# Patient Record
Sex: FEMALE | Race: WHITE | NOT HISPANIC OR LATINO
[De-identification: names, ages, dates, MRNs, and addresses within clinical notes are randomized per-mention and may not be internally consistent; named-entity substitution may affect disease eponyms.]

---

## 2017-01-03 ENCOUNTER — RESULT CHARGE (OUTPATIENT)
Age: 82
End: 2017-01-03

## 2017-01-03 ENCOUNTER — APPOINTMENT (OUTPATIENT)
Dept: CARDIOLOGY | Facility: CLINIC | Age: 82
End: 2017-01-03

## 2017-01-03 VITALS — OXYGEN SATURATION: 91 % | HEART RATE: 59 BPM

## 2017-01-03 LAB
INR PPP: 1.9 RATIO
POCT-PROTHROMBIN TIME: 23 SECS
QUALITY CONTROL: YES

## 2017-01-08 ENCOUNTER — RX RENEWAL (OUTPATIENT)
Age: 82
End: 2017-01-08

## 2017-01-10 ENCOUNTER — APPOINTMENT (OUTPATIENT)
Dept: MAMMOGRAPHY | Facility: HOSPITAL | Age: 82
End: 2017-01-10

## 2017-01-10 ENCOUNTER — OUTPATIENT (OUTPATIENT)
Dept: OUTPATIENT SERVICES | Facility: HOSPITAL | Age: 82
LOS: 1 days | End: 2017-01-10
Payer: COMMERCIAL

## 2017-01-10 DIAGNOSIS — Z12.31 ENCOUNTER FOR SCREENING MAMMOGRAM FOR MALIGNANT NEOPLASM OF BREAST: ICD-10-CM

## 2017-01-10 PROCEDURE — G0202: CPT | Mod: 26,52,LT

## 2017-01-10 PROCEDURE — 77067 SCR MAMMO BI INCL CAD: CPT

## 2017-01-10 PROCEDURE — 77063 BREAST TOMOSYNTHESIS BI: CPT

## 2017-01-10 PROCEDURE — 77063 BREAST TOMOSYNTHESIS BI: CPT | Mod: 26,52

## 2017-01-19 ENCOUNTER — APPOINTMENT (OUTPATIENT)
Dept: CARDIOLOGY | Facility: CLINIC | Age: 82
End: 2017-01-19

## 2017-01-19 VITALS — DIASTOLIC BLOOD PRESSURE: 90 MMHG | OXYGEN SATURATION: 93 % | HEART RATE: 68 BPM | SYSTOLIC BLOOD PRESSURE: 124 MMHG

## 2017-01-20 LAB
INR PPP: 1.9 RATIO
POCT-PROTHROMBIN TIME: 22.7 SECS
QUALITY CONTROL: YES

## 2017-01-30 ENCOUNTER — APPOINTMENT (OUTPATIENT)
Dept: CARDIOLOGY | Facility: CLINIC | Age: 82
End: 2017-01-30

## 2017-02-07 ENCOUNTER — APPOINTMENT (OUTPATIENT)
Dept: CARDIOLOGY | Facility: CLINIC | Age: 82
End: 2017-02-07

## 2017-02-07 VITALS — DIASTOLIC BLOOD PRESSURE: 56 MMHG | HEART RATE: 76 BPM | SYSTOLIC BLOOD PRESSURE: 124 MMHG | OXYGEN SATURATION: 90 %

## 2017-02-07 LAB
INR PPP: 2.4 RATIO
POCT-PROTHROMBIN TIME: 28.9 SECS
QUALITY CONTROL: YES

## 2017-02-16 ENCOUNTER — APPOINTMENT (OUTPATIENT)
Dept: CARDIOLOGY | Facility: CLINIC | Age: 82
End: 2017-02-16

## 2017-02-16 ENCOUNTER — NON-APPOINTMENT (OUTPATIENT)
Age: 82
End: 2017-02-16

## 2017-02-16 VITALS — SYSTOLIC BLOOD PRESSURE: 134 MMHG | HEART RATE: 80 BPM | DIASTOLIC BLOOD PRESSURE: 80 MMHG

## 2017-02-16 VITALS
WEIGHT: 223 LBS | HEART RATE: 75 BPM | OXYGEN SATURATION: 97 % | HEIGHT: 59 IN | SYSTOLIC BLOOD PRESSURE: 164 MMHG | BODY MASS INDEX: 44.96 KG/M2 | DIASTOLIC BLOOD PRESSURE: 107 MMHG | RESPIRATION RATE: 15 BRPM

## 2017-02-17 LAB
INR PPP: 2.8 RATIO
POCT-PROTHROMBIN TIME: 33.7 SECS
QUALITY CONTROL: YES

## 2017-02-21 ENCOUNTER — RX RENEWAL (OUTPATIENT)
Age: 82
End: 2017-02-21

## 2017-02-23 ENCOUNTER — APPOINTMENT (OUTPATIENT)
Dept: INTERNAL MEDICINE | Facility: CLINIC | Age: 82
End: 2017-02-23
Payer: COMMERCIAL

## 2017-02-23 VITALS
TEMPERATURE: 98.4 F | HEIGHT: 59 IN | RESPIRATION RATE: 14 BRPM | DIASTOLIC BLOOD PRESSURE: 78 MMHG | BODY MASS INDEX: 44.55 KG/M2 | HEART RATE: 76 BPM | WEIGHT: 221 LBS | SYSTOLIC BLOOD PRESSURE: 128 MMHG

## 2017-02-23 PROCEDURE — 99215 OFFICE O/P EST HI 40 MIN: CPT

## 2017-02-28 ENCOUNTER — APPOINTMENT (OUTPATIENT)
Dept: CARDIOLOGY | Facility: CLINIC | Age: 82
End: 2017-02-28

## 2017-03-09 ENCOUNTER — APPOINTMENT (OUTPATIENT)
Dept: CARDIOLOGY | Facility: CLINIC | Age: 82
End: 2017-03-09

## 2017-03-09 LAB
INR PPP: 2.1 RATIO
POCT-PROTHROMBIN TIME: 24.9 SECS
QUALITY CONTROL: YES

## 2017-03-30 ENCOUNTER — RX RENEWAL (OUTPATIENT)
Age: 82
End: 2017-03-30

## 2017-03-30 ENCOUNTER — APPOINTMENT (OUTPATIENT)
Dept: CARDIOLOGY | Facility: CLINIC | Age: 82
End: 2017-03-30

## 2017-03-30 VITALS — DIASTOLIC BLOOD PRESSURE: 74 MMHG | HEART RATE: 65 BPM | SYSTOLIC BLOOD PRESSURE: 140 MMHG

## 2017-03-30 LAB
INR PPP: 3 RATIO
POCT-PROTHROMBIN TIME: 36 SECS
QUALITY CONTROL: YES

## 2017-04-08 ENCOUNTER — RX RENEWAL (OUTPATIENT)
Age: 82
End: 2017-04-08

## 2017-04-14 ENCOUNTER — RX RENEWAL (OUTPATIENT)
Age: 82
End: 2017-04-14

## 2017-04-24 ENCOUNTER — RX RENEWAL (OUTPATIENT)
Age: 82
End: 2017-04-24

## 2017-04-27 ENCOUNTER — APPOINTMENT (OUTPATIENT)
Dept: CARDIOLOGY | Facility: CLINIC | Age: 82
End: 2017-04-27

## 2017-04-27 VITALS — OXYGEN SATURATION: 94 % | HEART RATE: 66 BPM

## 2017-04-27 LAB
INR PPP: 2.4 RATIO
POCT-PROTHROMBIN TIME: 29.3 SECS
QUALITY CONTROL: YES

## 2017-05-01 ENCOUNTER — RX RENEWAL (OUTPATIENT)
Age: 82
End: 2017-05-01

## 2017-05-10 ENCOUNTER — RX RENEWAL (OUTPATIENT)
Age: 82
End: 2017-05-10

## 2017-05-18 ENCOUNTER — APPOINTMENT (OUTPATIENT)
Dept: CARDIOLOGY | Facility: CLINIC | Age: 82
End: 2017-05-18

## 2017-05-18 VITALS — SYSTOLIC BLOOD PRESSURE: 137 MMHG | DIASTOLIC BLOOD PRESSURE: 77 MMHG | HEART RATE: 59 BPM | OXYGEN SATURATION: 91 %

## 2017-05-18 LAB
INR PPP: 1.5 RATIO
POCT-PROTHROMBIN TIME: 18.5 SECS
QUALITY CONTROL: YES

## 2017-05-24 ENCOUNTER — APPOINTMENT (OUTPATIENT)
Dept: CARDIOLOGY | Facility: CLINIC | Age: 82
End: 2017-05-24

## 2017-05-24 VITALS — OXYGEN SATURATION: 94 % | HEART RATE: 61 BPM

## 2017-05-24 LAB
INR PPP: 2.8 RATIO
POCT-PROTHROMBIN TIME: 33.2 SECS
QUALITY CONTROL: YES

## 2017-05-25 ENCOUNTER — LABORATORY RESULT (OUTPATIENT)
Age: 82
End: 2017-05-25

## 2017-05-25 ENCOUNTER — APPOINTMENT (OUTPATIENT)
Dept: INTERNAL MEDICINE | Facility: CLINIC | Age: 82
End: 2017-05-25

## 2017-05-25 VITALS
HEART RATE: 64 BPM | BODY MASS INDEX: 44.76 KG/M2 | HEIGHT: 59 IN | DIASTOLIC BLOOD PRESSURE: 74 MMHG | SYSTOLIC BLOOD PRESSURE: 132 MMHG | RESPIRATION RATE: 14 BRPM | WEIGHT: 222 LBS

## 2017-05-25 RX ORDER — MECLIZINE HYDROCHLORIDE 12.5 MG/1
12.5 TABLET ORAL
Qty: 30 | Refills: 0 | Status: DISCONTINUED | COMMUNITY
Start: 2017-01-08 | End: 2017-05-25

## 2017-06-13 ENCOUNTER — RESULT CHARGE (OUTPATIENT)
Age: 82
End: 2017-06-13

## 2017-06-14 ENCOUNTER — NON-APPOINTMENT (OUTPATIENT)
Age: 82
End: 2017-06-14

## 2017-06-14 ENCOUNTER — APPOINTMENT (OUTPATIENT)
Dept: CARDIOLOGY | Facility: CLINIC | Age: 82
End: 2017-06-14

## 2017-06-14 VITALS
OXYGEN SATURATION: 96 % | SYSTOLIC BLOOD PRESSURE: 156 MMHG | HEIGHT: 59 IN | HEART RATE: 75 BPM | BODY MASS INDEX: 43.34 KG/M2 | WEIGHT: 215 LBS | DIASTOLIC BLOOD PRESSURE: 104 MMHG | RESPIRATION RATE: 15 BRPM

## 2017-06-14 VITALS — DIASTOLIC BLOOD PRESSURE: 80 MMHG | SYSTOLIC BLOOD PRESSURE: 120 MMHG | HEART RATE: 76 BPM

## 2017-06-14 LAB
INR PPP: 3.5 RATIO
POCT-PROTHROMBIN TIME: 41.8 SECS
QUALITY CONTROL: YES

## 2017-06-27 ENCOUNTER — APPOINTMENT (OUTPATIENT)
Dept: CARDIOLOGY | Facility: CLINIC | Age: 82
End: 2017-06-27

## 2017-06-29 ENCOUNTER — APPOINTMENT (OUTPATIENT)
Dept: CARDIOLOGY | Facility: CLINIC | Age: 82
End: 2017-06-29
Payer: COMMERCIAL

## 2017-06-29 VITALS — OXYGEN SATURATION: 92 % | HEART RATE: 69 BPM

## 2017-06-29 LAB
INR PPP: 2.4 RATIO
POCT-PROTHROMBIN TIME: 28.8 SECS
QUALITY CONTROL: YES

## 2017-06-29 PROCEDURE — 85610 PROTHROMBIN TIME: CPT | Mod: QW

## 2017-06-29 PROCEDURE — 99211 OFF/OP EST MAY X REQ PHY/QHP: CPT

## 2017-07-01 ENCOUNTER — RX RENEWAL (OUTPATIENT)
Age: 82
End: 2017-07-01

## 2017-07-06 ENCOUNTER — MEDICATION RENEWAL (OUTPATIENT)
Age: 82
End: 2017-07-06

## 2017-07-18 ENCOUNTER — APPOINTMENT (OUTPATIENT)
Dept: CARDIOLOGY | Facility: CLINIC | Age: 82
End: 2017-07-18
Payer: COMMERCIAL

## 2017-07-18 LAB
INR PPP: 2.5 RATIO
POCT-PROTHROMBIN TIME: 30.5 SECS
QUALITY CONTROL: YES

## 2017-07-18 PROCEDURE — 99211 OFF/OP EST MAY X REQ PHY/QHP: CPT

## 2017-07-18 PROCEDURE — 85610 PROTHROMBIN TIME: CPT | Mod: QW

## 2017-08-08 ENCOUNTER — APPOINTMENT (OUTPATIENT)
Dept: CARDIOLOGY | Facility: CLINIC | Age: 82
End: 2017-08-08
Payer: COMMERCIAL

## 2017-08-08 VITALS — DIASTOLIC BLOOD PRESSURE: 85 MMHG | OXYGEN SATURATION: 96 % | SYSTOLIC BLOOD PRESSURE: 130 MMHG

## 2017-08-08 LAB
INR PPP: 2.8 RATIO
POCT-PROTHROMBIN TIME: 33.6 SECS
QUALITY CONTROL: YES

## 2017-08-08 PROCEDURE — 85610 PROTHROMBIN TIME: CPT | Mod: QW

## 2017-08-08 PROCEDURE — 99211 OFF/OP EST MAY X REQ PHY/QHP: CPT

## 2017-08-24 ENCOUNTER — APPOINTMENT (OUTPATIENT)
Dept: INTERNAL MEDICINE | Facility: CLINIC | Age: 82
End: 2017-08-24
Payer: COMMERCIAL

## 2017-08-24 VITALS
HEIGHT: 59 IN | SYSTOLIC BLOOD PRESSURE: 132 MMHG | WEIGHT: 216 LBS | DIASTOLIC BLOOD PRESSURE: 80 MMHG | BODY MASS INDEX: 43.55 KG/M2 | RESPIRATION RATE: 14 BRPM | HEART RATE: 70 BPM

## 2017-08-24 PROCEDURE — 99215 OFFICE O/P EST HI 40 MIN: CPT

## 2017-08-24 RX ORDER — METOPROLOL TARTRATE 25 MG/1
25 TABLET, FILM COATED ORAL
Refills: 0 | Status: DISCONTINUED | COMMUNITY
Start: 2017-08-24 | End: 2017-08-24

## 2017-08-29 ENCOUNTER — APPOINTMENT (OUTPATIENT)
Dept: CARDIOLOGY | Facility: CLINIC | Age: 82
End: 2017-08-29
Payer: COMMERCIAL

## 2017-08-29 LAB
INR PPP: 2.4 RATIO
POCT-PROTHROMBIN TIME: 29.1 SECS
QUALITY CONTROL: YES

## 2017-08-29 PROCEDURE — 85610 PROTHROMBIN TIME: CPT | Mod: QW

## 2017-08-29 PROCEDURE — 99211 OFF/OP EST MAY X REQ PHY/QHP: CPT

## 2017-09-26 ENCOUNTER — APPOINTMENT (OUTPATIENT)
Dept: CARDIOLOGY | Facility: CLINIC | Age: 82
End: 2017-09-26
Payer: COMMERCIAL

## 2017-09-26 VITALS — DIASTOLIC BLOOD PRESSURE: 67 MMHG | OXYGEN SATURATION: 96 % | SYSTOLIC BLOOD PRESSURE: 130 MMHG | HEART RATE: 59 BPM

## 2017-09-26 LAB
INR PPP: 2.6 RATIO
POCT-PROTHROMBIN TIME: 30.6 SECS
QUALITY CONTROL: YES

## 2017-09-26 PROCEDURE — 99211 OFF/OP EST MAY X REQ PHY/QHP: CPT

## 2017-09-26 PROCEDURE — 85610 PROTHROMBIN TIME: CPT | Mod: QW

## 2017-10-02 ENCOUNTER — RX RENEWAL (OUTPATIENT)
Age: 82
End: 2017-10-02

## 2017-10-03 ENCOUNTER — RX RENEWAL (OUTPATIENT)
Age: 82
End: 2017-10-03

## 2017-10-17 ENCOUNTER — APPOINTMENT (OUTPATIENT)
Dept: CARDIOLOGY | Facility: CLINIC | Age: 82
End: 2017-10-17
Payer: COMMERCIAL

## 2017-10-17 ENCOUNTER — NON-APPOINTMENT (OUTPATIENT)
Age: 82
End: 2017-10-17

## 2017-10-17 VITALS
WEIGHT: 219 LBS | HEIGHT: 59 IN | SYSTOLIC BLOOD PRESSURE: 124 MMHG | RESPIRATION RATE: 16 BRPM | BODY MASS INDEX: 44.15 KG/M2 | DIASTOLIC BLOOD PRESSURE: 86 MMHG | OXYGEN SATURATION: 94 % | HEART RATE: 64 BPM

## 2017-10-17 VITALS — DIASTOLIC BLOOD PRESSURE: 80 MMHG | SYSTOLIC BLOOD PRESSURE: 130 MMHG

## 2017-10-17 PROCEDURE — 99214 OFFICE O/P EST MOD 30 MIN: CPT

## 2017-10-17 PROCEDURE — 93000 ELECTROCARDIOGRAM COMPLETE: CPT

## 2017-10-17 RX ORDER — METOPROLOL TARTRATE 50 MG/1
50 TABLET, FILM COATED ORAL EVERY MORNING
Refills: 0 | Status: DISCONTINUED | COMMUNITY
Start: 2017-08-24 | End: 2017-10-17

## 2017-10-24 ENCOUNTER — APPOINTMENT (OUTPATIENT)
Dept: CARDIOLOGY | Facility: CLINIC | Age: 82
End: 2017-10-24
Payer: COMMERCIAL

## 2017-10-24 VITALS — OXYGEN SATURATION: 93 % | HEART RATE: 67 BPM | SYSTOLIC BLOOD PRESSURE: 145 MMHG | DIASTOLIC BLOOD PRESSURE: 84 MMHG

## 2017-10-24 LAB
INR PPP: 2.8 RATIO
POCT-PROTHROMBIN TIME: 34.1 SECS
QUALITY CONTROL: YES

## 2017-10-24 PROCEDURE — 85610 PROTHROMBIN TIME: CPT | Mod: QW

## 2017-10-24 PROCEDURE — 99211 OFF/OP EST MAY X REQ PHY/QHP: CPT

## 2017-11-03 ENCOUNTER — RX RENEWAL (OUTPATIENT)
Age: 82
End: 2017-11-03

## 2017-11-14 ENCOUNTER — NON-APPOINTMENT (OUTPATIENT)
Age: 82
End: 2017-11-14

## 2017-11-14 ENCOUNTER — APPOINTMENT (OUTPATIENT)
Dept: CARDIOLOGY | Facility: CLINIC | Age: 82
End: 2017-11-14
Payer: COMMERCIAL

## 2017-11-14 VITALS — DIASTOLIC BLOOD PRESSURE: 86 MMHG | OXYGEN SATURATION: 93 % | SYSTOLIC BLOOD PRESSURE: 137 MMHG | HEART RATE: 57 BPM

## 2017-11-14 VITALS
RESPIRATION RATE: 16 BRPM | DIASTOLIC BLOOD PRESSURE: 87 MMHG | SYSTOLIC BLOOD PRESSURE: 138 MMHG | HEIGHT: 59 IN | BODY MASS INDEX: 43.95 KG/M2 | HEART RATE: 68 BPM | WEIGHT: 218 LBS | OXYGEN SATURATION: 93 %

## 2017-11-14 VITALS — SYSTOLIC BLOOD PRESSURE: 108 MMHG | HEART RATE: 56 BPM | DIASTOLIC BLOOD PRESSURE: 80 MMHG

## 2017-11-14 LAB
INR PPP: 2.7 RATIO
POCT-PROTHROMBIN TIME: 32 SECS
QUALITY CONTROL: YES

## 2017-11-14 PROCEDURE — 93000 ELECTROCARDIOGRAM COMPLETE: CPT

## 2017-11-14 PROCEDURE — 99214 OFFICE O/P EST MOD 30 MIN: CPT

## 2017-11-14 PROCEDURE — 85610 PROTHROMBIN TIME: CPT | Mod: QW

## 2017-11-17 ENCOUNTER — RX RENEWAL (OUTPATIENT)
Age: 82
End: 2017-11-17

## 2017-11-21 ENCOUNTER — APPOINTMENT (OUTPATIENT)
Dept: INTERNAL MEDICINE | Facility: CLINIC | Age: 82
End: 2017-11-21
Payer: COMMERCIAL

## 2017-11-21 VITALS
TEMPERATURE: 98.4 F | WEIGHT: 210 LBS | BODY MASS INDEX: 42.33 KG/M2 | RESPIRATION RATE: 15 BRPM | DIASTOLIC BLOOD PRESSURE: 62 MMHG | SYSTOLIC BLOOD PRESSURE: 112 MMHG | HEIGHT: 59 IN | HEART RATE: 60 BPM

## 2017-11-21 DIAGNOSIS — Z23 ENCOUNTER FOR IMMUNIZATION: ICD-10-CM

## 2017-11-21 PROCEDURE — 90686 IIV4 VACC NO PRSV 0.5 ML IM: CPT

## 2017-11-21 PROCEDURE — G0008: CPT

## 2017-11-21 PROCEDURE — 99215 OFFICE O/P EST HI 40 MIN: CPT | Mod: 25

## 2017-12-12 ENCOUNTER — APPOINTMENT (OUTPATIENT)
Dept: CARDIOLOGY | Facility: CLINIC | Age: 82
End: 2017-12-12
Payer: COMMERCIAL

## 2017-12-12 VITALS — DIASTOLIC BLOOD PRESSURE: 84 MMHG | HEART RATE: 72 BPM | OXYGEN SATURATION: 91 % | SYSTOLIC BLOOD PRESSURE: 132 MMHG

## 2017-12-12 LAB
INR PPP: 2.4 RATIO
POCT-PROTHROMBIN TIME: 29.1 SECS
QUALITY CONTROL: YES

## 2017-12-12 PROCEDURE — 99211 OFF/OP EST MAY X REQ PHY/QHP: CPT

## 2017-12-12 PROCEDURE — 85610 PROTHROMBIN TIME: CPT | Mod: QW

## 2017-12-15 ENCOUNTER — MEDICATION RENEWAL (OUTPATIENT)
Age: 82
End: 2017-12-15

## 2017-12-26 ENCOUNTER — RX RENEWAL (OUTPATIENT)
Age: 82
End: 2017-12-26

## 2018-01-09 ENCOUNTER — APPOINTMENT (OUTPATIENT)
Dept: CARDIOLOGY | Facility: CLINIC | Age: 83
End: 2018-01-09
Payer: COMMERCIAL

## 2018-01-09 VITALS — OXYGEN SATURATION: 91 % | SYSTOLIC BLOOD PRESSURE: 143 MMHG | HEART RATE: 62 BPM | DIASTOLIC BLOOD PRESSURE: 80 MMHG

## 2018-01-09 LAB
INR PPP: 2.3 RATIO
POCT-PROTHROMBIN TIME: 27.2 SECS
QUALITY CONTROL: YES

## 2018-01-09 PROCEDURE — 99211 OFF/OP EST MAY X REQ PHY/QHP: CPT

## 2018-01-09 PROCEDURE — 85610 PROTHROMBIN TIME: CPT | Mod: QW

## 2018-01-18 ENCOUNTER — RX RENEWAL (OUTPATIENT)
Age: 83
End: 2018-01-18

## 2018-02-04 ENCOUNTER — RX RENEWAL (OUTPATIENT)
Age: 83
End: 2018-02-04

## 2018-02-05 ENCOUNTER — MEDICATION RENEWAL (OUTPATIENT)
Age: 83
End: 2018-02-05

## 2018-02-06 ENCOUNTER — APPOINTMENT (OUTPATIENT)
Dept: CARDIOLOGY | Facility: CLINIC | Age: 83
End: 2018-02-06
Payer: COMMERCIAL

## 2018-02-06 VITALS — OXYGEN SATURATION: 93 % | HEART RATE: 53 BPM

## 2018-02-06 LAB
INR PPP: 2.3 RATIO
POCT-PROTHROMBIN TIME: 27.2 SECS
QUALITY CONTROL: YES

## 2018-02-06 PROCEDURE — 85610 PROTHROMBIN TIME: CPT | Mod: QW

## 2018-02-06 PROCEDURE — 99211 OFF/OP EST MAY X REQ PHY/QHP: CPT

## 2018-02-13 ENCOUNTER — NON-APPOINTMENT (OUTPATIENT)
Age: 83
End: 2018-02-13

## 2018-02-13 ENCOUNTER — APPOINTMENT (OUTPATIENT)
Dept: CARDIOLOGY | Facility: CLINIC | Age: 83
End: 2018-02-13
Payer: COMMERCIAL

## 2018-02-13 VITALS — SYSTOLIC BLOOD PRESSURE: 130 MMHG | HEART RATE: 70 BPM | DIASTOLIC BLOOD PRESSURE: 70 MMHG

## 2018-02-13 VITALS
BODY MASS INDEX: 44.15 KG/M2 | RESPIRATION RATE: 16 BRPM | SYSTOLIC BLOOD PRESSURE: 137 MMHG | DIASTOLIC BLOOD PRESSURE: 86 MMHG | OXYGEN SATURATION: 94 % | WEIGHT: 219 LBS | HEART RATE: 70 BPM | HEIGHT: 59 IN

## 2018-02-13 PROCEDURE — 93000 ELECTROCARDIOGRAM COMPLETE: CPT

## 2018-02-13 PROCEDURE — 99215 OFFICE O/P EST HI 40 MIN: CPT

## 2018-02-20 ENCOUNTER — APPOINTMENT (OUTPATIENT)
Dept: INTERNAL MEDICINE | Facility: CLINIC | Age: 83
End: 2018-02-20
Payer: COMMERCIAL

## 2018-02-20 VITALS
WEIGHT: 210 LBS | SYSTOLIC BLOOD PRESSURE: 130 MMHG | HEIGHT: 59 IN | DIASTOLIC BLOOD PRESSURE: 72 MMHG | BODY MASS INDEX: 42.33 KG/M2 | RESPIRATION RATE: 15 BRPM | HEART RATE: 68 BPM

## 2018-02-20 PROCEDURE — 99215 OFFICE O/P EST HI 40 MIN: CPT

## 2018-02-22 ENCOUNTER — RX RENEWAL (OUTPATIENT)
Age: 83
End: 2018-02-22

## 2018-02-24 ENCOUNTER — RX RENEWAL (OUTPATIENT)
Age: 83
End: 2018-02-24

## 2018-03-06 ENCOUNTER — APPOINTMENT (OUTPATIENT)
Dept: CARDIOLOGY | Facility: CLINIC | Age: 83
End: 2018-03-06
Payer: COMMERCIAL

## 2018-03-06 VITALS — OXYGEN SATURATION: 92 % | HEART RATE: 54 BPM

## 2018-03-06 LAB
INR PPP: 3.3 RATIO
POCT-PROTHROMBIN TIME: 39.7 SECS
QUALITY CONTROL: YES

## 2018-03-06 PROCEDURE — 85610 PROTHROMBIN TIME: CPT | Mod: QW

## 2018-03-06 PROCEDURE — 99211 OFF/OP EST MAY X REQ PHY/QHP: CPT

## 2018-03-20 ENCOUNTER — APPOINTMENT (OUTPATIENT)
Dept: CARDIOLOGY | Facility: CLINIC | Age: 83
End: 2018-03-20
Payer: COMMERCIAL

## 2018-03-20 VITALS — RESPIRATION RATE: 14 BRPM | HEART RATE: 71 BPM

## 2018-03-20 LAB
INR PPP: 1.6 RATIO
POCT-PROTHROMBIN TIME: 19.5 SECS
QUALITY CONTROL: YES

## 2018-03-20 PROCEDURE — 99211 OFF/OP EST MAY X REQ PHY/QHP: CPT

## 2018-03-20 PROCEDURE — 85610 PROTHROMBIN TIME: CPT | Mod: QW

## 2018-04-03 ENCOUNTER — APPOINTMENT (OUTPATIENT)
Dept: CARDIOLOGY | Facility: CLINIC | Age: 83
End: 2018-04-03
Payer: COMMERCIAL

## 2018-04-03 VITALS — DIASTOLIC BLOOD PRESSURE: 72 MMHG | HEART RATE: 46 BPM | OXYGEN SATURATION: 89 % | SYSTOLIC BLOOD PRESSURE: 134 MMHG

## 2018-04-03 LAB
INR PPP: 2.1 RATIO
POCT-PROTHROMBIN TIME: 25.6 SECS
QUALITY CONTROL: YES

## 2018-04-03 PROCEDURE — 99211 OFF/OP EST MAY X REQ PHY/QHP: CPT

## 2018-04-03 PROCEDURE — 85610 PROTHROMBIN TIME: CPT | Mod: QW

## 2018-04-16 ENCOUNTER — MEDICATION RENEWAL (OUTPATIENT)
Age: 83
End: 2018-04-16

## 2018-04-18 ENCOUNTER — RX RENEWAL (OUTPATIENT)
Age: 83
End: 2018-04-18

## 2018-05-01 ENCOUNTER — APPOINTMENT (OUTPATIENT)
Dept: CARDIOLOGY | Facility: CLINIC | Age: 83
End: 2018-05-01
Payer: MEDICARE

## 2018-05-01 VITALS — OXYGEN SATURATION: 92 % | HEART RATE: 51 BPM

## 2018-05-01 LAB
INR PPP: 2.2 RATIO
POCT-PROTHROMBIN TIME: 26.9 SECS
QUALITY CONTROL: YES

## 2018-05-01 PROCEDURE — 99211 OFF/OP EST MAY X REQ PHY/QHP: CPT

## 2018-05-01 PROCEDURE — 85610 PROTHROMBIN TIME: CPT | Mod: QW

## 2018-05-08 ENCOUNTER — RX RENEWAL (OUTPATIENT)
Age: 83
End: 2018-05-08

## 2018-05-09 ENCOUNTER — NON-APPOINTMENT (OUTPATIENT)
Age: 83
End: 2018-05-09

## 2018-05-09 ENCOUNTER — APPOINTMENT (OUTPATIENT)
Dept: CARDIOLOGY | Facility: CLINIC | Age: 83
End: 2018-05-09
Payer: MEDICARE

## 2018-05-09 VITALS
SYSTOLIC BLOOD PRESSURE: 176 MMHG | OXYGEN SATURATION: 98 % | HEART RATE: 70 BPM | HEIGHT: 59 IN | WEIGHT: 215 LBS | RESPIRATION RATE: 16 BRPM | DIASTOLIC BLOOD PRESSURE: 75 MMHG | BODY MASS INDEX: 43.34 KG/M2

## 2018-05-09 VITALS — SYSTOLIC BLOOD PRESSURE: 130 MMHG | HEART RATE: 69 BPM | DIASTOLIC BLOOD PRESSURE: 80 MMHG

## 2018-05-09 PROCEDURE — 93306 TTE W/DOPPLER COMPLETE: CPT

## 2018-05-09 PROCEDURE — 99214 OFFICE O/P EST MOD 30 MIN: CPT

## 2018-05-09 PROCEDURE — 93000 ELECTROCARDIOGRAM COMPLETE: CPT

## 2018-05-15 ENCOUNTER — APPOINTMENT (OUTPATIENT)
Dept: INTERNAL MEDICINE | Facility: CLINIC | Age: 83
End: 2018-05-15

## 2018-05-21 LAB
25(OH)D3 SERPL-MCNC: 24.8 NG/ML
ALBUMIN SERPL ELPH-MCNC: 4.1 G/DL
ALP BLD-CCNC: 148 U/L
ALT SERPL-CCNC: 11 U/L
ANION GAP SERPL CALC-SCNC: 15 MMOL/L
APPEARANCE: CLEAR
AST SERPL-CCNC: 18 U/L
BASOPHILS # BLD AUTO: 0.01 K/UL
BASOPHILS NFR BLD AUTO: 0.2 %
BILIRUB SERPL-MCNC: 0.8 MG/DL
BILIRUBIN URINE: NEGATIVE
BLOOD URINE: NEGATIVE
BUN SERPL-MCNC: 21 MG/DL
CALCIUM SERPL-MCNC: 9.9 MG/DL
CHLORIDE SERPL-SCNC: 102 MMOL/L
CHOLEST SERPL-MCNC: 140 MG/DL
CHOLEST/HDLC SERPL: 2.7 RATIO
CO2 SERPL-SCNC: 26 MMOL/L
COLOR: YELLOW
CREAT SERPL-MCNC: 1.19 MG/DL
CRP SERPL HS-MCNC: 3.9 MG/L
EOSINOPHIL # BLD AUTO: 0.13 K/UL
EOSINOPHIL NFR BLD AUTO: 2 %
GLUCOSE BS SERPL-MCNC: 96 MG/DL
GLUCOSE QUALITATIVE U: NEGATIVE MG/DL
GLUCOSE SERPL-MCNC: 96 MG/DL
HBA1C MFR BLD HPLC: 5.8 %
HCT VFR BLD CALC: 40 %
HDLC SERPL-MCNC: 52 MG/DL
HGB BLD-MCNC: 12.6 G/DL
IMM GRANULOCYTES NFR BLD AUTO: 0.3 %
KETONES URINE: NEGATIVE
LDLC SERPL CALC-MCNC: 65 MG/DL
LEUKOCYTE ESTERASE URINE: NEGATIVE
LYMPHOCYTES # BLD AUTO: 0.92 K/UL
LYMPHOCYTES NFR BLD AUTO: 14.4 %
MAN DIFF?: NORMAL
MCHC RBC-ENTMCNC: 29.8 PG
MCHC RBC-ENTMCNC: 31.5 GM/DL
MCV RBC AUTO: 94.6 FL
MONOCYTES # BLD AUTO: 0.37 K/UL
MONOCYTES NFR BLD AUTO: 5.8 %
NEUTROPHILS # BLD AUTO: 4.92 K/UL
NEUTROPHILS NFR BLD AUTO: 77.3 %
NITRITE URINE: NEGATIVE
PH URINE: 5.5
PLATELET # BLD AUTO: 201 K/UL
POTASSIUM SERPL-SCNC: 3.9 MMOL/L
PROT SERPL-MCNC: 8.4 G/DL
PROTEIN URINE: NEGATIVE MG/DL
RBC # BLD: 4.23 M/UL
RBC # FLD: 15.4 %
SODIUM SERPL-SCNC: 143 MMOL/L
SPECIFIC GRAVITY URINE: 1.01
T4 FREE SERPL-MCNC: 1.1 NG/DL
TRIGL SERPL-MCNC: 114 MG/DL
TSH SERPL-ACNC: 3.32 UIU/ML
UROBILINOGEN URINE: NEGATIVE MG/DL
VIT B12 SERPL-MCNC: 351 PG/ML
WBC # FLD AUTO: 6.37 K/UL

## 2018-05-29 ENCOUNTER — APPOINTMENT (OUTPATIENT)
Dept: CARDIOLOGY | Facility: CLINIC | Age: 83
End: 2018-05-29
Payer: MEDICARE

## 2018-05-29 VITALS — OXYGEN SATURATION: 90 % | HEART RATE: 62 BPM | SYSTOLIC BLOOD PRESSURE: 138 MMHG | DIASTOLIC BLOOD PRESSURE: 79 MMHG

## 2018-05-29 LAB
INR PPP: 1.8 RATIO
POCT-PROTHROMBIN TIME: 22.1 SECS
QUALITY CONTROL: YES

## 2018-05-29 PROCEDURE — 85610 PROTHROMBIN TIME: CPT | Mod: QW

## 2018-05-29 PROCEDURE — 99211 OFF/OP EST MAY X REQ PHY/QHP: CPT

## 2018-05-30 ENCOUNTER — APPOINTMENT (OUTPATIENT)
Dept: MAMMOGRAPHY | Facility: HOSPITAL | Age: 83
End: 2018-05-30
Payer: MEDICARE

## 2018-05-30 ENCOUNTER — OUTPATIENT (OUTPATIENT)
Dept: OUTPATIENT SERVICES | Facility: HOSPITAL | Age: 83
LOS: 1 days | End: 2018-05-30
Payer: MEDICARE

## 2018-05-30 DIAGNOSIS — Z00.8 ENCOUNTER FOR OTHER GENERAL EXAMINATION: ICD-10-CM

## 2018-05-30 PROCEDURE — 77067 SCR MAMMO BI INCL CAD: CPT

## 2018-05-30 PROCEDURE — 77063 BREAST TOMOSYNTHESIS BI: CPT | Mod: 26,52

## 2018-05-30 PROCEDURE — 77063 BREAST TOMOSYNTHESIS BI: CPT

## 2018-05-30 PROCEDURE — 77067 SCR MAMMO BI INCL CAD: CPT | Mod: 26,52,LT

## 2018-06-05 ENCOUNTER — RX RENEWAL (OUTPATIENT)
Age: 83
End: 2018-06-05

## 2018-06-12 ENCOUNTER — APPOINTMENT (OUTPATIENT)
Dept: CARDIOLOGY | Facility: CLINIC | Age: 83
End: 2018-06-12
Payer: MEDICARE

## 2018-06-12 LAB
INR PPP: 2.6 RATIO
POCT-PROTHROMBIN TIME: 31.7 SECS
QUALITY CONTROL: YES

## 2018-06-12 PROCEDURE — 99211 OFF/OP EST MAY X REQ PHY/QHP: CPT

## 2018-06-12 PROCEDURE — 85610 PROTHROMBIN TIME: CPT | Mod: QW

## 2018-06-13 ENCOUNTER — APPOINTMENT (OUTPATIENT)
Dept: INTERNAL MEDICINE | Facility: CLINIC | Age: 83
End: 2018-06-13
Payer: MEDICARE

## 2018-06-13 VITALS — DIASTOLIC BLOOD PRESSURE: 82 MMHG | SYSTOLIC BLOOD PRESSURE: 128 MMHG | HEART RATE: 66 BPM | RESPIRATION RATE: 15 BRPM

## 2018-06-13 VITALS — BODY MASS INDEX: 42.94 KG/M2 | WEIGHT: 213 LBS | HEIGHT: 59 IN

## 2018-06-13 PROCEDURE — 99215 OFFICE O/P EST HI 40 MIN: CPT

## 2018-06-13 NOTE — HEALTH RISK ASSESSMENT
[No falls in past year] : Patient reported no falls in the past year [0] : 2) Feeling down, depressed, or hopeless: Not at all (0) [[id number]] : 0

## 2018-06-13 NOTE — PHYSICAL EXAM
[No Acute Distress] : no acute distress [Well Nourished] : well nourished [Well Developed] : well developed [Well-Appearing] : well-appearing [Normal Voice/Communication] : normal voice/communication [Normal Sclera/Conjunctiva] : normal sclera/conjunctiva [PERRL] : pupils equal round and reactive to light [EOMI] : extraocular movements intact [Normal Outer Ear/Nose] : the outer ears and nose were normal in appearance [Normal Oropharynx] : the oropharynx was normal [Normal TMs] : both tympanic membranes were normal [Normal Nasal Mucosa] : the nasal mucosa was normal [No JVD] : no jugular venous distention [Supple] : supple [No Lymphadenopathy] : no lymphadenopathy [Thyroid Normal, No Nodules] : the thyroid was normal and there were no nodules present [No Respiratory Distress] : no respiratory distress  [Clear to Auscultation] : lungs were clear to auscultation bilaterally [No Accessory Muscle Use] : no accessory muscle use [Normal Percussion] : the chest was normal to percussion [Normal Rate] : normal rate  [Regular Rhythm] : with a regular rhythm [Normal S1, S2] : normal S1 and S2 [No Murmur] : no murmur heard [No Carotid Bruits] : no carotid bruits [No Abdominal Bruit] : a ~M bruit was not heard ~T in the abdomen [No Varicosities] : no varicosities [Pedal Pulses Present] : the pedal pulses are present [No Edema] : there was no peripheral edema [No Extremity Clubbing/Cyanosis] : no extremity clubbing/cyanosis [No Palpable Aorta] : no palpable aorta [Declined Breast Exam] : declined breast exam  [Soft] : abdomen soft [Non Tender] : non-tender [Non-distended] : non-distended [No Masses] : no abdominal mass palpated [No HSM] : no HSM [Normal Bowel Sounds] : normal bowel sounds [No Hernias] : no hernias [Declined Rectal Exam] : declined rectal exam [Normal Supraclavicular Nodes] : no supraclavicular lymphadenopathy [Normal Axillary Nodes] : no axillary lymphadenopathy [Normal Posterior Cervical Nodes] : no posterior cervical lymphadenopathy [Normal Femoral Nodes] : no femoral lymphadenopathy [Normal Anterior Cervical Nodes] : no anterior cervical lymphadenopathy [Normal Inguinal Nodes] : no inguinal lymphadenopathy [No CVA Tenderness] : no CVA  tenderness [No Spinal Tenderness] : no spinal tenderness [No Joint Swelling] : no joint swelling [Grossly Normal Strength/Tone] : grossly normal strength/tone [No Rash] : no rash [No Skin Lesions] : no skin lesions [Normal Gait] : normal gait [Coordination Grossly Intact] : coordination grossly intact [No Focal Deficits] : no focal deficits [Deep Tendon Reflexes (DTR)] : deep tendon reflexes were 2+ and symmetric [Speech Grossly Normal] : speech grossly normal [Memory Grossly Normal] : memory grossly normal [Normal Affect] : the affect was normal [Normal Mood] : the mood was normal [Normal Insight/Judgement] : insight and judgment were intact [Kyphosis] : no kyphosis [Scoliosis] : no scoliosis [Acne] : no acne

## 2018-06-13 NOTE — REVIEW OF SYSTEMS
[Fever] : no fever [Chills] : no chills [Fatigue] : no fatigue [Hot Flashes] : no hot flashes [Night Sweats] : no night sweats [Recent Change In Weight] : ~T no recent weight change [Discharge] : no discharge [Pain] : no pain [Redness] : no redness [Dryness] : no dryness  [Vision Problems] : no vision problems [Itching] : no itching [Earache] : no earache [Hearing Loss] : no hearing loss [Nosebleed] : no nosebleeds [Hoarseness] : no hoarseness [Nasal Discharge] : no nasal discharge [Sore Throat] : no sore throat [Postnasal Drip] : no postnasal drip [Chest Pain] : no chest pain [Palpitations] : no palpitations [Leg Claudication] : no leg claudication [Orthopnea] : no orthopnea [Paroysmal Nocturnal Dyspnea] : no paroysmal nocturnal dyspnea [Shortness Of Breath] : no shortness of breath [Wheezing] : no wheezing [Cough] : no cough [Dyspnea on Exertion] : no dyspnea on exertion [Abdominal Pain] : no abdominal pain [Nausea] : no nausea [Constipation] : no constipation [Diarrhea] : diarrhea [Vomiting] : no vomiting [Heartburn] : no heartburn [Melena] : no melena [Dysuria] : no dysuria [Incontinence] : no incontinence [Nocturia] : nocturia [Poor Libido] : libido not poor [Hematuria] : no hematuria [Frequency] : frequency [Vaginal Discharge] : no vaginal discharge [Dysmenorrhea] : no dysmenorrhea [Joint Pain] : no joint pain [Joint Stiffness] : joint stiffness [Joint Swelling] : no joint swelling [Muscle Weakness] : no muscle weakness [Muscle Pain] : no muscle pain [Back Pain] : back pain [Mole Changes] : no mole changes [Nail Changes] : no nail changes [Hair Changes] : no hair changes [Skin Rash] : no skin rash [Headache] : no headache [Dizziness] : no dizziness [Fainting] : no fainting [Confusion] : no confusion [Memory Loss] : no memory loss [Unsteady Walking] : no ataxia [Suicidal] : not suicidal [Insomnia] : no insomnia [Anxiety] : no anxiety [Depression] : no depression [Easy Bleeding] : no easy bleeding [Easy Bruising] : no easy bruising [Swollen Glands] : no swollen glands [Negative] : Heme/Lymph

## 2018-06-13 NOTE — HISTORY OF PRESENT ILLNESS
[FreeTextEntry1] : Comes to the office for followup to review medications discuss her overall health...Her INRs are being followed by Dr. Wesbter.Her last complete blood tests were in May of 2018 [de-identified] : -83year-old woman comes to the office for followup with a history of atrial fibrillation chronic allergies hypertension gout hyperlipidemia hypothyroidism chronic anticoagulation left bundle branch block obesity. She has been feeling fatigued denies temperature chills sweats or myalgias he has had no chest pain shortness of breath exertional dyspnea palpitations lightheadedness vertigo dizziness or syncope she denies abdominal pain nausea vomiting diarrhea constipation rectal bleeding she has seen no black stools she has chronic pains her back occasionally knees and hips but nothing severe her appetite has been good and her weight has been high and stable

## 2018-06-13 NOTE — ASSESSMENT
[FreeTextEntry1] : Physical exam shows a well-developed female in no acute distress her blood pressure was 128/82 her weight was 213 pounds 4 foot 11 inches her BMI was 43.0... her heart rate was 66 and her respiratory rate was 15. HEENT was unremarkable chest was clear cardiovascular exam was regular abdomen was soft and nontender extremities showed no clubbing cyanosis or edema neurologic exam was nonfocal...... blood tests from May were reviewed again a slip was given for shingles vaccine is up-to-date on all other vaccines she is up-to-date on her ophthalmologist and dermatologist and I have reinforced her need to go to a gynecologist for an exam of her uterus and ovaries on discussion with her about her weight and if her quality of life she is improved tremendously but losing some weight I gave her organization names and ideas as to how to achieve that she'll ever since says that she doesn't know if she has the strength or energy to do it.

## 2018-06-14 DIAGNOSIS — Z12.31 ENCOUNTER FOR SCREENING MAMMOGRAM FOR MALIGNANT NEOPLASM OF BREAST: ICD-10-CM

## 2018-07-03 ENCOUNTER — APPOINTMENT (OUTPATIENT)
Dept: CARDIOLOGY | Facility: CLINIC | Age: 83
End: 2018-07-03
Payer: MEDICARE

## 2018-07-03 VITALS — SYSTOLIC BLOOD PRESSURE: 143 MMHG | DIASTOLIC BLOOD PRESSURE: 78 MMHG | OXYGEN SATURATION: 94 % | HEART RATE: 58 BPM

## 2018-07-03 LAB
INR PPP: 2.4 RATIO
POCT-PROTHROMBIN TIME: 29.3 SECS
QUALITY CONTROL: YES

## 2018-07-03 PROCEDURE — 93793 ANTICOAG MGMT PT WARFARIN: CPT

## 2018-07-03 PROCEDURE — 85610 PROTHROMBIN TIME: CPT | Mod: QW

## 2018-07-05 ENCOUNTER — RX RENEWAL (OUTPATIENT)
Age: 83
End: 2018-07-05

## 2018-07-27 ENCOUNTER — MEDICATION RENEWAL (OUTPATIENT)
Age: 83
End: 2018-07-27

## 2018-07-31 ENCOUNTER — APPOINTMENT (OUTPATIENT)
Dept: CARDIOLOGY | Facility: CLINIC | Age: 83
End: 2018-07-31
Payer: MEDICARE

## 2018-07-31 VITALS — HEART RATE: 57 BPM | OXYGEN SATURATION: 96 %

## 2018-07-31 LAB
INR PPP: 2.5 RATIO
POCT-PROTHROMBIN TIME: 30.1 SECS
QUALITY CONTROL: YES

## 2018-07-31 PROCEDURE — 93793 ANTICOAG MGMT PT WARFARIN: CPT

## 2018-07-31 PROCEDURE — 85610 PROTHROMBIN TIME: CPT | Mod: QW

## 2018-08-02 ENCOUNTER — RX RENEWAL (OUTPATIENT)
Age: 83
End: 2018-08-02

## 2018-08-09 ENCOUNTER — RX RENEWAL (OUTPATIENT)
Age: 83
End: 2018-08-09

## 2018-08-14 ENCOUNTER — APPOINTMENT (OUTPATIENT)
Dept: CARDIOLOGY | Facility: CLINIC | Age: 83
End: 2018-08-14
Payer: MEDICARE

## 2018-08-14 VITALS — OXYGEN SATURATION: 94 % | HEART RATE: 56 BPM

## 2018-08-14 LAB
INR PPP: 2.9 RATIO
POCT-PROTHROMBIN TIME: 35 SECS
QUALITY CONTROL: YES

## 2018-08-14 PROCEDURE — 93793 ANTICOAG MGMT PT WARFARIN: CPT

## 2018-08-14 PROCEDURE — 85610 PROTHROMBIN TIME: CPT | Mod: QW

## 2018-09-12 ENCOUNTER — NON-APPOINTMENT (OUTPATIENT)
Age: 83
End: 2018-09-12

## 2018-09-12 ENCOUNTER — APPOINTMENT (OUTPATIENT)
Dept: CARDIOLOGY | Facility: CLINIC | Age: 83
End: 2018-09-12
Payer: MEDICARE

## 2018-09-12 VITALS
DIASTOLIC BLOOD PRESSURE: 106 MMHG | HEIGHT: 55 IN | BODY MASS INDEX: 49.3 KG/M2 | SYSTOLIC BLOOD PRESSURE: 178 MMHG | WEIGHT: 213 LBS | OXYGEN SATURATION: 96 % | HEART RATE: 70 BPM

## 2018-09-12 VITALS — DIASTOLIC BLOOD PRESSURE: 80 MMHG | SYSTOLIC BLOOD PRESSURE: 140 MMHG

## 2018-09-12 VITALS — HEART RATE: 60 BPM | DIASTOLIC BLOOD PRESSURE: 80 MMHG | SYSTOLIC BLOOD PRESSURE: 150 MMHG

## 2018-09-12 LAB
INR PPP: 2.7 RATIO
POCT-PROTHROMBIN TIME: 32.5 SECS
QUALITY CONTROL: YES

## 2018-09-12 PROCEDURE — 99214 OFFICE O/P EST MOD 30 MIN: CPT

## 2018-09-12 PROCEDURE — 93000 ELECTROCARDIOGRAM COMPLETE: CPT

## 2018-09-12 PROCEDURE — 85610 PROTHROMBIN TIME: CPT | Mod: QW

## 2018-10-11 ENCOUNTER — APPOINTMENT (OUTPATIENT)
Dept: CARDIOLOGY | Facility: CLINIC | Age: 83
End: 2018-10-11
Payer: MEDICARE

## 2018-10-11 VITALS — HEART RATE: 58 BPM | DIASTOLIC BLOOD PRESSURE: 72 MMHG | SYSTOLIC BLOOD PRESSURE: 132 MMHG

## 2018-10-11 LAB
INR PPP: 2.6 RATIO
POCT-PROTHROMBIN TIME: 31.1 SECS
QUALITY CONTROL: YES

## 2018-10-11 PROCEDURE — 85610 PROTHROMBIN TIME: CPT | Mod: QW

## 2018-10-11 PROCEDURE — 93793 ANTICOAG MGMT PT WARFARIN: CPT

## 2018-10-17 ENCOUNTER — APPOINTMENT (OUTPATIENT)
Dept: INTERNAL MEDICINE | Facility: CLINIC | Age: 83
End: 2018-10-17
Payer: MEDICARE

## 2018-10-17 VITALS
HEIGHT: 59 IN | OXYGEN SATURATION: 97 % | DIASTOLIC BLOOD PRESSURE: 62 MMHG | TEMPERATURE: 97.7 F | HEART RATE: 72 BPM | BODY MASS INDEX: 42.33 KG/M2 | SYSTOLIC BLOOD PRESSURE: 155 MMHG | RESPIRATION RATE: 16 BRPM | WEIGHT: 210 LBS

## 2018-10-17 DIAGNOSIS — M10.9 GOUT, UNSPECIFIED: ICD-10-CM

## 2018-10-17 PROCEDURE — G0008: CPT

## 2018-10-17 PROCEDURE — 99215 OFFICE O/P EST HI 40 MIN: CPT | Mod: 25

## 2018-10-17 PROCEDURE — 90662 IIV NO PRSV INCREASED AG IM: CPT

## 2018-10-17 NOTE — PHYSICAL EXAM
[No Acute Distress] : no acute distress [Well Nourished] : well nourished [Well Developed] : well developed [Well-Appearing] : well-appearing [Normal Voice/Communication] : normal voice/communication [Normal Sclera/Conjunctiva] : normal sclera/conjunctiva [PERRL] : pupils equal round and reactive to light [EOMI] : extraocular movements intact [Normal Outer Ear/Nose] : the outer ears and nose were normal in appearance [Normal Oropharynx] : the oropharynx was normal [Normal TMs] : both tympanic membranes were normal [Normal Nasal Mucosa] : the nasal mucosa was normal [No JVD] : no jugular venous distention [Supple] : supple [No Lymphadenopathy] : no lymphadenopathy [Thyroid Normal, No Nodules] : the thyroid was normal and there were no nodules present [No Respiratory Distress] : no respiratory distress  [Clear to Auscultation] : lungs were clear to auscultation bilaterally [No Accessory Muscle Use] : no accessory muscle use [Normal Percussion] : the chest was normal to percussion [Normal Rate] : normal rate  [Regular Rhythm] : with a regular rhythm [Normal S1, S2] : normal S1 and S2 [No Murmur] : no murmur heard [No Carotid Bruits] : no carotid bruits [No Abdominal Bruit] : a ~M bruit was not heard ~T in the abdomen [No Varicosities] : no varicosities [Pedal Pulses Present] : the pedal pulses are present [No Edema] : there was no peripheral edema [No Extremity Clubbing/Cyanosis] : no extremity clubbing/cyanosis [No Palpable Aorta] : no palpable aorta [Declined Breast Exam] : declined breast exam  [Soft] : abdomen soft [Non Tender] : non-tender [Non-distended] : non-distended [No Masses] : no abdominal mass palpated [No HSM] : no HSM [Normal Bowel Sounds] : normal bowel sounds [No Hernias] : no hernias [Declined Rectal Exam] : declined rectal exam [Normal Supraclavicular Nodes] : no supraclavicular lymphadenopathy [Normal Axillary Nodes] : no axillary lymphadenopathy [Normal Posterior Cervical Nodes] : no posterior cervical lymphadenopathy [Normal Femoral Nodes] : no femoral lymphadenopathy [Normal Anterior Cervical Nodes] : no anterior cervical lymphadenopathy [Normal Inguinal Nodes] : no inguinal lymphadenopathy [No CVA Tenderness] : no CVA  tenderness [No Spinal Tenderness] : no spinal tenderness [Kyphosis] : no kyphosis [Scoliosis] : no scoliosis [No Joint Swelling] : no joint swelling [Grossly Normal Strength/Tone] : grossly normal strength/tone [No Rash] : no rash [Normal Gait] : normal gait [Coordination Grossly Intact] : coordination grossly intact [No Focal Deficits] : no focal deficits [Deep Tendon Reflexes (DTR)] : deep tendon reflexes were 2+ and symmetric [Speech Grossly Normal] : speech grossly normal [Memory Grossly Normal] : memory grossly normal [Normal Affect] : the affect was normal [Alert and Oriented x3] : oriented to person, place, and time [Normal Mood] : the mood was normal [Normal Insight/Judgement] : insight and judgment were intact

## 2018-10-17 NOTE — HISTORY OF PRESENT ILLNESS
[Family Member] : family member [FreeTextEntry1] : Comes to the office for followup company by her daughter to review her medications discuss her overall health recent problems with lower back pain and memory [de-identified] : 83-year-old woman comes to the office accompanied by her daughter with a history of hypertension atrial fibrillation hyperlipidemia hypothyroidism gout and chronic back pain 4 followup and to receive the influenza vaccine they bring up an issue with her memory that they have noticed the past months. She has been overloaded emotionally recently because of a hospitalization of her  she also has been housebound because of her bad back and has not been having a lot of social interaction she denies temperature chills sweats or myalgias she said no headache sinus congestion sore throat cough wheezing chest pain pleurisy shortness of breath exertional dyspnea lightheadedness palpitations dizziness vertigo or syncope she denies abdominal pain nausea vomiting diarrhea constipation bright red blood per rectum or rectal bleeding . Her eating habits have been sporadic and her weight however is stable

## 2018-10-17 NOTE — ASSESSMENT
[FreeTextEntry1] : Physical exam shows a well-developed female in no acute distress blood pressure was 155/62 repeated 138/68 height is 4 foot 11 weight 210 pounds heart rate is 72 respiratory rate of 16 HEENT was unremarkable chest was clear cardiovascular exam normal S1 and S2 with no extra heart sounds or murmurs abdomen was soft extremities showed chronic left greater than right lower extremity edema neurologic exam was nonfocal high-dose influenza vaccine was administered long discussion with the patient concerning her memory was referred to Dr. Armstrong neurologist for followup valuation I have suggested that she do memory puzzles and games every morning and to get involves socializing with people whether this is [facility] etc. he has been under a lot of stress recently because of the health of her  full blood tests have recently been done they will be forwarded to Dr. Armstrong she is up-to-date with the ophthalmologist and dermatologist

## 2018-10-17 NOTE — HEALTH RISK ASSESSMENT
[No falls in past year] : Patient reported no falls in the past year [0] : 2) Feeling down, depressed, or hopeless: Not at all (0) [[id number]] : 0

## 2018-10-17 NOTE — REVIEW OF SYSTEMS
[Negative] : Heme/Lymph [Fever] : no fever [Chills] : no chills [Fatigue] : no fatigue [Hot Flashes] : no hot flashes [Night Sweats] : no night sweats [Recent Change In Weight] : ~T no recent weight change [Discharge] : no discharge [Pain] : no pain [Redness] : no redness [Dryness] : no dryness  [Vision Problems] : no vision problems [Itching] : no itching [Earache] : no earache [Hearing Loss] : no hearing loss [Nosebleed] : no nosebleeds [Hoarseness] : no hoarseness [Nasal Discharge] : no nasal discharge [Sore Throat] : no sore throat [Postnasal Drip] : postnasal drip [Chest Pain] : no chest pain [Palpitations] : no palpitations [Leg Claudication] : no leg claudication [Lower Ext Edema] : no lower extremity edema [Orthopnea] : no orthopnea [Paroysmal Nocturnal Dyspnea] : no paroysmal nocturnal dyspnea [Shortness Of Breath] : no shortness of breath [Wheezing] : no wheezing [Cough] : no cough [Dyspnea on Exertion] : no dyspnea on exertion [Abdominal Pain] : no abdominal pain [Nausea] : no nausea [Constipation] : no constipation [Diarrhea] : diarrhea [Vomiting] : no vomiting [Heartburn] : no heartburn [Melena] : no melena [Dysuria] : no dysuria [Incontinence] : no incontinence [Nocturia] : no nocturia [Poor Libido] : libido not poor [Hematuria] : no hematuria [Frequency] : no frequency [Vaginal Discharge] : no vaginal discharge [Dysmenorrhea] : no dysmenorrhea [Joint Pain] : no joint pain [Joint Stiffness] : no joint stiffness [Joint Swelling] : no joint swelling [Muscle Weakness] : no muscle weakness [Muscle Pain] : no muscle pain [Back Pain] : no back pain [Itching] : no itching [Mole Changes] : no mole changes [Nail Changes] : no nail changes [Hair Changes] : no hair changes [Skin Rash] : no skin rash [Headache] : no headache [Dizziness] : no dizziness [Fainting] : no fainting [Confusion] : confusion [Memory Loss] : memory loss [Unsteady Walking] : no ataxia [Suicidal] : not suicidal [Insomnia] : no insomnia [Anxiety] : anxiety [Depression] : no depression [Easy Bleeding] : no easy bleeding [Easy Bruising] : no easy bruising [Swollen Glands] : no swollen glands

## 2018-10-21 ENCOUNTER — RX RENEWAL (OUTPATIENT)
Age: 83
End: 2018-10-21

## 2018-10-22 ENCOUNTER — MEDICATION RENEWAL (OUTPATIENT)
Age: 83
End: 2018-10-22

## 2018-10-25 ENCOUNTER — RX RENEWAL (OUTPATIENT)
Age: 83
End: 2018-10-25

## 2018-11-08 ENCOUNTER — APPOINTMENT (OUTPATIENT)
Dept: CARDIOLOGY | Facility: CLINIC | Age: 83
End: 2018-11-08
Payer: MEDICARE

## 2018-11-08 VITALS — OXYGEN SATURATION: 95 % | HEART RATE: 57 BPM

## 2018-11-08 LAB
INR PPP: 2.5 RATIO
POCT-PROTHROMBIN TIME: 30.5 SECS
QUALITY CONTROL: YES

## 2018-11-08 PROCEDURE — 93793 ANTICOAG MGMT PT WARFARIN: CPT

## 2018-11-08 PROCEDURE — 85610 PROTHROMBIN TIME: CPT | Mod: QW

## 2018-11-23 ENCOUNTER — RX RENEWAL (OUTPATIENT)
Age: 83
End: 2018-11-23

## 2018-12-06 ENCOUNTER — APPOINTMENT (OUTPATIENT)
Dept: CARDIOLOGY | Facility: CLINIC | Age: 83
End: 2018-12-06
Payer: MEDICARE

## 2018-12-06 VITALS — SYSTOLIC BLOOD PRESSURE: 129 MMHG | HEART RATE: 56 BPM | OXYGEN SATURATION: 96 % | DIASTOLIC BLOOD PRESSURE: 83 MMHG

## 2018-12-06 LAB
INR PPP: 2.3 RATIO
POCT-PROTHROMBIN TIME: 27.6 SECS
QUALITY CONTROL: YES

## 2018-12-06 PROCEDURE — G0250: CPT

## 2018-12-06 PROCEDURE — 85610 PROTHROMBIN TIME: CPT | Mod: QW

## 2018-12-19 ENCOUNTER — APPOINTMENT (OUTPATIENT)
Dept: MRI IMAGING | Facility: HOSPITAL | Age: 83
End: 2018-12-19
Payer: MEDICARE

## 2018-12-19 ENCOUNTER — OUTPATIENT (OUTPATIENT)
Dept: OUTPATIENT SERVICES | Facility: HOSPITAL | Age: 83
LOS: 1 days | End: 2018-12-19
Payer: MEDICARE

## 2018-12-19 DIAGNOSIS — Z00.8 ENCOUNTER FOR OTHER GENERAL EXAMINATION: ICD-10-CM

## 2018-12-19 PROCEDURE — 70551 MRI BRAIN STEM W/O DYE: CPT | Mod: 26

## 2018-12-19 PROCEDURE — 70544 MR ANGIOGRAPHY HEAD W/O DYE: CPT

## 2018-12-19 PROCEDURE — 70547 MR ANGIOGRAPHY NECK W/O DYE: CPT | Mod: 26

## 2018-12-19 PROCEDURE — 70547 MR ANGIOGRAPHY NECK W/O DYE: CPT

## 2018-12-19 PROCEDURE — 70544 MR ANGIOGRAPHY HEAD W/O DYE: CPT | Mod: 26,59

## 2018-12-19 PROCEDURE — 70551 MRI BRAIN STEM W/O DYE: CPT

## 2019-01-03 ENCOUNTER — APPOINTMENT (OUTPATIENT)
Dept: CARDIOLOGY | Facility: CLINIC | Age: 84
End: 2019-01-03
Payer: MEDICARE

## 2019-01-03 VITALS — SYSTOLIC BLOOD PRESSURE: 119 MMHG | HEART RATE: 54 BPM | DIASTOLIC BLOOD PRESSURE: 72 MMHG | OXYGEN SATURATION: 93 %

## 2019-01-03 LAB
INR PPP: 3 RATIO
POCT-PROTHROMBIN TIME: 31.9 SECS
QUALITY CONTROL: YES

## 2019-01-03 PROCEDURE — 93793 ANTICOAG MGMT PT WARFARIN: CPT

## 2019-01-03 PROCEDURE — 85610 PROTHROMBIN TIME: CPT | Mod: QW

## 2019-01-14 ENCOUNTER — MEDICATION RENEWAL (OUTPATIENT)
Age: 84
End: 2019-01-14

## 2019-01-15 ENCOUNTER — RX RENEWAL (OUTPATIENT)
Age: 84
End: 2019-01-15

## 2019-01-16 ENCOUNTER — RX RENEWAL (OUTPATIENT)
Age: 84
End: 2019-01-16

## 2019-01-24 ENCOUNTER — APPOINTMENT (OUTPATIENT)
Dept: CARDIOLOGY | Facility: CLINIC | Age: 84
End: 2019-01-24

## 2019-01-29 ENCOUNTER — RX RENEWAL (OUTPATIENT)
Age: 84
End: 2019-01-29

## 2019-02-03 ENCOUNTER — RX RENEWAL (OUTPATIENT)
Age: 84
End: 2019-02-03

## 2019-02-07 ENCOUNTER — APPOINTMENT (OUTPATIENT)
Dept: CARDIOLOGY | Facility: CLINIC | Age: 84
End: 2019-02-07
Payer: MEDICARE

## 2019-02-07 VITALS — HEART RATE: 67 BPM | OXYGEN SATURATION: 93 % | DIASTOLIC BLOOD PRESSURE: 79 MMHG | SYSTOLIC BLOOD PRESSURE: 144 MMHG

## 2019-02-07 LAB
INR PPP: 3 RATIO
POCT-PROTHROMBIN TIME: 39 SECS
QUALITY CONTROL: YES

## 2019-02-07 PROCEDURE — 99211 OFF/OP EST MAY X REQ PHY/QHP: CPT | Mod: 25

## 2019-02-07 PROCEDURE — 85610 PROTHROMBIN TIME: CPT | Mod: QW

## 2019-03-07 ENCOUNTER — APPOINTMENT (OUTPATIENT)
Dept: CARDIOLOGY | Facility: CLINIC | Age: 84
End: 2019-03-07
Payer: MEDICARE

## 2019-03-07 VITALS — OXYGEN SATURATION: 96 % | HEART RATE: 67 BPM

## 2019-03-07 LAB
INR PPP: 2.2 RATIO
POCT-PROTHROMBIN TIME: 25.9 SECS
QUALITY CONTROL: YES

## 2019-03-07 PROCEDURE — 85610 PROTHROMBIN TIME: CPT | Mod: QW

## 2019-03-07 PROCEDURE — 99211 OFF/OP EST MAY X REQ PHY/QHP: CPT | Mod: 25

## 2019-04-02 ENCOUNTER — APPOINTMENT (OUTPATIENT)
Dept: CARDIOLOGY | Facility: CLINIC | Age: 84
End: 2019-04-02
Payer: MEDICARE

## 2019-04-02 LAB
INR PPP: 4.1 RATIO
POCT-PROTHROMBIN TIME: 48.8 SECS
QUALITY CONTROL: YES

## 2019-04-02 PROCEDURE — 85610 PROTHROMBIN TIME: CPT | Mod: QW

## 2019-04-02 PROCEDURE — 99211 OFF/OP EST MAY X REQ PHY/QHP: CPT | Mod: 25

## 2019-04-16 ENCOUNTER — APPOINTMENT (OUTPATIENT)
Dept: CARDIOLOGY | Facility: CLINIC | Age: 84
End: 2019-04-16

## 2019-04-25 RX ORDER — CANDESARTAN CILEXETIL 8 MG/1
8 TABLET ORAL DAILY
Qty: 90 | Refills: 1 | Status: DISCONTINUED | COMMUNITY
Start: 2019-04-24 | End: 2019-04-25

## 2019-04-25 RX ORDER — IRBESARTAN 150 MG/1
150 TABLET ORAL DAILY
Qty: 90 | Refills: 3 | Status: DISCONTINUED | COMMUNITY
Start: 2018-07-25 | End: 2019-04-25

## 2019-05-01 ENCOUNTER — RX RENEWAL (OUTPATIENT)
Age: 84
End: 2019-05-01

## 2019-05-06 ENCOUNTER — RX RENEWAL (OUTPATIENT)
Age: 84
End: 2019-05-06

## 2019-05-08 ENCOUNTER — LABORATORY RESULT (OUTPATIENT)
Age: 84
End: 2019-05-08

## 2019-07-16 ENCOUNTER — RX RENEWAL (OUTPATIENT)
Age: 84
End: 2019-07-16

## 2019-07-19 ENCOUNTER — LABORATORY RESULT (OUTPATIENT)
Age: 84
End: 2019-07-19

## 2019-08-06 ENCOUNTER — RX RENEWAL (OUTPATIENT)
Age: 84
End: 2019-08-06

## 2019-08-08 ENCOUNTER — APPOINTMENT (OUTPATIENT)
Dept: CARDIOLOGY | Facility: CLINIC | Age: 84
End: 2019-08-08
Payer: MEDICARE

## 2019-08-08 ENCOUNTER — NON-APPOINTMENT (OUTPATIENT)
Age: 84
End: 2019-08-08

## 2019-08-08 ENCOUNTER — APPOINTMENT (OUTPATIENT)
Dept: INTERNAL MEDICINE | Facility: CLINIC | Age: 84
End: 2019-08-08
Payer: MEDICARE

## 2019-08-08 VITALS
RESPIRATION RATE: 16 BRPM | HEIGHT: 59 IN | BODY MASS INDEX: 39.72 KG/M2 | HEART RATE: 65 BPM | OXYGEN SATURATION: 93 % | SYSTOLIC BLOOD PRESSURE: 152 MMHG | WEIGHT: 197 LBS | DIASTOLIC BLOOD PRESSURE: 89 MMHG

## 2019-08-08 VITALS
HEART RATE: 72 BPM | HEIGHT: 59 IN | TEMPERATURE: 98 F | OXYGEN SATURATION: 96 % | SYSTOLIC BLOOD PRESSURE: 140 MMHG | DIASTOLIC BLOOD PRESSURE: 80 MMHG | RESPIRATION RATE: 17 BRPM | WEIGHT: 196 LBS | BODY MASS INDEX: 39.51 KG/M2

## 2019-08-08 VITALS — SYSTOLIC BLOOD PRESSURE: 132 MMHG | DIASTOLIC BLOOD PRESSURE: 80 MMHG | HEART RATE: 72 BPM

## 2019-08-08 DIAGNOSIS — J30.9 ALLERGIC RHINITIS, UNSPECIFIED: ICD-10-CM

## 2019-08-08 DIAGNOSIS — Z00.00 ENCOUNTER FOR GENERAL ADULT MEDICAL EXAMINATION W/OUT ABNORMAL FINDINGS: ICD-10-CM

## 2019-08-08 PROCEDURE — 93000 ELECTROCARDIOGRAM COMPLETE: CPT

## 2019-08-08 PROCEDURE — 99215 OFFICE O/P EST HI 40 MIN: CPT

## 2019-08-08 PROCEDURE — 99214 OFFICE O/P EST MOD 30 MIN: CPT

## 2019-08-08 NOTE — ASSESSMENT
[FreeTextEntry1] : Physical exam shows an elderly woman in no acute distress blood pressure 140/80 height 4 feet 11 weight 196 pounds BMI 39.59 pulse is 72 respirations 17 HEENT was unremarkable chest was clear cardiovascular exam was irregularly irregular abdomen was soft extremities showed trace bilateral edema neurologic exam was nonfocal family encouraging use of memory stabilizing medications in view of her A. fib and heart disease Aricept is not a good choice and started her on Namenda 5 mg once a day at bedtime they are aware that this medicine does not improve memory stabilizes and slows its deterioration a slip was given for full blood tests including CBC comprehensive metabolic profile CRP hemoglobin A1c lipid profile thyroid profile urinalysis vitamins D3 and B12 and measles mumps and rubella antibodies she is up-to-date with her ophthalmologist a dermatologist and wishes to defer mammograms and colorectal cancer screening at this age

## 2019-08-08 NOTE — REASON FOR VISIT
[Atrial Fibrillation] : atrial fibrillation [Follow-Up - Clinic] : a clinic follow-up of [FreeTextEntry1] : Patient returns for followup. Feeling well. Offers no complaints of chest discomfort shortness of breath palpitations dizziness or syncope.\par  [Hypertension] : hypertension

## 2019-08-08 NOTE — HEALTH RISK ASSESSMENT
[No falls in past year] : Patient reported no falls in the past year [0] : 2) Feeling down, depressed, or hopeless: Not at all (0) [] : No [[id number]] : 0

## 2019-08-08 NOTE — ASSESSMENT
[FreeTextEntry1] : depression:\par 1. Chronic atrial fibrillation with moderate ventricular response \par 2. History of mitral and tricuspid insufficiency by echocardiography\par 3. hypertension well-controlled \par \par  Plan:\par 1.continue current medical regimen

## 2019-08-08 NOTE — PHYSICAL EXAM
[General Appearance - Well Developed] : well developed [Normal Appearance] : normal appearance [Well Groomed] : well groomed [General Appearance - Well Nourished] : well nourished [General Appearance - In No Acute Distress] : no acute distress [No Deformities] : no deformities [No Oral Pallor] : no oral pallor [Normal Oral Mucosa] : normal oral mucosa [Normal Jugular Venous A Waves Present] : normal jugular venous A waves present [No Oral Cyanosis] : no oral cyanosis [Normal Jugular Venous V Waves Present] : normal jugular venous V waves present [No Jugular Venous Gamez A Waves] : no jugular venous gamez A waves [Respiration, Rhythm And Depth] : normal respiratory rhythm and effort [Exaggerated Use Of Accessory Muscles For Inspiration] : no accessory muscle use [Auscultation Breath Sounds / Voice Sounds] : lungs were clear to auscultation bilaterally [Abdomen Soft] : soft [Abdomen Mass (___ Cm)] : no abdominal mass palpated [Abdomen Tenderness] : non-tender [Nail Clubbing] : no clubbing of the fingernails [Cyanosis, Localized] : no localized cyanosis [Petechial Hemorrhages (___cm)] : no petechial hemorrhages [Skin Color & Pigmentation] : normal skin color and pigmentation [FreeTextEntry1] : negative Homans sign [No Venous Stasis] : no venous stasis [] : no rash [No Xanthoma] : no  xanthoma was observed [No Skin Ulcers] : no skin ulcer [Skin Lesions] : no skin lesions [Oriented To Time, Place, And Person] : oriented to person, place, and time [Affect] : the affect was normal [Mood] : the mood was normal [No Anxiety] : not feeling anxious [5th Left ICS - MCL] : palpated at the 5th LICS in the midclavicular line [Normal] : normal [Normal Rate] : normal [No Murmur] : no murmurs heard [Irregularly Irregular] : irregularly irregular [Left Carotid Bruit] : no bruit heard over the left carotid [Right Femoral Bruit] : no bruit heard over the right femoral artery [Right Carotid Bruit] : no bruit heard over the right carotid [Left Femoral Bruit] : no bruit heard over the left femoral artery [No Abnormalities] : the abdominal aorta was not enlarged and no bruit was heard [Nonpitting Edema] : nonpitting edema present

## 2019-08-08 NOTE — HISTORY OF PRESENT ILLNESS
[Family Member] : family member [de-identified] : 84-year-old woman comes to the office accompanied by her daughter with a history of chronic atrial fibrillation on Coumadin hypertension hyperlipidemia hypothyroidism allergic rhinitis and mild cognitive impairment which was recently evaluated by a neurologist he denies temperature chills sweats or myalgias he's had no headache sinus congestion sore throat cough wheezing pleurisy chest pain shortness of breath exertional dyspnea lightheadedness palpitations dizziness vertigo or syncope she has had no abdominal pain nausea vomiting diarrhea or constipation bright red blood per rectum or black stools her appetite has been good her weight is stable she denies significant musculoskeletal complaints he does get up at night to urinate but denies dysuria or gross hematuria she has had no recent skin rashes [FreeTextEntry1] : Comes to the office for followup to review her medications and discuss her overall health recent issues with mild cognitive impairment

## 2019-08-08 NOTE — REVIEW OF SYSTEMS
[Postnasal Drip] : postnasal drip [Joint Stiffness] : joint stiffness [Confusion] : confusion [Memory Loss] : memory loss [Anxiety] : anxiety [Negative] : Heme/Lymph [Fever] : no fever [Chills] : no chills [Fatigue] : no fatigue [Night Sweats] : no night sweats [Hot Flashes] : no hot flashes [Recent Change In Weight] : ~T no recent weight change [Redness] : no redness [Pain] : no pain [Discharge] : no discharge [Vision Problems] : no vision problems [Dryness] : no dryness  [Itching] : no itching [Hearing Loss] : no hearing loss [Earache] : no earache [Nosebleed] : no nosebleeds [Hoarseness] : no hoarseness [Sore Throat] : no sore throat [Nasal Discharge] : no nasal discharge [Palpitations] : no palpitations [Chest Pain] : no chest pain [Lower Ext Edema] : no lower extremity edema [Leg Claudication] : no leg claudication [Orthopnea] : no orthopnea [Shortness Of Breath] : no shortness of breath [Paroysmal Nocturnal Dyspnea] : no paroysmal nocturnal dyspnea [Wheezing] : no wheezing [Cough] : no cough [Abdominal Pain] : no abdominal pain [Dyspnea on Exertion] : no dyspnea on exertion [Constipation] : no constipation [Nausea] : no nausea [Vomiting] : no vomiting [Diarrhea] : diarrhea [Melena] : no melena [Heartburn] : no heartburn [Incontinence] : no incontinence [Dysuria] : no dysuria [Nocturia] : no nocturia [Poor Libido] : libido not poor [Hematuria] : no hematuria [Frequency] : no frequency [Dysmenorrhea] : no dysmenorrhea [Vaginal Discharge] : no vaginal discharge [Joint Swelling] : no joint swelling [Joint Pain] : no joint pain [Back Pain] : no back pain [Muscle Pain] : no muscle pain [Muscle Weakness] : no muscle weakness [Nail Changes] : no nail changes [Mole Changes] : no mole changes [Hair Changes] : no hair changes [Skin Rash] : no skin rash [Headache] : no headache [Dizziness] : no dizziness [Fainting] : no fainting [Unsteady Walking] : no ataxia [Insomnia] : no insomnia [Suicidal] : not suicidal [Depression] : no depression [Easy Bleeding] : no easy bleeding [Easy Bruising] : no easy bruising [Swollen Glands] : no swollen glands

## 2019-08-08 NOTE — PHYSICAL EXAM
[No Acute Distress] : no acute distress [Well Nourished] : well nourished [Well Developed] : well developed [Well-Appearing] : well-appearing [Normal Sclera/Conjunctiva] : normal sclera/conjunctiva [PERRL] : pupils equal round and reactive to light [EOMI] : extraocular movements intact [Normal Outer Ear/Nose] : the outer ears and nose were normal in appearance [Normal Oropharynx] : the oropharynx was normal [No JVD] : no jugular venous distention [No Lymphadenopathy] : no lymphadenopathy [Thyroid Normal, No Nodules] : the thyroid was normal and there were no nodules present [Supple] : supple [No Respiratory Distress] : no respiratory distress  [No Accessory Muscle Use] : no accessory muscle use [Clear to Auscultation] : lungs were clear to auscultation bilaterally [Normal Rate] : normal rate  [Normal S1, S2] : normal S1 and S2 [Regular Rhythm] : with a regular rhythm [No Murmur] : no murmur heard [No Carotid Bruits] : no carotid bruits [No Varicosities] : no varicosities [No Abdominal Bruit] : a ~M bruit was not heard ~T in the abdomen [Pedal Pulses Present] : the pedal pulses are present [No Edema] : there was no peripheral edema [No Palpable Aorta] : no palpable aorta [No Extremity Clubbing/Cyanosis] : no extremity clubbing/cyanosis [Soft] : abdomen soft [Non Tender] : non-tender [Non-distended] : non-distended [No Masses] : no abdominal mass palpated [No HSM] : no HSM [Normal Bowel Sounds] : normal bowel sounds [Normal Posterior Cervical Nodes] : no posterior cervical lymphadenopathy [Normal Anterior Cervical Nodes] : no anterior cervical lymphadenopathy [No CVA Tenderness] : no CVA  tenderness [No Spinal Tenderness] : no spinal tenderness [No Joint Swelling] : no joint swelling [Grossly Normal Strength/Tone] : grossly normal strength/tone [No Rash] : no rash [Coordination Grossly Intact] : coordination grossly intact [No Focal Deficits] : no focal deficits [Normal Gait] : normal gait [Deep Tendon Reflexes (DTR)] : deep tendon reflexes were 2+ and symmetric [Normal Affect] : the affect was normal [Normal Insight/Judgement] : insight and judgment were intact

## 2019-08-12 ENCOUNTER — LABORATORY RESULT (OUTPATIENT)
Age: 84
End: 2019-08-12

## 2019-08-15 ENCOUNTER — LABORATORY RESULT (OUTPATIENT)
Age: 84
End: 2019-08-15

## 2019-08-21 LAB
25(OH)D3 SERPL-MCNC: 10.9 NG/ML
APPEARANCE: CLEAR
BASOPHILS # BLD AUTO: 0.01 K/UL
BASOPHILS NFR BLD AUTO: 0.1 %
BILIRUBIN URINE: NEGATIVE
BLOOD URINE: NEGATIVE
CHOLEST SERPL-MCNC: 121 MG/DL
CHOLEST/HDLC SERPL: 2.6 RATIO
COLOR: YELLOW
CRP SERPL HS-MCNC: 2.48 MG/L
EOSINOPHIL # BLD AUTO: 0.11 K/UL
EOSINOPHIL NFR BLD AUTO: 1.5 %
ESTIMATED AVERAGE GLUCOSE: 111 MG/DL
GLUCOSE BS SERPL-MCNC: 85 MG/DL
GLUCOSE QUALITATIVE U: NEGATIVE
HBA1C MFR BLD HPLC: 5.5 %
HCT VFR BLD CALC: 36 %
HDLC SERPL-MCNC: 47 MG/DL
HGB BLD-MCNC: 11.1 G/DL
IMM GRANULOCYTES NFR BLD AUTO: 0.3 %
KETONES URINE: NEGATIVE
LDLC SERPL CALC-MCNC: 47 MG/DL
LEUKOCYTE ESTERASE URINE: NEGATIVE
LYMPHOCYTES # BLD AUTO: 1.17 K/UL
LYMPHOCYTES NFR BLD AUTO: 16 %
MAN DIFF?: NORMAL
MCHC RBC-ENTMCNC: 29.1 PG
MCHC RBC-ENTMCNC: 30.8 GM/DL
MCV RBC AUTO: 94.5 FL
MEV IGG FLD QL IA: >300 AU/ML
MEV IGG+IGM SER-IMP: POSITIVE
MONOCYTES # BLD AUTO: 0.36 K/UL
MONOCYTES NFR BLD AUTO: 4.9 %
MUV AB SER-ACNC: POSITIVE
MUV IGG SER QL IA: >300 AU/ML
NEUTROPHILS # BLD AUTO: 5.65 K/UL
NEUTROPHILS NFR BLD AUTO: 77.2 %
NITRITE URINE: NEGATIVE
PH URINE: 5.5
PLATELET # BLD AUTO: 225 K/UL
PROTEIN URINE: NORMAL
RBC # BLD: 3.81 M/UL
RBC # FLD: 14.9 %
RUBV IGG FLD-ACNC: 25 INDEX
RUBV IGG SER-IMP: POSITIVE
SPECIFIC GRAVITY URINE: 1.01
T4 FREE SERPL-MCNC: 1.3 NG/DL
TRIGL SERPL-MCNC: 137 MG/DL
TSH SERPL-ACNC: 2.06 UIU/ML
UROBILINOGEN URINE: NORMAL
VIT B12 SERPL-MCNC: 293 PG/ML
WBC # FLD AUTO: 7.32 K/UL

## 2019-09-05 ENCOUNTER — LABORATORY RESULT (OUTPATIENT)
Age: 84
End: 2019-09-05

## 2019-09-26 ENCOUNTER — LABORATORY RESULT (OUTPATIENT)
Age: 84
End: 2019-09-26

## 2019-10-05 ENCOUNTER — EMERGENCY (EMERGENCY)
Facility: HOSPITAL | Age: 84
LOS: 1 days | Discharge: HOME | End: 2019-10-05
Attending: INTERNAL MEDICINE | Admitting: INTERNAL MEDICINE
Payer: MEDICARE

## 2019-10-05 VITALS
OXYGEN SATURATION: 98 % | TEMPERATURE: 98 F | DIASTOLIC BLOOD PRESSURE: 110 MMHG | SYSTOLIC BLOOD PRESSURE: 164 MMHG | HEIGHT: 62 IN | HEART RATE: 90 BPM | RESPIRATION RATE: 17 BRPM | WEIGHT: 179.9 LBS

## 2019-10-05 VITALS
HEART RATE: 68 BPM | DIASTOLIC BLOOD PRESSURE: 70 MMHG | OXYGEN SATURATION: 96 % | RESPIRATION RATE: 17 BRPM | TEMPERATURE: 98 F | SYSTOLIC BLOOD PRESSURE: 144 MMHG

## 2019-10-05 PROCEDURE — 70450 CT HEAD/BRAIN W/O DYE: CPT | Mod: 26

## 2019-10-05 PROCEDURE — 99284 EMERGENCY DEPT VISIT MOD MDM: CPT

## 2019-10-05 PROCEDURE — 70450 CT HEAD/BRAIN W/O DYE: CPT

## 2019-10-05 PROCEDURE — 99284 EMERGENCY DEPT VISIT MOD MDM: CPT | Mod: 25

## 2019-10-05 RX ORDER — ACETAMINOPHEN 500 MG
650 TABLET ORAL ONCE
Refills: 0 | Status: COMPLETED | OUTPATIENT
Start: 2019-10-05 | End: 2019-10-05

## 2019-10-05 RX ADMIN — Medication 650 MILLIGRAM(S): at 19:56

## 2019-10-05 NOTE — ED PROVIDER NOTE - PATIENT PORTAL LINK FT
You can access the FollowMyHealth Patient Portal offered by [facility] by registering at the following website: [email or web address]. By joining [employer]’s FollowMyHealth portal, you will also be able to view your health information using other applications (apps) compatible with our system.

## 2019-10-05 NOTE — ED PROVIDER NOTE - CHPI ED SYMPTOMS NEG
no vomiting/no confusion/no syncope/no weakness/no dizziness/no loss of consciousness/no seizure/no change in level of consciousness/no blurred vision/no nausea

## 2019-10-05 NOTE — ED PROVIDER NOTE - NSFOLLOWUPINSTRUCTIONS_ED_ALL_ED_FT
Concussion, Adult  ImageA concussion is a brain injury from a direct hit (blow) to the head or body. This injury causes the brain to shake quickly back and forth inside the skull. It is caused by:  A hit to the head.A quick and sudden movement (jolt) of the head or neck.How fast you will get better from a concussion depends on many things. Recovery can take time. It is important to wait to return to activity until a doctor says it is safe and your symptoms are all gone.  Follow these instructions at home:  Activity     Limit activities that need a lot of thought or concentration. You may need to talk with your  or teachers about this. Limit activities such as:  Homework or work for your job.Watching TV.Computer work.Playing memory games and puzzles.Rest. Rest helps the brain to heal. Make sure you:  Get plenty of sleep at night. Do not stay up late.Rest during the day. Take naps or rest breaks when you feel tired.Do not do activities that could cause a second concussion, such as riding a bike or playing sports. It can be dangerous if you get another concussion before the first one has healed.Ask your doctor when you can return to your normal activities, like driving, riding a bike, or using machinery. Your ability to react may be slower. Do not do these activities if you are dizzy. Your doctor will likely give you a plan for slowly going back to activities.General instructions     Take over-the-counter and prescription medicines only as told by your doctor.Do not drink alcohol until your doctor says you can.Watch your symptoms and tell other people to do the same. Other problems (complications) can happen after a concussion. Older adults with a brain injury may have a higher risk of serious problems, such as a blood clot in the brain.Tell your , teachers, school nurse, school counselor, , or  about your injury and symptoms. Tell them about what you can or cannot do. They should watch you for:  More problems with attention or concentration.More trouble remembering or learning new information.More time needed to do tasks or assignments.Being more annoyed (irritable) or having a harder time dealing with stress.Any other symptoms that get worse.Keep all follow-up visits as told by your doctor. This is important.Prevention     It is very important that you donot get another brain injury, especially before you have healed. In rare cases, another injury can cause permanent brain damage, brain swelling, or death. You have the most risk if you get another head injury in the first 7–10 days after you were hurt before. To avoid injuries:  Avoid activities that could make you get a second concussion, like contact sports.When you have returned to sports or activities:  Avoid plays or moves that can cause you to crash into another person. This is how most concussions happen.Follow the rules and be respectful of other players.Get regular exercise that includes strength and balance training.Wear a helmet when you do activities like:  Biking.Skiing.Skateboarding.Skating.Helmets can help protect you from serious skull and brain injuries, but they do not protect your from a concussion. Even when wearing a helmet, you should avoid being hit in the head.Contact a doctor if:  Your symptoms get worse or they do not get better.You have new symptoms.You have another injury.Get help right away if:  You have bad headaches or your headaches get worse.You have weakness in any part of your body.You are confused.Your coordination gets worse.You keep throwing up (vomiting).You feel more sleepy than normal.You twitch or shake violently (convulse) or have a seizure.Your speech is not clear (is slurred).You have strange behavior changes.You have changes in how you see (vision).You pass out (lose consciousness).Summary  A concussion is a brain injury from a direct hit (blow) to the head or body.This condition is treated with rest and careful watching of symptoms.If you keep having symptoms, call your doctor.This information is not intended to replace advice given to you by your health care provider. Make sure you discuss any questions you have with your health care provider.    Document Released: 12/06/2010 Document Revised: 01/29/2019 Document Reviewed: 01/29/2019  [employer] Interactive Patient Education © 2019 [employer]  Rest, drink plenty of fluids  Advance activity as tolerated  Continue all previously prescribed medications as directed  Follow up with your PMD 2-3 days- bring copies of your results  Return to the ER for worsening

## 2019-10-05 NOTE — ED ADULT NURSE NOTE - OBJECTIVE STATEMENT
84 yr old female A&Ox3 from home s/p fall today. Pt c/o head injury and pain to buttocks. Pt denies LOC. Pt states she was on the kitchen, tripped and hit her head on the cabinet. Pt denies nausea, vomiting, weakness, numbness. Pt on Coumadin. PMHX: Afib, Hard of hearing. Family at bedside.

## 2019-10-05 NOTE — ED ADULT NURSE NOTE - NSIMPLEMENTINTERV_GEN_ALL_ED
Implemented All Fall Risk Interventions:  [place] to call system. Call bell, personal items and telephone within reach. Instruct patient to call for assistance. Room bathroom lighting operational. Non-slip footwear when patient is off stretcher. Physically safe environment: no spills, clutter or unnecessary equipment. Stretcher in lowest position, wheels locked, appropriate side rails in place. Provide visual cue, wrist band, yellow gown, etc. Monitor gait and stability. Monitor for mental status changes and reorient to person, place, and time. Review medications for side effects contributing to fall risk. Reinforce activity limits and safety measures with patient and family.

## 2019-10-07 ENCOUNTER — RX RENEWAL (OUTPATIENT)
Age: 84
End: 2019-10-07

## 2019-10-07 PROBLEM — I48.91 UNSPECIFIED ATRIAL FIBRILLATION: Chronic | Status: ACTIVE | Noted: 2019-10-05

## 2019-10-07 PROBLEM — I10 ESSENTIAL (PRIMARY) HYPERTENSION: Chronic | Status: ACTIVE | Noted: 2019-10-05

## 2019-10-08 ENCOUNTER — APPOINTMENT (OUTPATIENT)
Dept: INTERNAL MEDICINE | Facility: CLINIC | Age: 84
End: 2019-10-08
Payer: MEDICARE

## 2019-10-08 VITALS
BODY MASS INDEX: 39.92 KG/M2 | DIASTOLIC BLOOD PRESSURE: 76 MMHG | HEIGHT: 59 IN | SYSTOLIC BLOOD PRESSURE: 128 MMHG | RESPIRATION RATE: 15 BRPM | HEART RATE: 76 BPM | WEIGHT: 198 LBS

## 2019-10-08 PROCEDURE — 99215 OFFICE O/P EST HI 40 MIN: CPT

## 2019-10-08 NOTE — PHYSICAL EXAM
[No Acute Distress] : no acute distress [Well Nourished] : well nourished [Well-Appearing] : well-appearing [Well Developed] : well developed [PERRL] : pupils equal round and reactive to light [Normal Sclera/Conjunctiva] : normal sclera/conjunctiva [Normal Voice/Communication] : normal voice/communication [EOMI] : extraocular movements intact [Normal Outer Ear/Nose] : the outer ears and nose were normal in appearance [No JVD] : no jugular venous distention [Normal Oropharynx] : the oropharynx was normal [No Lymphadenopathy] : no lymphadenopathy [Thyroid Normal, No Nodules] : the thyroid was normal and there were no nodules present [Supple] : supple [No Accessory Muscle Use] : no accessory muscle use [No Respiratory Distress] : no respiratory distress  [Normal Rate] : normal rate  [Regular Rhythm] : with a regular rhythm [Clear to Auscultation] : lungs were clear to auscultation bilaterally [Normal S1, S2] : normal S1 and S2 [No Murmur] : no murmur heard [No Carotid Bruits] : no carotid bruits [No Abdominal Bruit] : a ~M bruit was not heard ~T in the abdomen [Pedal Pulses Present] : the pedal pulses are present [No Varicosities] : no varicosities [No Edema] : there was no peripheral edema [No Palpable Aorta] : no palpable aorta [Soft] : abdomen soft [No Extremity Clubbing/Cyanosis] : no extremity clubbing/cyanosis [Declined Breast Exam] : declined breast exam  [Non Tender] : non-tender [No Masses] : no abdominal mass palpated [Non-distended] : non-distended [Normal Bowel Sounds] : normal bowel sounds [No HSM] : no HSM [Declined Rectal Exam] : declined rectal exam [Normal Supraclavicular Nodes] : no supraclavicular lymphadenopathy [No Hernias] : no hernias [Normal Axillary Nodes] : no axillary lymphadenopathy [Normal Anterior Cervical Nodes] : no anterior cervical lymphadenopathy [Normal Posterior Cervical Nodes] : no posterior cervical lymphadenopathy [Normal Inguinal Nodes] : no inguinal lymphadenopathy [No CVA Tenderness] : no CVA  tenderness [Normal Femoral Nodes] : no femoral lymphadenopathy [No Spinal Tenderness] : no spinal tenderness [No Joint Swelling] : no joint swelling [Grossly Normal Strength/Tone] : grossly normal strength/tone [No Rash] : no rash [No Skin Lesions] : no skin lesions [No Focal Deficits] : no focal deficits [Coordination Grossly Intact] : coordination grossly intact [Normal Gait] : normal gait [Deep Tendon Reflexes (DTR)] : deep tendon reflexes were 2+ and symmetric [Speech Grossly Normal] : speech grossly normal [Memory Grossly Normal] : memory grossly normal [Normal Affect] : the affect was normal [Normal Mood] : the mood was normal [Alert and Oriented x3] : oriented to person, place, and time [Normal Insight/Judgement] : insight and judgment were intact [Kyphosis] : no kyphosis [Scoliosis] : no scoliosis [Acne] : no acne

## 2019-10-08 NOTE — ASSESSMENT
[FreeTextEntry1] : Physical exam shows an elderly woman in no acute distress looking his usual with her cane blood pressure 128/76 height 4 foot 11 weight 198 pounds BMI of 40 heart rate is 76 respiratory rate of 15 HEENT was unremarkable there was minimal discomfort with flexion and extension of her neck on the right chest was clear cardiovascular exam was irregularly irregular a moderate ventricular response abdomen was soft and nontender extremities showed no clubbing cyanosis or edema neurologic exam was nonfocal impression questionable syncope patient was advised to use a walker instead of a cane for now previous potassium had been 3.3 will repeat as potassium has been ordered patient will call her cardiologist tomorrow to make an immediate appointment for followup monitoring such as Holter monitor or a loop recorder may be necessary she is up-to-date with her ophthalmologist she is aware that if any further symptoms occur of near syncope or lightheadedness that she should go immediately to the emergency room

## 2019-10-08 NOTE — REVIEW OF SYSTEMS
[Joint Stiffness] : joint stiffness [Back Pain] : back pain [Confusion] : confusion [Unsteady Walking] : ataxia [Memory Loss] : memory loss [Anxiety] : anxiety [Negative] : Psychiatric [Fever] : no fever [Chills] : no chills [Hot Flashes] : no hot flashes [Fatigue] : no fatigue [Recent Change In Weight] : ~T no recent weight change [Night Sweats] : no night sweats [Redness] : no redness [Discharge] : no discharge [Pain] : no pain [Dryness] : no dryness  [Itching] : no itching [Vision Problems] : no vision problems [Earache] : no earache [Hoarseness] : no hoarseness [Nosebleed] : no nosebleeds [Hearing Loss] : no hearing loss [Nasal Discharge] : no nasal discharge [Sore Throat] : no sore throat [Chest Pain] : no chest pain [Postnasal Drip] : no postnasal drip [Leg Claudication] : no leg claudication [Palpitations] : no palpitations [Lower Ext Edema] : no lower extremity edema [Orthopnea] : no orthopnea [Shortness Of Breath] : no shortness of breath [Wheezing] : no wheezing [Cough] : no cough [Nausea] : no nausea [Dyspnea on Exertion] : no dyspnea on exertion [Abdominal Pain] : no abdominal pain [Constipation] : no constipation [Diarrhea] : diarrhea [Heartburn] : no heartburn [Vomiting] : no vomiting [Melena] : no melena [Incontinence] : no incontinence [Dysuria] : no dysuria [Hematuria] : no hematuria [Nocturia] : no nocturia [Poor Libido] : libido not poor [Vaginal Discharge] : no vaginal discharge [Dysmenorrhea] : no dysmenorrhea [Frequency] : no frequency [Muscle Weakness] : no muscle weakness [Joint Swelling] : no joint swelling [Joint Pain] : no joint pain [Muscle Pain] : no muscle pain [Itching] : no itching [Mole Changes] : no mole changes [Hair Changes] : no hair changes [Nail Changes] : no nail changes [Dizziness] : no dizziness [Headache] : no headache [Skin Rash] : no skin rash [Fainting] : no fainting [Insomnia] : no insomnia [Depression] : no depression [Suicidal] : not suicidal [Easy Bleeding] : no easy bleeding [FreeTextEntry9] : neck and left elbow [Swollen Glands] : no swollen glands [Easy Bruising] : no easy bruising [de-identified] : syncope

## 2019-10-08 NOTE — HEALTH RISK ASSESSMENT
[Any fall with injury in past year] : Patient reported fall with injury in the past year [] : No [Yes] : Yes [de-identified] : neck sprain [[id number]] : 0 [0] : 2) Feeling down, depressed, or hopeless: Not at all (0)

## 2019-10-08 NOTE — HISTORY OF PRESENT ILLNESS
[Spouse] : spouse [de-identified] : 84 year-old lungs and comes to the office with a history of atrial fibrillation chronic anticoagulation hypertension hyperlipidemia hypothyroidism left bundle branch block mild cognitive impairment and obesity for followup patient recently 3 days ago while in the kitchen had a questionable syncopal attack hitting her head and her left elbow she was taken to the emergency room where a CT scan of the head was read as no evidence of acute process there were no x-rays taken of her elbow or blood test to check potassium and electrolytes or INR she denies temperature chills sweats or myalgias she said no headaches she does complain of some neck discomfort on the right with motion he has had no sinus congestion sore throat cough pleurisy chest pain shortness of breath exertional dyspnea lightheadedness palpitations dizziness vertigo or syncope he has had no abdominal pain nausea vomiting diarrhea constipation bright red blood rectum or black stools her appetite has been good her weight has been stable she has had no urinary symptoms leg edema or recent skin rashes [FreeTextEntry1] : Comes to the office for followup to review her medications and discuss her overall health recently in the emergency room 3 days ago after a fall/questionable syncope in her kitchen

## 2019-10-10 ENCOUNTER — NON-APPOINTMENT (OUTPATIENT)
Age: 84
End: 2019-10-10

## 2019-10-10 ENCOUNTER — APPOINTMENT (OUTPATIENT)
Dept: CARDIOLOGY | Facility: CLINIC | Age: 84
End: 2019-10-10
Payer: MEDICARE

## 2019-10-10 VITALS
BODY MASS INDEX: 37.9 KG/M2 | SYSTOLIC BLOOD PRESSURE: 128 MMHG | WEIGHT: 188 LBS | OXYGEN SATURATION: 91 % | RESPIRATION RATE: 16 BRPM | DIASTOLIC BLOOD PRESSURE: 71 MMHG | HEART RATE: 59 BPM | HEIGHT: 59 IN

## 2019-10-10 PROCEDURE — 93000 ELECTROCARDIOGRAM COMPLETE: CPT

## 2019-10-10 PROCEDURE — 99215 OFFICE O/P EST HI 40 MIN: CPT

## 2019-10-10 NOTE — REASON FOR VISIT
[Follow-Up - From Hospitalization] : follow-up of a recent hospitalization for [Syncope] : syncope [FreeTextEntry1] : She presents for re evaluation. She was standing by the sink with her  several days ago and fell to the floor. It does not appear that it was a mechanical fall. Is uncertain whether or not she actually lost consciousness, but she is unclear about remembering when knowing that she was going to fall. Her  states that one moment she was standing in the next moment she was on the floor. She was evaluated in the emergency room with a CAT scan and discharged. She has a bump on her head and bruised left arm. He is uncertain of what dose of metoprolol she is taking but will go home and check. The record shows 25 mg twice per day which has been reduced over time. Last Holter monitor several years ago showed A. fib with fairly well-controlled ventricular response but there was a pause of his long as 3.6 seconds.At that time her metoprolol was cut in half.

## 2019-10-10 NOTE — PHYSICAL EXAM
[General Appearance - Well Developed] : well developed [Normal Appearance] : normal appearance [Well Groomed] : well groomed [General Appearance - Well Nourished] : well nourished [No Deformities] : no deformities [General Appearance - In No Acute Distress] : no acute distress [Normal Oral Mucosa] : normal oral mucosa [No Oral Pallor] : no oral pallor [No Oral Cyanosis] : no oral cyanosis [Normal Jugular Venous A Waves Present] : normal jugular venous A waves present [Normal Jugular Venous V Waves Present] : normal jugular venous V waves present [No Jugular Venous Gamez A Waves] : no jugular venous gamez A waves [Respiration, Rhythm And Depth] : normal respiratory rhythm and effort [Exaggerated Use Of Accessory Muscles For Inspiration] : no accessory muscle use [Auscultation Breath Sounds / Voice Sounds] : lungs were clear to auscultation bilaterally [Abdomen Soft] : soft [Abdomen Tenderness] : non-tender [Abdomen Mass (___ Cm)] : no abdominal mass palpated [Nail Clubbing] : no clubbing of the fingernails [Cyanosis, Localized] : no localized cyanosis [Petechial Hemorrhages (___cm)] : no petechial hemorrhages [Skin Color & Pigmentation] : normal skin color and pigmentation [] : no rash [No Venous Stasis] : no venous stasis [Skin Lesions] : no skin lesions [No Skin Ulcers] : no skin ulcer [No Xanthoma] : no  xanthoma was observed [Oriented To Time, Place, And Person] : oriented to person, place, and time [Affect] : the affect was normal [Mood] : the mood was normal [No Anxiety] : not feeling anxious [5th Left ICS - MCL] : palpated at the 5th LICS in the midclavicular line [Normal] : normal [Normal Rate] : normal [Irregularly Irregular] : irregularly irregular [No Murmur] : no murmurs heard [No Abnormalities] : the abdominal aorta was not enlarged and no bruit was heard [Nonpitting Edema] : nonpitting edema present [FreeTextEntry1] : negative Homans sign [Right Carotid Bruit] : no bruit heard over the right carotid [Left Carotid Bruit] : no bruit heard over the left carotid [Right Femoral Bruit] : no bruit heard over the right femoral artery [Left Femoral Bruit] : no bruit heard over the left femoral artery

## 2019-10-11 ENCOUNTER — LABORATORY RESULT (OUTPATIENT)
Age: 84
End: 2019-10-11

## 2019-10-17 ENCOUNTER — LABORATORY RESULT (OUTPATIENT)
Age: 84
End: 2019-10-17

## 2019-10-19 DIAGNOSIS — S09.90XA UNSPECIFIED INJURY OF HEAD, INITIAL ENCOUNTER: ICD-10-CM

## 2019-11-05 ENCOUNTER — RX RENEWAL (OUTPATIENT)
Age: 84
End: 2019-11-05

## 2019-11-08 ENCOUNTER — LABORATORY RESULT (OUTPATIENT)
Age: 84
End: 2019-11-08

## 2019-11-14 ENCOUNTER — APPOINTMENT (OUTPATIENT)
Dept: INTERNAL MEDICINE | Facility: CLINIC | Age: 84
End: 2019-11-14
Payer: MEDICARE

## 2019-11-14 ENCOUNTER — EMERGENCY (EMERGENCY)
Facility: HOSPITAL | Age: 84
LOS: 1 days | Discharge: HOME | End: 2019-11-14
Attending: EMERGENCY MEDICINE | Admitting: EMERGENCY MEDICINE
Payer: MEDICARE

## 2019-11-14 VITALS
OXYGEN SATURATION: 96 % | HEART RATE: 67 BPM | RESPIRATION RATE: 16 BRPM | SYSTOLIC BLOOD PRESSURE: 186 MMHG | DIASTOLIC BLOOD PRESSURE: 100 MMHG | HEIGHT: 62 IN | WEIGHT: 179.9 LBS | TEMPERATURE: 98 F

## 2019-11-14 VITALS
TEMPERATURE: 97.5 F | HEART RATE: 66 BPM | WEIGHT: 180 LBS | SYSTOLIC BLOOD PRESSURE: 130 MMHG | HEIGHT: 59 IN | RESPIRATION RATE: 15 BRPM | DIASTOLIC BLOOD PRESSURE: 80 MMHG | OXYGEN SATURATION: 95 % | BODY MASS INDEX: 36.29 KG/M2

## 2019-11-14 VITALS
OXYGEN SATURATION: 95 % | HEART RATE: 62 BPM | TEMPERATURE: 97 F | RESPIRATION RATE: 18 BRPM | DIASTOLIC BLOOD PRESSURE: 89 MMHG | SYSTOLIC BLOOD PRESSURE: 152 MMHG
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
## 2019-11-14 DIAGNOSIS — R55 SYNCOPE AND COLLAPSE: ICD-10-CM
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
## 2019-11-14 LAB
APTT BLD: 35.8 SEC — SIGNIFICANT CHANGE UP (ref 27.5–36.3)
INR BLD: 1.54 RATIO — HIGH (ref 0.88–1.16)
PROTHROM AB SERPL-ACNC: 17.5 SEC — HIGH (ref 10–12.9)

## 2019-11-14 PROCEDURE — 85610 PROTHROMBIN TIME: CPT

## 2019-11-14 PROCEDURE — 73521 X-RAY EXAM HIPS BI 2 VIEWS: CPT

## 2019-11-14 PROCEDURE — 71110 X-RAY EXAM RIBS BIL 3 VIEWS: CPT | Mod: 26

## 2019-11-14 PROCEDURE — 36415 COLL VENOUS BLD VENIPUNCTURE: CPT

## 2019-11-14 PROCEDURE — 72110 X-RAY EXAM L-2 SPINE 4/>VWS: CPT | Mod: 26

## 2019-11-14 PROCEDURE — 99215 OFFICE O/P EST HI 40 MIN: CPT | Mod: 25,PD

## 2019-11-14 PROCEDURE — 73521 X-RAY EXAM HIPS BI 2 VIEWS: CPT | Mod: 26

## 2019-11-14 PROCEDURE — 99284 EMERGENCY DEPT VISIT MOD MDM: CPT

## 2019-11-14 PROCEDURE — 85730 THROMBOPLASTIN TIME PARTIAL: CPT

## 2019-11-14 PROCEDURE — 71110 X-RAY EXAM RIBS BIL 3 VIEWS: CPT

## 2019-11-14 PROCEDURE — G0444 DEPRESSION SCREEN ANNUAL: CPT | Mod: 59,PD

## 2019-11-14 PROCEDURE — 72110 X-RAY EXAM L-2 SPINE 4/>VWS: CPT

## 2019-11-14 NOTE — ED PROVIDER NOTE - PATIENT PORTAL LINK FT
You can access the FollowMyHealth Patient Portal offered by [facility] by registering at the following website: [email or web address]. By joining [employer]’s FollowMyHealth portal, you will also be able to view your health information using other applications (apps) compatible with our system.

## 2019-11-14 NOTE — ED ADULT NURSE NOTE - NSIMPLEMENTINTERV_GEN_ALL_ED
Implemented All Fall Risk Interventions:  [place] to call system. Call bell, personal items and telephone within reach. Instruct patient to call for assistance. Room bathroom lighting operational. Non-slip footwear when patient is off stretcher. Physically safe environment: no spills, clutter or unnecessary equipment. Stretcher in lowest position, wheels locked, appropriate side rails in place. Provide visual cue, wrist band, yellow gown, etc. Monitor gait and stability. Monitor for mental status changes and reorient to person, place, and time. Review medications for side effects contributing to fall risk. Reinforce activity limits and safety measures with patient and family.

## 2019-11-14 NOTE — ED ADULT NURSE NOTE - OBJECTIVE STATEMENT
Pt arrived to the ER s/p fall. Pt reports syncope yesterday. Pt denies hitting her head. Pt denies any chest pain, SOB, or dizziness prior to syncope. Pt c/o b/l hip and pelvic region pain. Pt states PMD Dr. Bryant sent for xrays. Pt able to ambulate with cane.

## 2019-11-14 NOTE — ED ADULT NURSE REASSESSMENT NOTE - NS ED NURSE REASSESS COMMENT FT1
Pt received in bed. Handoff given by outgoing RN. Pt A&Ox4. MAEX4. Breathing spontaneously on RA. VSS. xray completed. awaiting for result at this time. All needs are met and safety maintained. Will continue to monitor.

## 2019-11-14 NOTE — ED PROVIDER NOTE - OBJECTIVE STATEMENT
Pt is 85 y/o female with PMhx of spinal stenosis, a-fib (on Coumadin), HTN here for eval s/p mechanical fall yesterday. Pt fell at home, lost her balance and fell backwards and landed on her buttocks, no head injury no LOC, now with b/l hip pain, able to ambulate (uses cane secondary to spinal stenosis), seen by pcp today Dr Bryant and sent to ER for xrays. Pt denies any cp, sob, palpitations or dizziness before or after the fall, denies abd pain, n/v/d. also back pain at baseline, pt denies ext weakness or numbness, denies saddle anesthesia, urinary retention, denies fever, chills, urinary sx, chronic steroids use, IVDU, denies hx of malignancy, denies hx of spinal surgeries or epidural injections;

## 2019-11-14 NOTE — ED PROVIDER NOTE - ATTENDING CONTRIBUTION TO CARE
I personally evaluated the patient. I reviewed the Resident’s or Physician Assistant’s note (as assigned above), and agree with the findings and plan except as documented in my note.    Fall with back and rib pain     PE: tender lateral lower ribs     xray negative     follow up Dr Bryant

## 2019-11-14 NOTE — ED PROVIDER NOTE - CLINICAL SUMMARY MEDICAL DECISION MAKING FREE TEXT BOX
Pt is 83 y/o female with PMhx of spinal stenosis, a-fib (on Coumadin), HTN here for eval s/p mechanical fall yesterday. Pt fell at home, lost her balance and fell backwards and landed on her buttocks, no head injury no LOC, now with b/l hip pain, able to ambulate (uses cane secondary to spinal stenosis), seen by pcp today Dr Bryant and sent to ER for xrays. Pt denies any cp, sob, palpitations or dizziness before or after the fall, denies abd pain, n/v/d. also back pain at baseline, pt denies ext weakness or numbness, denies saddle anesthesia, urinary retention, denies fever, chills, urinary sx, chronic steroids use, IVDU, denies hx of malignancy, denies hx of spinal surgeries or epidural injections; will get plain films of lumbar spine, hips/pelvis and ribs, declined pain meds for now.

## 2019-11-14 NOTE — ED PROVIDER NOTE - NSFOLLOWUPINSTRUCTIONS_ED_ALL_ED_FT
PLEASE MAKE SURE THAT YOU FOLLOW UP WITH DR MATHEWS WITHIN 1 WEEK. TAKE TYLENOL 650MG EVERY 6HRS AS NEED FOR PAIN.    Contusion in Adults    WHAT YOU NEED TO KNOW:    A contusion is a bruise that appears on your skin after an injury. A bruise happens when small blood vessels tear but skin does not. Blood leaks into nearby tissue, such as soft tissue or muscle.    DISCHARGE INSTRUCTIONS:    Seek care immediately if:     You have new trouble moving the injured area.      You have tingling or numbness in or near the injured area.      Your hand or foot below the bruise gets cold or turns pale.    Call your doctor if:     You find a new lump in the injured area.      Your symptoms do not improve with treatment after 4 to 5 days.      You have questions or concerns about your condition or care.    Medicines: You may need any of the following:     NSAIDs, such as ibuprofen, help decrease swelling, pain, and fever. This medicine is available with or without a doctor's order. NSAIDs can cause stomach bleeding or kidney problems in certain people. If you take blood thinner medicine, always ask your healthcare provider if NSAIDs are safe for you. Always read the medicine label and follow directions.      Prescription pain medicine may be given. Ask your healthcare provider how to take this medicine safely. Some prescription pain medicines contain acetaminophen. Do not take other medicines that contain acetaminophen without talking to your healthcare provider. Too much acetaminophen may cause liver damage. Prescription pain medicine may cause constipation. Ask your healthcare provider how to prevent or treat constipation.       Take your medicine as directed. Contact your healthcare provider if you think your medicine is not helping or if you have side effects. Tell him or her if you are allergic to any medicine. Keep a list of the medicines, vitamins, and herbs you take. Include the amounts, and when and why you take them. Bring the list or the pill bottles to follow-up visits. Carry your medicine list with you in case of an emergency.    Help a contusion heal:     Rest the injured area or use it less than usual. If you bruised your leg or foot, you may need crutches or a cane to help you walk. This will help you keep weight off your injured body part.      Apply ice to decrease swelling and pain. Ice may also help prevent tissue damage. Use an ice pack, or put crushed ice in a plastic bag. Cover it with a towel and place it on your bruise for 15 to 20 minutes every hour or as directed.      Use compression to support the area and decrease swelling. Wrap an elastic bandage around the area over the bruised muscle. Make sure the bandage is not too tight. You should be able to fit 1 finger between the bandage and your skin.      Elevate (raise) your injured body part above the level of your heart to help decrease pain and swelling. Use pillows, blankets, or rolled towels to elevate the area as often as you can.      Do not drink alcohol as directed. Alcohol may slow healing.      Do not stretch injured muscles right after your injury. Ask your healthcare provider when and how you may safely stretch after your injury. Gentle stretches can help increase your flexibility.      Do not massage the area or put heating pads on the bruise right after your injury. Heat and massage may slow healing. Your healthcare provider may tell you to apply heat after several days. At that time, heat will start to help the injury heal.    Prevent another contusion:     Stretch and warm up before you play sports or exercise.      Wear protective gear when you play sports. Examples are shin guards and padding.       If you begin a new physical activity, start slowly to give your body a chance to adjust.

## 2019-11-14 NOTE — ED PROVIDER NOTE - PROGRESS NOTE DETAILS
[facility] - INR results subtherapeutic, called pt's daughter Lisa ([phone]) and advised that pt needs to f/u with pcp for coumadin adjustment;

## 2019-11-15 ENCOUNTER — INPATIENT (INPATIENT)
Facility: HOSPITAL | Age: 84
LOS: 3 days | Discharge: HOME | DRG: 229 | End: 2019-11-19
Attending: HOSPITALIST | Admitting: HOSPITALIST
Payer: MEDICARE

## 2019-11-15 VITALS
OXYGEN SATURATION: 95 % | HEIGHT: 62 IN | WEIGHT: 179.9 LBS | DIASTOLIC BLOOD PRESSURE: 89 MMHG | HEART RATE: 69 BPM | RESPIRATION RATE: 18 BRPM | TEMPERATURE: 98 F | SYSTOLIC BLOOD PRESSURE: 164 MMHG

## 2019-11-15 DIAGNOSIS — R55 SYNCOPE AND COLLAPSE: ICD-10-CM

## 2019-11-15 DIAGNOSIS — E03.9 HYPOTHYROIDISM, UNSPECIFIED: ICD-10-CM

## 2019-11-15 DIAGNOSIS — R60.0 LOCALIZED EDEMA: ICD-10-CM

## 2019-11-15 DIAGNOSIS — R09.89 OTHER SPECIFIED SYMPTOMS AND SIGNS INVOLVING THE CIRCULATORY AND RESPIRATORY SYSTEMS: ICD-10-CM

## 2019-11-15 DIAGNOSIS — Z96.659 PRESENCE OF UNSPECIFIED ARTIFICIAL KNEE JOINT: Chronic | ICD-10-CM

## 2019-11-15 DIAGNOSIS — Z29.9 ENCOUNTER FOR PROPHYLACTIC MEASURES, UNSPECIFIED: ICD-10-CM

## 2019-11-15 DIAGNOSIS — Z90.10 ACQUIRED ABSENCE OF UNSPECIFIED BREAST AND NIPPLE: Chronic | ICD-10-CM

## 2019-11-15 DIAGNOSIS — F03.90 UNSPECIFIED DEMENTIA WITHOUT BEHAVIORAL DISTURBANCE: ICD-10-CM

## 2019-11-15 DIAGNOSIS — N18.9 CHRONIC KIDNEY DISEASE, UNSPECIFIED: ICD-10-CM

## 2019-11-15 DIAGNOSIS — I48.91 UNSPECIFIED ATRIAL FIBRILLATION: ICD-10-CM

## 2019-11-15 DIAGNOSIS — I10 ESSENTIAL (PRIMARY) HYPERTENSION: ICD-10-CM

## 2019-11-15 DIAGNOSIS — Z02.9 ENCOUNTER FOR ADMINISTRATIVE EXAMINATIONS, UNSPECIFIED: ICD-10-CM

## 2019-11-15 LAB
ALBUMIN SERPL ELPH-MCNC: 4.4 G/DL — SIGNIFICANT CHANGE UP (ref 3.3–5)
ALP SERPL-CCNC: 189 U/L — HIGH (ref 40–120)
ALT FLD-CCNC: 7 U/L — LOW (ref 10–45)
ANION GAP SERPL CALC-SCNC: 10 MMOL/L — SIGNIFICANT CHANGE UP (ref 5–17)
AST SERPL-CCNC: 15 U/L — SIGNIFICANT CHANGE UP (ref 10–40)
BASOPHILS # BLD AUTO: 0.02 K/UL — SIGNIFICANT CHANGE UP (ref 0–0.2)
BASOPHILS NFR BLD AUTO: 0.2 % — SIGNIFICANT CHANGE UP (ref 0–2)
BILIRUB SERPL-MCNC: 1 MG/DL — SIGNIFICANT CHANGE UP (ref 0.2–1.2)
BUN SERPL-MCNC: 23 MG/DL — SIGNIFICANT CHANGE UP (ref 7–23)
CALCIUM SERPL-MCNC: 10.3 MG/DL — SIGNIFICANT CHANGE UP (ref 8.4–10.5)
CHLORIDE SERPL-SCNC: 103 MMOL/L — SIGNIFICANT CHANGE UP (ref 96–108)
CO2 SERPL-SCNC: 27 MMOL/L — SIGNIFICANT CHANGE UP (ref 22–31)
CREAT SERPL-MCNC: 1.37 MG/DL — HIGH (ref 0.5–1.3)
EOSINOPHIL # BLD AUTO: 0.06 K/UL — SIGNIFICANT CHANGE UP (ref 0–0.5)
EOSINOPHIL NFR BLD AUTO: 0.7 % — SIGNIFICANT CHANGE UP (ref 0–6)
GLUCOSE SERPL-MCNC: 108 MG/DL — HIGH (ref 70–99)
HCT VFR BLD CALC: 40.9 % — SIGNIFICANT CHANGE UP (ref 34.5–45)
HGB BLD-MCNC: 12.7 G/DL — SIGNIFICANT CHANGE UP (ref 11.5–15.5)
IMM GRANULOCYTES NFR BLD AUTO: 0.3 % — SIGNIFICANT CHANGE UP (ref 0–1.5)
INR BLD: 1.44 RATIO — HIGH (ref 0.88–1.16)
LYMPHOCYTES # BLD AUTO: 0.98 K/UL — LOW (ref 1–3.3)
LYMPHOCYTES # BLD AUTO: 10.9 % — LOW (ref 13–44)
MCHC RBC-ENTMCNC: 28 PG — SIGNIFICANT CHANGE UP (ref 27–34)
MCHC RBC-ENTMCNC: 31.1 GM/DL — LOW (ref 32–36)
MCV RBC AUTO: 90.3 FL — SIGNIFICANT CHANGE UP (ref 80–100)
MONOCYTES # BLD AUTO: 0.62 K/UL — SIGNIFICANT CHANGE UP (ref 0–0.9)
MONOCYTES NFR BLD AUTO: 6.9 % — SIGNIFICANT CHANGE UP (ref 2–14)
NEUTROPHILS # BLD AUTO: 7.24 K/UL — SIGNIFICANT CHANGE UP (ref 1.8–7.4)
NEUTROPHILS NFR BLD AUTO: 81 % — HIGH (ref 43–77)
NRBC # BLD: 0 /100 WBCS — SIGNIFICANT CHANGE UP (ref 0–0)
PLATELET # BLD AUTO: 240 K/UL — SIGNIFICANT CHANGE UP (ref 150–400)
POTASSIUM SERPL-MCNC: 4 MMOL/L — SIGNIFICANT CHANGE UP (ref 3.5–5.3)
POTASSIUM SERPL-SCNC: 4 MMOL/L — SIGNIFICANT CHANGE UP (ref 3.5–5.3)
PROT SERPL-MCNC: 8.6 G/DL — HIGH (ref 6–8.3)
PROTHROM AB SERPL-ACNC: 16.6 SEC — HIGH (ref 10–12.9)
RBC # BLD: 4.53 M/UL — SIGNIFICANT CHANGE UP (ref 3.8–5.2)
RBC # FLD: 16.1 % — HIGH (ref 10.3–14.5)
SODIUM SERPL-SCNC: 140 MMOL/L — SIGNIFICANT CHANGE UP (ref 135–145)
TROPONIN T, HIGH SENSITIVITY RESULT: 24 NG/L — SIGNIFICANT CHANGE UP (ref 0–51)
WBC # BLD: 8.95 K/UL — SIGNIFICANT CHANGE UP (ref 3.8–10.5)
WBC # FLD AUTO: 8.95 K/UL — SIGNIFICANT CHANGE UP (ref 3.8–10.5)

## 2019-11-15 PROCEDURE — 93010 ELECTROCARDIOGRAM REPORT: CPT

## 2019-11-15 PROCEDURE — 71045 X-RAY EXAM CHEST 1 VIEW: CPT | Mod: 26

## 2019-11-15 PROCEDURE — 99285 EMERGENCY DEPT VISIT HI MDM: CPT | Mod: GC

## 2019-11-15 PROCEDURE — 99223 1ST HOSP IP/OBS HIGH 75: CPT

## 2019-11-15 RX ORDER — LEVOTHYROXINE SODIUM 125 MCG
25 TABLET ORAL DAILY
Refills: 0 | Status: DISCONTINUED | OUTPATIENT
Start: 2019-11-15 | End: 2019-11-19

## 2019-11-15 RX ORDER — MEMANTINE HYDROCHLORIDE 10 MG/1
5 TABLET ORAL DAILY
Refills: 0 | Status: DISCONTINUED | OUTPATIENT
Start: 2019-11-15 | End: 2019-11-19

## 2019-11-15 RX ORDER — WARFARIN SODIUM 2.5 MG/1
5 TABLET ORAL ONCE
Refills: 0 | Status: COMPLETED | OUTPATIENT
Start: 2019-11-15 | End: 2019-11-15

## 2019-11-15 RX ORDER — FUROSEMIDE 40 MG
40 TABLET ORAL DAILY
Refills: 0 | Status: DISCONTINUED | OUTPATIENT
Start: 2019-11-15 | End: 2019-11-19

## 2019-11-15 RX ORDER — ATORVASTATIN CALCIUM 80 MG/1
10 TABLET, FILM COATED ORAL AT BEDTIME
Refills: 0 | Status: DISCONTINUED | OUTPATIENT
Start: 2019-11-15 | End: 2019-11-19

## 2019-11-15 RX ORDER — METOPROLOL TARTRATE 50 MG
25 TABLET ORAL
Refills: 0 | Status: DISCONTINUED | OUTPATIENT
Start: 2019-11-15 | End: 2019-11-17

## 2019-11-15 RX ORDER — WARFARIN SODIUM 2.5 MG/1
5 TABLET ORAL ONCE
Refills: 0 | Status: DISCONTINUED | OUTPATIENT
Start: 2019-11-15 | End: 2019-11-16

## 2019-11-15 RX ORDER — ALLOPURINOL 300 MG
100 TABLET ORAL DAILY
Refills: 0 | Status: DISCONTINUED | OUTPATIENT
Start: 2019-11-15 | End: 2019-11-19

## 2019-11-15 RX ADMIN — ATORVASTATIN CALCIUM 10 MILLIGRAM(S): 80 TABLET, FILM COATED ORAL at 23:43

## 2019-11-15 RX ADMIN — Medication 25 MILLIGRAM(S): at 23:43

## 2019-11-15 RX ADMIN — WARFARIN SODIUM 5 MILLIGRAM(S): 2.5 TABLET ORAL at 23:43

## 2019-11-15 NOTE — HEALTH RISK ASSESSMENT
[] : No [Yes] : Yes [Two or more falls in past year] : Patient reported two or more falls in the past year [0] : 2) Feeling down, depressed, or hopeless: Not at all (0) [[id number]] : 0

## 2019-11-15 NOTE — H&P ADULT - NSICDXFAMILYHX_GEN_ALL_CORE_FT
FAMILY HISTORY:  Family history of pacemaker, mother  FH: CVA (cerebrovascular accident), Mother  FH: myocardial infarction, father

## 2019-11-15 NOTE — H&P ADULT - HISTORY OF PRESENT ILLNESS
85 yo female with PMH of spinal stenosis, afib on coumadin, presents here with syncope.  Patient     84F with PMH of spinal stenosis and A fib on coumadin p/w concern for syncope. Pt states she fell 2 days ago. Was here in the ER yesterday for resultant hip pain 2/2 the fall at which time imaging was negative for pelvic fracture. Pt is unsure if she passed out or just tripped during the incident but in talking to her cardiologist today she was told to come to the ER to be admitted for syncope workup. Denies any prodromal or current palpitations, chest pain, SOB, N/V/D, numbness, weakness. 85 yo female with PMH of spinal stenosis, arthritis, Dementia, HTN, HLD, Hypothyroidism, afib on coumadin, presents here after a possible syncope.  Patient states about one month ago, she was standing in the kitchen and suddenly fell and had head trauma, not sure if she passed out.  At that time she was referred to cardiology was told everything was OK, but needed a follow up, which was never done. Two days ago, on Wednesday, patient was standing next to the TV and fell again on her side and her buttock.  She is unsure if she lost consciousness but she says she "woke up" and was on the floor.  She dragged herself to the couch. She was able to stand and walk on her feet though had some pelvic pain.  Yesterday, patient was seen by PCP, who sent patient to ED for XR.  Xray of hip and pelvis were negative and patient was discharged home.  Today she received a call from PCP stating that she should come to ED for further cardiac work up and EPS evaluation with Dr. Mijares. Patient otherwise denies palpitation, dizziness, blurry vision, chest pain, SOB, fever, cough, runny nose, sick contact, recent travel.  She denies any GI/ complaints.  She still has some left pelvic discomfort when walking, but much better than before.      Of note, family is concerned because patient has memory issues and tends to forget her medication.  Also not sure if she has been taking more than she is supposed to. 83 yo female with PMH of spinal stenosis, arthritis, Dementia, HTN, HLD, Hypothyroidism, afib on coumadin, presents here after a possible syncope.  Patient states about one month ago, she was standing in the kitchen and suddenly fell and had head trauma, not sure if she passed out.  At that time she was referred to cardiology and a discussion about EP study and pacemaker took place, but never had a follow up. Two days ago, on Wednesday, patient was standing next to the TV and fell again on her side and her buttock.  She is unsure if she lost consciousness but she says she "woke up" and was on the floor.  She dragged herself to the couch. She was able to stand and walk on her feet though had some pelvic pain.  Yesterday, patient was seen by PCP, who sent patient to ED for XR.  Xray of hip and pelvis were negative and patient was discharged home.  Today she received a call from PCP stating that she should come to ED for further cardiac work up and EPS evaluation with Dr. Mijares. Patient otherwise denies palpitation, dizziness, blurry vision, chest pain, SOB, fever, cough, runny nose, sick contact, recent travel.  She denies any GI/ complaints.  She still has some left pelvic discomfort when walking, but much better than before.      Of note, family is concerned because patient has memory issues and tends to forget her medication.  Also not sure if she has been taking more than she is supposed to.

## 2019-11-15 NOTE — H&P ADULT - NSHPPHYSICALEXAM_GEN_ALL_CORE
PHYSICAL EXAM:  Vital Signs Last 24 Hrs  T(C): 36.6 (11-15-19 @ 19:23)  T(F): 97.8 (11-15-19 @ 19:23), Max: 98.2 (11-15-19 @ 18:02)  HR: 65 (11-15-19 @ 19:23) (65 - 69)  BP: 148/71 (11-15-19 @ 19:23)  BP(mean): --  RR: 18 (11-15-19 @ 19:23) (18 - 18)  SpO2: 96% (11-15-19 @ 19:23) (95% - 97%)  Wt(kg): --    Constitutional: NAD, awake and alert  EYES: EOMI  ENT:  Normal Hearing, no tonsillar exudates   Neck: Soft and supple, No JVD  Lungs: Breath sounds are clear bilaterally, No wheezing, rales or rhonchi  Heart: S1 and S2, irregular, no Murmurs, gallops or rubs  Abdomen: Bowel Sounds present, soft, nontender, nondistended, no guarding, no rebound  Extremities: No cyanosis or clubbing; warm to touch  Vascular: 2+ peripheral pulses lower ex  Neurological: A/O x 3, no focal deficits  Musculoskeletal: 5/5 strength b/l upper and lower extremities; b/l LE swelling (chronic), L>R  Skin: No rashes  Psych: no depression or anhedonia  HEME: no bruises, no nose bleeds

## 2019-11-15 NOTE — HISTORY OF PRESENT ILLNESS
[Family Member] : family member [FreeTextEntry1] : Comes to the office for followup to review her medications and discuss her overall health recent episodes of syncope [de-identified] : 84-year-old woman comes to the office accompanied by her daughter with a history of atrial fibrillation hypertension previous syncope hyperlipidemia hypothyroidism left bundle branch block lumbar pain obesity on chronic anticoagulation several days ago the patient fell at home fainting requiring EMS to help her off the ground did not go to the emergency room and she comes in today for a regularly scheduled appointment patient saw her cardiology a month ago for a similar event was recommended to notify the cardiologist of how much metoprolol she was taking and to set up an EPS study neither of these things occurred she is complaining of some discomfort in her lower back hips and pelvic area she denies temperature chills sweats or myalgias denies headaches sinus congestion sore throat cough wheezing pleurisy chest pain shortness of breath lightheadedness palpitations exertional dyspnea no chronic dizziness or vertigo since her second syncopal attack in the last 2 months she has had no abdominal pain nausea vomiting diarrhea constipation bright red blood per rectum or black stools she denies urinary symptoms leg edema or recent skin rashes

## 2019-11-15 NOTE — PHYSICAL EXAM
[No Acute Distress] : no acute distress [Well Nourished] : well nourished [Well Developed] : well developed [Well-Appearing] : well-appearing [Normal Voice/Communication] : normal voice/communication [Normal Sclera/Conjunctiva] : normal sclera/conjunctiva [PERRL] : pupils equal round and reactive to light [EOMI] : extraocular movements intact [Normal Outer Ear/Nose] : the outer ears and nose were normal in appearance [Normal TMs] : both tympanic membranes were normal [Normal Oropharynx] : the oropharynx was normal [Normal Nasal Mucosa] : the nasal mucosa was normal [No JVD] : no jugular venous distention [Supple] : supple [No Lymphadenopathy] : no lymphadenopathy [Thyroid Normal, No Nodules] : the thyroid was normal and there were no nodules present [No Respiratory Distress] : no respiratory distress  [No Accessory Muscle Use] : no accessory muscle use [Clear to Auscultation] : lungs were clear to auscultation bilaterally [Normal Rate] : normal rate  [Normal S1, S2] : normal S1 and S2 [Regular Rhythm] : with a regular rhythm [No Murmur] : no murmur heard [No Carotid Bruits] : no carotid bruits [No Abdominal Bruit] : a ~M bruit was not heard ~T in the abdomen [No Varicosities] : no varicosities [Pedal Pulses Present] : the pedal pulses are present [No Edema] : there was no peripheral edema [No Extremity Clubbing/Cyanosis] : no extremity clubbing/cyanosis [No Palpable Aorta] : no palpable aorta [Declined Breast Exam] : declined breast exam  [Non Tender] : non-tender [Soft] : abdomen soft [Non-distended] : non-distended [No Masses] : no abdominal mass palpated [No HSM] : no HSM [Normal Bowel Sounds] : normal bowel sounds [No Hernias] : no hernias [Normal Supraclavicular Nodes] : no supraclavicular lymphadenopathy [Declined Rectal Exam] : declined rectal exam [Normal Axillary Nodes] : no axillary lymphadenopathy [Normal Posterior Cervical Nodes] : no posterior cervical lymphadenopathy [Normal Anterior Cervical Nodes] : no anterior cervical lymphadenopathy [Normal Inguinal Nodes] : no inguinal lymphadenopathy [No Spinal Tenderness] : no spinal tenderness [No CVA Tenderness] : no CVA  tenderness [Normal Femoral Nodes] : no femoral lymphadenopathy [Scoliosis] : no scoliosis [Kyphosis] : no kyphosis [No Joint Swelling] : no joint swelling [No Rash] : no rash [Grossly Normal Strength/Tone] : grossly normal strength/tone [No Skin Lesions] : no skin lesions [Coordination Grossly Intact] : coordination grossly intact [Acne] : no acne [No Focal Deficits] : no focal deficits [Normal Gait] : normal gait [Deep Tendon Reflexes (DTR)] : deep tendon reflexes were 2+ and symmetric [Memory Grossly Normal] : memory grossly normal [Speech Grossly Normal] : speech grossly normal [Alert and Oriented x3] : oriented to person, place, and time [Normal Affect] : the affect was normal [Normal Mood] : the mood was normal [Normal Insight/Judgement] : insight and judgment were intact

## 2019-11-15 NOTE — ED PROVIDER NOTE - PHYSICAL EXAMINATION
Gen: AAOx3, non-toxic  Head: NCAT  HEENT: EOMI, oral mucosa moist, normal conjunctiva  Lung: CTAB, no respiratory distress, no wheezes/rhonchi/rales B/L, speaking in full sentences  CV: irregular rhythm, normal rate, no murmurs, rubs or gallops  Abd: soft, NTND, no guarding, no CVA tenderness  MSK: no visible deformities  Neuro: No focal sensory or motor deficits, normal CN exam   Skin: Warm, well perfused, no rash  Psych: normal affect.     ~Julio Bill PGY2

## 2019-11-15 NOTE — ED PROVIDER NOTE - OBJECTIVE STATEMENT
84F with PMH of spinal stenosis and A fib on coumadin p/w concern for syncope. Pt states she fell 2 days ago. Was here in the ER yesterday for resultant hip pain 2/2 the fall at which time imaging was negative for pelvic fracture. Pt is unsure if she passed out or just tripped during the incident but in talking to her cardiologist today she was told to come to the ER to be admitted for syncope workup. Denies any prodromal or current palpitations, chest pain, SOB, N/V/D, numbness, weakness.

## 2019-11-15 NOTE — ED PROVIDER NOTE - CLINICAL SUMMARY MEDICAL DECISION MAKING FREE TEXT BOX
Possible syncope. Currently asymptomatic, well appearing, HD stable. Will assess labs, EKG, touch base with pt's cardiologist then likely admit for further syncope workup and monitoring. Possible syncope. Currently asymptomatic, well appearing, HD stable. Will assess labs, EKG, touch base with pt's cardiologist then likely admit for further syncope workup and monitoring.    JENNIFER Cuevas MD:  84F with PMH of spinal stenosis and A fib on coumadin p/w concern for syncope. Pt states she fell 2 days ago. Was here in the ER yesterday for resultant hip pain 2/2 the fall at which time imaging was negative for pelvic fracture. Pt is unsure if she passed out or just tripped during the incident but in talking to her cardiologist today she was told to come to the ER to be admitted for EP/syncope workup. Denies any prodromal or current palpitations, chest pain, SOB, N/V/D, numbness, weakness. Plan: basic labs, ekg, cxr, TBA

## 2019-11-15 NOTE — ASSESSMENT
[FreeTextEntry1] : Physical exam shows a obese elderly woman in no acute distress blood pressure 130/80 height 4 feet 11 inches weight 180 pounds BMI 36.36 heart rate 66 respirations of 15 HEENT was unremarkable chest was clear the rest that was irregular irregular heart rate between 60 and 70 abdomen was soft extremities showed trace bilateral edema neurologic exam was nonfocal patient sent to the emergency room for immediate x-rays is complaining of pelvis and hip discomfort discussed with her cardiologist once we find out her dose of her beta blocker we will either taper it or discontinue it Dr. Webster will be in contact with EPS in [facility] to arrange a study patient was informed of any further episodes go immediately to the emergency room. She was counseled about using a walker she is made aware as well as her daughter of the seriousness of these complaints and had a pacemaker may be the intervention required

## 2019-11-15 NOTE — COUNSELING
[Fall prevention counseling provided] : Fall prevention counseling provided [Adequate lighting] : Adequate lighting [No throw rugs] : No throw rugs [Use recommended devices] : Use recommended devices [Use proper foot wear] : Use proper foot wear

## 2019-11-15 NOTE — H&P ADULT - NSICDXPASTMEDICALHX_GEN_ALL_CORE_FT
PAST MEDICAL HISTORY:  Atrial fibrillation, unspecified type     Benign hypertension     Dementia     HLD (hyperlipidemia)     Hypothyroid

## 2019-11-15 NOTE — H&P ADULT - NSHPLABSRESULTS_GEN_ALL_CORE
Labs personally reviewed:                          12.7   8.95  )-----------( 240      ( 15 Nov 2019 19:23 )             40.9     11-15    140  |  103  |  23  ----------------------------<  108<H>  4.0   |  27  |  1.37<H>    Ca    10.3      15 Nov 2019 19:23    TPro  8.6<H>  /  Alb  4.4  /  TBili  1.0  /  DBili  x   /  AST  15  /  ALT  7<L>  /  AlkPhos  189<H>  11-15        LIVER FUNCTIONS - ( 15 Nov 2019 19:23 )  Alb: 4.4 g/dL / Pro: 8.6 g/dL / ALK PHOS: 189 U/L / ALT: 7 U/L / AST: 15 U/L / GGT: x           PT/INR - ( 14 Nov 2019 21:10 )   PT: 17.5 sec;   INR: 1.54 ratio         PTT - ( 14 Nov 2019 21:10 )  PTT:35.8 sec    CAPILLARY BLOOD GLUCOSE          Imaging:  CXR personally reviewed: no focal opacity    EKG personally reviewed: Afib, LBBB    XR hip/pelvis/lumbosacral 11/14: negative

## 2019-11-15 NOTE — H&P ADULT - ATTENDING COMMENTS
Patient assigned to me by night hospitalist in charge for management and care for patient for this evening only. Care to be resumed by day hospitalist in the morning and thereafter.     Patient's care rendered on 11/15/19 at 9PM.      I was physically present for the key portions of the evaluation and management (E/M) service provided.  I agree with the above history, physical, and plan which I have reviewed and edited where appropriate.     Plan discussed with Patient, RN,  Ced, Son, Balaji, Daughter, Lisa.

## 2019-11-15 NOTE — H&P ADULT - PROBLEM SELECTOR PLAN 4
creatinine likely at baseline  October creatinine was 1.39  monitor BMP  c/w home medication for now

## 2019-11-15 NOTE — ED ADULT NURSE NOTE - OBJECTIVE STATEMENT
pt brought in by family for possible opacemaker per daughter pt had syncope episode Tuesday and unwitnessed fall Wednesday seen at [facility] yesterday with xrays completed pt md called and told family to bring her to er today pt alert hx of afib on metoprolol on arrival pt denies any dizziness no sob pt obese with boilateral lower extremity edema

## 2019-11-15 NOTE — H&P ADULT - PROBLEM SELECTOR PLAN 9
1.  Name of PCP:  Dr. Bryant  2.  PCP Contacted on Admission: [ ] Y    [x ] N    3.  PCP contacted at Discharge: [ ] Y    [ ] N    [ ] N/A  4.  Post-Discharge Appointment Date and Location:  5.  Summary of Handoff given to PCP:

## 2019-11-15 NOTE — H&P ADULT - PROBLEM SELECTOR PLAN 1
Patient with likely syncopal episode, currently denies any complaints  check orthostatics  monitor on telemetry  check TSH  trend troponin  check echocardiogram  PCP requested EP evaluation with Dr. Mijares

## 2019-11-15 NOTE — REVIEW OF SYSTEMS
[Fever] : no fever [Fatigue] : no fatigue [Chills] : no chills [Hot Flashes] : no hot flashes [Recent Change In Weight] : ~T no recent weight change [Night Sweats] : no night sweats [Pain] : no pain [Discharge] : no discharge [Redness] : no redness [Dryness] : no dryness  [Itching] : no itching [Vision Problems] : no vision problems [Earache] : no earache [Hearing Loss] : no hearing loss [Nosebleed] : no nosebleeds [Hoarseness] : no hoarseness [Nasal Discharge] : no nasal discharge [Postnasal Drip] : no postnasal drip [Sore Throat] : no sore throat [Palpitations] : no palpitations [Chest Pain] : no chest pain [Lower Ext Edema] : no lower extremity edema [Leg Claudication] : no leg claudication [Shortness Of Breath] : no shortness of breath [Orthopnea] : no orthopnea [Wheezing] : no wheezing [Cough] : no cough [Dyspnea on Exertion] : no dyspnea on exertion [Abdominal Pain] : no abdominal pain [Constipation] : no constipation [Nausea] : no nausea [Diarrhea] : diarrhea [Vomiting] : no vomiting [Heartburn] : no heartburn [Melena] : no melena [Dysuria] : no dysuria [Incontinence] : no incontinence [Nocturia] : no nocturia [Poor Libido] : libido not poor [Hematuria] : no hematuria [Frequency] : no frequency [Vaginal Discharge] : no vaginal discharge [Dysmenorrhea] : no dysmenorrhea [Joint Pain] : joint pain [Joint Stiffness] : joint stiffness [Joint Swelling] : no joint swelling [Muscle Weakness] : no muscle weakness [Muscle Pain] : no muscle pain [Back Pain] : back pain [Itching] : no itching [Mole Changes] : no mole changes [Nail Changes] : no nail changes [Hair Changes] : no hair changes [Skin Rash] : no skin rash [Headache] : no headache [Fainting] : fainting [Dizziness] : no dizziness [Confusion] : no confusion [Unsteady Walking] : ataxia [Memory Loss] : memory loss [Insomnia] : no insomnia [Anxiety] : anxiety [Suicidal] : not suicidal [Depression] : no depression [Easy Bruising] : no easy bruising [Easy Bleeding] : no easy bleeding [Swollen Glands] : no swollen glands [Negative] : Heme/Lymph [de-identified] : syncope [FreeTextEntry9] : hips and back

## 2019-11-15 NOTE — ED PROVIDER NOTE - PROGRESS NOTE DETAILS
Estuardo PGY2- d/w Dr Richards who is partners with pt's cardiologist (Dr. Webster), admit to hospitalist Ivana Joshi (pcp, unattached Dr. CHRISTEL Bryant) to see EP Dr. Mijares for possible PM placement. Estuardo PGY2- sign out from Landy PGY2,  d/w Dr Richards who is partners with pt's cardiologist (Dr. Webster), admit to hospitalist Ivana Joshi (pcp, unattached Dr. CHRISTEL Bryant) to see EP Dr. Mijares for possible PM placement.

## 2019-11-15 NOTE — H&P ADULT - NSHPREVIEWOFSYSTEMS_GEN_ALL_CORE
CONSTITUTIONAL: No weakness, fevers or chills  EYES/ENT: No visual changes;  No dysphagia  NECK: No pain or stiffness  RESPIRATORY: No cough, wheezing, hemoptysis; No shortness of breath  CARDIOVASCULAR: no chest pain or palpitation  EXTREMITIES: no le edema, cyanosis, clubbing  MUSCULOSKELETAL: b/l knee pain  GASTROINTESTINAL: No abdominal or epigastric pain. No nausea, vomiting, or hematemesis; No diarrhea or constipation. No melena or hematochezia.  BACK: + back pain  GENITOURINARY: No dysuria, frequency or hematuria  NEUROLOGICAL: No numbness or weakness  SKIN: No itching, burning, rashes, or lesions   PSYCH: no agitation  All other review of systems is negative unless indicated above.

## 2019-11-15 NOTE — ED PROVIDER NOTE - NS ED ROS FT
ROS:  GENERAL: +fall/syncope. No fever, no chills  EYES: no change in vision  HEENT: no trouble swallowing, no trouble speaking  CARDIAC: no chest pain  PULMONARY: no cough, no shortness of breath  GI: no abdominal pain, no nausea, no vomiting, no diarrhea, no constipation  : No dysuria, no frequency, no change in appearance, or odor of urine  SKIN: no rashes  NEURO: no headache, no weakness  MSK: No joint pain    Julio Bill PGY2

## 2019-11-15 NOTE — H&P ADULT - ASSESSMENT
85 yo female with PMH of spinal stenosis, arthritis, Dementia, HTN, HLD, Hypothyroidism, afib on coumadin, presents here after a possible syncope.

## 2019-11-16 LAB
ALBUMIN SERPL ELPH-MCNC: 4 G/DL — SIGNIFICANT CHANGE UP (ref 3.3–5)
ALP SERPL-CCNC: 157 U/L — HIGH (ref 40–120)
ALT FLD-CCNC: 8 U/L — LOW (ref 10–45)
ANION GAP SERPL CALC-SCNC: 9 MMOL/L — SIGNIFICANT CHANGE UP (ref 5–17)
APTT BLD: 32.6 SEC — SIGNIFICANT CHANGE UP (ref 27.5–36.3)
AST SERPL-CCNC: 12 U/L — SIGNIFICANT CHANGE UP (ref 10–40)
BASOPHILS # BLD AUTO: 0.02 K/UL — SIGNIFICANT CHANGE UP (ref 0–0.2)
BASOPHILS NFR BLD AUTO: 0.3 % — SIGNIFICANT CHANGE UP (ref 0–2)
BILIRUB SERPL-MCNC: 1.1 MG/DL — SIGNIFICANT CHANGE UP (ref 0.2–1.2)
BUN SERPL-MCNC: 20 MG/DL — SIGNIFICANT CHANGE UP (ref 7–23)
CALCIUM SERPL-MCNC: 9.8 MG/DL — SIGNIFICANT CHANGE UP (ref 8.4–10.5)
CHLORIDE SERPL-SCNC: 104 MMOL/L — SIGNIFICANT CHANGE UP (ref 96–108)
CO2 SERPL-SCNC: 27 MMOL/L — SIGNIFICANT CHANGE UP (ref 22–31)
CREAT SERPL-MCNC: 1.24 MG/DL — SIGNIFICANT CHANGE UP (ref 0.5–1.3)
EOSINOPHIL # BLD AUTO: 0.07 K/UL — SIGNIFICANT CHANGE UP (ref 0–0.5)
EOSINOPHIL NFR BLD AUTO: 1 % — SIGNIFICANT CHANGE UP (ref 0–6)
FOLATE SERPL-MCNC: 19.7 NG/ML — SIGNIFICANT CHANGE UP
GLUCOSE SERPL-MCNC: 92 MG/DL — SIGNIFICANT CHANGE UP (ref 70–99)
HCT VFR BLD CALC: 37.7 % — SIGNIFICANT CHANGE UP (ref 34.5–45)
HGB BLD-MCNC: 11.7 G/DL — SIGNIFICANT CHANGE UP (ref 11.5–15.5)
IMM GRANULOCYTES NFR BLD AUTO: 0.3 % — SIGNIFICANT CHANGE UP (ref 0–1.5)
INR BLD: 1.34 RATIO — HIGH (ref 0.88–1.16)
LYMPHOCYTES # BLD AUTO: 0.79 K/UL — LOW (ref 1–3.3)
LYMPHOCYTES # BLD AUTO: 11.1 % — LOW (ref 13–44)
MAGNESIUM SERPL-MCNC: 2.2 MG/DL — SIGNIFICANT CHANGE UP (ref 1.6–2.6)
MCHC RBC-ENTMCNC: 28.1 PG — SIGNIFICANT CHANGE UP (ref 27–34)
MCHC RBC-ENTMCNC: 31 GM/DL — LOW (ref 32–36)
MCV RBC AUTO: 90.4 FL — SIGNIFICANT CHANGE UP (ref 80–100)
MONOCYTES # BLD AUTO: 0.49 K/UL — SIGNIFICANT CHANGE UP (ref 0–0.9)
MONOCYTES NFR BLD AUTO: 6.9 % — SIGNIFICANT CHANGE UP (ref 2–14)
NEUTROPHILS # BLD AUTO: 5.72 K/UL — SIGNIFICANT CHANGE UP (ref 1.8–7.4)
NEUTROPHILS NFR BLD AUTO: 80.4 % — HIGH (ref 43–77)
PHOSPHATE SERPL-MCNC: 2.9 MG/DL — SIGNIFICANT CHANGE UP (ref 2.5–4.5)
PLATELET # BLD AUTO: 218 K/UL — SIGNIFICANT CHANGE UP (ref 150–400)
POTASSIUM SERPL-MCNC: 3.5 MMOL/L — SIGNIFICANT CHANGE UP (ref 3.5–5.3)
POTASSIUM SERPL-SCNC: 3.5 MMOL/L — SIGNIFICANT CHANGE UP (ref 3.5–5.3)
PROT SERPL-MCNC: 7.6 G/DL — SIGNIFICANT CHANGE UP (ref 6–8.3)
PROTHROM AB SERPL-ACNC: 15.4 SEC — HIGH (ref 10–13.1)
RBC # BLD: 4.17 M/UL — SIGNIFICANT CHANGE UP (ref 3.8–5.2)
RBC # FLD: 16.3 % — HIGH (ref 10.3–14.5)
SODIUM SERPL-SCNC: 140 MMOL/L — SIGNIFICANT CHANGE UP (ref 135–145)
T PALLIDUM AB TITR SER: NEGATIVE — SIGNIFICANT CHANGE UP
TSH SERPL-MCNC: 3.97 UIU/ML — SIGNIFICANT CHANGE UP (ref 0.27–4.2)
VIT B12 SERPL-MCNC: 283 PG/ML — SIGNIFICANT CHANGE UP (ref 232–1245)
WBC # BLD: 7.11 K/UL — SIGNIFICANT CHANGE UP (ref 3.8–10.5)
WBC # FLD AUTO: 7.11 K/UL — SIGNIFICANT CHANGE UP (ref 3.8–10.5)

## 2019-11-16 PROCEDURE — 99232 SBSQ HOSP IP/OBS MODERATE 35: CPT

## 2019-11-16 PROCEDURE — 93010 ELECTROCARDIOGRAM REPORT: CPT

## 2019-11-16 RX ORDER — WARFARIN SODIUM 2.5 MG/1
5 TABLET ORAL ONCE
Refills: 0 | Status: COMPLETED | OUTPATIENT
Start: 2019-11-16 | End: 2019-11-16

## 2019-11-16 RX ADMIN — WARFARIN SODIUM 5 MILLIGRAM(S): 2.5 TABLET ORAL at 21:17

## 2019-11-16 RX ADMIN — Medication 25 MICROGRAM(S): at 05:14

## 2019-11-16 RX ADMIN — Medication 100 MILLIGRAM(S): at 12:37

## 2019-11-16 RX ADMIN — Medication 5 MILLIGRAM(S): at 05:14

## 2019-11-16 RX ADMIN — Medication 40 MILLIGRAM(S): at 05:14

## 2019-11-16 RX ADMIN — Medication 25 MILLIGRAM(S): at 18:15

## 2019-11-16 RX ADMIN — MEMANTINE HYDROCHLORIDE 5 MILLIGRAM(S): 10 TABLET ORAL at 12:37

## 2019-11-16 RX ADMIN — ATORVASTATIN CALCIUM 10 MILLIGRAM(S): 80 TABLET, FILM COATED ORAL at 21:17

## 2019-11-16 NOTE — PROGRESS NOTE ADULT - SUBJECTIVE AND OBJECTIVE BOX
Patient is a 84y old  Female who presents with a chief complaint of sent here for syncope (15 Nov 2019 21:13)      SUBJECTIVE / OVERNIGHT EVENTS:  No acute events.  Feels well.  Overnight, HR dropped to 37 (while patient was sleeping).  Now asymptomatic    MEDICATIONS  (STANDING):  allopurinol 100 milliGRAM(s) Oral daily  atorvastatin 10 milliGRAM(s) Oral at bedtime  enalapril 5 milliGRAM(s) Oral daily  furosemide    Tablet 40 milliGRAM(s) Oral daily  levothyroxine 25 MICROGram(s) Oral daily  memantine 5 milliGRAM(s) Oral daily  metoprolol tartrate 25 milliGRAM(s) Oral two times a day    MEDICATIONS  (PRN):      CAPILLARY BLOOD GLUCOSE        I&O's Summary    16 Nov 2019 07:01  -  16 Nov 2019 13:11  --------------------------------------------------------  IN: 420 mL / OUT: 400 mL / NET: 20 mL        PHYSICAL EXAM:  Vital Signs Last 24 Hrs  T(C): 36.3 (16 Nov 2019 12:38), Max: 36.8 (15 Nov 2019 18:02)  T(F): 97.4 (16 Nov 2019 12:38), Max: 98.2 (15 Nov 2019 18:02)  HR: 73 (16 Nov 2019 12:38) (55 - 73)  BP: 145/73 (16 Nov 2019 12:38) (142/77 - 165/81)  BP(mean): --  RR: 17 (16 Nov 2019 12:38) (17 - 22)  SpO2: 93% (16 Nov 2019 12:38) (93% - 98%)  GENERAL: NAD, well-developed  HEAD:  Atraumatic, Normocephalic  EYES: EOMI, PERRLA, conjunctiva and sclera clear  NECK: Supple, No JVD  CHEST/LUNG: Clear to auscultation bilaterally; No wheeze  HEART: Regular rate and rhythm; No murmurs, rubs, or gallops  ABDOMEN: Soft, Nontender, Nondistended; Bowel sounds present  EXTREMITIES:  2+ Peripheral Pulses, No clubbing, cyanosis, or edema  PSYCH: AAOx3  NEUROLOGY: non-focal  SKIN: No rashes or lesions    LABS:                        11.7   7.11  )-----------( 218      ( 16 Nov 2019 10:17 )             37.7     11-16    140  |  104  |  20  ----------------------------<  92  3.5   |  27  |  1.24    Ca    9.8      16 Nov 2019 07:25  Phos  2.9     11-16  Mg     2.2     11-16    TPro  7.6  /  Alb  4.0  /  TBili  1.1  /  DBili  x   /  AST  12  /  ALT  8<L>  /  AlkPhos  157<H>  11-16    PT/INR - ( 16 Nov 2019 09:40 )   PT: 15.4 sec;   INR: 1.34 ratio         PTT - ( 16 Nov 2019 09:40 )  PTT:32.6 sec          RADIOLOGY & ADDITIONAL TESTS:    Imaging Personally Reviewed:    Consultant(s) Notes Reviewed:      Care Discussed with Consultants/Other Providers:

## 2019-11-16 NOTE — PROGRESS NOTE ADULT - PROBLEM SELECTOR PLAN 1
- Patient with likely syncopal episode, currently denies any complaints  - check orthostatics  - monitor on telemetry  - check TSH  - trend troponin  - Echo pending.   - PCP requested EP evaluation with Dr. Mijares

## 2019-11-16 NOTE — CHART NOTE - NSCHARTNOTEFT_GEN_A_CORE
Chart/Event Note  [facility] [address]  ROBSON ESTRELLA, 84y, Female  [id number]    Reason for Notification:       Events/History of Present Illness:      T(C): 36.8 (11-16-19 @ 01:15), Max: 36.8 (11-15-19 @ 18:02)  HR: 55 (11-16-19 @ 01:15) (55 - 70)  BP: 142/77 (11-16-19 @ 01:15) (142/77 - 165/81)  RR: 18 (11-16-19 @ 01:15) (18 - 22)  SpO2: 94% (11-16-19 @ 01:15) (94% - 98%)    Medical Decision Making/Assessment/Plan:      - Diagnosis:     1)  2)  3)  4)   5) Will endorse the above diagnostics and findings to the day medicine team for review and follow up. Will additionally sign out to day medicine teams to follow with relevant specialties.     Akhil Madera NP  Department of Medicine   # Chart/Event Note  [facility] [address]  ROBSON ESTRELLA, 84y, Female  [id number]    Reason for Notification:   Notified by RN and Telemetry that the above patient AFib with slow ventricular response monitor down to a low of 37 beats while sleeping. This the patient's first episode of bradycardia throughout admission.     Events/History of Present Illness  Patient's chart reviewed. Went to bedside with nursing staff to evaluate patient. Patient at documented baseline status without acute change in condition or complaints. Vitals stable. Patient was sleeping, asymptomatic, and was not aware of the episode.     T(C): 36.8 (11-16-19 @ 01:15), Max: 36.8 (11-15-19 @ 18:02)  HR: 55 (11-16-19 @ 01:15) (55 - 70)  BP: 142/77 (11-16-19 @ 01:15) (142/77 - 165/81)  RR: 18 (11-16-19 @ 01:15) (18 - 22)  SpO2: 94% (11-16-19 @ 01:15) (94% - 98%)    Medical Decision Making/Assessment/Plan:  84-year-old Female with past medical history of dementia, Hypertension, and AFib presented with possible syncope, now with AFib with slow ventricular response, down to a low of 31.     - Diagnosis: AFib with Slow Ventricular Response  Patient clinically stable and asymptomatic. Vitals stable. Currently in rate controlled AFib 50s-60s. Episode occurred while patient was sleeping. Patient is on Lopressor 25 mg PO BID, received a dose this evening.     1) Continue cardiac monitoring.   2) EKG obtained and reviewed, AFib with slow ventricular response, no acute ST segment changes noted.   3) Will hold am doses of Lopressor.   4) Discussed above with Cardiology fellow Dr. Evans who agreed with above plan. Patient to be seen by Cardiology and EP in the am.   5) Will endorse the above diagnostics and findings to the day medicine team for review and follow up. Will additionally sign out to day medicine teams to follow with Cardiology and other relevant specialties.     Akhil Madera NP  Department of Medicine   #[id number] Chart/Event Note  [facility] [address]  ROBSON ESTRELLA, 84y, Female  [id number]    Reason for Notification:   Notified by RN and Telemetry that the above patient AFib with slow ventricular response monitor down to a low of 37 beats while sleeping. This the patient's first episode of bradycardia throughout admission.     Events/History of Present Illness  Patient's chart reviewed. Went to bedside with nursing staff to evaluate patient. Patient at documented baseline status without acute change in condition or complaints. Vitals stable. Patient was sleeping, asymptomatic, and was not aware of the episode.     T(C): 36.8 (11-16-19 @ 01:15), Max: 36.8 (11-15-19 @ 18:02)  HR: 55 (11-16-19 @ 01:15) (55 - 70)  BP: 142/77 (11-16-19 @ 01:15) (142/77 - 165/81)  RR: 18 (11-16-19 @ 01:15) (18 - 22)  SpO2: 94% (11-16-19 @ 01:15) (94% - 98%)    Medical Decision Making/Assessment/Plan:  84-year-old Female with past medical history of dementia, Hypertension, and AFib presented with possible syncope, now with AFib with slow ventricular response, down to a low of 37.     - Diagnosis: AFib with Slow Ventricular Response  Patient clinically stable and asymptomatic. Vitals stable. Currently in rate controlled AFib 50s-60s. Episode occurred while patient was sleeping. Patient is on Lopressor 25 mg PO BID, received a dose this evening.     1) Continue cardiac monitoring.   2) EKG obtained and reviewed, AFib with slow ventricular response, no acute ST segment changes noted.   3) Will hold am doses of Lopressor.   4) Discussed above with Cardiology fellow Dr. Evans who agreed with above plan. Patient to be seen by Cardiology and EP in the am.   5) Will endorse the above diagnostics and findings to the day medicine team for review and follow up. Will additionally sign out to day medicine teams to follow with Cardiology and other relevant specialties.     Akhil Madera NP  Department of Medicine   #[id number] Chart/Event Note  [facility] [address]  ROBSON ESTRELLA, 84y, Female  [id number]    Reason for Notification:   Notified by RN and Telemetry that the above patient AFib with slow ventricular response monitor down to a low of 37 beats while sleeping. This the patient's first episode of bradycardia throughout admission.     Events/History of Present Illness  Patient's chart reviewed. Went to bedside with nursing staff to evaluate patient. Patient at documented baseline status without acute change in condition or complaints. Vitals stable. Patient was sleeping, asymptomatic, and was not aware of the episode.     T(C): 36.8 (11-16-19 @ 01:15), Max: 36.8 (11-15-19 @ 18:02)  HR: 55 (11-16-19 @ 01:15) (55 - 70)  BP: 142/77 (11-16-19 @ 01:15) (142/77 - 165/81)  RR: 18 (11-16-19 @ 01:15) (18 - 22)  SpO2: 94% (11-16-19 @ 01:15) (94% - 98%)    Medical Decision Making/Assessment/Plan:  84-year-old Female with past medical history of dementia, Hypertension, and AFib presented with possible syncope, now with AFib with slow ventricular response, down to a low of 37.     - Diagnosis: AFib with Slow Ventricular Response  Patient clinically stable and asymptomatic. Vitals stable. Currently in rate controlled AFib 50s-60s. Episode occurred while patient was sleeping. Patient is on Lopressor 25 mg PO BID, received a dose this evening.     1) Continue cardiac monitoring. R2 pads applied.   2) EKG obtained and reviewed, AFib with slow ventricular response, no acute ST segment changes noted.   3) Will hold am doses of Lopressor.   4) Discussed above with Cardiology fellow Dr. Evans who agreed with above plan. Patient to be seen by Cardiology and EP in the am.   5) Will endorse the above diagnostics and findings to the day medicine team for review and follow up. Will additionally sign out to day medicine teams to follow with Cardiology and other relevant specialties.     Akhil Madera NP  Department of Medicine   #[id number]

## 2019-11-16 NOTE — CONSULT NOTE ADULT - SUBJECTIVE AND OBJECTIVE BOX
Patient seen and evaluated at bedside    Chief Complaint:    HPI:  83 yo female with PMH of spinal stenosis, arthritis, Dementia, HTN, HLD, Hypothyroidism, afib on coumadin, presents here after a possible syncope.  Patient states about one month ago, she was standing in the kitchen and suddenly fell and had head trauma, not sure if she passed out.  At that time she was referred to cardiology and a discussion about EP study and pacemaker took place, but never had a follow up. Two days ago, on Wednesday, patient was standing next to the TV and fell again on her side and her buttock.  She is unsure if she lost consciousness but she says she "woke up" and was on the floor.  She dragged herself to the couch. She was able to stand and walk on her feet though had some pelvic pain.  Yesterday, patient was seen by PCP, who sent patient to ED for XR.  Xray of hip and pelvis were negative and patient was discharged home.  Today she received a call from PCP stating that she should come to ED for further cardiac work up and EPS evaluation with Dr. Mijares. Patient otherwise denies palpitation, dizziness, blurry vision, chest pain, SOB, fever, cough, runny nose, sick contact, recent travel.  She denies any GI/ complaints.  She still has some left pelvic discomfort when walking, but much better than before.      Of note, family is concerned because patient has memory issues and tends to forget her medication.  Also not sure if she has been taking more than she is supposed to. (15 Nov 2019 21:13)      PMHx:   Dementia  HLD (hyperlipidemia)  Hypothyroid  Benign hypertension  Atrial fibrillation, unspecified type      PSHx:   H/O mastectomy  S/P knee replacement      Allergies:  No Known Allergies      Home Meds:    Current Medications:   allopurinol 100 milliGRAM(s) Oral daily  atorvastatin 10 milliGRAM(s) Oral at bedtime  enalapril 5 milliGRAM(s) Oral daily  furosemide    Tablet 40 milliGRAM(s) Oral daily  levothyroxine 25 MICROGram(s) Oral daily  memantine 5 milliGRAM(s) Oral daily  metoprolol tartrate 25 milliGRAM(s) Oral two times a day  warfarin 5 milliGRAM(s) Oral once      FAMILY HISTORY:  Family history of pacemaker: mother  FH: CVA (cerebrovascular accident): Mother  FH: myocardial infarction: father      Social History:  Smoking History:  Alcohol Use:  Drug Use:    REVIEW OF SYSTEMS:  CONSTITUTIONAL: No weakness, fevers or chills  EYES/ENT: No visual changes;  No dysphagia  NECK: No pain or stiffness  RESPIRATORY: No cough, wheezing, hemoptysis; No shortness of breath  CARDIOVASCULAR: No chest pain or palpitations; No lower extremity edema  GASTROINTESTINAL: No abdominal or epigastric pain. No nausea, vomiting, or hematemesis; No diarrhea or constipation. No melena or hematochezia.  BACK: No back pain  GENITOURINARY: No dysuria, frequency or hematuria  NEUROLOGICAL: No numbness or weakness  SKIN: No itching, burning, rashes, or lesions   All other review of systems is negative unless indicated above.    Physical Exam:  T(F): 97.4 (11-16), Max: 98.2 (11-16)  HR: 73 (11-16) (55 - 73)  BP: 145/73 (11-16) (142/77 - 163/90)  RR: 17 (11-16)  SpO2: 93% (11-16)  GENERAL: No acute distress, well-developed  HEAD:  Atraumatic, Normocephalic  ENT: EOMI, PERRLA, conjunctiva and sclera clear, Neck supple, No JVD, moist mucosa  CHEST/LUNG: Clear to auscultation bilaterally; No wheeze, equal breath sounds bilaterally   BACK: No spinal tenderness  HEART: Regular rate and rhythm; No murmurs, rubs, or gallops  ABDOMEN: Soft, Nontender, Nondistended; Bowel sounds present  EXTREMITIES:  No clubbing, cyanosis, or edema  PSYCH: Nl behavior, nl affect  NEUROLOGY: AAOx3, non-focal, cranial nerves intact  SKIN: Normal color, No rashes or lesions  LINES:    Cardiovascular Diagnostic Testing:    ECG: Personally reviewed:    Echo: Personally reviewed:    Stress Testing:    Cath:    Imaging:    CXR: Personally reviewed    Labs: Personally reviewed                        11.7   7.11  )-----------( 218      ( 16 Nov 2019 10:17 )             37.7     11-16    140  |  104  |  20  ----------------------------<  92  3.5   |  27  |  1.24    Ca    9.8      16 Nov 2019 07:25  Phos  2.9     11-16  Mg     2.2     11-16    TPro  7.6  /  Alb  4.0  /  TBili  1.1  /  DBili  x   /  AST  12  /  ALT  8<L>  /  AlkPhos  157<H>  11-16    PT/INR - ( 16 Nov 2019 09:40 )   PT: 15.4 sec;   INR: 1.34 ratio         PTT - ( 16 Nov 2019 09:40 )  PTT:32.6 sec    CARDIAC MARKERS ( 15 Nov 2019 22:40 )  24 ng/L / x     / x     / x     / x     / x      CARDIAC MARKERS ( 15 Nov 2019 19:23 )  24 ng/L / x     / x     / x     / x     / x                  Thyroid Stimulating Hormone, Serum: 3.97 uIU/mL (11-16 @ 10:30) Patient seen and evaluated at bedside    Chief Complaint: Syncope    HPI:  83 yo F with PMH of spinal stenosis, arthritis, mild dementia, HTN, HLD, hypothyroidism, afib on coumadin who presented after episode of syncope, which was second occurrence in last few months without prodrome. Noted with intermittent a fib with slow VR to 30s since admission, mostly during sleep. EP consulted for need for PPM vs additional evaluation.  Per patient, has had two separate episodes of syncope in past few months, both with sudden loss of consciousness without prior prodrome, denies chest pain, palpitations or lightheadedness beforehand. No postictal symptoms or seizure like activity afterwards. Of note, patient follows with general cardiologist Dr. Webster as outpatient, has worn a Holter monitor which showed a fib with well controlled rates, however significant for about 3 sec pause. Patient taking metoprolol 25mg BID. Discussion was held with primary cardiologist re: possible EPS, perhaps PPM, which patient was amenable to. Given second episode of syncope, recommended presenting to ED for workup and possible EPS evaluation with Dr. Pereira.  Patient currently without complaints, reports no episodes of presyncope or syncope since admitted.    PMHx:   Dementia  HLD (hyperlipidemia)  Hypothyroid  Benign hypertension  Atrial fibrillation, unspecified type      PSHx:   H/O mastectomy  S/P knee replacement      Allergies:  No Known Allergies      Home Meds:    Current Medications:   allopurinol 100 milliGRAM(s) Oral daily  atorvastatin 10 milliGRAM(s) Oral at bedtime  enalapril 5 milliGRAM(s) Oral daily  furosemide    Tablet 40 milliGRAM(s) Oral daily  levothyroxine 25 MICROGram(s) Oral daily  memantine 5 milliGRAM(s) Oral daily  metoprolol tartrate 25 milliGRAM(s) Oral two times a day  warfarin 5 milliGRAM(s) Oral once      FAMILY HISTORY:  Family history of pacemaker: mother  FH: CVA (cerebrovascular accident): Mother  FH: myocardial infarction: father    REVIEW OF SYSTEMS:  CONSTITUTIONAL: No weakness, fevers or chills  EYES/ENT: No visual changes;  No dysphagia  NECK: No pain or stiffness  RESPIRATORY: No cough, wheezing, hemoptysis; No shortness of breath  CARDIOVASCULAR: No chest pain or palpitations; No lower extremity edema  GASTROINTESTINAL: No abdominal or epigastric pain. No nausea, vomiting, or hematemesis; No diarrhea or constipation. No melena or hematochezia.  BACK: No back pain  GENITOURINARY: No dysuria, frequency or hematuria  NEUROLOGICAL: No numbness or weakness  SKIN: No itching, burning, rashes, or lesions   All other review of systems is negative unless indicated above.    Physical Exam:  T(F): 97.4 (11-16), Max: 98.2 (11-16)  HR: 73 (11-16) (55 - 73)  BP: 145/73 (11-16) (142/77 - 163/90)  RR: 17 (11-16)  SpO2: 93% (11-16)  GENERAL: No acute distress, well-developed  HEAD:  Atraumatic, Normocephalic  ENT: EOMI, conjunctiva and sclera clear, Neck supple, No JVD, moist mucosa  CHEST/LUNG: Clear to auscultation bilaterally  HEART: Irregularly irregular, normal S1, S2; No murmurs, rubs, or gallops  ABDOMEN: Soft  EXTREMITIES:  No clubbing, cyanosis, or edema  PSYCH: Nl behavior, nl affect  NEUROLOGY: AAOx3, non-focal, cranial nerves intact  SKIN: Normal color, No rashes or lesions    Cardiovascular Diagnostic Testing:  ECG: a fib with slow VR  Telemetry: a fib 50-70s, lowest 37 with evidence of slow VR    Labs: Personally reviewed                        11.7   7.11  )-----------( 218      ( 16 Nov 2019 10:17 )             37.7     11-16    140  |  104  |  20  ----------------------------<  92  3.5   |  27  |  1.24    Ca    9.8      16 Nov 2019 07:25  Phos  2.9     11-16  Mg     2.2     11-16    TPro  7.6  /  Alb  4.0  /  TBili  1.1  /  DBili  x   /  AST  12  /  ALT  8<L>  /  AlkPhos  157<H>  11-16    PT/INR - ( 16 Nov 2019 09:40 )   PT: 15.4 sec;   INR: 1.34 ratio         PTT - ( 16 Nov 2019 09:40 )  PTT:32.6 sec    CARDIAC MARKERS ( 15 Nov 2019 22:40 )  24 ng/L / x     / x     / x     / x     / x      CARDIAC MARKERS ( 15 Nov 2019 19:23 )  24 ng/L / x     / x     / x     / x     / x        Thyroid Stimulating Hormone, Serum: 3.97 uIU/mL (11-16 @ 10:30) Patient seen and evaluated at bedside    Chief Complaint: Syncope    HPI:  85 yo F with PMH of spinal stenosis, arthritis, mild dementia, HTN, HLD, hypothyroidism, afib on coumadin who presented after episode of syncope, which was second occurrence in last few months without prodrome. Noted with intermittent a fib with slow VR to 30s since admission, mostly during sleep. EP consulted for need for PPM vs additional evaluation.  Per patient, has had two separate episodes of syncope in past few months, both with sudden loss of consciousness without prior prodrome, denies chest pain, palpitations or lightheadedness beforehand. No postictal symptoms or seizure like activity afterwards. Of note, patient follows with general cardiologist Dr. Webster as outpatient, has worn a Holter monitor which showed a fib with well controlled rates, however significant for about 3 sec pause. Patient taking metoprolol 25mg BID. Discussion was held with primary cardiologist re: possible EPS, perhaps PPM, which patient was amenable to. Given second episode of syncope, recommended presenting to ED for workup and possible EPS evaluation with Dr. Pereira.  Patient currently without complaints, reports no episodes of presyncope or syncope since admitted.    PMHx:   Dementia  HLD (hyperlipidemia)  Hypothyroid  Benign hypertension  Atrial fibrillation, unspecified type      PSHx:   H/O mastectomy  S/P knee replacement      Allergies:  No Known Allergies      Home Meds:    Current Medications:   allopurinol 100 milliGRAM(s) Oral daily  atorvastatin 10 milliGRAM(s) Oral at bedtime  enalapril 5 milliGRAM(s) Oral daily  furosemide    Tablet 40 milliGRAM(s) Oral daily  levothyroxine 25 MICROGram(s) Oral daily  memantine 5 milliGRAM(s) Oral daily  metoprolol tartrate 25 milliGRAM(s) Oral two times a day  warfarin 5 milliGRAM(s) Oral once      FAMILY HISTORY:  Family history of pacemaker: mother  FH: CVA (cerebrovascular accident): Mother  FH: myocardial infarction: father    REVIEW OF SYSTEMS:  CONSTITUTIONAL: No weakness, fevers or chills  EYES/ENT: No visual changes;  No dysphagia  NECK: No pain or stiffness  RESPIRATORY: No cough, wheezing, hemoptysis; No shortness of breath  CARDIOVASCULAR: No chest pain or palpitations; No lower extremity edema  GASTROINTESTINAL: No abdominal or epigastric pain. No nausea, vomiting, or hematemesis; No diarrhea or constipation. No melena or hematochezia.  BACK: No back pain  GENITOURINARY: No dysuria, frequency or hematuria  NEUROLOGICAL: No numbness or weakness  SKIN: No itching, burning, rashes, or lesions   All other review of systems is negative unless indicated above.    Physical Exam:  T(F): 97.4 (11-16), Max: 98.2 (11-16)  HR: 73 (11-16) (55 - 73)  BP: 145/73 (11-16) (142/77 - 163/90)  RR: 17 (11-16)  SpO2: 93% (11-16)  GENERAL: No acute distress, well-developed  HEAD:  Atraumatic, Normocephalic  ENT: EOMI, conjunctiva and sclera clear, Neck supple, No JVD, moist mucosa  CHEST/LUNG: Clear to auscultation bilaterally  HEART: Irregularly irregular, normal S1, S2; No murmurs, rubs, or gallops  ABDOMEN: Soft  EXTREMITIES:  No clubbing, cyanosis, or edema  PSYCH: Nl behavior, nl affect  NEUROLOGY: AAOx3, non-focal, cranial nerves intact  SKIN: Normal color, No rashes or lesions    Cardiovascular Diagnostic Testing:  ECG: a fib with slow VR, IVCD  Telemetry: a fib 50-70s, lowest 37 with evidence of slow VR    Labs: Personally reviewed                        11.7   7.11  )-----------( 218      ( 16 Nov 2019 10:17 )             37.7     11-16    140  |  104  |  20  ----------------------------<  92  3.5   |  27  |  1.24    Ca    9.8      16 Nov 2019 07:25  Phos  2.9     11-16  Mg     2.2     11-16    TPro  7.6  /  Alb  4.0  /  TBili  1.1  /  DBili  x   /  AST  12  /  ALT  8<L>  /  AlkPhos  157<H>  11-16    PT/INR - ( 16 Nov 2019 09:40 )   PT: 15.4 sec;   INR: 1.34 ratio         PTT - ( 16 Nov 2019 09:40 )  PTT:32.6 sec    CARDIAC MARKERS ( 15 Nov 2019 22:40 )  24 ng/L / x     / x     / x     / x     / x      CARDIAC MARKERS ( 15 Nov 2019 19:23 )  24 ng/L / x     / x     / x     / x     / x        Thyroid Stimulating Hormone, Serum: 3.97 uIU/mL (11-16 @ 10:30)

## 2019-11-16 NOTE — CONSULT NOTE ADULT - ASSESSMENT
83 yo F with PMH of spinal stenosis, arthritis, mild dementia, HTN, HLD, hypothyroidism, afib on coumadin who presented after episode of syncope, second occurence in last few months without prodrome. EP consulted for need for PPM vs additional evaluation.    #a fib with occasional slow VR  - Tele reviewed, 50-70s with episode of a fib with slow VR to 37 in sleep, patient has been asymptomatic this admission  - Agree with holding home metoprolol, patient rate controlled this admission  - Unclear if a fib contributed to two episodes of syncope as described, patient with acute loss of consciousness, no lightheadedness, chest pain or dyspnea prior  - Continue to monitor on telemetry  - Please obtain TTE  - [id number] 3, on Coumadin, INR subtherapeutic currently  - Will discuss with attending in AM, options may consider continued monitoring, discharge with event monitor vs ILR, vs PPM pending on symptoms with bradyarrythmia    Will discuss with attending in AM    Stacey Muhammad MD  Cardiology Fellow  [phone]  All Cardiology service information can be found 24/7 on [email or web address], password: [email or web address] 83 yo F with PMH of spinal stenosis, arthritis, mild dementia, HTN, HLD, hypothyroidism, afib on coumadin who presented after episode of syncope, second occurence in last few months without prodrome. EP consulted for need for PPM vs additional evaluation.    #a fib with occasional slow VR  - Tele reviewed, 50-70s with episode of a fib with slow VR to 37 in sleep, patient has been asymptomatic this admission  - Agree with holding home metoprolol, patient rate controlled this admission  - Unclear if a fib contributed to two episodes of syncope as described, patient with acute loss of consciousness, no lightheadedness, chest pain or dyspnea prior  - Continue to monitor on telemetry  - Please obtain TTE  - [id number] 3, on Coumadin, INR subtherapeutic currently  - Will discuss with attending in AM, options may consider continued monitoring, discharge with event monitor vs ILR, EPS vs PPM pending on symptoms with bradyarrythmia    Will discuss with attending in AM    Stacey Muhammad MD  Cardiology Fellow  [phone]  All Cardiology service information can be found 24/7 on [email or web address], password: [email or web address] 83 yo F with PMH of spinal stenosis, arthritis, mild dementia, HTN, HLD, hypothyroidism, afib on coumadin who presented after episode of syncope, second occurence in last few months without prodrome. EP consulted for need for PPM vs additional evaluation.    #a fib with occasional slow VR  - Tele reviewed, 50-70s with episode of a fib with slow VR to 37 in sleep, patient has been asymptomatic this admission  - Agree with holding home metoprolol, patient rate controlled this admission  - Concern for bradyarrhythmia given patient with recurrent syncope with underlying conduction delay  - Continue to monitor on telemetry  - Please obtain TTE  - [id number] 3, on Coumadin, INR subtherapeutic currently  - Will discuss with attending in AM, will consider PPM based on symptoms with bradyarrhythmia    Will discuss with attending in AM    Stacey Muhammad MD  Cardiology Fellow  [phone]  All Cardiology service information can be found 24/7 on [email or web address], password: [email or web address]

## 2019-11-17 LAB
APPEARANCE UR: CLEAR — SIGNIFICANT CHANGE UP
APTT BLD: 94.7 SEC — HIGH (ref 27.5–36.3)
BACTERIA # UR AUTO: NEGATIVE — SIGNIFICANT CHANGE UP
BILIRUB UR-MCNC: NEGATIVE — SIGNIFICANT CHANGE UP
COLOR SPEC: SIGNIFICANT CHANGE UP
DIFF PNL FLD: NEGATIVE — SIGNIFICANT CHANGE UP
EPI CELLS # UR: 4 /HPF — SIGNIFICANT CHANGE UP
GLUCOSE UR QL: NEGATIVE — SIGNIFICANT CHANGE UP
HCT VFR BLD CALC: 39.2 % — SIGNIFICANT CHANGE UP (ref 34.5–45)
HGB BLD-MCNC: 12.2 G/DL — SIGNIFICANT CHANGE UP (ref 11.5–15.5)
HYALINE CASTS # UR AUTO: 2 /LPF — SIGNIFICANT CHANGE UP (ref 0–2)
INR BLD: 1.49 RATIO — HIGH (ref 0.88–1.16)
KETONES UR-MCNC: NEGATIVE — SIGNIFICANT CHANGE UP
LEUKOCYTE ESTERASE UR-ACNC: ABNORMAL
MCHC RBC-ENTMCNC: 27.9 PG — SIGNIFICANT CHANGE UP (ref 27–34)
MCHC RBC-ENTMCNC: 31.1 GM/DL — LOW (ref 32–36)
MCV RBC AUTO: 89.5 FL — SIGNIFICANT CHANGE UP (ref 80–100)
NITRITE UR-MCNC: NEGATIVE — SIGNIFICANT CHANGE UP
NRBC # BLD: 0 /100 WBCS — SIGNIFICANT CHANGE UP (ref 0–0)
PH UR: 5.5 — SIGNIFICANT CHANGE UP (ref 5–8)
PLATELET # BLD AUTO: 246 K/UL — SIGNIFICANT CHANGE UP (ref 150–400)
PROT UR-MCNC: ABNORMAL
PROTHROM AB SERPL-ACNC: 17.3 SEC — HIGH (ref 10–13.1)
RBC # BLD: 4.38 M/UL — SIGNIFICANT CHANGE UP (ref 3.8–5.2)
RBC # FLD: 16 % — HIGH (ref 10.3–14.5)
RBC CASTS # UR COMP ASSIST: 2 /HPF — SIGNIFICANT CHANGE UP (ref 0–4)
SP GR SPEC: 1.02 — SIGNIFICANT CHANGE UP (ref 1.01–1.02)
UROBILINOGEN FLD QL: NEGATIVE — SIGNIFICANT CHANGE UP
WBC # BLD: 8.48 K/UL — SIGNIFICANT CHANGE UP (ref 3.8–10.5)
WBC # FLD AUTO: 8.48 K/UL — SIGNIFICANT CHANGE UP (ref 3.8–10.5)
WBC UR QL: 4 /HPF — SIGNIFICANT CHANGE UP (ref 0–5)

## 2019-11-17 PROCEDURE — 93970 EXTREMITY STUDY: CPT | Mod: 26

## 2019-11-17 PROCEDURE — 99232 SBSQ HOSP IP/OBS MODERATE 35: CPT

## 2019-11-17 RX ORDER — HEPARIN SODIUM 5000 [USP'U]/ML
15 INJECTION INTRAVENOUS; SUBCUTANEOUS
Qty: 25000 | Refills: 0 | Status: DISCONTINUED | OUTPATIENT
Start: 2019-11-17 | End: 2019-11-17

## 2019-11-17 RX ORDER — WARFARIN SODIUM 2.5 MG/1
5 TABLET ORAL ONCE
Refills: 0 | Status: COMPLETED | OUTPATIENT
Start: 2019-11-17 | End: 2019-11-17

## 2019-11-17 RX ORDER — HEPARIN SODIUM 5000 [USP'U]/ML
1400 INJECTION INTRAVENOUS; SUBCUTANEOUS
Qty: 25000 | Refills: 0 | Status: DISCONTINUED | OUTPATIENT
Start: 2019-11-17 | End: 2019-11-18

## 2019-11-17 RX ORDER — HEPARIN SODIUM 5000 [USP'U]/ML
14 INJECTION INTRAVENOUS; SUBCUTANEOUS
Qty: 25000 | Refills: 0 | Status: DISCONTINUED | OUTPATIENT
Start: 2019-11-17 | End: 2019-11-17

## 2019-11-17 RX ORDER — HEPARIN SODIUM 5000 [USP'U]/ML
1500 INJECTION INTRAVENOUS; SUBCUTANEOUS
Qty: 25000 | Refills: 0 | Status: DISCONTINUED | OUTPATIENT
Start: 2019-11-17 | End: 2019-11-17

## 2019-11-17 RX ORDER — WARFARIN SODIUM 2.5 MG/1
5 TABLET ORAL ONCE
Refills: 0 | Status: DISCONTINUED | OUTPATIENT
Start: 2019-11-17 | End: 2019-11-17

## 2019-11-17 RX ADMIN — Medication 5 MILLIGRAM(S): at 05:28

## 2019-11-17 RX ADMIN — Medication 100 MILLIGRAM(S): at 12:05

## 2019-11-17 RX ADMIN — Medication 25 MICROGRAM(S): at 05:28

## 2019-11-17 RX ADMIN — ATORVASTATIN CALCIUM 10 MILLIGRAM(S): 80 TABLET, FILM COATED ORAL at 21:38

## 2019-11-17 RX ADMIN — WARFARIN SODIUM 5 MILLIGRAM(S): 2.5 TABLET ORAL at 21:38

## 2019-11-17 RX ADMIN — Medication 40 MILLIGRAM(S): at 05:28

## 2019-11-17 RX ADMIN — HEPARIN SODIUM 15 UNIT(S)/HR: 5000 INJECTION INTRAVENOUS; SUBCUTANEOUS at 13:58

## 2019-11-17 RX ADMIN — MEMANTINE HYDROCHLORIDE 5 MILLIGRAM(S): 10 TABLET ORAL at 12:05

## 2019-11-17 NOTE — PROGRESS NOTE ADULT - PROBLEM SELECTOR PLAN 1
- Patient with likely syncopal episode, currently denies any complaints  - Orthostatics pending.   - monitor on telemetry  - TSH WNL.  - CE's WNL.  - Echo pending.   - EP evaluation underway for possible PPM.

## 2019-11-17 NOTE — PROGRESS NOTE ADULT - PROBLEM SELECTOR PLAN 2
- rate controlled  - Metoprolol on hold.   - c/w coumadin; May need to hold if patient undergoes PPM.

## 2019-11-17 NOTE — PROGRESS NOTE ADULT - SUBJECTIVE AND OBJECTIVE BOX
Patient is a 84y old  Female who presents with a chief complaint of sent here for syncope (16 Nov 2019 19:35)      SUBJECTIVE / OVERNIGHT EVENTS:  Feels well.  No events overnight.  No further syncope.  Slight bradycardia on tele.     MEDICATIONS  (STANDING):  allopurinol 100 milliGRAM(s) Oral daily  atorvastatin 10 milliGRAM(s) Oral at bedtime  enalapril 5 milliGRAM(s) Oral daily  furosemide    Tablet 40 milliGRAM(s) Oral daily  levothyroxine 25 MICROGram(s) Oral daily  memantine 5 milliGRAM(s) Oral daily    MEDICATIONS  (PRN):      CAPILLARY BLOOD GLUCOSE        I&O's Summary    16 Nov 2019 07:01  -  17 Nov 2019 07:00  --------------------------------------------------------  IN: 1020 mL / OUT: 1225 mL / NET: -205 mL    17 Nov 2019 07:01  -  17 Nov 2019 10:34  --------------------------------------------------------  IN: 0 mL / OUT: 300 mL / NET: -300 mL        PHYSICAL EXAM:  Vital Signs Last 24 Hrs  T(C): 36.4 (17 Nov 2019 04:59), Max: 36.8 (16 Nov 2019 20:23)  T(F): 97.5 (17 Nov 2019 04:59), Max: 98.2 (16 Nov 2019 20:23)  HR: 71 (17 Nov 2019 04:59) (57 - 73)  BP: 133/74 (17 Nov 2019 04:59) (120/71 - 145/73)  BP(mean): --  RR: 18 (17 Nov 2019 04:59) (16 - 18)  SpO2: 94% (17 Nov 2019 04:59) (93% - 94%)  GENERAL: NAD, well-developed  HEAD:  Atraumatic, Normocephalic  EYES: EOMI, PERRLA, conjunctiva and sclera clear  NECK: Supple, No JVD  CHEST/LUNG: Clear to auscultation bilaterally; No wheeze  HEART: Regular rate and rhythm; No murmurs, rubs, or gallops  ABDOMEN: Soft, Nontender, Nondistended; Bowel sounds present  EXTREMITIES:  2+ Peripheral Pulses, No clubbing, cyanosis, or edema  PSYCH: AAOx3  NEUROLOGY: non-focal  SKIN: No rashes or lesions    LABS:                        11.7   7.11  )-----------( 218      ( 16 Nov 2019 10:17 )             37.7     11-16    140  |  104  |  20  ----------------------------<  92  3.5   |  27  |  1.24    Ca    9.8      16 Nov 2019 07:25  Phos  2.9     11-16  Mg     2.2     11-16    TPro  7.6  /  Alb  4.0  /  TBili  1.1  /  DBili  x   /  AST  12  /  ALT  8<L>  /  AlkPhos  157<H>  11-16    PT/INR - ( 17 Nov 2019 08:57 )   PT: 17.3 sec;   INR: 1.49 ratio         PTT - ( 16 Nov 2019 09:40 )  PTT:32.6 sec          RADIOLOGY & ADDITIONAL TESTS:    Imaging Personally Reviewed:    Consultant(s) Notes Reviewed:      Care Discussed with Consultants/Other Providers:

## 2019-11-17 NOTE — PROGRESS NOTE ADULT - SUBJECTIVE AND OBJECTIVE BOX
Patient seen and examined. Chart reviewed.    Sudden syncope x 2 with history of "slow" AF and wide LBBB. Will plan for MIcra implantation tomorrow for likely bradyarrhythmic syncope.  - Begin heparin gtt without bolus, goal ptt 50-70 sec  - Please give scheduled coumadin tonight  - NPO after MN

## 2019-11-18 LAB
ANION GAP SERPL CALC-SCNC: 14 MMOL/L — SIGNIFICANT CHANGE UP (ref 5–17)
APTT BLD: 92.8 SEC — HIGH (ref 27.5–36.3)
BUN SERPL-MCNC: 25 MG/DL — HIGH (ref 7–23)
CALCIUM SERPL-MCNC: 9.8 MG/DL — SIGNIFICANT CHANGE UP (ref 8.4–10.5)
CHLORIDE SERPL-SCNC: 100 MMOL/L — SIGNIFICANT CHANGE UP (ref 96–108)
CO2 SERPL-SCNC: 27 MMOL/L — SIGNIFICANT CHANGE UP (ref 22–31)
CREAT SERPL-MCNC: 1.37 MG/DL — HIGH (ref 0.5–1.3)
GLUCOSE SERPL-MCNC: 104 MG/DL — HIGH (ref 70–99)
HCT VFR BLD CALC: 43.6 % — SIGNIFICANT CHANGE UP (ref 34.5–45)
HGB BLD-MCNC: 13.5 G/DL — SIGNIFICANT CHANGE UP (ref 11.5–15.5)
INR BLD: 1.78 RATIO — HIGH (ref 0.88–1.16)
MAGNESIUM SERPL-MCNC: 2.1 MG/DL — SIGNIFICANT CHANGE UP (ref 1.6–2.6)
MCHC RBC-ENTMCNC: 28.3 PG — SIGNIFICANT CHANGE UP (ref 27–34)
MCHC RBC-ENTMCNC: 31 GM/DL — LOW (ref 32–36)
MCV RBC AUTO: 91.4 FL — SIGNIFICANT CHANGE UP (ref 80–100)
PLATELET # BLD AUTO: 215 K/UL — SIGNIFICANT CHANGE UP (ref 150–400)
POTASSIUM SERPL-MCNC: 3.2 MMOL/L — LOW (ref 3.5–5.3)
POTASSIUM SERPL-SCNC: 3.2 MMOL/L — LOW (ref 3.5–5.3)
PROTHROM AB SERPL-ACNC: 20.7 SEC — HIGH (ref 10–12.9)
RBC # BLD: 4.77 M/UL — SIGNIFICANT CHANGE UP (ref 3.8–5.2)
RBC # FLD: 16 % — HIGH (ref 10.3–14.5)
SODIUM SERPL-SCNC: 141 MMOL/L — SIGNIFICANT CHANGE UP (ref 135–145)
WBC # BLD: 6.67 K/UL — SIGNIFICANT CHANGE UP (ref 3.8–10.5)
WBC # FLD AUTO: 6.67 K/UL — SIGNIFICANT CHANGE UP (ref 3.8–10.5)

## 2019-11-18 PROCEDURE — 99233 SBSQ HOSP IP/OBS HIGH 50: CPT

## 2019-11-18 PROCEDURE — 93010 ELECTROCARDIOGRAM REPORT: CPT

## 2019-11-18 PROCEDURE — 33274 TCAT INSJ/RPL PERM LDLS PM: CPT | Mod: Q0

## 2019-11-18 RX ORDER — HEPARIN SODIUM 5000 [USP'U]/ML
1500 INJECTION INTRAVENOUS; SUBCUTANEOUS
Qty: 25000 | Refills: 0 | Status: DISCONTINUED | OUTPATIENT
Start: 2019-11-18 | End: 2019-11-19

## 2019-11-18 RX ORDER — POTASSIUM CHLORIDE 20 MEQ
40 PACKET (EA) ORAL EVERY 4 HOURS
Refills: 0 | Status: COMPLETED | OUTPATIENT
Start: 2019-11-18 | End: 2019-11-18

## 2019-11-18 RX ORDER — WARFARIN SODIUM 2.5 MG/1
5 TABLET ORAL ONCE
Refills: 0 | Status: COMPLETED | OUTPATIENT
Start: 2019-11-18 | End: 2019-11-18

## 2019-11-18 RX ADMIN — Medication 100 MILLIGRAM(S): at 12:30

## 2019-11-18 RX ADMIN — Medication 40 MILLIGRAM(S): at 05:36

## 2019-11-18 RX ADMIN — Medication 5 MILLIGRAM(S): at 05:36

## 2019-11-18 RX ADMIN — ATORVASTATIN CALCIUM 10 MILLIGRAM(S): 80 TABLET, FILM COATED ORAL at 21:24

## 2019-11-18 RX ADMIN — HEPARIN SODIUM 15 UNIT(S)/HR: 5000 INJECTION INTRAVENOUS; SUBCUTANEOUS at 18:57

## 2019-11-18 RX ADMIN — WARFARIN SODIUM 5 MILLIGRAM(S): 2.5 TABLET ORAL at 21:23

## 2019-11-18 RX ADMIN — Medication 40 MILLIEQUIVALENT(S): at 18:58

## 2019-11-18 RX ADMIN — Medication 25 MICROGRAM(S): at 05:36

## 2019-11-18 RX ADMIN — Medication 40 MILLIEQUIVALENT(S): at 21:24

## 2019-11-18 RX ADMIN — HEPARIN SODIUM 14 UNIT(S)/HR: 5000 INJECTION INTRAVENOUS; SUBCUTANEOUS at 00:01

## 2019-11-18 RX ADMIN — MEMANTINE HYDROCHLORIDE 5 MILLIGRAM(S): 10 TABLET ORAL at 12:30

## 2019-11-18 NOTE — PROGRESS NOTE ADULT - SUBJECTIVE AND OBJECTIVE BOX
24H hour events: Patient without complaints post MICRA implant    MEDICATIONS:  enalapril 5 milliGRAM(s) Oral daily  furosemide    Tablet 40 milliGRAM(s) Oral daily  heparin  Infusion 1500 Unit(s)/Hr IV Continuous <Continuous>  warfarin 5 milliGRAM(s) Oral once  memantine 5 milliGRAM(s) Oral daily  allopurinol 100 milliGRAM(s) Oral daily  atorvastatin 10 milliGRAM(s) Oral at bedtime  levothyroxine 25 MICROGram(s) Oral daily  potassium chloride    Tablet ER 40 milliEquivalent(s) Oral every 4 hours      REVIEW OF SYSTEMS:  See HPI, otherwise ROS negative.    PHYSICAL EXAM:  T(C): 36.7 (11-18-19 @ 13:03), Max: 37.1 (11-17-19 @ 20:41)  HR: 51 (11-18-19 @ 13:03) (51 - 67)  BP: 148/82 (11-18-19 @ 13:03) (128/73 - 158/82)  RR: 18 (11-18-19 @ 13:03) (17 - 18)  SpO2: 96% (11-18-19 @ 13:03) (92% - 96%)  Wt(kg): --  I&O's Summary    17 Nov 2019 07:01  -  18 Nov 2019 07:00  --------------------------------------------------------  IN: 689 mL / OUT: 1600 mL / NET: -911 mL    18 Nov 2019 07:01  -  18 Nov 2019 18:51  --------------------------------------------------------  IN: 0 mL / OUT: 525 mL / NET: -525 mL        Appearance: Alert. Obese.  NAD	  Cardiovascular: +S1S2 irreg, irreg  Respiratory: CTA B/L	  Psychiatry: A & O x 3, Mood & affect appropriate  Neurologic: Non-focal  Extremities: No edema BLE; No groin hematoma of right groin  Vascular: Peripheral pulses palpable 2+ bilaterally      LABS:	 	    CBC Full  -  ( 18 Nov 2019 08:53 )  WBC Count : 6.67 K/uL  Hemoglobin : 13.5 g/dL  Hematocrit : 43.6 %  Platelet Count - Automated : 215 K/uL  Mean Cell Volume : 91.4 fl  Mean Cell Hemoglobin : 28.3 pg  Mean Cell Hemoglobin Concentration : 31.0 gm/dL    11-18    141  |  100  |  25<H>  ----------------------------<  104<H>  3.2<L>   |  27  |  1.37<H>    Ca    9.8      18 Nov 2019 06:46  Mg     2.1     11-18    TELEMETRY: AF w/ VR 60-80's bpm    	  ASSESSMENT/PLAN: 	  83y/o female h/o spinal stenosis, mild dementia, HTN, HLD, hypothyroidism, AF on coumadin p/w recurrent syncope without prodrome & on telemetry with periods of AF w/ slow VR s/p Medtronic MICRA PPM 11/18/19  1. Recurrent syncope  2. AF w/ slow VR  3. LBBB  4. s/p Medtronic MICRA PPM 11/18    --Suture removed from right groin without complications  --PA & lateral CXR in am  --Heparin gtt can be resumed. Continue Coumadin      Aylin Carpio PA-C  [phone]

## 2019-11-18 NOTE — PROGRESS NOTE ADULT - PROBLEM SELECTOR PLAN 9
1.  Name of PCP:  Dr. Bryant  2.  PCP Contacted on Admission: [ ] Y    [x ] N    3.  PCP contacted at Discharge: [ ] Y    [ ] N    [ ] N/A  4.  Post-Discharge Appointment Date and Location:  5.  Summary of Handoff given to PCP: will update on d/c

## 2019-11-18 NOTE — PROGRESS NOTE ADULT - PROBLEM SELECTOR PLAN 2
rate controlled  - c/w holding home metoprolol as per EP  - c/w coumadin - ok per EP to restart - due to for 5 mg tonight  - I d/w pt and family NOAC for afib, however pt is hesistant thus far and would like to f/u w/ her cardiologist Dr Webster to decide as outpatient  - replete electrolytes - hypokalemia repleted today

## 2019-11-18 NOTE — CHART NOTE - NSCHARTNOTEFT_GEN_A_CORE
BRIEF PROCEDURE NOTE    ROBSON ESTRELLA  [id number]    Pre-op Diagnosis: LBBB/Syncope    Post-op Diagnosis: same    Procedure: Leadless Pacemaker implant    Electrophysiologist: Jigna Foster MD    Assistant: none    Anesthesia: IV sedation/Local    Access: RFV    Description:  6Fr sheath RFV using Seldinger technique  Superstiff amplatz to SVC  serial dilation with 12/18Fr dilators  27Fr Micra sheath placed to HRA  Micra delivered to RV septum  Micra tested and released  Sheath removed/hemosatsis with figure of 8 Stitch    Complications: none    EBL: 10cc    Disposition: to recovery/stable    Plan:  Bedrest 4 hours  remove stitch 5PM  Heparin @ 5PM with no bolus  continue coumadin  PA/Lat in CXR  ECG

## 2019-11-18 NOTE — PROGRESS NOTE ADULT - SUBJECTIVE AND OBJECTIVE BOX
Patient is a 84y old  Female who presents with a chief complaint of sent here for syncope (2019 11:10)        SUBJECTIVE / OVERNIGHT EVENTS:  overnight no acute events.  no n/v/f/chills, cp, sob.  seen after ppm placement with daughter at bedside. no complaints.    CAPILLARY BLOOD GLUCOSE        I&O's Summary    2019 07:  -  2019 07:00  --------------------------------------------------------  IN: 689 mL / OUT: 1600 mL / NET: -911 mL    2019 07:01  -  2019 14:37  --------------------------------------------------------  IN: 0 mL / OUT: 200 mL / NET: -200 mL        Vital Signs Last 24 Hrs  T(C): 36.7 (2019 13:03), Max: 37.1 (2019 20:41)  T(F): 98 (2019 13:03), Max: 98.7 (2019 20:41)  HR: 51 (2019 13:03) (51 - 67)  BP: 148/82 (2019 13:03) (128/73 - 158/82)  BP(mean): --  RR: 18 (2019 13:03) (17 - 18)  SpO2: 96% (2019 13:03) (92% - 96%)    PHYSICAL EXAM:  GENERAL:  Well appearing, obese f, appears younger than stated age, in NAD  HEAD:  NCAT  EYES: PERRLA, conjunctiva clear  NECK: Supple  CHEST/LUNG: CTA B/L. No w/r/r.  HEART: bradycardic. Normal S1, S2. No m/r/g.   ABDOMEN: SNTND.   EXTREMITIES:  2+ Peripheral Pulses, No clubbing, cyanosis, edema.  PSYCH: appropriate affect    LABS:                        13.5   6.67  )-----------( 215      ( 2019 08:53 )             43.6     11-18    141  |  100  |  25<H>  ----------------------------<  104<H>  3.2<L>   |  27  |  1.37<H>    Ca    9.8      2019 06:46  Mg     2.1     -18      PT/INR - ( 2019 06:47 )   PT: 20.7 sec;   INR: 1.78 ratio         PTT - ( 2019 06:47 )  PTT:92.8 sec      Urinalysis Basic - ( 2019 23:41 )    Color: Light Yellow / Appearance: Clear / S.018 / pH: x  Gluc: x / Ketone: Negative  / Bili: Negative / Urobili: Negative   Blood: x / Protein: Trace / Nitrite: Negative   Leuk Esterase: Small / RBC: 2 /hpf / WBC 4 /HPF   Sq Epi: x / Non Sq Epi: 4 /hpf / Bacteria: Negative      RADIOLOGY & ADDITIONAL TESTS:  Consultant(s) Notes Reviewed:  EP  Care Discussed with Consultants/Other Providers: Floor NP/PA    MEDICATIONS  (STANDING):  allopurinol 100 milliGRAM(s) Oral daily  atorvastatin 10 milliGRAM(s) Oral at bedtime  enalapril 5 milliGRAM(s) Oral daily  furosemide    Tablet 40 milliGRAM(s) Oral daily  levothyroxine 25 MICROGram(s) Oral daily  memantine 5 milliGRAM(s) Oral daily    MEDICATIONS  (PRN):    Home Medications:  allopurinol 100 mg oral tablet: 1 tab(s) orally once a day (15 Nov 2019 22:24)  atorvastatin 10 mg oral tablet: 1 tab(s) orally once a day (at bedtime) (15 Nov 2019 22:24)  Coumadin 2.5 mg oral tablet: 1 tab(s) orally once a day (at bedtime), Tu//Sat/Sun (15 Nov 2019 22:24)  Coumadin 5 mg oral tablet: 1 tab(s) orally once a day (at bedtime), MWF (15 Nov 2019 22:24)  enalapril 5 mg oral tablet: 1 tab(s) orally once a day (15 Nov 2019 22:24)  Lasix 40 mg oral tablet: 1 tab(s) orally once a day (15 Nov 2019 22:24)  levothyroxine 25 mcg (0.025 mg) oral tablet: 1 tab(s) orally once a day (15 Nov 2019 22:24)  memantine 5 mg oral tablet: 1 tab(s) orally once a day (15 Nov 2019 22:24)  Metoprolol Tartrate 25 mg oral tablet: 1 tab(s) orally 2 times a day (15 Nov 2019 22:24)

## 2019-11-18 NOTE — PROGRESS NOTE ADULT - ASSESSMENT
83 yo F PMH of spinal stenosis, OA, Dementia, HTN, HLD, Hypothyroidism, afib on coumadin, p/w syncope likely due to bradyarrhythmia now s/p micraPPM 1/18, awaiting PT eval.

## 2019-11-18 NOTE — PROGRESS NOTE ADULT - PROBLEM SELECTOR PLAN 1
recurrent syncope suspect bradyarrhythmia given slow AF and wide LBBB with no prodrome  now s/p micra PPM  - monitor on telemetry  - f/u TTE  - f/u EP outpatient, f/u post PPM CXR

## 2019-11-19 ENCOUNTER — TRANSCRIPTION ENCOUNTER (OUTPATIENT)
Age: 84
End: 2019-11-19

## 2019-11-19 VITALS
RESPIRATION RATE: 18 BRPM | SYSTOLIC BLOOD PRESSURE: 111 MMHG | DIASTOLIC BLOOD PRESSURE: 67 MMHG | OXYGEN SATURATION: 93 % | HEART RATE: 70 BPM

## 2019-11-19 PROBLEM — E03.9 HYPOTHYROIDISM, UNSPECIFIED: Chronic | Status: ACTIVE | Noted: 2019-11-14

## 2019-11-19 PROBLEM — E78.5 HYPERLIPIDEMIA, UNSPECIFIED: Chronic | Status: ACTIVE | Noted: 2019-11-15

## 2019-11-19 PROBLEM — F03.90 UNSPECIFIED DEMENTIA WITHOUT BEHAVIORAL DISTURBANCE: Chronic | Status: ACTIVE | Noted: 2019-11-15

## 2019-11-19 PROBLEM — F03.90 UNSPECIFIED DEMENTIA, UNSPECIFIED SEVERITY, WITHOUT BEHAVIORAL DISTURBANCE, PSYCHOTIC DISTURBANCE, MOOD DISTURBANCE, AND ANXIETY: Chronic | Status: ACTIVE | Noted: 2019-11-15

## 2019-11-19 LAB
ANION GAP SERPL CALC-SCNC: 13 MMOL/L — SIGNIFICANT CHANGE UP (ref 5–17)
APTT BLD: 102.9 SEC — HIGH (ref 27.5–36.3)
APTT BLD: 129.4 SEC — CRITICAL HIGH (ref 27.5–36.3)
BUN SERPL-MCNC: 25 MG/DL — HIGH (ref 7–23)
CALCIUM SERPL-MCNC: 9.6 MG/DL — SIGNIFICANT CHANGE UP (ref 8.4–10.5)
CHLORIDE SERPL-SCNC: 104 MMOL/L — SIGNIFICANT CHANGE UP (ref 96–108)
CO2 SERPL-SCNC: 26 MMOL/L — SIGNIFICANT CHANGE UP (ref 22–31)
CREAT SERPL-MCNC: 1.3 MG/DL — SIGNIFICANT CHANGE UP (ref 0.5–1.3)
CULTURE RESULTS: SIGNIFICANT CHANGE UP
GLUCOSE SERPL-MCNC: 91 MG/DL — SIGNIFICANT CHANGE UP (ref 70–99)
HCT VFR BLD CALC: 37.7 % — SIGNIFICANT CHANGE UP (ref 34.5–45)
HCT VFR BLD CALC: 39.4 % — SIGNIFICANT CHANGE UP (ref 34.5–45)
HGB BLD-MCNC: 11.9 G/DL — SIGNIFICANT CHANGE UP (ref 11.5–15.5)
HGB BLD-MCNC: 12.2 G/DL — SIGNIFICANT CHANGE UP (ref 11.5–15.5)
INR BLD: 2.44 RATIO — HIGH (ref 0.88–1.16)
MCHC RBC-ENTMCNC: 28.1 PG — SIGNIFICANT CHANGE UP (ref 27–34)
MCHC RBC-ENTMCNC: 28.6 PG — SIGNIFICANT CHANGE UP (ref 27–34)
MCHC RBC-ENTMCNC: 31 GM/DL — LOW (ref 32–36)
MCHC RBC-ENTMCNC: 31.6 GM/DL — LOW (ref 32–36)
MCV RBC AUTO: 88.9 FL — SIGNIFICANT CHANGE UP (ref 80–100)
MCV RBC AUTO: 92.3 FL — SIGNIFICANT CHANGE UP (ref 80–100)
NRBC # BLD: 0 /100 WBCS — SIGNIFICANT CHANGE UP (ref 0–0)
PLATELET # BLD AUTO: 211 K/UL — SIGNIFICANT CHANGE UP (ref 150–400)
PLATELET # BLD AUTO: 236 K/UL — SIGNIFICANT CHANGE UP (ref 150–400)
POTASSIUM SERPL-MCNC: 4.2 MMOL/L — SIGNIFICANT CHANGE UP (ref 3.5–5.3)
POTASSIUM SERPL-SCNC: 4.2 MMOL/L — SIGNIFICANT CHANGE UP (ref 3.5–5.3)
PROTHROM AB SERPL-ACNC: 28.4 SEC — HIGH (ref 10–13.1)
RBC # BLD: 4.24 M/UL — SIGNIFICANT CHANGE UP (ref 3.8–5.2)
RBC # BLD: 4.27 M/UL — SIGNIFICANT CHANGE UP (ref 3.8–5.2)
RBC # FLD: 15.9 % — HIGH (ref 10.3–14.5)
RBC # FLD: 16.1 % — HIGH (ref 10.3–14.5)
SODIUM SERPL-SCNC: 143 MMOL/L — SIGNIFICANT CHANGE UP (ref 135–145)
SPECIMEN SOURCE: SIGNIFICANT CHANGE UP
WBC # BLD: 6.8 K/UL — SIGNIFICANT CHANGE UP (ref 3.8–10.5)
WBC # BLD: 8.28 K/UL — SIGNIFICANT CHANGE UP (ref 3.8–10.5)
WBC # FLD AUTO: 6.8 K/UL — SIGNIFICANT CHANGE UP (ref 3.8–10.5)
WBC # FLD AUTO: 8.28 K/UL — SIGNIFICANT CHANGE UP (ref 3.8–10.5)

## 2019-11-19 PROCEDURE — 93005 ELECTROCARDIOGRAM TRACING: CPT

## 2019-11-19 PROCEDURE — 80053 COMPREHEN METABOLIC PANEL: CPT

## 2019-11-19 PROCEDURE — 33274 TCAT INSJ/RPL PERM LDLS PM: CPT | Mod: Q0

## 2019-11-19 PROCEDURE — 82607 VITAMIN B-12: CPT

## 2019-11-19 PROCEDURE — 85730 THROMBOPLASTIN TIME PARTIAL: CPT

## 2019-11-19 PROCEDURE — 87086 URINE CULTURE/COLONY COUNT: CPT

## 2019-11-19 PROCEDURE — 84100 ASSAY OF PHOSPHORUS: CPT

## 2019-11-19 PROCEDURE — 84484 ASSAY OF TROPONIN QUANT: CPT

## 2019-11-19 PROCEDURE — 86900 BLOOD TYPING SEROLOGIC ABO: CPT

## 2019-11-19 PROCEDURE — C1786: CPT

## 2019-11-19 PROCEDURE — 99239 HOSP IP/OBS DSCHRG MGMT >30: CPT

## 2019-11-19 PROCEDURE — 97161 PT EVAL LOW COMPLEX 20 MIN: CPT

## 2019-11-19 PROCEDURE — 81001 URINALYSIS AUTO W/SCOPE: CPT

## 2019-11-19 PROCEDURE — 99285 EMERGENCY DEPT VISIT HI MDM: CPT

## 2019-11-19 PROCEDURE — C1894: CPT

## 2019-11-19 PROCEDURE — 84443 ASSAY THYROID STIM HORMONE: CPT

## 2019-11-19 PROCEDURE — 86780 TREPONEMA PALLIDUM: CPT

## 2019-11-19 PROCEDURE — 93306 TTE W/DOPPLER COMPLETE: CPT | Mod: 26

## 2019-11-19 PROCEDURE — 93970 EXTREMITY STUDY: CPT

## 2019-11-19 PROCEDURE — 80048 BASIC METABOLIC PNL TOTAL CA: CPT

## 2019-11-19 PROCEDURE — 85027 COMPLETE CBC AUTOMATED: CPT

## 2019-11-19 PROCEDURE — 86850 RBC ANTIBODY SCREEN: CPT

## 2019-11-19 PROCEDURE — 71046 X-RAY EXAM CHEST 2 VIEWS: CPT | Mod: 26

## 2019-11-19 PROCEDURE — 71045 X-RAY EXAM CHEST 1 VIEW: CPT

## 2019-11-19 PROCEDURE — C1887: CPT

## 2019-11-19 PROCEDURE — 85610 PROTHROMBIN TIME: CPT

## 2019-11-19 PROCEDURE — 93306 TTE W/DOPPLER COMPLETE: CPT

## 2019-11-19 PROCEDURE — 82746 ASSAY OF FOLIC ACID SERUM: CPT

## 2019-11-19 PROCEDURE — 86901 BLOOD TYPING SEROLOGIC RH(D): CPT

## 2019-11-19 PROCEDURE — 83735 ASSAY OF MAGNESIUM: CPT

## 2019-11-19 PROCEDURE — 71046 X-RAY EXAM CHEST 2 VIEWS: CPT

## 2019-11-19 RX ORDER — METOPROLOL TARTRATE 50 MG
1 TABLET ORAL
Qty: 0 | Refills: 0 | DISCHARGE

## 2019-11-19 RX ORDER — HEPARIN SODIUM 5000 [USP'U]/ML
1150 INJECTION INTRAVENOUS; SUBCUTANEOUS
Qty: 25000 | Refills: 0 | Status: DISCONTINUED | OUTPATIENT
Start: 2019-11-19 | End: 2019-11-19

## 2019-11-19 RX ADMIN — HEPARIN SODIUM 11.5 UNIT(S)/HR: 5000 INJECTION INTRAVENOUS; SUBCUTANEOUS at 03:14

## 2019-11-19 RX ADMIN — Medication 40 MILLIGRAM(S): at 05:24

## 2019-11-19 RX ADMIN — Medication 25 MICROGRAM(S): at 05:24

## 2019-11-19 RX ADMIN — Medication 100 MILLIGRAM(S): at 11:32

## 2019-11-19 RX ADMIN — Medication 5 MILLIGRAM(S): at 05:24

## 2019-11-19 RX ADMIN — HEPARIN SODIUM 11.5 UNIT(S)/HR: 5000 INJECTION INTRAVENOUS; SUBCUTANEOUS at 11:30

## 2019-11-19 RX ADMIN — MEMANTINE HYDROCHLORIDE 5 MILLIGRAM(S): 10 TABLET ORAL at 11:31

## 2019-11-19 NOTE — DISCHARGE NOTE PROVIDER - HOSPITAL COURSE
85y/o female h/o spinal stenosis, mild dementia, HTN, HLD, hypothyroidism, AF on coumadin p/w recurrent syncope without prodrome & on telemetry with periods of AF w/ slow VR s/p [employer] MICRA PPM 11/18/19 83y/o female h/o spinal stenosis, mild dementia, HTN, HLD, hypothyroidism, AF on coumadin p/w recurrent syncope without prodrome & on telemetry with periods of AF w/ slow VR s/p [employer]onic MICRA PPM 11/18/19. Pt. stable for discharge per EP. chest x-ray done no pneumothorax noted. 85y/o female h/o spinal stenosis, mild dementia, HTN, HLD, hypothyroidism, AF on coumadin p/w recurrent syncope without prodrome & on telemetry with periods of AF w/ slow VR. Syncope likely due to bradyarrhythmia. s/p Medtronic MICRA PPM 11/18/19. Noted to have scalp hematoma 2/2 fall, stable and no issues. HYpokalemia repleted. Pt. stable for discharge per EP. Bridged hep to coumadin for afib, INR therapeutic. chest x-ray done no pneumothorax noted. PCP Dr Bryant updated.

## 2019-11-19 NOTE — DISCHARGE NOTE PROVIDER - CARE PROVIDER_API CALL
Jose Bryant)  Gastroenterology; Internal Medicine  [address] [place] [address], [address]  [place], [place] [id number]  Phone: [phone]  Fax: [phone]  Follow Up Time:

## 2019-11-19 NOTE — PHYSICAL THERAPY INITIAL EVALUATION ADULT - PERTINENT HX OF CURRENT PROBLEM, REHAB EVAL
84 y/oF admitted 11/15 for syncope workup. History of 2 falls, 1st one ~1 month pta, 2nd one 2 days pta, with possible LOC. Xrays of hip/pelvis neg for fx. PMH spinal stenosis, arthritis, Dementia, HTN, HLD, Hypothyroidism, afib on coumadin. Now s/p MICRA PPM implant on 11/18 2/2 bradyarrhythmia.

## 2019-11-19 NOTE — DISCHARGE NOTE NURSING/CASE MANAGEMENT/SOCIAL WORK - NSDCPEPTCOWAR_GEN_ALL_CORE
Warfarin/Coumadin - Compliance/Warfarin/Coumadin - Dietary Advice/Warfarin/Coumadin - Potential for adverse drug reactions and interactions/Warfarin/Coumadin - Follow up monitoring

## 2019-11-19 NOTE — PROGRESS NOTE ADULT - REASON FOR ADMISSION
sent here for syncope

## 2019-11-19 NOTE — DISCHARGE NOTE PROVIDER - NSDCFUSCHEDAPPT_GEN_ALL_CORE_FT
ROBSON ESTRELLA ; 11/29/2019 ; NPP Cardio [facility] [address] ROBSON James ; 12/17/2019 ; NPP Cardio [facility] ROBSON ESTRELLA ; 11/29/2019 ; NPP Cardio [facility] [address] ROBSON James ; 12/17/2019 ; NPP Cardio [facility] ROBSON ESTRELLA ; 11/29/2019 ; NPP Cardio [facility] [address] ROBSON James ; 12/17/2019 ; NPP Cardio [facility]

## 2019-11-19 NOTE — CHART NOTE - NSCHARTNOTEFT_GEN_A_CORE
seen at bedside, pt without complaints, sitting up in recliner chair, no pain, no n/v/f/chills, cp, sob, abd pain.  VSS, PE r groin c/d/i, no hematoma. Labs reviewed INR therapeutic.    83 yo F PMH of spinal stenosis, OA, Dementia, HTN, HLD, Hypothyroidism, afib on coumadin, p/w syncope likely due to bradyarrhythmia now s/p micraPPM 1/18, stable for dc home. d/c'ed hep gtt. seen by EP, appt set up.  I spoke w/ daughter Lisa over phone, all ques answered. can f/u TTE result outpatient.  dc time 48 min. see dc summary, seen at bedside, pt without complaints, sitting up in recliner chair, no pain, no n/v/f/chills, cp, sob, abd pain.  VSS, PE r groin c/d/i, no hematoma. Labs reviewed INR therapeutic. TTE reviewed - not optimal study but otherwise grossly unremarkable.    85 yo F PMH of spinal stenosis, OA, Dementia, HTN, HLD, Hypothyroidism, afib on coumadin, p/w syncope likely due to bradyarrhythmia now s/p micraPPM 1/18, stable for dc home. d/c'ed hep gtt. seen by EP, appt set up.  I spoke w/ daughter Lisa over phone, all ques answered.   dc time 48 min. see dc summary, seen at bedside, pt without complaints, sitting up in recliner chair, no pain, no n/v/f/chills, cp, sob, abd pain.  VSS, PE r groin c/d/i, no hematoma. Labs reviewed INR therapeutic. TTE reviewed - not optimal study but otherwise grossly unremarkable.    85 yo F PMH of spinal stenosis, OA, Dementia, HTN, HLD, Hypothyroidism, afib on coumadin, p/w syncope likely due to bradyarrhythmia now s/p micraPPM 1/18, stable for dc home. d/c'ed hep gtt. seen by EP, appt set up.  I spoke w/ daughter Lisa over phone, all ques answered.   dc time 48 min. see dc summary,      Name of PCP:  Dr. Bryant  2.  PCP Contacted on Admission: [ ] Y    [x ] N    3.  PCP contacted at Discharge: x ] Y    [ ] N    [ ] N/A  4.  Post-Discharge Appointment Date and Location: pt's daughter to make  5.  Summary of Handoff given to PCP: email sent to update on 11/19

## 2019-11-19 NOTE — PHYSICAL THERAPY INITIAL EVALUATION ADULT - ADDITIONAL COMMENTS
Pt lives with  in single level private house, 1 step to enter. Owns cane and rolling walker, ambulates with no device or with one of those devices depending on how she feels.

## 2019-11-19 NOTE — DISCHARGE NOTE NURSING/CASE MANAGEMENT/SOCIAL WORK - PATIENT PORTAL LINK FT
You can access the FollowMyHealth Patient Portal offered by [facility] by registering at the following website: [email or web address]. By joining [employer]’s FollowMyHealth portal, you will also be able to view your health information using other applications (apps) compatible with our system.

## 2019-11-19 NOTE — DISCHARGE NOTE PROVIDER - NSDCMRMEDTOKEN_GEN_ALL_CORE_FT
allopurinol 100 mg oral tablet: 1 tab(s) orally once a day  atorvastatin 10 mg oral tablet: 1 tab(s) orally once a day (at bedtime)  Coumadin 2.5 mg oral tablet: 1 tab(s) orally once a day (at bedtime), Tues/Thus/Sat/Sun  Coumadin 5 mg oral tablet: 1 tab(s) orally once a day (at bedtime), MWF  enalapril 5 mg oral tablet: 1 tab(s) orally once a day  Lasix 40 mg oral tablet: 1 tab(s) orally once a day  levothyroxine 25 mcg (0.025 mg) oral tablet: 1 tab(s) orally once a day  memantine 5 mg oral tablet: 1 tab(s) orally once a day  Metoprolol Tartrate 25 mg oral tablet: 1 tab(s) orally 2 times a day allopurinol 100 mg oral tablet: 1 tab(s) orally once a day  atorvastatin 10 mg oral tablet: 1 tab(s) orally once a day (at bedtime)  Coumadin 2.5 mg oral tablet: 1 tab(s) orally once a day (at bedtime), Tues/Thus/Sat/Sun  Coumadin 5 mg oral tablet: 1 tab(s) orally once a day (at bedtime), MWF  enalapril 5 mg oral tablet: 1 tab(s) orally once a day  Lasix 40 mg oral tablet: 1 tab(s) orally once a day  levothyroxine 25 mcg (0.025 mg) oral tablet: 1 tab(s) orally once a day  memantine 5 mg oral tablet: 1 tab(s) orally once a day

## 2019-11-19 NOTE — PROGRESS NOTE ADULT - ASSESSMENT
85y/o female h/o spinal stenosis, mild dementia, HTN, HLD, hypothyroidism, AF on coumadin p/w recurrent syncope without prodrome & on telemetry with periods of AF w/ slow VR s/p Medtronic MICRA PPM 11/18/19    1. Recurrent syncope  2. AF w/ slow VR  3. LBBB  4. s/p Medtronic MICRA PPM 11/18    - Post pacemaker instructions reviewed with patient  - ID and booklet given to patient  - Remote instructions d/w patient  - CXR result noted, micra PPM visible and no pneumothorax  - Follow up with EP CLinic on 11/29/19 @ 9:25am  - Patient clear for discharge from EP perspective, will sign off    #[phone]

## 2019-11-19 NOTE — CHART NOTE - NSCHARTNOTEFT_GEN_A_CORE
MEDICINE PA NOTE     EVENT SUMMARY   Notified by RN for APTT >129.4  Pt on patient specific heparin gtt for afib and currently is at 15cc/hr RN made aware to re weight the pt. Will hold Heparin drip for 1 hour and restart Heparin Drip at 11.5cc/hr and will repeat PT/APTT in 6 hours. No signs of active bleeding noted. Will continue to monitor.    Eden Weinberg PA-C  Department of Medicine  #[id number]

## 2019-11-19 NOTE — PROGRESS NOTE ADULT - SUBJECTIVE AND OBJECTIVE BOX
24H hour events:   Seen sitting up in bed; no events overnight; denies sob, dizziness, chest pain or palpitations, N/V, abdominal pain    MEDICATIONS:  enalapril 5 milliGRAM(s) Oral daily  furosemide    Tablet 40 milliGRAM(s) Oral daily  memantine 5 milliGRAM(s) Oral daily  allopurinol 100 milliGRAM(s) Oral daily  atorvastatin 10 milliGRAM(s) Oral at bedtime  levothyroxine 25 MICROGram(s) Oral daily      REVIEW OF SYSTEMS:  Complete 10point ROS negative except as noted above     PHYSICAL EXAM:  T(C): 36.5 (11-19-19 @ 04:42), Max: 36.7 (11-18-19 @ 13:03)  HR: 76 (11-19-19 @ 10:12) (51 - 76)  BP: 151/90 (11-19-19 @ 10:12) (122/78 - 151/90)  RR: 16 (11-19-19 @ 04:42) (16 - 18)  SpO2: 93% (11-19-19 @ 04:42) (93% - 96%)  Wt(kg): --  I&O's Summary    18 Nov 2019 07:01  -  19 Nov 2019 07:00  --------------------------------------------------------  IN: 507.5 mL / OUT: 525 mL / NET: -17.5 mL        Appearance: Normal, in NAD  HEENT: Normocephalic, atraumatic, normal oral mucosa  Cardiovascular:  S1 S2, No JVD, No murmurs, No edema  Respiratory: Lungs clear to auscultation	  Psychiatry: A & O x 3, Mood & affect appropriate  Gastrointestinal:  Soft, Non-tender, + BS	  Genitourinary: voids spontaneously  Skin: No rashes, No ecchymoses, No cyanosis	  Neurologic: Non-focal  Extremities: Normal range of motion, No clubbing, cyanosis or edema; right groin no hematoma, no echymosis      LABS:	 	    CBC Full  -  ( 19 Nov 2019 10:40 )  WBC Count : 6.80 K/uL  Hemoglobin : 12.2 g/dL  Hematocrit : 39.4 %  Platelet Count - Automated : 211 K/uL  Mean Cell Volume : 92.3 fl  Mean Cell Hemoglobin : 28.6 pg  Mean Cell Hemoglobin Concentration : 31.0 gm/dL  Auto Neutrophil # : x  Auto Lymphocyte # : x  Auto Monocyte # : x  Auto Eosinophil # : x  Auto Basophil # : x  Auto Neutrophil % : x  Auto Lymphocyte % : x  Auto Monocyte % : x  Auto Eosinophil % : x  Auto Basophil % : x    11-19    143  |  104  |  25<H>  ----------------------------<  91  4.2   |  26  |  1.30  11-18    141  |  100  |  25<H>  ----------------------------<  104<H>  3.2<L>   |  27  |  1.37<H>    Ca    9.6      19 Nov 2019 07:52  Ca    9.8      18 Nov 2019 06:46  Mg     2.1     11-18      TELEMETRY: Afib/Vpaced 60-100s	      CXR:  EXAM:  XR CHEST PA LAT 2V                            PROCEDURE DATE:  11/19/2019        INTERPRETATION:  XR CHEST PA AND LATERAL. AP view.    INDICATION: S/P Micra check placement    COMPARISON: 11/15/2019    FINDINGS/  IMPRESSION:    Clear lungs. No pleural effusion or pneumothorax.    The Micra pacer projects over the right ventricle.      NE BARNHART M.D., ATTENDING RADIOLOGIST  This document has been electronically signed. Nov 19 2019 12:42PM

## 2019-11-20 DIAGNOSIS — M25.552 PAIN IN LEFT HIP: ICD-10-CM

## 2019-11-21 ENCOUNTER — LABORATORY RESULT (OUTPATIENT)
Age: 84
End: 2019-11-21

## 2019-11-26 ENCOUNTER — APPOINTMENT (OUTPATIENT)
Dept: INTERNAL MEDICINE | Facility: CLINIC | Age: 84
End: 2019-11-26
Payer: MEDICARE

## 2019-11-26 VITALS
HEIGHT: 59 IN | HEART RATE: 68 BPM | BODY MASS INDEX: 36.08 KG/M2 | WEIGHT: 179 LBS | DIASTOLIC BLOOD PRESSURE: 75 MMHG | SYSTOLIC BLOOD PRESSURE: 125 MMHG | RESPIRATION RATE: 15 BRPM

## 2019-11-26 PROCEDURE — G0444 DEPRESSION SCREEN ANNUAL: CPT | Mod: 59

## 2019-11-26 PROCEDURE — 99496 TRANSJ CARE MGMT HIGH F2F 7D: CPT | Mod: 25

## 2019-11-26 RX ORDER — VALSARTAN 80 MG/1
80 TABLET, COATED ORAL
Qty: 90 | Refills: 1 | Status: DISCONTINUED | COMMUNITY
Start: 2019-07-16 | End: 2019-11-26

## 2019-11-26 RX ORDER — METOPROLOL TARTRATE 25 MG/1
25 TABLET, FILM COATED ORAL
Qty: 14 | Refills: 0 | Status: DISCONTINUED | COMMUNITY
Start: 2017-08-24 | End: 2019-11-26

## 2019-11-26 NOTE — REVIEW OF SYSTEMS
[Joint Stiffness] : joint stiffness [Joint Pain] : joint pain [Back Pain] : back pain [Memory Loss] : memory loss [Unsteady Walking] : ataxia [Anxiety] : anxiety [Negative] : Heme/Lymph [Fever] : no fever [Chills] : no chills [Hot Flashes] : no hot flashes [Fatigue] : no fatigue [Recent Change In Weight] : ~T no recent weight change [Night Sweats] : no night sweats [Redness] : no redness [Pain] : no pain [Discharge] : no discharge [Vision Problems] : no vision problems [Dryness] : no dryness  [Earache] : no earache [Itching] : no itching [Hearing Loss] : no hearing loss [Nosebleed] : no nosebleeds [Hoarseness] : no hoarseness [Nasal Discharge] : no nasal discharge [Sore Throat] : no sore throat [Postnasal Drip] : no postnasal drip [Chest Pain] : no chest pain [Palpitations] : no palpitations [Lower Ext Edema] : no lower extremity edema [Orthopnea] : no orthopnea [Leg Claudication] : no leg claudication [Wheezing] : no wheezing [Shortness Of Breath] : no shortness of breath [Cough] : no cough [Dyspnea on Exertion] : no dyspnea on exertion [Abdominal Pain] : no abdominal pain [Nausea] : no nausea [Constipation] : no constipation [Diarrhea] : diarrhea [Heartburn] : no heartburn [Vomiting] : no vomiting [Melena] : no melena [Dysuria] : no dysuria [Incontinence] : no incontinence [Nocturia] : no nocturia [Poor Libido] : libido not poor [Frequency] : no frequency [Hematuria] : no hematuria [Vaginal Discharge] : no vaginal discharge [Dysmenorrhea] : no dysmenorrhea [Joint Swelling] : no joint swelling [Muscle Pain] : no muscle pain [Muscle Weakness] : no muscle weakness [Mole Changes] : no mole changes [Itching] : no itching [Nail Changes] : no nail changes [Hair Changes] : no hair changes [Skin Rash] : no skin rash [Headache] : no headache [Dizziness] : no dizziness [Fainting] : no fainting [Confusion] : no confusion [Suicidal] : not suicidal [Insomnia] : no insomnia [Depression] : no depression [Easy Bruising] : no easy bruising [Easy Bleeding] : no easy bleeding [Swollen Glands] : no swollen glands [FreeTextEntry9] : hips and back

## 2019-11-26 NOTE — PHYSICAL EXAM
[No Acute Distress] : no acute distress [Well Developed] : well developed [Well Nourished] : well nourished [Well-Appearing] : well-appearing [Normal Sclera/Conjunctiva] : normal sclera/conjunctiva [Normal Voice/Communication] : normal voice/communication [PERRL] : pupils equal round and reactive to light [Normal Outer Ear/Nose] : the outer ears and nose were normal in appearance [Normal Oropharynx] : the oropharynx was normal [EOMI] : extraocular movements intact [Normal Nasal Mucosa] : the nasal mucosa was normal [Normal TMs] : both tympanic membranes were normal [No Lymphadenopathy] : no lymphadenopathy [No JVD] : no jugular venous distention [Thyroid Normal, No Nodules] : the thyroid was normal and there were no nodules present [Supple] : supple [No Respiratory Distress] : no respiratory distress  [No Accessory Muscle Use] : no accessory muscle use [Normal Rate] : normal rate  [Clear to Auscultation] : lungs were clear to auscultation bilaterally [Normal S1, S2] : normal S1 and S2 [Regular Rhythm] : with a regular rhythm [No Murmur] : no murmur heard [No Carotid Bruits] : no carotid bruits [No Abdominal Bruit] : a ~M bruit was not heard ~T in the abdomen [No Varicosities] : no varicosities [Pedal Pulses Present] : the pedal pulses are present [No Edema] : there was no peripheral edema [No Palpable Aorta] : no palpable aorta [No Extremity Clubbing/Cyanosis] : no extremity clubbing/cyanosis [Declined Breast Exam] : declined breast exam  [Soft] : abdomen soft [Non Tender] : non-tender [Non-distended] : non-distended [No HSM] : no HSM [No Masses] : no abdominal mass palpated [No Hernias] : no hernias [Normal Bowel Sounds] : normal bowel sounds [Normal Supraclavicular Nodes] : no supraclavicular lymphadenopathy [Declined Rectal Exam] : declined rectal exam [Normal Posterior Cervical Nodes] : no posterior cervical lymphadenopathy [Normal Axillary Nodes] : no axillary lymphadenopathy [Normal Anterior Cervical Nodes] : no anterior cervical lymphadenopathy [Normal Inguinal Nodes] : no inguinal lymphadenopathy [Normal Femoral Nodes] : no femoral lymphadenopathy [No Spinal Tenderness] : no spinal tenderness [No CVA Tenderness] : no CVA  tenderness [No Joint Swelling] : no joint swelling [Scoliosis] : no scoliosis [Kyphosis] : no kyphosis [Grossly Normal Strength/Tone] : grossly normal strength/tone [No Skin Lesions] : no skin lesions [No Rash] : no rash [Acne] : no acne [Coordination Grossly Intact] : coordination grossly intact [No Focal Deficits] : no focal deficits [Normal Gait] : normal gait [Deep Tendon Reflexes (DTR)] : deep tendon reflexes were 2+ and symmetric [Speech Grossly Normal] : speech grossly normal [Normal Affect] : the affect was normal [Memory Grossly Normal] : memory grossly normal [Alert and Oriented x3] : oriented to person, place, and time [Normal Mood] : the mood was normal [Normal Insight/Judgement] : insight and judgment were intact

## 2019-11-26 NOTE — HEALTH RISK ASSESSMENT
[Yes] : Yes [] : No [No falls in past year] : Patient reported no falls in the past year [0] : 1) Little interest or pleasure doing things: Not at all (0) [[id number]] : 0 [HIV test declined] : HIV test declined [Hepatitis C test declined] : Hepatitis C test declined [MammogramDate] : 01/17

## 2019-11-26 NOTE — ASSESSMENT
[FreeTextEntry1] : Physical exam shows an elderly woman somewhat forgetful in no acute distress blood pressure 125/75 pulse is 68 respirations 15 height 4 feet 11 inches weight 179 pounds HEENT was unremarkable chest was clear cardiovascular was irregularly irregular abdomen was soft extremities showed trace bilateral edema neurologic exam was nonfocal patient with proper followup with her cardiologist after pacemaker insertion medications were reviewed and reconciled for changes made while in the hospital. Previous potassium has been held slip was given for a repeat potassium in several weeks last potassium before she left the hospital was 4.2 she will walk carefully and has been advised to use her walker more than her cane physical therapy has been suggested and will be considered

## 2019-11-26 NOTE — HISTORY OF PRESENT ILLNESS
[Post-hospitalization from ___ Hospital] : Post-hospitalization from [unfilled] Hospital [Discharged on ___] : discharged on [unfilled] [FreeTextEntry2] : 84-year-old woman. comes to the office for transitional care after her recent discharge from [facility] where she was admitted for syncope and a pacemaker was placed prior to the pacemaker she had had several syncopal attacks in a short period of time with minor injuries since the time of the pacemaker she has not felt any episodes of lightheadedness and has not had any further syncopal attacks she denies temperature chills sweats or myalgias she's had no headache sinus congestion sore throat cough wheezing pleurisy chest pain shortness of breath exertional dyspnea lightheadedness palpitations dizziness vertigo or syncope denies abdominal pain nausea vomiting diarrhea constipation or blood per rectum or black stools her appetite has been good her weight is stable she denies significant musculoskeletal complaints at present she does urinate frequently and gets up several times at night to urinate she has had no leg edema and denies any recent skin rashes

## 2019-11-29 ENCOUNTER — APPOINTMENT (OUTPATIENT)
Dept: ELECTROPHYSIOLOGY | Facility: CLINIC | Age: 84
End: 2019-11-29
Payer: MEDICARE

## 2019-11-29 ENCOUNTER — LABORATORY RESULT (OUTPATIENT)
Age: 84
End: 2019-11-29

## 2019-11-29 VITALS
DIASTOLIC BLOOD PRESSURE: 80 MMHG | HEART RATE: 61 BPM | HEIGHT: 59 IN | SYSTOLIC BLOOD PRESSURE: 123 MMHG | OXYGEN SATURATION: 96 %

## 2019-11-29 PROCEDURE — 99024 POSTOP FOLLOW-UP VISIT: CPT

## 2019-11-29 PROCEDURE — 93279 PRGRMG DEV EVAL PM/LDLS PM: CPT

## 2019-12-05 ENCOUNTER — LABORATORY RESULT (OUTPATIENT)
Age: 84
End: 2019-12-05

## 2019-12-05 ENCOUNTER — MEDICATION RENEWAL (OUTPATIENT)
Age: 84
End: 2019-12-05

## 2019-12-12 ENCOUNTER — LABORATORY RESULT (OUTPATIENT)
Age: 84
End: 2019-12-12

## 2019-12-17 ENCOUNTER — NON-APPOINTMENT (OUTPATIENT)
Age: 84
End: 2019-12-17

## 2019-12-17 ENCOUNTER — APPOINTMENT (OUTPATIENT)
Dept: CARDIOLOGY | Facility: CLINIC | Age: 84
End: 2019-12-17
Payer: MEDICARE

## 2019-12-17 VITALS
WEIGHT: 180 LBS | HEART RATE: 70 BPM | RESPIRATION RATE: 16 BRPM | DIASTOLIC BLOOD PRESSURE: 83 MMHG | BODY MASS INDEX: 36.29 KG/M2 | OXYGEN SATURATION: 95 % | SYSTOLIC BLOOD PRESSURE: 138 MMHG | HEIGHT: 59 IN

## 2019-12-17 VITALS — HEART RATE: 78 BPM | DIASTOLIC BLOOD PRESSURE: 80 MMHG | SYSTOLIC BLOOD PRESSURE: 110 MMHG

## 2019-12-17 PROCEDURE — 93000 ELECTROCARDIOGRAM COMPLETE: CPT

## 2019-12-17 PROCEDURE — 99214 OFFICE O/P EST MOD 30 MIN: CPT

## 2019-12-17 NOTE — PHYSICAL EXAM
[General Appearance - Well Developed] : well developed [Normal Appearance] : normal appearance [General Appearance - Well Nourished] : well nourished [Well Groomed] : well groomed [No Deformities] : no deformities [Normal Oral Mucosa] : normal oral mucosa [General Appearance - In No Acute Distress] : no acute distress [No Oral Pallor] : no oral pallor [No Oral Cyanosis] : no oral cyanosis [Normal Jugular Venous A Waves Present] : normal jugular venous A waves present [No Jugular Venous Gamez A Waves] : no jugular venous gamez A waves [Normal Jugular Venous V Waves Present] : normal jugular venous V waves present [Respiration, Rhythm And Depth] : normal respiratory rhythm and effort [Exaggerated Use Of Accessory Muscles For Inspiration] : no accessory muscle use [Auscultation Breath Sounds / Voice Sounds] : lungs were clear to auscultation bilaterally [Abdomen Soft] : soft [Abdomen Mass (___ Cm)] : no abdominal mass palpated [Abdomen Tenderness] : non-tender [Nail Clubbing] : no clubbing of the fingernails [Cyanosis, Localized] : no localized cyanosis [Petechial Hemorrhages (___cm)] : no petechial hemorrhages [FreeTextEntry1] : negative Homans sign [No Venous Stasis] : no venous stasis [] : no rash [Skin Color & Pigmentation] : normal skin color and pigmentation [No Xanthoma] : no  xanthoma was observed [No Skin Ulcers] : no skin ulcer [Skin Lesions] : no skin lesions [Affect] : the affect was normal [Mood] : the mood was normal [Oriented To Time, Place, And Person] : oriented to person, place, and time [No Anxiety] : not feeling anxious [5th Left ICS - MCL] : palpated at the 5th LICS in the midclavicular line [Irregularly Irregular] : irregularly irregular [Normal] : normal [Normal Rate] : normal [No Murmur] : no murmurs heard [Right Carotid Bruit] : no bruit heard over the right carotid [Left Carotid Bruit] : no bruit heard over the left carotid [Left Femoral Bruit] : no bruit heard over the left femoral artery [No Abnormalities] : the abdominal aorta was not enlarged and no bruit was heard [Right Femoral Bruit] : no bruit heard over the right femoral artery [Nonpitting Edema] : nonpitting edema present

## 2019-12-17 NOTE — REASON FOR VISIT
[Follow-Up - Clinic] : a clinic follow-up of [Atrial Fibrillation] : atrial fibrillation [Hypertension] : hypertension [FreeTextEntry1] : She returns for followup. She is now status post pacemaker. She offers no complaints of chest discomfort shortness of breath palpitations dizziness or syncope

## 2019-12-17 NOTE — ASSESSMENT
[FreeTextEntry1] : IMPRESSION:\par 1. Probable syncopal episode\par 1. Chronic atrial fibrillation with significant AV node dysfunction and syncope now status post permanent pacemaker\par 2. History of mitral and tricuspid insufficiency by echocardiography\par 3. hypertension well-controlled \par \par  Plan:\par 1. continue current regimen

## 2019-12-19 ENCOUNTER — LABORATORY RESULT (OUTPATIENT)
Age: 84
End: 2019-12-19

## 2019-12-26 ENCOUNTER — LABORATORY RESULT (OUTPATIENT)
Age: 84
End: 2019-12-26

## 2020-01-02 ENCOUNTER — LABORATORY RESULT (OUTPATIENT)
Age: 85
End: 2020-01-02

## 2020-01-09 ENCOUNTER — LABORATORY RESULT (OUTPATIENT)
Age: 85
End: 2020-01-09

## 2020-01-11 ENCOUNTER — RX RENEWAL (OUTPATIENT)
Age: 85
End: 2020-01-11

## 2020-01-13 ENCOUNTER — RX RENEWAL (OUTPATIENT)
Age: 85
End: 2020-01-13

## 2020-01-16 ENCOUNTER — LABORATORY RESULT (OUTPATIENT)
Age: 85
End: 2020-01-16

## 2020-01-19 ENCOUNTER — RX RENEWAL (OUTPATIENT)
Age: 85
End: 2020-01-19

## 2020-01-23 ENCOUNTER — LABORATORY RESULT (OUTPATIENT)
Age: 85
End: 2020-01-23

## 2020-01-30 ENCOUNTER — RX RENEWAL (OUTPATIENT)
Age: 85
End: 2020-01-30

## 2020-01-30 ENCOUNTER — LABORATORY RESULT (OUTPATIENT)
Age: 85
End: 2020-01-30

## 2020-02-06 ENCOUNTER — LABORATORY RESULT (OUTPATIENT)
Age: 85
End: 2020-02-06

## 2020-02-13 ENCOUNTER — LABORATORY RESULT (OUTPATIENT)
Age: 85
End: 2020-02-13

## 2020-02-20 ENCOUNTER — LABORATORY RESULT (OUTPATIENT)
Age: 85
End: 2020-02-20

## 2020-02-27 ENCOUNTER — LABORATORY RESULT (OUTPATIENT)
Age: 85
End: 2020-02-27

## 2020-02-28 ENCOUNTER — NON-APPOINTMENT (OUTPATIENT)
Age: 85
End: 2020-02-28

## 2020-02-28 ENCOUNTER — APPOINTMENT (OUTPATIENT)
Dept: ELECTROPHYSIOLOGY | Facility: CLINIC | Age: 85
End: 2020-02-28
Payer: MEDICARE

## 2020-02-28 VITALS
HEART RATE: 71 BPM | BODY MASS INDEX: 36.29 KG/M2 | SYSTOLIC BLOOD PRESSURE: 136 MMHG | HEIGHT: 59 IN | OXYGEN SATURATION: 95 % | WEIGHT: 180 LBS | DIASTOLIC BLOOD PRESSURE: 71 MMHG

## 2020-02-28 PROCEDURE — 93279 PRGRMG DEV EVAL PM/LDLS PM: CPT

## 2020-03-05 ENCOUNTER — LABORATORY RESULT (OUTPATIENT)
Age: 85
End: 2020-03-05

## 2020-03-12 ENCOUNTER — LABORATORY RESULT (OUTPATIENT)
Age: 85
End: 2020-03-12

## 2020-03-19 ENCOUNTER — LABORATORY RESULT (OUTPATIENT)
Age: 85
End: 2020-03-19

## 2020-03-26 ENCOUNTER — LABORATORY RESULT (OUTPATIENT)
Age: 85
End: 2020-03-26

## 2020-04-02 ENCOUNTER — LABORATORY RESULT (OUTPATIENT)
Age: 85
End: 2020-04-02

## 2020-04-09 ENCOUNTER — LABORATORY RESULT (OUTPATIENT)
Age: 85
End: 2020-04-09

## 2020-04-16 ENCOUNTER — LABORATORY RESULT (OUTPATIENT)
Age: 85
End: 2020-04-16

## 2020-04-21 ENCOUNTER — APPOINTMENT (OUTPATIENT)
Dept: CARDIOLOGY | Facility: CLINIC | Age: 85
End: 2020-04-21

## 2020-04-23 ENCOUNTER — LABORATORY RESULT (OUTPATIENT)
Age: 85
End: 2020-04-23

## 2020-04-30 ENCOUNTER — LABORATORY RESULT (OUTPATIENT)
Age: 85
End: 2020-04-30

## 2020-05-04 ENCOUNTER — RX RENEWAL (OUTPATIENT)
Age: 85
End: 2020-05-04

## 2020-05-07 ENCOUNTER — LABORATORY RESULT (OUTPATIENT)
Age: 85
End: 2020-05-07

## 2020-05-14 ENCOUNTER — LABORATORY RESULT (OUTPATIENT)
Age: 85
End: 2020-05-14

## 2020-05-21 ENCOUNTER — LABORATORY RESULT (OUTPATIENT)
Age: 85
End: 2020-05-21

## 2020-05-28 ENCOUNTER — LABORATORY RESULT (OUTPATIENT)
Age: 85
End: 2020-05-28

## 2020-06-16 ENCOUNTER — RX RENEWAL (OUTPATIENT)
Age: 85
End: 2020-06-16

## 2020-06-17 ENCOUNTER — LABORATORY RESULT (OUTPATIENT)
Age: 85
End: 2020-06-17

## 2020-06-21 ENCOUNTER — FORM ENCOUNTER (OUTPATIENT)
Age: 85
End: 2020-06-21

## 2020-07-01 ENCOUNTER — LABORATORY RESULT (OUTPATIENT)
Age: 85
End: 2020-07-01

## 2020-07-13 ENCOUNTER — RX RENEWAL (OUTPATIENT)
Age: 85
End: 2020-07-13

## 2020-07-15 ENCOUNTER — LABORATORY RESULT (OUTPATIENT)
Age: 85
End: 2020-07-15

## 2020-07-22 ENCOUNTER — RX RENEWAL (OUTPATIENT)
Age: 85
End: 2020-07-22

## 2020-07-29 ENCOUNTER — LABORATORY RESULT (OUTPATIENT)
Age: 85
End: 2020-07-29

## 2020-08-12 ENCOUNTER — LABORATORY RESULT (OUTPATIENT)
Age: 85
End: 2020-08-12

## 2020-08-12 ENCOUNTER — RX RENEWAL (OUTPATIENT)
Age: 85
End: 2020-08-12

## 2020-08-19 ENCOUNTER — RX RENEWAL (OUTPATIENT)
Age: 85
End: 2020-08-19

## 2020-08-26 ENCOUNTER — LABORATORY RESULT (OUTPATIENT)
Age: 85
End: 2020-08-26

## 2020-08-28 ENCOUNTER — APPOINTMENT (OUTPATIENT)
Dept: ELECTROPHYSIOLOGY | Facility: CLINIC | Age: 85
End: 2020-08-28

## 2020-09-08 ENCOUNTER — FORM ENCOUNTER (OUTPATIENT)
Age: 85
End: 2020-09-08

## 2020-09-09 ENCOUNTER — LABORATORY RESULT (OUTPATIENT)
Age: 85
End: 2020-09-09

## 2020-09-14 ENCOUNTER — RX RENEWAL (OUTPATIENT)
Age: 85
End: 2020-09-14

## 2020-10-05 ENCOUNTER — RX RENEWAL (OUTPATIENT)
Age: 85
End: 2020-10-05

## 2020-10-16 ENCOUNTER — APPOINTMENT (OUTPATIENT)
Dept: INTERNAL MEDICINE | Facility: CLINIC | Age: 85
End: 2020-10-16

## 2020-10-20 ENCOUNTER — APPOINTMENT (OUTPATIENT)
Dept: INTERNAL MEDICINE | Facility: CLINIC | Age: 85
End: 2020-10-20
Payer: MEDICARE

## 2020-10-20 VITALS
SYSTOLIC BLOOD PRESSURE: 128 MMHG | DIASTOLIC BLOOD PRESSURE: 74 MMHG | WEIGHT: 178 LBS | HEART RATE: 68 BPM | HEIGHT: 59 IN | BODY MASS INDEX: 35.88 KG/M2 | OXYGEN SATURATION: 96 % | RESPIRATION RATE: 15 BRPM

## 2020-10-20 DIAGNOSIS — Z23 ENCOUNTER FOR IMMUNIZATION: ICD-10-CM

## 2020-10-20 DIAGNOSIS — M54.5 LOW BACK PAIN: ICD-10-CM

## 2020-10-20 PROCEDURE — G0008: CPT

## 2020-10-20 PROCEDURE — 99215 OFFICE O/P EST HI 40 MIN: CPT | Mod: 25

## 2020-10-20 PROCEDURE — 90662 IIV NO PRSV INCREASED AG IM: CPT

## 2020-10-20 NOTE — REVIEW OF SYSTEMS
[Fatigue] : fatigue [Joint Pain] : joint pain [Joint Stiffness] : joint stiffness [Back Pain] : back pain [Memory Loss] : memory loss [Unsteady Walking] : ataxia [Anxiety] : anxiety [Negative] : Heme/Lymph [Fever] : no fever [Chills] : no chills [Hot Flashes] : no hot flashes [Night Sweats] : no night sweats [Recent Change In Weight] : ~T no recent weight change [Discharge] : no discharge [Pain] : no pain [Redness] : no redness [Dryness] : no dryness  [Vision Problems] : no vision problems [Itching] : no itching [Earache] : no earache [Hearing Loss] : no hearing loss [Nosebleed] : no nosebleeds [Hoarseness] : no hoarseness [Nasal Discharge] : no nasal discharge [Sore Throat] : no sore throat [Postnasal Drip] : no postnasal drip [Chest Pain] : no chest pain [Palpitations] : no palpitations [Leg Claudication] : no leg claudication [Lower Ext Edema] : no lower extremity edema [Orthopnea] : no orthopnea [Wheezing] : no wheezing [Shortness Of Breath] : no shortness of breath [Abdominal Pain] : no abdominal pain [Dyspnea on Exertion] : no dyspnea on exertion [Cough] : no cough [Constipation] : no constipation [Nausea] : no nausea [Vomiting] : no vomiting [Diarrhea] : diarrhea [Dysuria] : no dysuria [Heartburn] : no heartburn [Melena] : no melena [Nocturia] : no nocturia [Incontinence] : no incontinence [Poor Libido] : libido not poor [Frequency] : no frequency [Hematuria] : no hematuria [Dysmenorrhea] : no dysmenorrhea [Vaginal Discharge] : no vaginal discharge [Muscle Pain] : no muscle pain [Muscle Weakness] : no muscle weakness [Joint Swelling] : no joint swelling [Mole Changes] : no mole changes [Hair Changes] : no hair changes [Nail Changes] : no nail changes [Dizziness] : no dizziness [Headache] : no headache [Skin Rash] : no skin rash [Confusion] : no confusion [Fainting] : no fainting [Suicidal] : not suicidal [Insomnia] : no insomnia [Depression] : no depression [Easy Bleeding] : no easy bleeding [Easy Bruising] : no easy bruising [Swollen Glands] : no swollen glands [FreeTextEntry9] : hips and back

## 2020-10-20 NOTE — PHYSICAL EXAM
[No Acute Distress] : no acute distress [Well Nourished] : well nourished [Well Developed] : well developed [Well-Appearing] : well-appearing [Normal Sclera/Conjunctiva] : normal sclera/conjunctiva [Normal Voice/Communication] : normal voice/communication [PERRL] : pupils equal round and reactive to light [EOMI] : extraocular movements intact [Normal Outer Ear/Nose] : the outer ears and nose were normal in appearance [Normal TMs] : both tympanic membranes were normal [Normal Oropharynx] : the oropharynx was normal [Normal Nasal Mucosa] : the nasal mucosa was normal [No JVD] : no jugular venous distention [No Lymphadenopathy] : no lymphadenopathy [Supple] : supple [Thyroid Normal, No Nodules] : the thyroid was normal and there were no nodules present [No Respiratory Distress] : no respiratory distress  [No Accessory Muscle Use] : no accessory muscle use [Clear to Auscultation] : lungs were clear to auscultation bilaterally [Normal Rate] : normal rate  [Regular Rhythm] : with a regular rhythm [Normal S1, S2] : normal S1 and S2 [No Murmur] : no murmur heard [No Carotid Bruits] : no carotid bruits [No Abdominal Bruit] : a ~M bruit was not heard ~T in the abdomen [No Varicosities] : no varicosities [Pedal Pulses Present] : the pedal pulses are present [No Edema] : there was no peripheral edema [No Palpable Aorta] : no palpable aorta [No Extremity Clubbing/Cyanosis] : no extremity clubbing/cyanosis [Declined Breast Exam] : declined breast exam  [Soft] : abdomen soft [Non Tender] : non-tender [Non-distended] : non-distended [No Masses] : no abdominal mass palpated [No HSM] : no HSM [Normal Bowel Sounds] : normal bowel sounds [No Hernias] : no hernias [Declined Rectal Exam] : declined rectal exam [Normal Supraclavicular Nodes] : no supraclavicular lymphadenopathy [Normal Axillary Nodes] : no axillary lymphadenopathy [Normal Posterior Cervical Nodes] : no posterior cervical lymphadenopathy [Normal Anterior Cervical Nodes] : no anterior cervical lymphadenopathy [Normal Inguinal Nodes] : no inguinal lymphadenopathy [Normal Femoral Nodes] : no femoral lymphadenopathy [No CVA Tenderness] : no CVA  tenderness [No Spinal Tenderness] : no spinal tenderness [No Joint Swelling] : no joint swelling [Grossly Normal Strength/Tone] : grossly normal strength/tone [No Rash] : no rash [No Skin Lesions] : no skin lesions [Coordination Grossly Intact] : coordination grossly intact [No Focal Deficits] : no focal deficits [Normal Gait] : normal gait [Deep Tendon Reflexes (DTR)] : deep tendon reflexes were 2+ and symmetric [Speech Grossly Normal] : speech grossly normal [Memory Grossly Normal] : memory grossly normal [Normal Affect] : the affect was normal [Alert and Oriented x3] : oriented to person, place, and time [Normal Mood] : the mood was normal [Normal Insight/Judgement] : insight and judgment were intact [Kyphosis] : no kyphosis [Scoliosis] : no scoliosis [Acne] : no acne

## 2020-10-20 NOTE — HEALTH RISK ASSESSMENT
[] : No [Yes] : Yes [No falls in past year] : Patient reported no falls in the past year [0] : 2) Feeling down, depressed, or hopeless: Not at all (0) [de-identified] : minimal [[id number]] : 0

## 2020-10-20 NOTE — ASSESSMENT
[FreeTextEntry1] : Physical examination shows a well-developed female in no acute distress blood pressure 128/74 height 4 feet 11 inches weight 178 pounds BMI 35.95 heart rate is 68 respirations 15 oxygen saturation on room air was 96% HEENT was unremarkable this was clear cardiovascular exam was regularly irregular at a moderate ventricular response abdomen was soft extremities show trace bilateral edema neurologic exam was nonfocal patient has her usual INR from her cardiologist but has not had one drawn in over a month and a half the son will contact their office as it is possible that the order needs to be renewed a slip was also given for complete blood test to obtain next time they draw her INR including CBC full chemistries lipid profile thyroid profile urine analysis CRP and vitamins D3 and B12 IgG antibodies for COVID-19 were also ordered it was reinforced to her that she should see her ophthalmologist patient was given the high-dose influenza vaccine received both the Prevnar 13 vaccine and the Pneumovax 23 vaccine and she is reminded to consider getting the new shingles vaccine patient's memory was discussed she will follow back with Dr. Armstrong her neurologist social interaction has been limited due to the COVID-19 pandemic which I believe has contributed to her memory decline she was advised to keep her mind active by [employer] games and her son will be involved in getting her some of these things to do advised to keep active even if it is walking around the house several times a day

## 2020-10-20 NOTE — HISTORY OF PRESENT ILLNESS
[Family Member] : family member [FreeTextEntry1] : Comes to the office accompanied by her son for follow-up and to receive the influenza vaccine to review her medicines and discuss her overall health recent issues with memory and unsteady gait [de-identified] : 85-year-old woman comes to the office for follow-up with a history of atrial fibrillation hypertension hyperlipidemia hypothyroidism cognitive impairment pacemaker and lumbar pain patient denies temperature chills sweats or myalgias she has had no headache sinus congestion sore throat cough wheezing pleurisy chest pain shortness of breath exertional dyspnea lightheadedness palpitations dizziness vertigo or syncope she does have an unsteady gait contributed to by her lower back pain she has had no abdominal pain nausea vomiting diarrhea constipation bright red blood per rectum or black stools her appetite has been good her weight is stable she urinates frequently but denies dysuria or gross hematuria she has had no recent leg edema skin rashes and her sleep has been adequate admits that her memory and her mind is getting worse

## 2020-10-22 ENCOUNTER — LABORATORY RESULT (OUTPATIENT)
Age: 85
End: 2020-10-22

## 2020-10-26 ENCOUNTER — APPOINTMENT (OUTPATIENT)
Dept: ELECTROPHYSIOLOGY | Facility: CLINIC | Age: 85
End: 2020-10-26
Payer: MEDICARE

## 2020-10-26 ENCOUNTER — RX RENEWAL (OUTPATIENT)
Age: 85
End: 2020-10-26

## 2020-10-26 PROCEDURE — 93296 REM INTERROG EVL PM/IDS: CPT

## 2020-10-26 PROCEDURE — 93294 REM INTERROG EVL PM/LDLS PM: CPT

## 2020-10-29 ENCOUNTER — LABORATORY RESULT (OUTPATIENT)
Age: 85
End: 2020-10-29

## 2020-11-05 ENCOUNTER — LABORATORY RESULT (OUTPATIENT)
Age: 85
End: 2020-11-05

## 2020-11-09 ENCOUNTER — RX RENEWAL (OUTPATIENT)
Age: 85
End: 2020-11-09

## 2020-11-13 LAB
25(OH)D3 SERPL-MCNC: 19.6 NG/ML
ALBUMIN SERPL ELPH-MCNC: 4.3 G/DL
ALP BLD-CCNC: 168 U/L
ALT SERPL-CCNC: 12 U/L
ANION GAP SERPL CALC-SCNC: 11 MMOL/L
APPEARANCE: CLEAR
AST SERPL-CCNC: 16 U/L
BASOPHILS # BLD AUTO: 0.02 K/UL
BASOPHILS NFR BLD AUTO: 0.3 %
BILIRUB SERPL-MCNC: 0.6 MG/DL
BILIRUBIN URINE: NEGATIVE
BLOOD URINE: NEGATIVE
BUN SERPL-MCNC: 37 MG/DL
CALCIUM SERPL-MCNC: 9.6 MG/DL
CHLORIDE SERPL-SCNC: 102 MMOL/L
CHOLEST SERPL-MCNC: 119 MG/DL
CO2 SERPL-SCNC: 27 MMOL/L
COLOR: NORMAL
CREAT SERPL-MCNC: 1.59 MG/DL
CRP SERPL HS-MCNC: 0.88 MG/L
EOSINOPHIL # BLD AUTO: 0.09 K/UL
EOSINOPHIL NFR BLD AUTO: 1.3 %
ESTIMATED AVERAGE GLUCOSE: 114 MG/DL
GLUCOSE BS SERPL-MCNC: 81 MG/DL
GLUCOSE QUALITATIVE U: NEGATIVE
GLUCOSE SERPL-MCNC: 65 MG/DL
HBA1C MFR BLD HPLC: 5.6 %
HCT VFR BLD CALC: 36.9 %
HDLC SERPL-MCNC: 53 MG/DL
HGB BLD-MCNC: 11.4 G/DL
IMM GRANULOCYTES NFR BLD AUTO: 0.3 %
KETONES URINE: NEGATIVE
LDLC SERPL CALC-MCNC: 45 MG/DL
LEUKOCYTE ESTERASE URINE: NEGATIVE
LYMPHOCYTES # BLD AUTO: 1.26 K/UL
LYMPHOCYTES NFR BLD AUTO: 17.8 %
MAN DIFF?: NORMAL
MCHC RBC-ENTMCNC: 29.8 PG
MCHC RBC-ENTMCNC: 30.9 GM/DL
MCV RBC AUTO: 96.6 FL
MONOCYTES # BLD AUTO: 0.4 K/UL
MONOCYTES NFR BLD AUTO: 5.6 %
NEUTROPHILS # BLD AUTO: 5.3 K/UL
NEUTROPHILS NFR BLD AUTO: 74.7 %
NITRITE URINE: NEGATIVE
NONHDLC SERPL-MCNC: 66 MG/DL
PH URINE: 7
PLATELET # BLD AUTO: 186 K/UL
POTASSIUM SERPL-SCNC: 5.2 MMOL/L
PROT SERPL-MCNC: 7 G/DL
PROTEIN URINE: NEGATIVE
RBC # BLD: 3.82 M/UL
RBC # FLD: 14.7 %
SARS-COV-2 IGG SERPL IA-ACNC: <0.1 INDEX
SARS-COV-2 IGG SERPL QL IA: NEGATIVE
SODIUM SERPL-SCNC: 140 MMOL/L
SPECIFIC GRAVITY URINE: 1.01
T4 FREE SERPL-MCNC: 1.2 NG/DL
TRIGL SERPL-MCNC: 105 MG/DL
TSH SERPL-ACNC: 2.18 UIU/ML
UROBILINOGEN URINE: NORMAL
VIT B12 SERPL-MCNC: 433 PG/ML
WBC # FLD AUTO: 7.09 K/UL

## 2020-11-19 ENCOUNTER — LABORATORY RESULT (OUTPATIENT)
Age: 85
End: 2020-11-19

## 2020-11-20 ENCOUNTER — MED ADMIN CHARGE (OUTPATIENT)
Age: 85
End: 2020-11-20

## 2020-11-21 ENCOUNTER — OUTPATIENT (OUTPATIENT)
Dept: OUTPATIENT SERVICES | Facility: HOSPITAL | Age: 85
LOS: 1 days | End: 2020-11-21
Payer: MEDICARE

## 2020-11-21 ENCOUNTER — APPOINTMENT (OUTPATIENT)
Dept: CT IMAGING | Facility: CLINIC | Age: 85
End: 2020-11-21
Payer: MEDICARE

## 2020-11-21 DIAGNOSIS — Z96.659 PRESENCE OF UNSPECIFIED ARTIFICIAL KNEE JOINT: Chronic | ICD-10-CM

## 2020-11-21 DIAGNOSIS — Z90.10 ACQUIRED ABSENCE OF UNSPECIFIED BREAST AND NIPPLE: Chronic | ICD-10-CM

## 2020-11-21 DIAGNOSIS — Z00.8 ENCOUNTER FOR OTHER GENERAL EXAMINATION: ICD-10-CM

## 2020-11-21 PROCEDURE — 70450 CT HEAD/BRAIN W/O DYE: CPT | Mod: 26

## 2020-11-21 PROCEDURE — 70450 CT HEAD/BRAIN W/O DYE: CPT

## 2020-12-03 ENCOUNTER — LABORATORY RESULT (OUTPATIENT)
Age: 85
End: 2020-12-03

## 2020-12-07 ENCOUNTER — FORM ENCOUNTER (OUTPATIENT)
Age: 85
End: 2020-12-07

## 2020-12-16 ENCOUNTER — RX RENEWAL (OUTPATIENT)
Age: 85
End: 2020-12-16

## 2020-12-17 ENCOUNTER — RX RENEWAL (OUTPATIENT)
Age: 85
End: 2020-12-17

## 2020-12-17 ENCOUNTER — LABORATORY RESULT (OUTPATIENT)
Age: 85
End: 2020-12-17

## 2020-12-31 ENCOUNTER — LABORATORY RESULT (OUTPATIENT)
Age: 85
End: 2020-12-31

## 2021-01-14 ENCOUNTER — LABORATORY RESULT (OUTPATIENT)
Age: 86
End: 2021-01-14

## 2021-01-15 ENCOUNTER — RX RENEWAL (OUTPATIENT)
Age: 86
End: 2021-01-15

## 2021-01-25 ENCOUNTER — APPOINTMENT (OUTPATIENT)
Dept: ELECTROPHYSIOLOGY | Facility: CLINIC | Age: 86
End: 2021-01-25

## 2021-01-28 ENCOUNTER — LABORATORY RESULT (OUTPATIENT)
Age: 86
End: 2021-01-28

## 2021-02-09 ENCOUNTER — RX RENEWAL (OUTPATIENT)
Age: 86
End: 2021-02-09

## 2021-02-11 ENCOUNTER — LABORATORY RESULT (OUTPATIENT)
Age: 86
End: 2021-02-11

## 2021-02-25 ENCOUNTER — LABORATORY RESULT (OUTPATIENT)
Age: 86
End: 2021-02-25

## 2021-03-05 ENCOUNTER — NON-APPOINTMENT (OUTPATIENT)
Age: 86
End: 2021-03-05

## 2021-03-05 ENCOUNTER — APPOINTMENT (OUTPATIENT)
Dept: ELECTROPHYSIOLOGY | Facility: CLINIC | Age: 86
End: 2021-03-05
Payer: MEDICARE

## 2021-03-05 PROCEDURE — 93296 REM INTERROG EVL PM/IDS: CPT

## 2021-03-05 PROCEDURE — 93294 REM INTERROG EVL PM/LDLS PM: CPT

## 2021-03-11 ENCOUNTER — RX RENEWAL (OUTPATIENT)
Age: 86
End: 2021-03-11

## 2021-03-11 ENCOUNTER — LABORATORY RESULT (OUTPATIENT)
Age: 86
End: 2021-03-11

## 2021-03-16 ENCOUNTER — NON-APPOINTMENT (OUTPATIENT)
Age: 86
End: 2021-03-16

## 2021-03-16 DIAGNOSIS — Z87.898 PERSONAL HISTORY OF OTHER SPECIFIED CONDITIONS: ICD-10-CM

## 2021-03-25 ENCOUNTER — LABORATORY RESULT (OUTPATIENT)
Age: 86
End: 2021-03-25

## 2021-04-08 ENCOUNTER — LABORATORY RESULT (OUTPATIENT)
Age: 86
End: 2021-04-08

## 2021-04-22 ENCOUNTER — LABORATORY RESULT (OUTPATIENT)
Age: 86
End: 2021-04-22

## 2021-04-27 ENCOUNTER — RX RENEWAL (OUTPATIENT)
Age: 86
End: 2021-04-27

## 2021-05-08 ENCOUNTER — RX RENEWAL (OUTPATIENT)
Age: 86
End: 2021-05-08

## 2021-05-19 NOTE — ED PROVIDER NOTE - NS ED MD DISPO DISCHARGE CCDA
Patient/Caregiver provided printed discharge information.
I personally performed the service described in the documentation recorded by the scribe in my presence, and it accurately and completely records my words and actions.

## 2021-05-20 ENCOUNTER — RX RENEWAL (OUTPATIENT)
Age: 86
End: 2021-05-20

## 2021-05-21 ENCOUNTER — RX RENEWAL (OUTPATIENT)
Age: 86
End: 2021-05-21

## 2021-05-25 ENCOUNTER — NON-APPOINTMENT (OUTPATIENT)
Age: 86
End: 2021-05-25

## 2021-05-25 ENCOUNTER — APPOINTMENT (OUTPATIENT)
Dept: CARDIOLOGY | Facility: CLINIC | Age: 86
End: 2021-05-25
Payer: MEDICARE

## 2021-05-25 VITALS — SYSTOLIC BLOOD PRESSURE: 120 MMHG | DIASTOLIC BLOOD PRESSURE: 80 MMHG

## 2021-05-25 VITALS
HEART RATE: 67 BPM | HEIGHT: 59 IN | TEMPERATURE: 98.2 F | OXYGEN SATURATION: 97 % | DIASTOLIC BLOOD PRESSURE: 70 MMHG | WEIGHT: 178 LBS | RESPIRATION RATE: 15 BRPM | BODY MASS INDEX: 35.88 KG/M2 | SYSTOLIC BLOOD PRESSURE: 124 MMHG

## 2021-05-25 DIAGNOSIS — I34.0 NONRHEUMATIC MITRAL (VALVE) INSUFFICIENCY: ICD-10-CM

## 2021-05-25 PROCEDURE — 99214 OFFICE O/P EST MOD 30 MIN: CPT

## 2021-05-25 PROCEDURE — 93000 ELECTROCARDIOGRAM COMPLETE: CPT

## 2021-05-25 NOTE — PHYSICAL EXAM
[General Appearance - Well Developed] : well developed [Normal Appearance] : normal appearance [Well Groomed] : well groomed [General Appearance - Well Nourished] : well nourished [General Appearance - In No Acute Distress] : no acute distress [No Deformities] : no deformities [Normal Oral Mucosa] : normal oral mucosa [No Oral Pallor] : no oral pallor [No Oral Cyanosis] : no oral cyanosis [Normal Jugular Venous A Waves Present] : normal jugular venous A waves present [Normal Jugular Venous V Waves Present] : normal jugular venous V waves present [No Jugular Venous Gamez A Waves] : no jugular venous gamez A waves [Respiration, Rhythm And Depth] : normal respiratory rhythm and effort [Exaggerated Use Of Accessory Muscles For Inspiration] : no accessory muscle use [Auscultation Breath Sounds / Voice Sounds] : lungs were clear to auscultation bilaterally [Abdomen Soft] : soft [Abdomen Tenderness] : non-tender [Abdomen Mass (___ Cm)] : no abdominal mass palpated [Nail Clubbing] : no clubbing of the fingernails [Petechial Hemorrhages (___cm)] : no petechial hemorrhages [Cyanosis, Localized] : no localized cyanosis [FreeTextEntry1] : negative Homans sign [Skin Color & Pigmentation] : normal skin color and pigmentation [] : no rash [No Venous Stasis] : no venous stasis [Skin Lesions] : no skin lesions [No Skin Ulcers] : no skin ulcer [No Xanthoma] : no  xanthoma was observed [Oriented To Time, Place, And Person] : oriented to person, place, and time [Affect] : the affect was normal [Mood] : the mood was normal [No Anxiety] : not feeling anxious [5th Left ICS - MCL] : palpated at the 5th LICS in the midclavicular line [Normal] : normal [Normal Rate] : normal [Irregularly Irregular] : irregularly irregular [No Murmur] : no murmurs heard [Right Carotid Bruit] : no bruit heard over the right carotid [Left Carotid Bruit] : no bruit heard over the left carotid [Right Femoral Bruit] : no bruit heard over the right femoral artery [Left Femoral Bruit] : no bruit heard over the left femoral artery [No Abnormalities] : the abdominal aorta was not enlarged and no bruit was heard [Nonpitting Edema] : nonpitting edema present

## 2021-05-25 NOTE — ASSESSMENT
[FreeTextEntry1] : IMPRESSION:\par 1. Chronic atrial fibrillation with significant AV node dysfunction and syncope s/p ppm\par 2. History of mitral and tricuspid insufficiency by echocardiography\par 3. hypertension well-controlled \par \par  Plan:\par 1. continue current regimen

## 2021-05-25 NOTE — REASON FOR VISIT
[Symptom and Test Evaluation] : symptom and test evaluation [Cardiac Failure] : cardiac failure [Arrhythmia/ECG Abnorrmalities] : arrhythmia/ECG abnormalities [FreeTextEntry1] : Patient returns for followup. Feeling well. Offers no complaints of chest discomfort shortness of breath palpitations dizziness or syncope.She is essentially homebound at this point.Her pacemaker is monitored remotely through [facility]

## 2021-05-26 ENCOUNTER — LABORATORY RESULT (OUTPATIENT)
Age: 86
End: 2021-05-26

## 2021-05-26 LAB
ALBUMIN SERPL ELPH-MCNC: 4.6 G/DL
ALP BLD-CCNC: 162 U/L
ALT SERPL-CCNC: 10 U/L
ANION GAP SERPL CALC-SCNC: 14 MMOL/L
AST SERPL-CCNC: 20 U/L
BILIRUB SERPL-MCNC: 0.7 MG/DL
BUN SERPL-MCNC: 33 MG/DL
CALCIUM SERPL-MCNC: 10.1 MG/DL
CHLORIDE SERPL-SCNC: 102 MMOL/L
CHOLEST SERPL-MCNC: 127 MG/DL
CO2 SERPL-SCNC: 25 MMOL/L
CREAT SERPL-MCNC: 1.6 MG/DL
GLUCOSE SERPL-MCNC: 89 MG/DL
HDLC SERPL-MCNC: 60 MG/DL
LDLC SERPL CALC-MCNC: 48 MG/DL
NONHDLC SERPL-MCNC: 68 MG/DL
POTASSIUM SERPL-SCNC: 3.7 MMOL/L
PROT SERPL-MCNC: 7.6 G/DL
SODIUM SERPL-SCNC: 141 MMOL/L
T3 SERPL-MCNC: 77 NG/DL
T4 SERPL-MCNC: 7.7 UG/DL
TRIGL SERPL-MCNC: 100 MG/DL
TSH SERPL-ACNC: 2.32 UIU/ML

## 2021-06-07 ENCOUNTER — APPOINTMENT (OUTPATIENT)
Dept: ELECTROPHYSIOLOGY | Facility: CLINIC | Age: 86
End: 2021-06-07
Payer: MEDICARE

## 2021-06-07 ENCOUNTER — NON-APPOINTMENT (OUTPATIENT)
Age: 86
End: 2021-06-07

## 2021-06-07 VITALS
OXYGEN SATURATION: 96 % | BODY MASS INDEX: 35.88 KG/M2 | WEIGHT: 178 LBS | HEART RATE: 72 BPM | DIASTOLIC BLOOD PRESSURE: 78 MMHG | HEIGHT: 59 IN | SYSTOLIC BLOOD PRESSURE: 126 MMHG

## 2021-06-07 PROCEDURE — 93279 PRGRMG DEV EVAL PM/LDLS PM: CPT

## 2021-06-09 ENCOUNTER — LABORATORY RESULT (OUTPATIENT)
Age: 86
End: 2021-06-09

## 2021-06-14 ENCOUNTER — RX RENEWAL (OUTPATIENT)
Age: 86
End: 2021-06-14

## 2021-06-23 ENCOUNTER — LABORATORY RESULT (OUTPATIENT)
Age: 86
End: 2021-06-23

## 2021-07-07 ENCOUNTER — LABORATORY RESULT (OUTPATIENT)
Age: 86
End: 2021-07-07

## 2021-07-13 ENCOUNTER — RX RENEWAL (OUTPATIENT)
Age: 86
End: 2021-07-13

## 2021-07-13 ENCOUNTER — APPOINTMENT (OUTPATIENT)
Dept: NEUROLOGY | Facility: CLINIC | Age: 86
End: 2021-07-13
Payer: MEDICARE

## 2021-07-13 VITALS
WEIGHT: 178 LBS | HEIGHT: 59 IN | BODY MASS INDEX: 35.88 KG/M2 | SYSTOLIC BLOOD PRESSURE: 134 MMHG | HEART RATE: 68 BPM | DIASTOLIC BLOOD PRESSURE: 74 MMHG

## 2021-07-13 PROCEDURE — 99214 OFFICE O/P EST MOD 30 MIN: CPT

## 2021-07-13 NOTE — CONSULT LETTER
[Dear  ___] : Dear  [unfilled], [Consult Letter:] : I had the pleasure of evaluating your patient, [unfilled]. [Please see my note below.] : Please see my note below. [Consult Closing:] : Thank you very much for allowing me to participate in the care of this patient.  If you have any questions, please do not hesitate to contact me. [Sincerely,] : Sincerely, [FreeTextEntry3] : Jose Armstrong MD\par

## 2021-07-13 NOTE — PHYSICAL EXAM
[FreeTextEntry1] : Head:  Normocephalic Neck: Supple nontender no carotid bruits.  Obese\par \par Mental Status:  Alert disoriented to month year and date but she did know the day of the week.  She does not appear aphasic or dysarthric.  She is attentive to the examiner.  She recalls 0 out of 3 objects at 5 minutes.  She is able to spell world backwards.  He does not appear depressed.\par \par Cranial Nerves:  PERRL, Fundi normal Visual Fields full  EOMI no diplopia no ptosis no nystagmus, V through XII intact.\par \par Motor:  No drift, normal strength tone and coordination and no focal atrophy. No abnormal movements. No dysmetria.  Normal rapid alternating movements. \par \par DTRs: Symmetric .  Plantars flexor.  No Clonus.\par \par Sensory:  Normal testing with pin light touch.\par \par Gait: Unsteady needs assist uses a walker.\par

## 2021-07-13 NOTE — ASSESSMENT
[FreeTextEntry1] : Impression: This 86-year-old woman has a presentation consistent with dementia.  She is neurologically stable.\par \par Recommendations: Maintain memantine 10 mg twice daily.  Office follow-up in 6 months.  Discussed with the patient's son.\par

## 2021-07-13 NOTE — HISTORY OF PRESENT ILLNESS
[FreeTextEntry1] : Sylvie Varghese is seen for an office visit accompanied by her son.  Her condition is neurologically stable.  She is forgetful especially for recent events but she manages her own ADL and lives at home with her .  She does most of her own household chores.  She has an unsteady gait and uses a walker.  She has been taking memantine 10 mg twice daily without side effect.  She is not having headache syncope seizure or any focal neurological symptomatology.

## 2021-07-20 NOTE — PHYSICAL THERAPY INITIAL EVALUATION ADULT - ASR EQUIP NEEDS DISCH PT EVAL
Medicare Part B Preventive Services Limitations Recommendation Scheduled   Bone Mass Measurement  (age 72 & older, biennial) Requires diagnosis related to osteoporosis or estrogen deficiency. Biennial benefit unless patient has history of long-term glucocorticoid tx or baseline is needed because initial test was by other method This should be done at age 72 and again if on osteoporosis medication at 2-3 year intervals. Up to date   Cardiovascular Screening Blood Tests (every 5 years)  Total cholesterol, HDL, Triglycerides Order as a panel if possible We should check your cholesterol panel at least once every 5 years. Up to date   Colorectal Cancer Screening  -Fecal occult blood test (annual)  -Flexible sigmoidoscopy (5y)  -Screening colonoscopy (10y)  -Barium Enema  Due per your Gastroenterologist's recommendations. Up to date   Counseling to Prevent Tobacco Use (up to 8 sessions per year)  - Counseling greater than 3 and up to 10 minutes  - Counseling greater than 10 minutes Patients must be asymptomatic of tobacco-related conditions to receive as preventive service Continue with smoking cessation Not applicable   Diabetes Screening Tests (at least every 3 years, Medicare covers annually or at 6-month intervals for prediabetic patients)    Fasting blood sugar (FBS) or glucose tolerance test (GTT) Patient must be diagnosed with one of the following:  -Hypertension, Dyslipidemia, obesity, previous impaired FBS or GTT  Or any two of the following: overweight, FH of diabetes, age ? 72, history of gestational diabetes, birth of baby weighing more than 9 pounds Should be done yearly Up to date   Diabetes Self-Management Training (DSMT) (no USPSTF recommendation) Requires referral by treating physician for patient with diabetes or renal disease. 10 hours of initial DSMT session of no less than 30 minutes each in a continuous 12-month period. 2 hours of follow-up DSMT in subsequent years.  Not applicable Not applicable Glaucoma Screening (no USPSTF recommendation) Diabetes mellitus, family history, , age 48 or over,  American, age 72 or over Continue with annual eye exams. Up to date   Human Immunodeficiency Virus (HIV) Screening (annually for increased risk patients)  HIV-1 and HIV-2 by EIA, AURA, rapid antibody test, or oral mucosa transudate Patient must be at increased risk for HIV infection per USPSTF guidelines or pregnant. Tests covered annually for patients at increased risk. Pregnant patients may receive up to 3 test during pregnancy. Not applicable Not applicable   Medical Nutrition Therapy (MNT) (for diabetes or renal disease not recommended schedule) Requires referral by treating physician for patient with diabetes or renal disease. Can be provided in same year as diabetes self-management training (DSMT), and CMS recommends medical nutrition therapy take place after DSMT. Up to 3 hours for initial year and 2 hours in subsequent years. Not applicable Not applicable   Shingles Vaccination  Vaccination recommended for shingles vaccination. Overdue   Seasonal Influenza Vaccination (annually)  Continue with yearly \"flu\" shot annually Due later this year   Pneumococcal Vaccination (once after 65)  Please receive this vaccination at age 72. Not applicable   Hepatitis B Vaccinations (if medium/high risk) Medium/high risk factors:  End-stage renal disease,  Hemophiliacs who received Factor VIII or IX concentrates, Clients of institutions for the mentally retarded, Persons who live in the same house as a HepB virus carrier, Homosexual men, Illicit injectable drug abusers. Not applicable Not applicable   Screening Mammography (biennial age 54-69) Annually (age 36 or over) You need a mammogram yearly to screen for breast cancer.  Up to date   Screening Pap Tests and Pelvic Examination (up to age 79 and after 79 if unknown history or abnormal study last 10 years) Every 24 months except high risk You need no Pap smear at this time. Not warranted at this time. Ultrasound Screening for Abdominal Aortic Aneurysm (AAA) (once) Patient must be referred through [facility] and not have had a screening for abdominal aortic aneurysm before under Medicare. Limited to patients who meet one of the following criteria:  - Men who are 73-68 years old and have smoked more than 100 cigarettes in their lifetime.  -Anyone with a FH of AAA  -Anyone recommended for screening by USPSTF Not applicable Not applicable          Deep Vein Thrombosis: Care Instructions  Overview     A deep vein thrombosis (DVT) is a blood clot in certain veins, usually in the legs, pelvis, or arms. Blood clots in these veins need to be treated because they can get bigger, break loose, and travel through the bloodstream to the lungs. A blood clot in a lung can be life-threatening. The doctor may have given you a blood thinner (anticoagulant). A blood thinner can stop the blood clot from growing larger and prevent new clots from forming. You will need to take a blood thinner for at least 3 months. The doctor has checked you carefully, but problems can develop later. If you notice any problems or new symptoms, get medical treatment right away. Follow-up care is a key part of your treatment and safety. Be sure to make and go to all appointments, and call your doctor if you are having problems. It's also a good idea to know your test results and keep a list of the medicines you take. How can you care for yourself at home? · Take your medicines exactly as prescribed. Call your doctor if you think you are having a problem with your medicine. · If you are taking a blood thinner, be sure you get instructions about how to take your medicine safely. Blood thinners can cause serious bleeding problems. · Try to walk several times a day. · Wear compression stockings if your doctor recommends them. These stockings are tighter at the feet than on the legs.  They may reduce pain and swelling in your legs. But there are different types of stockings, and they need to fit right. So your doctor will recommend what you need. · When you sit, use a pillow to raise the arm or leg that has the blood clot. Try to keep it above the level of your heart. When should you call for help? Call 911 anytime you think you may need emergency care. For example, call if:    · You passed out (lost consciousness).     · You have symptoms of a blood clot in your lung (called a pulmonary embolism). These include:  ? Sudden chest pain. ? Trouble breathing. ? Coughing up blood. Call your doctor now or seek immediate medical care if:    · You have new or worse trouble breathing.     · You are dizzy or lightheaded, or you feel like you may faint.     · You have symptoms of a blood clot in your arm or leg. These may include:  ? Pain in the arm, calf, back of the knee, thigh, or groin. ? Redness and swelling in the arm, leg, or groin. Watch closely for changes in your health, and be sure to contact your doctor if:    · You do not get better as expected. Where can you learn more? Go to http://www.gray.com/  Enter M160 in the search box to learn more about \"Deep Vein Thrombosis: Care Instructions. \"  Current as of: March 4, 2020               Content Version: 12.8  © 3769-2779 [employer]. Care instructions adapted under license by [employer] (which disclaims liability or warranty for this information). If you have questions about a medical condition or this instruction, always ask your healthcare professional. [place] [address] any warranty or liability for your use of this information. pt owns rolling walker and straight cane

## 2021-07-21 ENCOUNTER — LABORATORY RESULT (OUTPATIENT)
Age: 86
End: 2021-07-21

## 2021-07-25 ENCOUNTER — RX RENEWAL (OUTPATIENT)
Age: 86
End: 2021-07-25

## 2021-08-04 ENCOUNTER — LABORATORY RESULT (OUTPATIENT)
Age: 86
End: 2021-08-04

## 2021-08-13 DIAGNOSIS — Z90.11 ACQUIRED ABSENCE OF RIGHT BREAST AND NIPPLE: ICD-10-CM

## 2021-08-18 ENCOUNTER — LABORATORY RESULT (OUTPATIENT)
Age: 86
End: 2021-08-18

## 2021-08-23 ENCOUNTER — RX RENEWAL (OUTPATIENT)
Age: 86
End: 2021-08-23

## 2021-09-01 ENCOUNTER — LABORATORY RESULT (OUTPATIENT)
Age: 86
End: 2021-09-01

## 2021-09-15 ENCOUNTER — LABORATORY RESULT (OUTPATIENT)
Age: 86
End: 2021-09-15

## 2021-09-30 ENCOUNTER — LABORATORY RESULT (OUTPATIENT)
Age: 86
End: 2021-09-30

## 2021-10-12 ENCOUNTER — FORM ENCOUNTER (OUTPATIENT)
Age: 86
End: 2021-10-12

## 2021-10-13 ENCOUNTER — LABORATORY RESULT (OUTPATIENT)
Age: 86
End: 2021-10-13

## 2021-10-27 ENCOUNTER — LABORATORY RESULT (OUTPATIENT)
Age: 86
End: 2021-10-27

## 2021-11-01 ENCOUNTER — APPOINTMENT (OUTPATIENT)
Dept: ELECTROPHYSIOLOGY | Facility: CLINIC | Age: 86
End: 2021-11-01

## 2021-11-10 ENCOUNTER — LABORATORY RESULT (OUTPATIENT)
Age: 86
End: 2021-11-10

## 2021-11-11 ENCOUNTER — APPOINTMENT (OUTPATIENT)
Dept: INTERNAL MEDICINE | Facility: CLINIC | Age: 86
End: 2021-11-11
Payer: MEDICARE

## 2021-11-11 VITALS
HEART RATE: 78 BPM | RESPIRATION RATE: 16 BRPM | OXYGEN SATURATION: 96 % | DIASTOLIC BLOOD PRESSURE: 82 MMHG | HEIGHT: 59 IN | WEIGHT: 175 LBS | BODY MASS INDEX: 35.28 KG/M2 | SYSTOLIC BLOOD PRESSURE: 126 MMHG | TEMPERATURE: 97.2 F

## 2021-11-11 DIAGNOSIS — I44.7 LEFT BUNDLE-BRANCH BLOCK, UNSPECIFIED: ICD-10-CM

## 2021-11-11 PROCEDURE — 99215 OFFICE O/P EST HI 40 MIN: CPT

## 2021-11-15 NOTE — HEALTH RISK ASSESSMENT
[Yes] : Yes [No falls in past year] : Patient reported no falls in the past year [0] : 2) Feeling down, depressed, or hopeless: Not at all (0) [PHQ-2 Negative - No further assessment needed] : PHQ-2 Negative - No further assessment needed [] : No [[id number]] : 0

## 2021-11-15 NOTE — ASSESSMENT
[FreeTextEntry1] : Physical examination shows a developed elderly woman in no acute distress blood pressure was 126/82 height 4 foot 11 inches weight 175 pounds BMI 35.35 temperature 97.2 °F heart rate is 78 respirations 16 oxygen saturation on room air was 96% HEENT was unremarkable chest was clear cardiovascular exam was regular with no extra heart sounds or murmurs abdomen was soft and nontender extremities showed no clubbing cyanosis or edema neurologic exam was nonfocal patient following regularly with her cardiologist recently saw her neurologist viewing her cognitive deficits she will forward to was the dates and type of Covid vaccine she has gotten a booster shot was strongly recommended she has received her influenza vaccines yearly Prevnar 13 Pneumovax 23 and was suggested to get the Shingrix vaccine patient has regular PT/INR is followed by her cardiologist defers on mammographies and colorectal cancer screening she is up-to-date with her ophthalmologist her blood pressure was well controlled at the present visit her pacemaker is checked at regular intervals and her lower back discomfort has been manageable

## 2021-11-15 NOTE — HISTORY OF PRESENT ILLNESS
[FreeTextEntry1] : 86-year-old woman comes to the office for follow-up to review her medications and discuss her overall health recent issues with back pain [de-identified] : Comes to the office for follow-up with a history of atrial fibrillation essential hypertension hypothyroidism hyperlipidemia left bundle branch block mild cognitive impairment chronic anticoagulation lumbar spinal stenosis in the presence of a pacemaker she denies temperature chills sweats or myalgias she does have chronic fatigue she has had no headache sinus congestion sore throat cough wheezing pleurisy chest pain shortness of breath exertional dyspnea lightheadedness palpitations dizziness vertigo or recent syncope she denies abdominal pain nausea vomiting diarrhea constipation bright red blood per rectum or black stools her appetite is been good her weight has been stable she has chronic pain in her lower back and hips she does get up at night to urinate but denies dysuria or gross hematuria she denies leg edema skin rashes has bouts of anxiety but is sleeping adequately does walk with an unsteady gait and has been sleeping adequately

## 2021-11-15 NOTE — REVIEW OF SYSTEMS
[Fatigue] : fatigue [Nocturia] : nocturia [Joint Pain] : joint pain [Joint Stiffness] : joint stiffness [Back Pain] : back pain [Memory Loss] : memory loss [Unsteady Walking] : ataxia [Anxiety] : anxiety [Negative] : Heme/Lymph [Fever] : no fever [Chills] : no chills [Hot Flashes] : no hot flashes [Night Sweats] : no night sweats [Recent Change In Weight] : ~T no recent weight change [Discharge] : no discharge [Pain] : no pain [Redness] : no redness [Dryness] : no dryness  [Vision Problems] : no vision problems [Itching] : no itching [Earache] : no earache [Hearing Loss] : no hearing loss [Nosebleed] : no nosebleeds [Hoarseness] : no hoarseness [Nasal Discharge] : no nasal discharge [Sore Throat] : no sore throat [Postnasal Drip] : no postnasal drip [Chest Pain] : no chest pain [Palpitations] : no palpitations [Leg Claudication] : no leg claudication [Lower Ext Edema] : no lower extremity edema [Orthopnea] : no orthopnea [Shortness Of Breath] : no shortness of breath [Wheezing] : no wheezing [Cough] : no cough [Dyspnea on Exertion] : no dyspnea on exertion [Abdominal Pain] : no abdominal pain [Nausea] : no nausea [Constipation] : no constipation [Diarrhea] : diarrhea [Vomiting] : no vomiting [Heartburn] : no heartburn [Melena] : no melena [Dysuria] : no dysuria [Incontinence] : no incontinence [Poor Libido] : libido not poor [Hematuria] : no hematuria [Frequency] : no frequency [Vaginal Discharge] : no vaginal discharge [Dysmenorrhea] : no dysmenorrhea [Joint Swelling] : no joint swelling [Muscle Weakness] : no muscle weakness [Muscle Pain] : no muscle pain [Itching] : no itching [Mole Changes] : no mole changes [Nail Changes] : no nail changes [Hair Changes] : no hair changes [Skin Rash] : no skin rash [Headache] : no headache [Dizziness] : no dizziness [Fainting] : no fainting [Confusion] : no confusion [Suicidal] : not suicidal [Insomnia] : no insomnia [Depression] : no depression [Easy Bleeding] : no easy bleeding [Easy Bruising] : no easy bruising [Swollen Glands] : no swollen glands [FreeTextEntry9] : hips and back

## 2021-11-19 ENCOUNTER — RX RENEWAL (OUTPATIENT)
Age: 86
End: 2021-11-19

## 2021-11-24 ENCOUNTER — LABORATORY RESULT (OUTPATIENT)
Age: 86
End: 2021-11-24

## 2021-11-29 ENCOUNTER — APPOINTMENT (OUTPATIENT)
Dept: ELECTROPHYSIOLOGY | Facility: CLINIC | Age: 86
End: 2021-11-29
Payer: MEDICARE

## 2021-11-29 ENCOUNTER — NON-APPOINTMENT (OUTPATIENT)
Age: 86
End: 2021-11-29

## 2021-11-29 PROCEDURE — 93296 REM INTERROG EVL PM/IDS: CPT

## 2021-11-29 PROCEDURE — 93294 REM INTERROG EVL PM/LDLS PM: CPT | Mod: NC

## 2021-12-30 ENCOUNTER — RX RENEWAL (OUTPATIENT)
Age: 86
End: 2021-12-30

## 2022-01-17 ENCOUNTER — RX RENEWAL (OUTPATIENT)
Age: 87
End: 2022-01-17

## 2022-01-31 ENCOUNTER — RX RENEWAL (OUTPATIENT)
Age: 87
End: 2022-01-31

## 2022-02-03 ENCOUNTER — LABORATORY RESULT (OUTPATIENT)
Age: 87
End: 2022-02-03

## 2022-02-13 ENCOUNTER — RX RENEWAL (OUTPATIENT)
Age: 87
End: 2022-02-13

## 2022-02-17 ENCOUNTER — LABORATORY RESULT (OUTPATIENT)
Age: 87
End: 2022-02-17

## 2022-02-28 ENCOUNTER — NON-APPOINTMENT (OUTPATIENT)
Age: 87
End: 2022-02-28

## 2022-02-28 ENCOUNTER — APPOINTMENT (OUTPATIENT)
Dept: ELECTROPHYSIOLOGY | Facility: CLINIC | Age: 87
End: 2022-02-28
Payer: MEDICARE

## 2022-02-28 PROCEDURE — 93294 REM INTERROG EVL PM/LDLS PM: CPT

## 2022-02-28 PROCEDURE — 93296 REM INTERROG EVL PM/IDS: CPT

## 2022-03-04 ENCOUNTER — LABORATORY RESULT (OUTPATIENT)
Age: 87
End: 2022-03-04

## 2022-03-17 ENCOUNTER — LABORATORY RESULT (OUTPATIENT)
Age: 87
End: 2022-03-17

## 2022-03-29 ENCOUNTER — APPOINTMENT (OUTPATIENT)
Dept: NEUROLOGY | Facility: CLINIC | Age: 87
End: 2022-03-29
Payer: MEDICARE

## 2022-03-29 VITALS
HEIGHT: 59 IN | DIASTOLIC BLOOD PRESSURE: 74 MMHG | WEIGHT: 176 LBS | BODY MASS INDEX: 35.48 KG/M2 | HEART RATE: 74 BPM | SYSTOLIC BLOOD PRESSURE: 120 MMHG

## 2022-03-29 DIAGNOSIS — R26.9 UNSPECIFIED ABNORMALITIES OF GAIT AND MOBILITY: ICD-10-CM

## 2022-03-29 PROCEDURE — 99214 OFFICE O/P EST MOD 30 MIN: CPT

## 2022-03-29 NOTE — ASSESSMENT
[FreeTextEntry1] : Impression: This 87-year-old female patient has a presentation consistent with dementia and a chronic gait disorder..  She has multiple medical vascular risk factors including atrial fibrillation pacemaker hyperlipidemia hypertension obesity.  Stable neurological exam.  \par \par Recommendations: At home physical therapy.  Continue memantine 10 mg twice daily.  Office follow-up in 6 months.  Discussed patient's  and son are present at time of the visit.\par

## 2022-03-29 NOTE — PHYSICAL EXAM
[FreeTextEntry1] : Head:  Normocephalic Neck: Supple nontender no carotid bruits.  \par \par Mental Status:  Alert Oriented to month disoriented to date day of the week and the year.  Follows simple commands.  Recalls 0 out of 3 words at 5 minutes.  She can spell world backwards.  She does not appear depressed.  Basically unchanged from the prior exam from 2021.\par \par Cranial Nerves:  PERRL,  Visual Fields full  EOMI no diplopia no ptosis no nystagmus, V through XII intact.\par \par Motor: Nonfocal no drift fairly good strength throughout no dysmetria.\par \par DTRs: Symmetric .  Plantars flexor.  No Clonus.\par \par Sensory: Equal pin and touch.\par \par Gait: Unsteady needs assist morbidly obese.  Change.\par

## 2022-03-29 NOTE — CONSULT LETTER
[Dear  ___] : Dear  [unfilled], [Consult Letter:] : I had the pleasure of evaluating your patient, [unfilled]. [Please see my note below.] : Please see my note below. [Consult Closing:] : Thank you very much for allowing me to participate in the care of this patient.  If you have any questions, please do not hesitate to contact me. [Sincerely,] : Sincerely, [FreeTextEntry3] : Jose Amrstrong MD\par

## 2022-03-29 NOTE — HISTORY OF PRESENT ILLNESS
[FreeTextEntry1] : Sylvie Varghese is seen for an office visit with her  and son.  Her condition is basically stable compared to her last visit July 2021 according to her  and son.  She performs her own ADL and does most of her own household chores.  She has a chronic unsteady gait.  She will use a walker.  She has had no falls.  She ambulates in the home rarely does she leave the house.  She been taking memantine 10 mg twice daily.  She is not having headache syncope or any focal neurological symptomatology.

## 2022-03-31 ENCOUNTER — LABORATORY RESULT (OUTPATIENT)
Age: 87
End: 2022-03-31

## 2022-04-12 ENCOUNTER — RX RENEWAL (OUTPATIENT)
Age: 87
End: 2022-04-12

## 2022-04-14 ENCOUNTER — LABORATORY RESULT (OUTPATIENT)
Age: 87
End: 2022-04-14

## 2022-04-18 LAB — INR PPP: 1.41

## 2022-04-28 ENCOUNTER — LABORATORY RESULT (OUTPATIENT)
Age: 87
End: 2022-04-28

## 2022-05-12 ENCOUNTER — LABORATORY RESULT (OUTPATIENT)
Age: 87
End: 2022-05-12

## 2022-05-18 ENCOUNTER — NON-APPOINTMENT (OUTPATIENT)
Age: 87
End: 2022-05-18

## 2022-05-26 ENCOUNTER — LABORATORY RESULT (OUTPATIENT)
Age: 87
End: 2022-05-26

## 2022-05-31 ENCOUNTER — APPOINTMENT (OUTPATIENT)
Dept: ELECTROPHYSIOLOGY | Facility: CLINIC | Age: 87
End: 2022-05-31

## 2022-06-02 ENCOUNTER — FORM ENCOUNTER (OUTPATIENT)
Age: 87
End: 2022-06-02

## 2022-06-09 ENCOUNTER — LABORATORY RESULT (OUTPATIENT)
Age: 87
End: 2022-06-09

## 2022-06-23 ENCOUNTER — LABORATORY RESULT (OUTPATIENT)
Age: 87
End: 2022-06-23

## 2022-07-01 ENCOUNTER — APPOINTMENT (OUTPATIENT)
Dept: ELECTROPHYSIOLOGY | Facility: CLINIC | Age: 87
End: 2022-07-01

## 2022-07-01 ENCOUNTER — NON-APPOINTMENT (OUTPATIENT)
Age: 87
End: 2022-07-01

## 2022-07-01 PROCEDURE — 93296 REM INTERROG EVL PM/IDS: CPT

## 2022-07-01 PROCEDURE — 93294 REM INTERROG EVL PM/LDLS PM: CPT

## 2022-07-07 ENCOUNTER — LABORATORY RESULT (OUTPATIENT)
Age: 87
End: 2022-07-07

## 2022-07-11 ENCOUNTER — RX RENEWAL (OUTPATIENT)
Age: 87
End: 2022-07-11

## 2022-07-13 ENCOUNTER — RX RENEWAL (OUTPATIENT)
Age: 87
End: 2022-07-13

## 2022-07-21 ENCOUNTER — LABORATORY RESULT (OUTPATIENT)
Age: 87
End: 2022-07-21

## 2022-08-04 ENCOUNTER — LABORATORY RESULT (OUTPATIENT)
Age: 87
End: 2022-08-04

## 2022-08-08 ENCOUNTER — RX RENEWAL (OUTPATIENT)
Age: 87
End: 2022-08-08

## 2022-08-18 ENCOUNTER — RX RENEWAL (OUTPATIENT)
Age: 87
End: 2022-08-18

## 2022-08-26 ENCOUNTER — RX RENEWAL (OUTPATIENT)
Age: 87
End: 2022-08-26

## 2022-08-27 ENCOUNTER — RX RENEWAL (OUTPATIENT)
Age: 87
End: 2022-08-27

## 2022-09-09 ENCOUNTER — RX RENEWAL (OUTPATIENT)
Age: 87
End: 2022-09-09

## 2022-09-17 DIAGNOSIS — B36.9 SUPERFICIAL MYCOSIS, UNSPECIFIED: ICD-10-CM

## 2022-09-28 ENCOUNTER — RX RENEWAL (OUTPATIENT)
Age: 87
End: 2022-09-28

## 2022-09-29 ENCOUNTER — APPOINTMENT (OUTPATIENT)
Dept: NEUROLOGY | Facility: CLINIC | Age: 87
End: 2022-09-29

## 2022-09-30 ENCOUNTER — NON-APPOINTMENT (OUTPATIENT)
Age: 87
End: 2022-09-30

## 2022-09-30 ENCOUNTER — APPOINTMENT (OUTPATIENT)
Dept: ELECTROPHYSIOLOGY | Facility: CLINIC | Age: 87
End: 2022-09-30

## 2022-09-30 PROCEDURE — 93296 REM INTERROG EVL PM/IDS: CPT

## 2022-09-30 PROCEDURE — 93294 REM INTERROG EVL PM/LDLS PM: CPT

## 2022-10-03 ENCOUNTER — RX RENEWAL (OUTPATIENT)
Age: 87
End: 2022-10-03

## 2022-10-04 ENCOUNTER — LABORATORY RESULT (OUTPATIENT)
Age: 87
End: 2022-10-04

## 2022-10-10 ENCOUNTER — LABORATORY RESULT (OUTPATIENT)
Age: 87
End: 2022-10-10

## 2022-10-18 ENCOUNTER — LABORATORY RESULT (OUTPATIENT)
Age: 87
End: 2022-10-18

## 2022-10-24 ENCOUNTER — RX RENEWAL (OUTPATIENT)
Age: 87
End: 2022-10-24

## 2022-11-01 ENCOUNTER — LABORATORY RESULT (OUTPATIENT)
Age: 87
End: 2022-11-01

## 2022-11-03 ENCOUNTER — NON-APPOINTMENT (OUTPATIENT)
Age: 87
End: 2022-11-03

## 2022-11-03 ENCOUNTER — APPOINTMENT (OUTPATIENT)
Dept: CARDIOLOGY | Facility: CLINIC | Age: 87
End: 2022-11-03

## 2022-11-03 VITALS — OXYGEN SATURATION: 99 % | HEIGHT: 59 IN | TEMPERATURE: 97.3 F | HEART RATE: 80 BPM | RESPIRATION RATE: 17 BRPM

## 2022-11-03 VITALS — HEART RATE: 60 BPM | SYSTOLIC BLOOD PRESSURE: 100 MMHG | DIASTOLIC BLOOD PRESSURE: 70 MMHG

## 2022-11-03 PROCEDURE — 99213 OFFICE O/P EST LOW 20 MIN: CPT

## 2022-11-03 PROCEDURE — 93000 ELECTROCARDIOGRAM COMPLETE: CPT

## 2022-11-03 NOTE — REASON FOR VISIT
[Arrhythmia/ECG Abnorrmalities] : arrhythmia/ECG abnormalities [FreeTextEntry1] : She returns for follow-up she offers no complaints.  She is suffering from significant memory loss.  She does ambulate at home with a walker.  Her pacemaker was recently checked through [facility] and is functioning normally and has more than 8 years until JAIR..  She has not had any recent blood work other than her INR

## 2022-11-03 NOTE — PHYSICAL EXAM
[General Appearance - Well Developed] : well developed [Normal Appearance] : normal appearance [Well Groomed] : well groomed [General Appearance - Well Nourished] : well nourished [General Appearance - In No Acute Distress] : no acute distress [No Deformities] : no deformities [Normal Oral Mucosa] : normal oral mucosa [No Oral Pallor] : no oral pallor [No Oral Cyanosis] : no oral cyanosis [Normal Jugular Venous A Waves Present] : normal jugular venous A waves present [Normal Jugular Venous V Waves Present] : normal jugular venous V waves present [No Jugular Venous Gamez A Waves] : no jugular venous gamez A waves [Respiration, Rhythm And Depth] : normal respiratory rhythm and effort [Exaggerated Use Of Accessory Muscles For Inspiration] : no accessory muscle use [Auscultation Breath Sounds / Voice Sounds] : lungs were clear to auscultation bilaterally [Abdomen Soft] : soft [Abdomen Tenderness] : non-tender [Abdomen Mass (___ Cm)] : no abdominal mass palpated [Nail Clubbing] : no clubbing of the fingernails [Cyanosis, Localized] : no localized cyanosis [Petechial Hemorrhages (___cm)] : no petechial hemorrhages [FreeTextEntry1] : negative Homans sign [Skin Color & Pigmentation] : normal skin color and pigmentation [] : no rash [No Venous Stasis] : no venous stasis [Skin Lesions] : no skin lesions [No Skin Ulcers] : no skin ulcer [No Xanthoma] : no  xanthoma was observed [Oriented To Time, Place, And Person] : oriented to person, place, and time [Affect] : the affect was normal [Mood] : the mood was normal [No Anxiety] : not feeling anxious [5th Left ICS - MCL] : palpated at the 5th LICS in the midclavicular line [Normal] : normal [Normal Rate] : normal [Irregularly Irregular] : irregularly irregular [No Murmur] : no murmurs heard [Right Carotid Bruit] : no bruit heard over the right carotid [Left Carotid Bruit] : no bruit heard over the left carotid [Right Femoral Bruit] : no bruit heard over the right femoral artery [Left Femoral Bruit] : no bruit heard over the left femoral artery [No Abnormalities] : the abdominal aorta was not enlarged and no bruit was heard [Nonpitting Edema] : nonpitting edema present

## 2022-11-03 NOTE — ASSESSMENT
[FreeTextEntry1] : IMPRESSION:\par 1. Chronic atrial fibrillation with significant AV node dysfunction and syncope s/p ppm\par 2. History of mitral and tricuspid insufficiency by echocardiography\par 3. hypertension well-controlled \par 4. Dementia\par \par  Plan:\par 1. continue current regimen

## 2022-11-15 ENCOUNTER — LABORATORY RESULT (OUTPATIENT)
Age: 87
End: 2022-11-15

## 2022-11-17 ENCOUNTER — LABORATORY RESULT (OUTPATIENT)
Age: 87
End: 2022-11-17

## 2022-11-29 ENCOUNTER — LABORATORY RESULT (OUTPATIENT)
Age: 87
End: 2022-11-29

## 2022-12-12 ENCOUNTER — LABORATORY RESULT (OUTPATIENT)
Age: 87
End: 2022-12-12

## 2022-12-27 ENCOUNTER — LABORATORY RESULT (OUTPATIENT)
Age: 87
End: 2022-12-27

## 2022-12-29 ENCOUNTER — FORM ENCOUNTER (OUTPATIENT)
Age: 87
End: 2022-12-29

## 2022-12-30 ENCOUNTER — APPOINTMENT (OUTPATIENT)
Dept: ELECTROPHYSIOLOGY | Facility: CLINIC | Age: 87
End: 2022-12-30

## 2023-01-03 ENCOUNTER — TRANSCRIPTION ENCOUNTER (OUTPATIENT)
Age: 88
End: 2023-01-03

## 2023-01-10 ENCOUNTER — LABORATORY RESULT (OUTPATIENT)
Age: 88
End: 2023-01-10

## 2023-01-11 NOTE — DISCHARGE NOTE PROVIDER - NSDCCPCAREPLAN_GEN_ALL_CORE_FT
Sent message via [employer] to schedule  This is the first attempt 
PRINCIPAL DISCHARGE DIAGNOSIS  Diagnosis: Syncope  Assessment and Plan of Treatment: HOME CARE INSTRUCTIONS  Have someone stay with you until you feel stable.  Do not drive, operate machinery, or play sports until your caregiver says it is okay.  Keep all follow-up appointments as directed by your caregiver.   Lie down right away if you start feeling like you might faint. Breathe deeply and steadily. Wait until all the symptoms have passed.Drink enough fluids to keep your urine clear or pale yellow.  If you are taking blood pressure or heart medicine, get up slowly, taking several minutes to sit and then stand. This can reduce dizziness.  SEEK IMMEDIATE MEDICAL CARE IF:  You have a severe headache.  You have unusual pain in the chest, abdomen, or back.  You are bleeding from the mouth or rectum, or you have black or tarry stool.  You have an irregular or very fast heartbeat.  You have pain with breathing.  You have repeated fainting or seizure-like jerking during an episode.  You faint when sitting or lying down.  You have confusion.  You have difficulty walking.  You have severe weakness.  You have vision problems.  If you fainted, call your local emergency services (_____________________). Do not drive yourself to the hospital        SECONDARY DISCHARGE DIAGNOSES  Diagnosis: Atrial fibrillation, unspecified type  Assessment and Plan of Treatment: Atrial fibrillation is the most common heart rhythm problem.  The condition puts you at risk for has stroke and heart attack  It helps if you control your blood pressure, not drink more than 1-2 alcohol drinks per day, cut down on caffeine, getting treatment for over active thyroid gland, and get regular exercise  Call your doctor if you feel your heart racing or beating unusually, chest tightness or pain, lightheaded, faint, shortness of breath especially with exercise  It is important to take your heart medication as prescribed  You may be on anticoagulation which is very important to take as directed - you may need blood work to monitor drug levels

## 2023-01-24 ENCOUNTER — LABORATORY RESULT (OUTPATIENT)
Age: 88
End: 2023-01-24

## 2023-01-27 ENCOUNTER — RX RENEWAL (OUTPATIENT)
Age: 88
End: 2023-01-27

## 2023-02-01 ENCOUNTER — FORM ENCOUNTER (OUTPATIENT)
Age: 88
End: 2023-02-01

## 2023-02-02 ENCOUNTER — APPOINTMENT (OUTPATIENT)
Dept: ELECTROPHYSIOLOGY | Facility: CLINIC | Age: 88
End: 2023-02-02

## 2023-02-03 ENCOUNTER — RX RENEWAL (OUTPATIENT)
Age: 88
End: 2023-02-03

## 2023-02-07 ENCOUNTER — LABORATORY RESULT (OUTPATIENT)
Age: 88
End: 2023-02-07

## 2023-02-09 ENCOUNTER — NON-APPOINTMENT (OUTPATIENT)
Age: 88
End: 2023-02-09

## 2023-02-09 ENCOUNTER — APPOINTMENT (OUTPATIENT)
Dept: ELECTROPHYSIOLOGY | Facility: CLINIC | Age: 88
End: 2023-02-09
Payer: MEDICARE

## 2023-02-09 PROCEDURE — 93294 REM INTERROG EVL PM/LDLS PM: CPT

## 2023-02-09 PROCEDURE — 93296 REM INTERROG EVL PM/IDS: CPT

## 2023-02-21 ENCOUNTER — LABORATORY RESULT (OUTPATIENT)
Age: 88
End: 2023-02-21

## 2023-03-07 ENCOUNTER — LABORATORY RESULT (OUTPATIENT)
Age: 88
End: 2023-03-07

## 2023-03-09 ENCOUNTER — NON-APPOINTMENT (OUTPATIENT)
Age: 88
End: 2023-03-09

## 2023-03-09 ENCOUNTER — APPOINTMENT (OUTPATIENT)
Dept: CARDIOLOGY | Facility: CLINIC | Age: 88
End: 2023-03-09
Payer: MEDICARE

## 2023-03-09 VITALS
WEIGHT: 176 LBS | BODY MASS INDEX: 35.48 KG/M2 | DIASTOLIC BLOOD PRESSURE: 74 MMHG | HEIGHT: 59 IN | SYSTOLIC BLOOD PRESSURE: 121 MMHG | HEART RATE: 66 BPM | OXYGEN SATURATION: 96 %

## 2023-03-09 VITALS — HEART RATE: 68 BPM

## 2023-03-09 PROCEDURE — 99214 OFFICE O/P EST MOD 30 MIN: CPT

## 2023-03-09 PROCEDURE — 93000 ELECTROCARDIOGRAM COMPLETE: CPT

## 2023-03-09 NOTE — ASSESSMENT
[FreeTextEntry1] : IMPRESSION:\par 1. Chronic atrial fibrillation with significant AV node dysfunction and syncope s/p ppm\par 2. History of mitral and tricuspid insufficiency by echocardiography\par 3. hypertension well-controlled \par 4. Dementia\par \par  Plan:\par 1. continue current regimen\par 2.  Unlikely that there are any significant cardiac contraindications in this patient to medications that might be used for dementia especially in light of the fact that she has a pacemaker\par 3.  Have told daughter to let me know if she wishes to switch her mother from Coumadin to Eliquis

## 2023-03-09 NOTE — PHYSICAL EXAM
[General Appearance - Well Developed] : well developed [Normal Appearance] : normal appearance [Well Groomed] : well groomed [General Appearance - Well Nourished] : well nourished [No Deformities] : no deformities [General Appearance - In No Acute Distress] : no acute distress [Normal Oral Mucosa] : normal oral mucosa [No Oral Pallor] : no oral pallor [No Oral Cyanosis] : no oral cyanosis [Normal Jugular Venous A Waves Present] : normal jugular venous A waves present [Normal Jugular Venous V Waves Present] : normal jugular venous V waves present [No Jugular Venous Gamez A Waves] : no jugular venous gamez A waves [Respiration, Rhythm And Depth] : normal respiratory rhythm and effort [Exaggerated Use Of Accessory Muscles For Inspiration] : no accessory muscle use [Auscultation Breath Sounds / Voice Sounds] : lungs were clear to auscultation bilaterally [Abdomen Soft] : soft [Abdomen Tenderness] : non-tender [Abdomen Mass (___ Cm)] : no abdominal mass palpated [Nail Clubbing] : no clubbing of the fingernails [Cyanosis, Localized] : no localized cyanosis [Petechial Hemorrhages (___cm)] : no petechial hemorrhages [FreeTextEntry1] : negative Homans sign [Skin Color & Pigmentation] : normal skin color and pigmentation [] : no rash [No Venous Stasis] : no venous stasis [Skin Lesions] : no skin lesions [No Skin Ulcers] : no skin ulcer [No Xanthoma] : no  xanthoma was observed [Oriented To Time, Place, And Person] : oriented to person, place, and time [Affect] : the affect was normal [Mood] : the mood was normal [No Anxiety] : not feeling anxious [5th Left ICS - MCL] : palpated at the 5th LICS in the midclavicular line [Normal] : normal [Normal Rate] : normal [Irregularly Irregular] : irregularly irregular [No Murmur] : no murmurs heard [Right Carotid Bruit] : no bruit heard over the right carotid [Left Carotid Bruit] : no bruit heard over the left carotid [Right Femoral Bruit] : no bruit heard over the right femoral artery [Left Femoral Bruit] : no bruit heard over the left femoral artery [No Abnormalities] : the abdominal aorta was not enlarged and no bruit was heard [Nonpitting Edema] : nonpitting edema present

## 2023-03-09 NOTE — REASON FOR VISIT
[Arrhythmia/ECG Abnorrmalities] : arrhythmia/ECG abnormalities [FreeTextEntry1] : Patient returns for followup. Feeling well. Offers no complaints of chest discomfort shortness of breath palpitations dizziness or syncope.  Cording to her daughter her dementia is getting somewhat worse.  Her daughter is concerned over medications that might be given for dementia to calm her down.  She is also talking about wanting to switch her mother to Eliquis from Coumadin\par

## 2023-03-17 ENCOUNTER — RX RENEWAL (OUTPATIENT)
Age: 88
End: 2023-03-17

## 2023-03-21 ENCOUNTER — LABORATORY RESULT (OUTPATIENT)
Age: 88
End: 2023-03-21

## 2023-03-22 ENCOUNTER — APPOINTMENT (OUTPATIENT)
Dept: INTERNAL MEDICINE | Facility: CLINIC | Age: 88
End: 2023-03-22
Payer: MEDICARE

## 2023-03-22 VITALS
DIASTOLIC BLOOD PRESSURE: 88 MMHG | TEMPERATURE: 97.9 F | OXYGEN SATURATION: 95 % | HEART RATE: 64 BPM | WEIGHT: 176 LBS | HEIGHT: 59 IN | SYSTOLIC BLOOD PRESSURE: 122 MMHG | RESPIRATION RATE: 16 BRPM | BODY MASS INDEX: 35.48 KG/M2

## 2023-03-22 DIAGNOSIS — Z79.01 LONG TERM (CURRENT) USE OF ANTICOAGULANTS: ICD-10-CM

## 2023-03-22 DIAGNOSIS — I10 ESSENTIAL (PRIMARY) HYPERTENSION: ICD-10-CM

## 2023-03-22 DIAGNOSIS — E78.5 HYPERLIPIDEMIA, UNSPECIFIED: ICD-10-CM

## 2023-03-22 DIAGNOSIS — E03.9 HYPOTHYROIDISM, UNSPECIFIED: ICD-10-CM

## 2023-03-22 DIAGNOSIS — Z95.0 PRESENCE OF CARDIAC PACEMAKER: ICD-10-CM

## 2023-03-22 DIAGNOSIS — Z74.09 OTHER REDUCED MOBILITY: ICD-10-CM

## 2023-03-22 DIAGNOSIS — Z78.9 OTHER REDUCED MOBILITY: ICD-10-CM

## 2023-03-22 DIAGNOSIS — M48.061 SPINAL STENOSIS, LUMBAR REGION WITHOUT NEUROGENIC CLAUDICATION: ICD-10-CM

## 2023-03-22 DIAGNOSIS — R46.89 OTHER SYMPTOMS AND SIGNS INVOLVING APPEARANCE AND BEHAVIOR: ICD-10-CM

## 2023-03-22 PROCEDURE — 99215 OFFICE O/P EST HI 40 MIN: CPT

## 2023-03-24 PROBLEM — Z74.09 IMPAIRED MOBILITY AND ADLS: Status: ACTIVE | Noted: 2023-03-24

## 2023-04-04 ENCOUNTER — LABORATORY RESULT (OUTPATIENT)
Age: 88
End: 2023-04-04

## 2023-04-10 ENCOUNTER — FORM ENCOUNTER (OUTPATIENT)
Age: 88
End: 2023-04-10

## 2023-04-14 PROBLEM — M48.061 LUMBAR SPINAL STENOSIS: Status: ACTIVE | Noted: 2021-03-16

## 2023-04-14 PROBLEM — Z95.0 PRESENCE OF CARDIAC PACEMAKER: Status: ACTIVE | Noted: 2019-11-26

## 2023-04-14 PROBLEM — Z79.01 CHRONIC ANTICOAGULATION: Status: ACTIVE | Noted: 2017-06-14

## 2023-04-14 NOTE — ASSESSMENT
[FreeTextEntry1] : Physical examination shows a well-developed elderly woman in no acute distress at rest blood pressure 122/88 height 4 foot 11 inches weight 176 pounds BMI 35.55 temperature 97.9 °F heart rate of 64 respirations 16 oxygen saturation on room air was 95% HEENT was unremarkable chest was clear cardiovascular exam was irregularly irregular and a moderate ventricular response there was 1 out of 6 systolic murmur abdomen soft extremities show trace bilateral lower extremity edema neurologic exam was nonfocal walks with an unsteady gait alert and oriented x2 patient with cognitive decline with occasional behavioral concerns we will continue for now to [place] 25 or 50 mg at bedtime neurologic evaluation was considered patient follows regularly with her cardiologist patient remains chronically anticoagulated for her atrial fibrillation patient is up-to-date with her ophthalmologist will consider dermatologist and bone density examination patient's last complete medical blood tests were performed in May 2020 1 repeat blood test were advised blood pressure was adequately controlled at the present visit she has regular follow-ups of her pacemaker lower back pain has been minimal her cognitive decline seems to be progressing

## 2023-04-14 NOTE — HISTORY OF PRESENT ILLNESS
[Family Member] : family member [FreeTextEntry1] : 88-year-old woman comes to the office for follow-up to review her medications and discuss her overall health recent issues with her behavior and confusion [de-identified] : Comes to the office for follow-up with a history of a pacemake intermittent fatigue cognitive impairment lumbar spinal stenosis hypothyroidism hyperlipidemia essential hypertension atrial fibrillation and recent behavioral concerns review of systems is significant for intermittent fatigue nocturia hip and back pain memory loss periodic confusion unsteady walking and anxiety the remaining review of systems is noncontributory

## 2023-04-14 NOTE — REVIEW OF SYSTEMS
[Fatigue] : fatigue [Nocturia] : nocturia [Joint Pain] : joint pain [Joint Stiffness] : joint stiffness [Back Pain] : back pain [Confusion] : confusion [Memory Loss] : memory loss [Unsteady Walking] : ataxia [Anxiety] : anxiety [Negative] : Heme/Lymph [Fever] : no fever [Chills] : no chills [Hot Flashes] : no hot flashes [Night Sweats] : no night sweats [Recent Change In Weight] : ~T no recent weight change [Discharge] : no discharge [Pain] : no pain [Redness] : no redness [Dryness] : no dryness  [Vision Problems] : no vision problems [Itching] : no itching [Earache] : no earache [Hearing Loss] : no hearing loss [Nosebleed] : no nosebleeds [Hoarseness] : no hoarseness [Nasal Discharge] : no nasal discharge [Sore Throat] : no sore throat [Postnasal Drip] : no postnasal drip [Chest Pain] : no chest pain [Palpitations] : no palpitations [Leg Claudication] : no leg claudication [Lower Ext Edema] : no lower extremity edema [Orthopnea] : no orthopnea [Paroxysmal Nocturnal Dyspnea] : no paroxysmal nocturnal dyspnea [Shortness Of Breath] : no shortness of breath [Wheezing] : no wheezing [Cough] : no cough [Dyspnea on Exertion] : no dyspnea on exertion [Abdominal Pain] : no abdominal pain [Nausea] : no nausea [Constipation] : no constipation [Diarrhea] : diarrhea [Vomiting] : no vomiting [Heartburn] : no heartburn [Melena] : no melena [Dysuria] : no dysuria [Incontinence] : no incontinence [Poor Libido] : libido not poor [Hematuria] : no hematuria [Frequency] : no frequency [Vaginal Discharge] : no vaginal discharge [Dysmenorrhea] : no dysmenorrhea [Joint Swelling] : no joint swelling [Muscle Weakness] : no muscle weakness [Muscle Pain] : no muscle pain [Itching] : no itching [Mole Changes] : no mole changes [Nail Changes] : no nail changes [Hair Changes] : no hair changes [Skin Rash] : no skin rash [Headache] : no headache [Dizziness] : no dizziness [Fainting] : no fainting [Suicidal] : not suicidal [Insomnia] : no insomnia [Depression] : no depression [Easy Bleeding] : no easy bleeding [Easy Bruising] : no easy bruising [Swollen Glands] : no swollen glands [FreeTextEntry9] : hips and back

## 2023-04-14 NOTE — PHYSICAL EXAM
[No Acute Distress] : no acute distress [Well Nourished] : well nourished [Well Developed] : well developed [Well-Appearing] : well-appearing [Normal Voice/Communication] : normal voice/communication [Normal Sclera/Conjunctiva] : normal sclera/conjunctiva [PERRL] : pupils equal round and reactive to light [EOMI] : extraocular movements intact [Normal Outer Ear/Nose] : the outer ears and nose were normal in appearance [Normal Oropharynx] : the oropharynx was normal [Normal TMs] : both tympanic membranes were normal [Normal Nasal Mucosa] : the nasal mucosa was normal [No JVD] : no jugular venous distention [No Lymphadenopathy] : no lymphadenopathy [Supple] : supple [Thyroid Normal, No Nodules] : the thyroid was normal and there were no nodules present [No Respiratory Distress] : no respiratory distress  [No Accessory Muscle Use] : no accessory muscle use [Clear to Auscultation] : lungs were clear to auscultation bilaterally [Normal Rate] : normal rate  [Regular Rhythm] : with a regular rhythm [Normal S1, S2] : normal S1 and S2 [No Murmur] : no murmur heard [No Carotid Bruits] : no carotid bruits [No Abdominal Bruit] : a ~M bruit was not heard ~T in the abdomen [No Varicosities] : no varicosities [Pedal Pulses Present] : the pedal pulses are present [No Edema] : there was no peripheral edema [No Palpable Aorta] : no palpable aorta [No Extremity Clubbing/Cyanosis] : no extremity clubbing/cyanosis [Declined Breast Exam] : declined breast exam  [Soft] : abdomen soft [Non Tender] : non-tender [Non-distended] : non-distended [No Masses] : no abdominal mass palpated [No HSM] : no HSM [Normal Bowel Sounds] : normal bowel sounds [No Hernias] : no hernias [Declined Rectal Exam] : declined rectal exam [No CVA Tenderness] : no CVA  tenderness [No Spinal Tenderness] : no spinal tenderness [No Joint Swelling] : no joint swelling [Grossly Normal Strength/Tone] : grossly normal strength/tone [No Rash] : no rash [No Skin Lesions] : no skin lesions [Coordination Grossly Intact] : coordination grossly intact [No Focal Deficits] : no focal deficits [Normal Gait] : normal gait [Deep Tendon Reflexes (DTR)] : deep tendon reflexes were 2+ and symmetric [Speech Grossly Normal] : speech grossly normal [Memory Grossly Normal] : memory grossly normal [Normal Affect] : the affect was normal [Alert and Oriented x3] : oriented to person, place, and time [Normal Mood] : the mood was normal [Normal Insight/Judgement] : insight and judgment were intact [Kyphosis] : no kyphosis [Scoliosis] : no scoliosis [Acne] : no acne

## 2023-05-01 ENCOUNTER — RX RENEWAL (OUTPATIENT)
Age: 88
End: 2023-05-01

## 2023-05-05 DIAGNOSIS — R26.89 OTHER ABNORMALITIES OF GAIT AND MOBILITY: ICD-10-CM

## 2023-05-05 DIAGNOSIS — R26.81 UNSTEADINESS ON FEET: ICD-10-CM

## 2023-05-08 NOTE — CONSULT NOTE ADULT - REASON FOR ADMISSION
Medication name: HYDROcodone-acetaminophen (NORCO)  MG per tablet  Last Refill Details: Date 04/19/2023, Quantity:  90, refills 0   Ordering provider name: Elgin Wagner MD  Last office visit: 07/22/2022 with provider Elgin Wagner MD   Unable to refill medication per established guidelines, request forwarded to provider for review.  Caller advised to contact office for follow-up.        Patient stated he took his last pill last night   Caller informed routed to provider.    sent here for syncope

## 2023-05-11 ENCOUNTER — NON-APPOINTMENT (OUTPATIENT)
Age: 88
End: 2023-05-11

## 2023-05-11 ENCOUNTER — APPOINTMENT (OUTPATIENT)
Dept: FAMILY MEDICINE | Facility: CLINIC | Age: 88
End: 2023-05-11
Payer: MEDICARE

## 2023-05-11 ENCOUNTER — APPOINTMENT (OUTPATIENT)
Dept: ELECTROPHYSIOLOGY | Facility: CLINIC | Age: 88
End: 2023-05-11
Payer: MEDICARE

## 2023-05-11 VITALS
BODY MASS INDEX: 35.48 KG/M2 | SYSTOLIC BLOOD PRESSURE: 120 MMHG | RESPIRATION RATE: 16 BRPM | HEIGHT: 59 IN | WEIGHT: 176 LBS | OXYGEN SATURATION: 95 % | DIASTOLIC BLOOD PRESSURE: 70 MMHG | TEMPERATURE: 98 F | HEART RATE: 102 BPM

## 2023-05-11 DIAGNOSIS — Z76.89 PERSONS ENCOUNTERING HEALTH SERVICES IN OTHER SPECIFIED CIRCUMSTANCES: ICD-10-CM

## 2023-05-11 DIAGNOSIS — M48.00 SPINAL STENOSIS, SITE UNSPECIFIED: ICD-10-CM

## 2023-05-11 PROCEDURE — 93296 REM INTERROG EVL PM/IDS: CPT

## 2023-05-11 PROCEDURE — 99215 OFFICE O/P EST HI 40 MIN: CPT

## 2023-05-11 PROCEDURE — 93294 REM INTERROG EVL PM/LDLS PM: CPT

## 2023-05-11 RX ORDER — MECLIZINE HYDROCHLORIDE 12.5 MG/1
12.5 TABLET ORAL 3 TIMES DAILY
Qty: 90 | Refills: 1 | Status: COMPLETED | COMMUNITY
Start: 2017-02-23 | End: 2023-05-11

## 2023-05-11 NOTE — PLAN
[FreeTextEntry1] : -discussed medical history/ADLS with daughter \par -reviewed medications, Seroquel was increased on last visit with Dr. Bryant - daughter believes did help with sleep, will let us know, they will f/u with neuro \par -follow-up blood work - home draws

## 2023-05-11 NOTE — HISTORY OF PRESENT ILLNESS
[FreeTextEntry1] : follow-up  [de-identified] : 87 yo F PMH afib, pacemaker, mitral tricuspid insufficiency, dementia, hypothyroidism, HLD, HTN, hearing impairment (b/l hearing aid) presenting today for follow-up.  She was following with Dr. Bryant.  \par She is accompanied by daughter. \par She will be starting PT again tomorrow, for gait instability, hip/leg pain.  \par Needs paper work for Nursing Home transition and Diversion.  \par \par social: lives alone,  passed away, aid, uses wheelchair for transport, has walker but does have difficult walking. Needs assistance with showers, can feed herself with some assistance (cutting food/prepping food). Needs assistance with taking medications.  \par Cannot do cooking/cleaning/driving/fiances all done by others.

## 2023-05-11 NOTE — PHYSICAL EXAM
[Normal Oropharynx] : the oropharynx was normal [Normal] : normal rate, regular rhythm, normal S1 and S2 and no murmur heard [de-identified] : in wheelchair, able to move extremities  [de-identified] : oriented to person and place not to year/date, pleasant, forgetful when answering questions

## 2023-05-11 NOTE — ASSESSMENT
[FreeTextEntry1] : 89 yo F PMH afib, pacemaker, mitral tricuspid insufficiency, dementia, hypothyroidism, HLD, HTN, hearing impairment (b/l hearing aid) presenting today for follow-up.

## 2023-06-05 ENCOUNTER — EMERGENCY (EMERGENCY)
Facility: HOSPITAL | Age: 88
LOS: 1 days | Discharge: HOME | End: 2023-06-05
Attending: EMERGENCY MEDICINE | Admitting: EMERGENCY MEDICINE
Payer: MEDICARE

## 2023-06-05 VITALS
OXYGEN SATURATION: 99 % | SYSTOLIC BLOOD PRESSURE: 145 MMHG | DIASTOLIC BLOOD PRESSURE: 75 MMHG | RESPIRATION RATE: 17 BRPM | HEART RATE: 65 BPM

## 2023-06-05 VITALS
DIASTOLIC BLOOD PRESSURE: 76 MMHG | TEMPERATURE: 98 F | WEIGHT: 199.96 LBS | RESPIRATION RATE: 16 BRPM | OXYGEN SATURATION: 95 % | HEIGHT: 61 IN | SYSTOLIC BLOOD PRESSURE: 133 MMHG | HEART RATE: 70 BPM

## 2023-06-05 DIAGNOSIS — Z90.10 ACQUIRED ABSENCE OF UNSPECIFIED BREAST AND NIPPLE: Chronic | ICD-10-CM

## 2023-06-05 DIAGNOSIS — Z96.659 PRESENCE OF UNSPECIFIED ARTIFICIAL KNEE JOINT: Chronic | ICD-10-CM

## 2023-06-05 PROCEDURE — 99284 EMERGENCY DEPT VISIT MOD MDM: CPT | Mod: FS

## 2023-06-05 PROCEDURE — 71045 X-RAY EXAM CHEST 1 VIEW: CPT

## 2023-06-05 PROCEDURE — 74018 RADEX ABDOMEN 1 VIEW: CPT | Mod: 26

## 2023-06-05 PROCEDURE — 99284 EMERGENCY DEPT VISIT MOD MDM: CPT | Mod: 25

## 2023-06-05 PROCEDURE — 74018 RADEX ABDOMEN 1 VIEW: CPT

## 2023-06-05 PROCEDURE — 71045 X-RAY EXAM CHEST 1 VIEW: CPT | Mod: 26

## 2023-06-05 NOTE — ED PROVIDER NOTE - PATIENT PORTAL LINK FT
You can access the FollowMyHealth Patient Portal offered by [facility] by registering at the following website: [email or web address]. By joining [facility]’s FollowMyHealth portal, you will also be able to view your health information using other applications (apps) compatible with our system.

## 2023-06-05 NOTE — ED ADULT NURSE NOTE - CHIEF COMPLAINT QUOTE
BIB family from home for possibly swallowing hearing aide with battery in it. pt with dementia. No SOB or difficulty breathing/ speaking noted.

## 2023-06-05 NOTE — ED PROVIDER NOTE - OBJECTIVE STATEMENT
88-year-old female with dementia accompanied by her daughter, who is reportedly at home with home health aide this morning with 2 small inner ear canal hearing aids on the table when the home health aide left and returned to 2 hearing aids were missing she recovered 1 hearing aid from the patient's mouth and the patient reported swallowing the other.

## 2023-06-05 NOTE — ED ADULT TRIAGE NOTE - HEIGHT IN FEET
Patient called back, per patient prednisone and abx, not helping.  Patient requested different medication.  Patient said biaxin works the best for her and she would like Singulair   [facility] in [place]  Patient requested call back today   5

## 2023-06-05 NOTE — ED ADULT NURSE NOTE - NSFALLHARMRISKINTERV_ED_ALL_ED

## 2023-06-05 NOTE — ED PROVIDER NOTE - NS ED ATTENDING STATEMENT MOD
This was a shared visit with the NANI. I reviewed and verified the documentation and independently performed the documented:

## 2023-06-05 NOTE — ED PROVIDER NOTE - CLINICAL SUMMARY MEDICAL DECISION MAKING FREE TEXT BOX
88-year-old female with dementia accompanied by her daughter, who is reportedly at home with home health aide this morning with 2 small inner ear canal hearing aids on the table when the home health aide left and returned to 2 hearing aids were missing she recovered 1 hearing aid from the patient's mouth and the patient reported swallowing the other.    Patient denies any throat pain neck pain chest pain abdominal pain or any complaints. Speaking clearly in complete sentences.    We will x-ray chest and abdomen.  Positive foreign body consistent with hearing aid noted in the left upper quadrant and gastric bubble below the level of the diaphragm

## 2023-06-05 NOTE — ED PROVIDER NOTE - ENMT, MLM
Airway patent, Nasal mucosa clear. Mouth with normal mucosa. Throat has no vesicles, no oropharyngeal exudates and uvula is midline. No oropharyngeal abrasions, trauma or bleeding. No visible foreign body. Speaking in complete sentences, no dysphagia or dysphonia. Neck supple, no palpable masses or deformity, no lymphadenopathy.

## 2023-06-05 NOTE — ED PROVIDER NOTE - NSFOLLOWUPINSTRUCTIONS_ED_ALL_ED_FT
Follow-up with your doctor.      Drink a lot of water and fluids.      Return to ED immediately for any blood in the stool, abdominal pain, fevers or for any concerns.        Swallowed Foreign Body, Adult  Body outline showing the digestive tract, including the stomach and esophagus.  A swallowed foreign body is an object that gets stuck in the digestive tract, either in the part of the body that moves food from the mouth to the stomach (esophagus) or in another part. When a person swallows an object, it passes into the esophagus. The narrowest place in the digestive system is where the esophagus meets the stomach. If the object can pass through that place, it will usually continue through the rest of the digestive system without causing problems. A foreign body that gets stuck may need to be removed.    Foreign bodies may be swallowed by accident or on purpose. It is very important to tell your health care provider what you have swallowed. Some swallowed objects can be life-threatening, and you may need emergency treatment. Dangerous swallowed foreign bodies include:  Objects that get stuck in your throat.  Objects that make you unable to swallow.  Objects that interfere with your breathing.  Sharp objects.  Harmful or poisonous (toxic) objects, such as batteries or illegal drugs.  What are the causes?  The most common swallowed foreign body is food that will not pass through your esophagus to your stomach (food impaction). Foods that commonly become impacted include meats and hard vegetables such as carrots and radishes.    Other common swallowed foreign bodies include:  Pieces of bone from meat or fish.  Toothpicks.  Dentures.  What increases the risk?  You are more likely to have a swallowed foreign body if you:  Wear dentures.  Have been drinking alcohol or taking drugs.  Have a mental health condition.  Have difficulties with thinking and learning (cognitive impairment).  Have a narrowed or scarred area in your digestive tract.  What are the signs or symptoms?  Symptoms of this condition include:  Pain or pressure in your throat or chest.  Not being able to swallow food, liquid, or your saliva.  Drooling.  Choking.  A hoarse voice.  Noisy breathing or trouble breathing.  Vomit that has blood in it.  How is this diagnosed?  This condition may be diagnosed based on your symptoms and medical history. Your health care provider will do a physical exam to confirm the diagnosis and to find the object. A metal detector may be used to find metal objects. Imaging studies may also be done, including:  X-rays.  A CT scan.  Some objects may not be seen on imaging studies. In those cases, an exam or procedure may be done using a scope to look into your esophagus (endoscopy).    How is this treated?  Usually, an object that has passed into your stomach but is not dangerous will pass through your digestive system without treatment. If the swallowed object is not dangerous but is stuck in your esophagus:  Your health care provider may gently suction out the object through your mouth.  You may be given medicine to relax the muscles of your esophagus to allow the object to pass through.  An endoscopy may be done to find and remove the object if it does not pass through with medicine. Your health care provider will put medical instruments through the endoscope to remove the object. You may need emergency treatment if:  The object is in your esophagus and is causing you to inhale saliva into your lungs (aspirate).  The object is in your esophagus and is pressing on your airway. This makes it hard for you to breathe.  The object can damage your digestive tract. Some objects that can cause damage include batteries, magnets, sharp objects, and drugs.  Follow these instructions at home:  Caring for yourself    If the object in your digestive system is expected to pass:  Continue eating what you usually eat unless your health care provider gives you different instructions.  Check your stool after every bowel movement to see if you have passed the object.  If you had an endoscopic procedure to remove the foreign body, follow instructions from your health care provider about caring for yourself after the procedure.  General instructions    Take over-the-counter and prescription medicines only as told by your health care provider.  Keep all follow-up visits, including any for repeat imaging tests. This is important.  How is this prevented?  Cut your food into small pieces.  Always sit upright while eating.  Remove bones from meat and fish.  Chew your food well before swallowing.  Do not talk, laugh, or walk around while eating or swallowing.  Contact a health care provider if:  You continue to have symptoms of a swallowed foreign body.  The object has not passed out of your body after 3 days.  Get help right away if:  You have a fever.  You have pain in your chest or your abdomen.  You cough up blood.  You have blood in your stool (feces).  You have blood in your vomit after treatment.  These symptoms may be an emergency. Get help right away. Call 911.  Do not wait to see if the symptoms will go away.  Do not drive yourself to the hospital.  Summary  A swallowed foreign body is an object that gets stuck in the digestive tract, either in the esophagus or in another part.  Usually, an object that has passed into your stomach but is not dangerous will pass through your digestive system without treatment.  Endoscopy may be done to find and remove the object.  If the object in your digestive system is expected to pass, contact your health care provider if the object has not passed after 3 days.  Get help right away if you have blood in your stool or there is blood when you cough or vomit.

## 2023-06-05 NOTE — ED ADULT NURSE NOTE - OBJECTIVE STATEMENT
Patient brought in by family for possible hearing ingestion. As per family patient was found to have one hearing aid in her mouth and the other one is missing. As per family patient reports swallowing hearing aid. Patient with a hx of dementia. Patient denies chest pain, SOB, headache, dizziness and blurry vision .

## 2023-06-05 NOTE — ED PROVIDER NOTE - ATTENDING APP SHARED VISIT CONTRIBUTION OF CARE
Sandy with [facility] Ocho[facility]. 88-year-old female with dementia accompanied by her daughter, who is reportedly at home with home health aide this morning with 2 small inner ear canal hearing aids on the table when the home health aide left and returned to 2 hearing aids were missing she recovered 1 hearing aid from the patient's mouth and the patient reported swallowing the other.    Patient denies any throat pain neck pain chest pain abdominal pain or any complaints. Speaking clearly in complete sentences.    We will x-ray chest and abdomen.  Positive foreign body consistent with hearing aid noted in the left upper quadrant and gastric bubble below the level of the diaphragm.     I performed a face to face bedside interview with patient regarding history of present illness, review of symptoms and past medical history. I completed an independent physical exam.  I have discussed the patient's plan of care with Physician Assistant (PA). I agree with note as stated above, having amended the EMR as needed to reflect my findings.   This includes History of Present Illness, HIV, Past Medical/Surgical/Family/Social History, Allergies and Home Medications, Review of Systems, Physical Exam, and any Progress Notes during the time I functioned as the attending physician for this patient.

## 2023-06-08 NOTE — CHART NOTE - NSCHARTNOTEFT_GEN_A_CORE
SW placed call to patient to discuss and assist with follow up care.  Patient presented to ED on 6/5/23 due to foreign body in throat. SW spoke with daughter who reports she has not yet followed up but will schedule appt, declined assistance with SW assistance at this time.  Patient encouraged to call ED SW if further assistance is needed.

## 2023-06-09 ENCOUNTER — RX RENEWAL (OUTPATIENT)
Age: 88
End: 2023-06-09

## 2023-06-14 ENCOUNTER — RX RENEWAL (OUTPATIENT)
Age: 88
End: 2023-06-14

## 2023-06-14 ENCOUNTER — EMERGENCY (EMERGENCY)
Facility: HOSPITAL | Age: 88
LOS: 1 days | Discharge: HOME | End: 2023-06-14
Attending: EMERGENCY MEDICINE | Admitting: EMERGENCY MEDICINE
Payer: MEDICARE

## 2023-06-14 VITALS
SYSTOLIC BLOOD PRESSURE: 151 MMHG | OXYGEN SATURATION: 94 % | RESPIRATION RATE: 19 BRPM | TEMPERATURE: 98 F | HEIGHT: 61 IN | DIASTOLIC BLOOD PRESSURE: 90 MMHG | HEART RATE: 78 BPM

## 2023-06-14 DIAGNOSIS — Z90.10 ACQUIRED ABSENCE OF UNSPECIFIED BREAST AND NIPPLE: Chronic | ICD-10-CM

## 2023-06-14 DIAGNOSIS — Z96.659 PRESENCE OF UNSPECIFIED ARTIFICIAL KNEE JOINT: Chronic | ICD-10-CM

## 2023-06-14 PROCEDURE — 99283 EMERGENCY DEPT VISIT LOW MDM: CPT

## 2023-06-14 PROCEDURE — 74018 RADEX ABDOMEN 1 VIEW: CPT

## 2023-06-14 PROCEDURE — 74018 RADEX ABDOMEN 1 VIEW: CPT | Mod: 26

## 2023-06-14 PROCEDURE — 99284 EMERGENCY DEPT VISIT MOD MDM: CPT | Mod: FS

## 2023-06-14 NOTE — ED PROVIDER NOTE - OBJECTIVE STATEMENT
pt 88y BIB family for reevaluation after swallowing hearing aid shown on XRay on 6/5. pt at baseline. tolerating PO. family reports patient is having daily bowel movements. no vomiting, no fever, no abd pain

## 2023-06-14 NOTE — ED ADULT TRIAGE NOTE - ARRIVAL INFO ADDITIONAL COMMENTS
Patient BIB family after ingesting hearing aide last Monday June 5th, seen in ED at this time and XRay confirmed that it was in her stomach. Aides at her home have been monitoring stools and have not yet found hearing aide. Patient denies abdominal pain or change in bowel habits. No fevers. Sent by PCP to ER for evaluation. Patient with severe dementia, at baseline mental status per family.

## 2023-06-14 NOTE — ED PROVIDER NOTE - CLINICAL SUMMARY MEDICAL DECISION MAKING FREE TEXT BOX
pt 88y BIB family for reevaluation after swallowing hearing aid shown on XRay on 6/5. pt at baseline. tolerating PO. family reports patient is having daily bowel movements. no vomiting, no fever, no abd pain  no fb on xray  Discussed with family need to return to ED if symptoms don't continue to improve or recur or develops any new or worsening symptoms that are of concern.

## 2023-06-14 NOTE — ED ADULT NURSE NOTE - NSFALLHARMRISKINTERV_ED_ALL_ED

## 2023-06-14 NOTE — ED PROVIDER NOTE - ATTENDING APP SHARED VISIT CONTRIBUTION OF CARE
Sandy with [facility]. pt 88y BIB family for reevaluation after swallowing hearing aid shown on XRay on 6/5. pt at baseline. tolerating PO. family reports patient is having daily bowel movements. no vomiting, no fever, no abd pain.   no fb on xray.  Discussed with family need to return to ED if symptoms don't continue to improve or recur or develops any new or worsening symptoms that are of concern.    I performed a face to face bedside interview with patient regarding history of present illness, review of symptoms and past medical history. I completed an independent physical exam.  I have discussed the patient's plan of care with Physician Assistant (PA). I agree with note as stated above, having amended the EMR as needed to reflect my findings.   This includes History of Present Illness, HIV, Past Medical/Surgical/Family/Social History, Allergies and Home Medications, Review of Systems, Physical Exam, and any Progress Notes during the time I functioned as the attending physician for this patient.

## 2023-06-14 NOTE — ED ADULT NURSE NOTE - OBJECTIVE STATEMENT
pt BIB family for reevaluation after swallowing hearing aid shown on XRay on 6/5. pt reports no GI symptoms or pain. family reports patient is having daily bowel movements.

## 2023-06-14 NOTE — ED PROVIDER NOTE - PATIENT PORTAL LINK FT
You can access the FollowMyHealth Patient Portal offered by [facility] by registering at the following website: [email or web address]. By joining [facility]’s FollowMyHealth portal, you will also be able to view your health information using other applications (apps) compatible with our system.

## 2023-06-17 NOTE — CHART NOTE - NSCHARTNOTEFT_GEN_A_CORE
SW called pt to discuss and assist with follow up care.  Pt is an 89 y/o female presented to ED for ingestion of foreign body.  Daughter reports that the daughter spoke to the primary on 6/25/23 and continue to observe for any possible symptoms otherwise pt does not need to have an appt right away.  Encouraged family to call SW if further assistance is needed.

## 2023-07-06 ENCOUNTER — LABORATORY RESULT (OUTPATIENT)
Age: 88
End: 2023-07-06

## 2023-07-28 ENCOUNTER — LABORATORY RESULT (OUTPATIENT)
Age: 88
End: 2023-07-28

## 2023-07-31 ENCOUNTER — LABORATORY RESULT (OUTPATIENT)
Age: 88
End: 2023-07-31

## 2023-08-03 ENCOUNTER — EMERGENCY (EMERGENCY)
Facility: HOSPITAL | Age: 88
LOS: 1 days | Discharge: ANOTHER HOSPITAL | End: 2023-08-03
Attending: EMERGENCY MEDICINE | Admitting: EMERGENCY MEDICINE
Payer: MEDICARE

## 2023-08-03 VITALS
TEMPERATURE: 98 F | RESPIRATION RATE: 18 BRPM | SYSTOLIC BLOOD PRESSURE: 142 MMHG | OXYGEN SATURATION: 94 % | WEIGHT: 184.97 LBS | HEART RATE: 77 BPM | HEIGHT: 61 IN | DIASTOLIC BLOOD PRESSURE: 78 MMHG

## 2023-08-03 VITALS
RESPIRATION RATE: 17 BRPM | DIASTOLIC BLOOD PRESSURE: 83 MMHG | HEART RATE: 72 BPM | OXYGEN SATURATION: 98 % | TEMPERATURE: 98 F | SYSTOLIC BLOOD PRESSURE: 137 MMHG

## 2023-08-03 DIAGNOSIS — Z90.10 ACQUIRED ABSENCE OF UNSPECIFIED BREAST AND NIPPLE: Chronic | ICD-10-CM

## 2023-08-03 DIAGNOSIS — Z96.659 PRESENCE OF UNSPECIFIED ARTIFICIAL KNEE JOINT: Chronic | ICD-10-CM

## 2023-08-03 PROCEDURE — 99284 EMERGENCY DEPT VISIT MOD MDM: CPT | Mod: FS

## 2023-08-03 PROCEDURE — 99284 EMERGENCY DEPT VISIT MOD MDM: CPT | Mod: 25

## 2023-08-03 PROCEDURE — 70450 CT HEAD/BRAIN W/O DYE: CPT | Mod: MA

## 2023-08-03 PROCEDURE — 73030 X-RAY EXAM OF SHOULDER: CPT

## 2023-08-03 PROCEDURE — 73030 X-RAY EXAM OF SHOULDER: CPT | Mod: 26,LT

## 2023-08-03 PROCEDURE — 70450 CT HEAD/BRAIN W/O DYE: CPT | Mod: 26,MA

## 2023-08-03 NOTE — ED PROVIDER NOTE - OBJECTIVE STATEMENT
87 y/o F with PMH of Atrial fibrillation - unspecified type, Benign hypertension, Dementia, HLD, Hypothyroid BIBEMS due to a mechanical fall at home earlier today. Pt was at her home in [place] with her aid when she was going from the bed to the couch and fell. Pt has had falls in the passed. Aid witnessed the fall but called EMS when she was unable to pick the pt back up from the ground. Aid was new and unaware of her history of dementia. Pt states they only have a little pain in the lt shoulder and is unaware if she hit her head. Pt is on warfarin. Pt denies any LOC, NV, headache, SOB, chest pain, or any other acute complaints. 87 y/o F with PMH of Atrial fibrillation (on coumadin) , hypertension, Dementia, HLD, Hypothyroid BIBEMS due to a mechanical fall at home earlier today. Pt was at her home in [place] with her aid when she was going from the bed to the couch and fell. Pt has had falls in the passed. Aid witnessed the fall but called EMS when she was unable to pick the pt back up from the ground. Aid was new and unaware of her history of dementia. Pt states they only have a little pain in the lt shoulder and is unaware if she hit her head. Pt is on warfarin. Pt denies any LOC, NV, headache, SOB, chest pain, or any other acute complaints.

## 2023-08-03 NOTE — ED ADULT NURSE NOTE - NSFALLHARMRISKINTERV_ED_ALL_ED
Assistance OOB with selected safe patient handling equipment if applicable/Assistance with ambulation/Communicate risk of Fall with Harm to all staff, patient, and family/Monitor gait and stability/Monitor for mental status changes and reorient to person, place, and time, as needed/Move patient closer to nursing station/within visual sight of ED staff/Provide patient with walking aids/Provide visual cue: red socks, yellow wristband, yellow gown, etc/Reinforce activity limits and safety measures with patient and family/Toileting schedule using arm’s reach rule for commode and bathroom/Use of alarms - bed, stretcher, chair and/or video monitoring/Bed in lowest position, wheels locked, appropriate side rails in place/Call bell, personal items and telephone in reach/Instruct patient to call for assistance before getting out of bed/chair/stretcher/Non-slip footwear applied when patient is off stretcher/Schneider to call system/Physically safe environment - no spills, clutter or unnecessary equipment/Purposeful Proactive Rounding/Room/bathroom lighting operational, light cord in reach

## 2023-08-03 NOTE — ED PROVIDER NOTE - ATTENDING APP SHARED VISIT CONTRIBUTION OF CARE
87yo female s/p fall with no real complaints, pt has dementia so hx is limited  exam: confused, FROM to left shoulder  plan: xr, ct  agree with assessment and plan of PA

## 2023-08-03 NOTE — ED PROVIDER NOTE - PATIENT PORTAL LINK FT
You can access the FollowMyHealth Patient Portal offered by [facility] by registering at the following website: [email or web address]. By joining [facility]’s FollowMyHealth portal, you will also be able to view your health information using other applications (apps) compatible with our system.

## 2023-08-03 NOTE — ED PROVIDER NOTE - CARE PLAN
1 Principal Discharge DX:	Closed head injury  Secondary Diagnosis:	Left shoulder pain  Secondary Diagnosis:	Fall

## 2023-08-03 NOTE — ED ADULT NURSE NOTE - OBJECTIVE STATEMENT
Pt BIBA from home s/p falling from standing up and landing on her buttock, aide witnessed fall, h/o dementia unable to answer if she has pain or not

## 2023-08-03 NOTE — ED PROVIDER NOTE - PHYSICAL EXAMINATION
Left shoulder: slight pain on ROM, positive radial pulse, less than 2 sec cap refill , no signs of dislocation

## 2023-08-03 NOTE — ED PROVIDER NOTE - NSFOLLOWUPINSTRUCTIONS_ED_ALL_ED_FT
Head Injury, Adult    There are many types of head injuries. Head injuries can be as minor as a small bump, or they can be a serious medical issue. More severe head injuries include:  A jarring injury to the brain (concussion).  A bruise (contusion) of the brain. This means there is bleeding in the brain that can cause swelling.  A cracked skull (skull fracture).  Bleeding in the brain that collects, clots, and forms a bump (hematoma).  After a head injury, most problems occur within the first 24 hours, but side effects may occur up to 7–10 days after the injury. It is important to watch your condition for any changes. You may need to be observed in the emergency department or urgent care, or you may be admitted to the hospital.    What are the causes?  There are many possible causes of a head injury. Serious head injuries may be caused by car accidents, bicycle or motorcycle accidents, sports injuries, falls, or being struck by an object.    What are the symptoms?  Symptoms of a head injury include a contusion, bump, or bleeding at the site of the injury. Other physical symptoms may include:  Headache.  Nausea or vomiting.  Dizziness.  Blurred or double vision.  Being uncomfortable around bright lights or loud noises.  Seizures.  Feeling tired.  Trouble being awakened.  Loss of consciousness.  Mental or emotional symptoms may include:  Irritability.  Confusion and memory problems.  Poor attention and concentration.  Changes in eating or sleeping habits.  Anxiety or depression.  How is this diagnosed?  This condition can usually be diagnosed based on your symptoms, a description of the injury, and a physical exam. You may also have imaging tests done, such as a CT scan or an MRI.    How is this treated?  Treatment for this condition depends on the severity and type of injury you have. The main goal of treatment is to prevent complications and allow the brain time to heal.    Mild head injury    If you have a mild head injury, you may be sent home, and treatment may include:  Observation. A responsible adult should stay with you for 24 hours after your injury and check on you often.  Physical rest.  Brain rest.  Pain medicines.  Severe head injury    If you have a severe head injury, treatment may include:  Close observation. This includes hospitalization with the following care:  Frequent physical exams.  Frequent checks of how your brain and nervous system are working (neurological status).  Checking your blood pressure and oxygen levels.  Medicines to relieve pain, prevent seizures, and decrease brain swelling.  Airway protection and breathing support. This may include using a ventilator.  Treatments that monitor and manage swelling inside the brain.  Brain surgery. This may be needed to:  Remove a collection of blood or blood clots.  Stop the bleeding.  Remove a part of the skull to allow room for the brain to swell.  Follow these instructions at home:  Activity    Rest and avoid activities that are physically hard or tiring.  Make sure you get enough sleep.  Let your brain rest by limiting activities that require a lot of thought or attention, such as:  Watching TV.  Playing memory games and puzzles.  Job-related work or homework.  Working on the computer, using social media, and texting.  Avoid activities that could cause another head injury, such as playing sports, until your health care provider approves. Having another head injury, especially before the first one has healed, can be dangerous.  Ask your health care provider when it is safe for you to return to your regular activities, including work or school. Ask your health care provider for a step-by-step plan for gradually returning to activities.  Ask your health care provider when you can drive, ride a bicycle, or use heavy machinery. Your ability to react may be slower after a brain injury. Do not do these activities if you are dizzy.  Lifestyle      Do not drink alcohol until your health care provider approves. Do not use drugs. Alcohol and certain drugs may slow your recovery and can put you at risk of further injury.  If it is harder than usual to remember things, write them down.  If you are easily distracted, try to do one thing at a time.  Talk with family members or close friends when making important decisions.  Tell your friends, family, a trusted colleague, and  about your injury, symptoms, and restrictions. Have them watch for any new or worsening problems.  General instructions    Take over-the-counter and prescription medicines only as told by your health care provider.  Have someone stay with you for 24 hours after your head injury. This person should watch you for any changes in your symptoms and be ready to seek medical help.  Keep all follow-up visits as told by your health care provider. This is important.  How is this prevented?  Work on improving your balance and strength to avoid falls.  Wear a seat belt when you are in a moving vehicle.  Wear a helmet when riding a bicycle, skiing, or doing any other sport or activity that has a risk of injury.  If you drink alcohol:  Limit how much you use to:  0–1 drink a day for nonpregnant women.  0–2 drinks a day for men.  Be aware of how much alcohol is in your drink. In the [place] one drink equals one 12 oz bottle of beer (355 mL), one 5 oz glass of wine (148 mL), or one 1½ oz glass of hard liquor (44 mL).  Take safety measures in your home, such as:  Removing clutter and tripping hazards from floors and stairways.  Using grab bars in bathrooms and handrails by stairs.  Placing non-slip mats on floors and in bathtubs.  Improving lighting in dim areas.  Where to find more information  Centers for Disease Control and Prevention: www.cdc.gov  Get help right away if:  You have:  A severe headache that is not helped by medicine.  Trouble walking or weakness in your arms and legs.  Clear or bloody fluid coming from your nose or ears.  Changes in your vision.  A seizure.  Increased confusion or irritability.  Your symptoms get worse.  You are sleepier than normal and have trouble staying awake.  You lose your balance.  Your pupils change size.  Your speech is slurred.  Your dizziness gets worse.  You vomit.  These symptoms may represent a serious problem that is an emergency. Do not wait to see if the symptoms will go away. Get medical help right away. Call your local emergency services (911 in the [place] Do not drive yourself to the hospital.    Summary  Head injuries can be minor, or they can be a serious medical issue requiring immediate attention.  Treatment for this condition depends on the severity and type of injury you have.  Have someone stay with you for 24 hours after your injury and check on you often.  Ask your health care provider when it is safe for you to return to your regular activities, including work or school.  Head injury prevention includes wearing a seat belt in a motor vehicle, using a helmet on a bicycle, limiting alcohol use, and taking safety measures in your home.  This information is not intended to replace advice given to you by your health care provider. Make sure you discuss any questions you have with your health care provider.    Document Revised: 10/30/2020 Document Reviewed: 10/30/2020        Fall Prevention for Older Adults    WHAT YOU NEED TO KNOW:    As you age, your muscles weaken and your risk for falls increases. Your risk also increases if you take medicines that make you sleepy or dizzy. You may also be at risk if you have vision or joint problems, have low blood pressure, or are not active.    DISCHARGE INSTRUCTIONS:    Call 911 or have someone else call if:   •You have fallen and are unconscious.      •You have fallen and cannot move part of your body.      Contact your healthcare provider if:   •You have fallen and have pain or a headache.      •You have questions or concerns about your condition or care.      Fall prevention tips:   •Stay active. Exercise can help strengthen your muscles and improve your balance. Your healthcare provider may recommend water aerobics, walking, or Alfredo Chi. He or she may also recommend physical therapy to improve your coordination. Never start an exercise program without asking your healthcare provider first.  Water Aerobics for Seniors       Alfredo Chi for Seniors           •Wear shoes that fit well and have soles that . Wear shoes both inside and outside. Use slippers with good . Avoid shoes with high heels.      •Use assistive devices as directed. Your healthcare provider may suggest that you use a cane or walker to help you keep your balance. You may need to have grab bars put in your bathroom near the toilet or in the shower.      •Stand or sit up slowly. This may help you keep your balance and prevent falls.      •Wear a personal alarm. This is a device that allows you to call 911 if you need help. Ask for more information on personal alarms.      •Manage your medical conditions.  Keep all appointments with your healthcare providers. Visit your eye doctor as directed.      Home safety tips:     Fall Prevention for Seniors     •Add items to prevent falls in the bathroom. Put nonslip strips on your bath or shower floor to prevent you from slipping. Use a bath mat if you do not have carpet in the bathroom. This will prevent you from falling when you step out of the bath or shower. Use a shower seat so you do not need to stand while you shower. Sit on the toilet or a chair in your bathroom to dry yourself and put on clothing. This will prevent you from losing your balance from drying or dressing yourself while you are standing.      •Keep paths clear. Remove books, shoes, and other objects from walkways and stairs. Place cords for telephones and lamps out of the way so that you do not need to walk over them. Tape them down if you cannot move them. Remove small rugs. If you cannot remove a rug, secure it with double-sided tape. This will prevent you from tripping.      •Install bright lights in your home. Use night lights to help light paths to the bathroom or kitchen. Always turn on the light before you start walking.      •Keep items you use often on shelves within reach. Do not use a step stool to help you reach an item.      •Paint or place reflective tape on the edges of your stairs. This will help you see the stairs better.      Follow up with your healthcare provider as directed: Write down your questions so you remember to ask them during your visits.

## 2023-08-03 NOTE — ED PROVIDER NOTE - CLINICAL SUMMARY MEDICAL DECISION MAKING FREE TEXT BOX
87 y/o F with PMH of Atrial fibrillation (on coumadin) , hypertension, Dementia, HLD, Hypothyroid BIBEMS due to a mechanical fall at home earlier today. Pt was at her home in [place] with her aid when she was going from the bed to the couch and fell. Pt has had falls in the passed. Aid witnessed the fall but called EMS when she was unable to pick the pt back up from the ground. Aid was new and unaware of her history of dementia. Pt states they only have a little pain in the lt shoulder and is unaware if she hit her head. Pt is on warfarin. Pt denies any LOC, NV, headache, SOB, chest pain, or any other acute complaints.   Left shoulder: slight pain on ROM, positive radial pulse, less than 2 sec cap refill , no signs of dislocation    CT head and xray left shoulder ordered 89 y/o F with PMH of Atrial fibrillation (on coumadin) , hypertension, Dementia, HLD, Hypothyroid BIBEMS due to a mechanical fall at home earlier today. Pt was at her home in [place] with her aid when she was going from the bed to the couch and fell. Pt has had falls in the passed. Aid witnessed the fall but called EMS when she was unable to pick the pt back up from the ground. Aid was new and unaware of her history of dementia. Pt states they only have a little pain in the lt shoulder and is unaware if she hit her head. Pt is on warfarin. Pt denies any LOC, NV, headache, SOB, chest pain, or any other acute complaints.   Left shoulder: slight pain on ROM, positive radial pulse, less than 2 sec cap refill , no signs of dislocation    CT head and xray left shoulder ordered   630pm: ct and xray reviewed and no acute findings   pt stable for dc home with aide. ambulance called for transfer to home. Pt and family agree with plan.

## 2023-08-05 NOTE — CHART NOTE - NSCHARTNOTEFT_GEN_A_CORE
BLAINE placed call to assist with follow up care. Patient presented to  ED on 8/3/23 due to a closed head injury following a fall at home and was advised to follow up with her PCP post d/c. BLAINE left message with pt emergency contact, Balaji due to pt being noted with dementia per to chart review.  BLAINE provided contact information to call back if in need of any assistance. SW will remain available.

## 2023-08-10 ENCOUNTER — APPOINTMENT (OUTPATIENT)
Dept: ELECTROPHYSIOLOGY | Facility: CLINIC | Age: 88
End: 2023-08-10

## 2023-08-18 ENCOUNTER — LABORATORY RESULT (OUTPATIENT)
Age: 88
End: 2023-08-18

## 2023-08-29 ENCOUNTER — NON-APPOINTMENT (OUTPATIENT)
Age: 88
End: 2023-08-29

## 2023-08-29 ENCOUNTER — APPOINTMENT (OUTPATIENT)
Dept: ELECTROPHYSIOLOGY | Facility: CLINIC | Age: 88
End: 2023-08-29
Payer: MEDICARE

## 2023-08-30 PROCEDURE — 93294 REM INTERROG EVL PM/LDLS PM: CPT

## 2023-08-30 PROCEDURE — 93296 REM INTERROG EVL PM/IDS: CPT

## 2023-09-08 ENCOUNTER — LABORATORY RESULT (OUTPATIENT)
Age: 88
End: 2023-09-08

## 2023-09-28 ENCOUNTER — RX RENEWAL (OUTPATIENT)
Age: 88
End: 2023-09-28

## 2023-09-29 ENCOUNTER — LABORATORY RESULT (OUTPATIENT)
Age: 88
End: 2023-09-29

## 2023-10-02 ENCOUNTER — LABORATORY RESULT (OUTPATIENT)
Age: 88
End: 2023-10-02

## 2023-10-11 ENCOUNTER — EMERGENCY (EMERGENCY)
Facility: HOSPITAL | Age: 88
LOS: 1 days | Discharge: HOME | End: 2023-10-11
Attending: STUDENT IN AN ORGANIZED HEALTH CARE EDUCATION/TRAINING PROGRAM | Admitting: STUDENT IN AN ORGANIZED HEALTH CARE EDUCATION/TRAINING PROGRAM
Payer: MEDICARE

## 2023-10-11 VITALS
SYSTOLIC BLOOD PRESSURE: 123 MMHG | RESPIRATION RATE: 18 BRPM | WEIGHT: 250 LBS | OXYGEN SATURATION: 97 % | TEMPERATURE: 97 F | HEART RATE: 82 BPM | DIASTOLIC BLOOD PRESSURE: 69 MMHG

## 2023-10-11 VITALS
DIASTOLIC BLOOD PRESSURE: 88 MMHG | HEART RATE: 58 BPM | RESPIRATION RATE: 20 BRPM | SYSTOLIC BLOOD PRESSURE: 125 MMHG | OXYGEN SATURATION: 98 %

## 2023-10-11 DIAGNOSIS — Z90.10 ACQUIRED ABSENCE OF UNSPECIFIED BREAST AND NIPPLE: Chronic | ICD-10-CM

## 2023-10-11 DIAGNOSIS — Z96.659 PRESENCE OF UNSPECIFIED ARTIFICIAL KNEE JOINT: Chronic | ICD-10-CM

## 2023-10-11 LAB
ALBUMIN SERPL ELPH-MCNC: 3.3 G/DL — SIGNIFICANT CHANGE UP (ref 3.3–5)
ALP SERPL-CCNC: 168 U/L — HIGH (ref 40–120)
ALT FLD-CCNC: 17 U/L — SIGNIFICANT CHANGE UP (ref 10–45)
ANION GAP SERPL CALC-SCNC: 6 MMOL/L — SIGNIFICANT CHANGE UP (ref 5–17)
APTT BLD: 35.2 SEC — SIGNIFICANT CHANGE UP (ref 24.5–35.6)
AST SERPL-CCNC: 23 U/L — SIGNIFICANT CHANGE UP (ref 10–40)
BASOPHILS # BLD AUTO: 0.02 K/UL — SIGNIFICANT CHANGE UP (ref 0–0.2)
BASOPHILS NFR BLD AUTO: 0.2 % — SIGNIFICANT CHANGE UP (ref 0–2)
BILIRUB SERPL-MCNC: 0.6 MG/DL — SIGNIFICANT CHANGE UP (ref 0.2–1.2)
BUN SERPL-MCNC: 57 MG/DL — HIGH (ref 7–23)
CALCIUM SERPL-MCNC: 9.7 MG/DL — SIGNIFICANT CHANGE UP (ref 8.4–10.5)
CHLORIDE SERPL-SCNC: 105 MMOL/L — SIGNIFICANT CHANGE UP (ref 96–108)
CO2 SERPL-SCNC: 30 MMOL/L — SIGNIFICANT CHANGE UP (ref 22–31)
CREAT SERPL-MCNC: 1.89 MG/DL — HIGH (ref 0.5–1.3)
EGFR: 25 ML/MIN/1.73M2 — LOW
EOSINOPHIL # BLD AUTO: 0.09 K/UL — SIGNIFICANT CHANGE UP (ref 0–0.5)
EOSINOPHIL NFR BLD AUTO: 1 % — SIGNIFICANT CHANGE UP (ref 0–6)
GLUCOSE SERPL-MCNC: 117 MG/DL — HIGH (ref 70–99)
HCT VFR BLD CALC: 34.7 % — SIGNIFICANT CHANGE UP (ref 34.5–45)
HGB BLD-MCNC: 11 G/DL — LOW (ref 11.5–15.5)
IMM GRANULOCYTES NFR BLD AUTO: 0.5 % — SIGNIFICANT CHANGE UP (ref 0–0.9)
INR BLD: 2.37 RATIO — HIGH (ref 0.85–1.18)
LYMPHOCYTES # BLD AUTO: 0.46 K/UL — LOW (ref 1–3.3)
LYMPHOCYTES # BLD AUTO: 5 % — LOW (ref 13–44)
MCHC RBC-ENTMCNC: 30.7 PG — SIGNIFICANT CHANGE UP (ref 27–34)
MCHC RBC-ENTMCNC: 31.7 GM/DL — LOW (ref 32–36)
MCV RBC AUTO: 96.9 FL — SIGNIFICANT CHANGE UP (ref 80–100)
MONOCYTES # BLD AUTO: 0.59 K/UL — SIGNIFICANT CHANGE UP (ref 0–0.9)
MONOCYTES NFR BLD AUTO: 6.4 % — SIGNIFICANT CHANGE UP (ref 2–14)
NEUTROPHILS # BLD AUTO: 7.99 K/UL — HIGH (ref 1.8–7.4)
NEUTROPHILS NFR BLD AUTO: 86.9 % — HIGH (ref 43–77)
NRBC # BLD: 0 /100 WBCS — SIGNIFICANT CHANGE UP (ref 0–0)
PLATELET # BLD AUTO: 209 K/UL — SIGNIFICANT CHANGE UP (ref 150–400)
POTASSIUM SERPL-MCNC: 5.3 MMOL/L — SIGNIFICANT CHANGE UP (ref 3.5–5.3)
POTASSIUM SERPL-SCNC: 5.3 MMOL/L — SIGNIFICANT CHANGE UP (ref 3.5–5.3)
PROT SERPL-MCNC: 7.8 G/DL — SIGNIFICANT CHANGE UP (ref 6–8.3)
PROTHROM AB SERPL-ACNC: 27 SEC — HIGH (ref 9.5–13)
RBC # BLD: 3.58 M/UL — LOW (ref 3.8–5.2)
RBC # FLD: 15.8 % — HIGH (ref 10.3–14.5)
SODIUM SERPL-SCNC: 141 MMOL/L — SIGNIFICANT CHANGE UP (ref 135–145)
TROPONIN I, HIGH SENSITIVITY RESULT: 22.6 NG/L — SIGNIFICANT CHANGE UP
TROPONIN I, HIGH SENSITIVITY RESULT: 26.5 NG/L — SIGNIFICANT CHANGE UP
WBC # BLD: 9.2 K/UL — SIGNIFICANT CHANGE UP (ref 3.8–10.5)
WBC # FLD AUTO: 9.2 K/UL — SIGNIFICANT CHANGE UP (ref 3.8–10.5)

## 2023-10-11 PROCEDURE — 99285 EMERGENCY DEPT VISIT HI MDM: CPT | Mod: FS

## 2023-10-11 PROCEDURE — 72192 CT PELVIS W/O DYE: CPT | Mod: 26,MA

## 2023-10-11 PROCEDURE — 76376 3D RENDER W/INTRP POSTPROCES: CPT

## 2023-10-11 PROCEDURE — 70450 CT HEAD/BRAIN W/O DYE: CPT | Mod: MA

## 2023-10-11 PROCEDURE — 85610 PROTHROMBIN TIME: CPT

## 2023-10-11 PROCEDURE — 72170 X-RAY EXAM OF PELVIS: CPT | Mod: 26

## 2023-10-11 PROCEDURE — 84484 ASSAY OF TROPONIN QUANT: CPT

## 2023-10-11 PROCEDURE — 93005 ELECTROCARDIOGRAM TRACING: CPT

## 2023-10-11 PROCEDURE — 71045 X-RAY EXAM CHEST 1 VIEW: CPT

## 2023-10-11 PROCEDURE — 76376 3D RENDER W/INTRP POSTPROCES: CPT | Mod: 26

## 2023-10-11 PROCEDURE — 80053 COMPREHEN METABOLIC PANEL: CPT

## 2023-10-11 PROCEDURE — 71045 X-RAY EXAM CHEST 1 VIEW: CPT | Mod: 26

## 2023-10-11 PROCEDURE — 85025 COMPLETE CBC W/AUTO DIFF WBC: CPT

## 2023-10-11 PROCEDURE — 99285 EMERGENCY DEPT VISIT HI MDM: CPT | Mod: 25

## 2023-10-11 PROCEDURE — 72170 X-RAY EXAM OF PELVIS: CPT

## 2023-10-11 PROCEDURE — 72192 CT PELVIS W/O DYE: CPT | Mod: MA

## 2023-10-11 PROCEDURE — 36415 COLL VENOUS BLD VENIPUNCTURE: CPT

## 2023-10-11 PROCEDURE — 93010 ELECTROCARDIOGRAM REPORT: CPT

## 2023-10-11 PROCEDURE — 85730 THROMBOPLASTIN TIME PARTIAL: CPT

## 2023-10-11 PROCEDURE — 70450 CT HEAD/BRAIN W/O DYE: CPT | Mod: 26,MA

## 2023-10-11 RX ORDER — SODIUM CHLORIDE 9 MG/ML
500 INJECTION INTRAMUSCULAR; INTRAVENOUS; SUBCUTANEOUS ONCE
Refills: 0 | Status: COMPLETED | OUTPATIENT
Start: 2023-10-11 | End: 2023-10-11

## 2023-10-11 RX ADMIN — SODIUM CHLORIDE 1000 MILLILITER(S): 9 INJECTION INTRAMUSCULAR; INTRAVENOUS; SUBCUTANEOUS at 14:31

## 2023-10-11 NOTE — ED PROVIDER NOTE - OBJECTIVE STATEMENT
88-year-old female past medical history of A-fib on Coumadin, dementia, high cholesterol, [place] was brought in from home for a near fall in the bathroom.  Where the home health aide had to ease her to the floor.  Patient is on Coumadin.  Patient's aide is not at bedside patient is an unreliable source, states that she might have passed out, she is unsure.  She denies any pain currently reports she is just hungry.

## 2023-10-11 NOTE — ED PROVIDER NOTE - PATIENT PORTAL LINK FT
You can access the FollowMyHealth Patient Portal offered by [facility] by registering at the following website: [email or web address]. By joining [employer]s FollowMyHealth portal, you will also be able to view your health information using other applications (apps) compatible with our system.

## 2023-10-11 NOTE — ED PROVIDER NOTE - CLINICAL SUMMARY MEDICAL DECISION MAKING FREE TEXT BOX
88-year-old female past medical history of A-fib on Coumadin, dementia, high cholesterol, [place] was brought in from home for a near fall in the bathroom.  Where the home health aide had to ease her to the floor.  Patient is on Coumadin.  Patient's aide is not at bedside patient is an unreliable source, states that she might have passed out, she is unsure.  She denies any pain currently reports she is just hungry. PE as noted above. imaging pending, will check basic labs and reassess. 88-year-old female past medical history of A-fib on Coumadin, dementia, high cholesterol, [place] was brought in from home for a near fall in the bathroom.  Where the home health aide had to ease her to the floor.  Patient is on Coumadin.  Patient's aide is not at bedside patient is an unreliable source, states that she might have passed out, she is unsure.  She denies any pain currently reports she is just hungry. PE as noted above. imaging pending, will check basic labs and reassess.    446pm: Labs reviewed, troponin negative x2, delta less than 12.  BUN/creatinine at baseline, compared to prior labs are [facility].  Head CT negative.  CT pelvis negative.  Patient's son at bedside will DC home

## 2023-10-11 NOTE — ED ADULT NURSE NOTE - OBJECTIVE STATEMENT
Assumed pt care for a 88 yr old female baseline dementia brought in by EMS for a mechanical fall. As per EMS HHA reports she was coming out the shower and began to fall. The aid then activated EMS. As per EMS aid reports pt was placed on ground and did not injure herself. No injuries noted. Pt placed in ground. IV established. Son at bedside.

## 2023-10-11 NOTE — ED ADULT TRIAGE NOTE - CHIEF COMPLAINT QUOTE
Pt BIBA from home where home health aide states pt had a near fall in the bathroom and had to be eased to the floor. Pt takes coumadin. Denies any injuries.

## 2023-10-11 NOTE — ED PROVIDER NOTE - NSFOLLOWUPINSTRUCTIONS_ED_ALL_ED_FT
Fall Prevention for Older Adults    WHAT YOU NEED TO KNOW:    As you age, your muscles weaken and your risk for falls increases. Your risk also increases if you take medicines that make you sleepy or dizzy. You may also be at risk if you have vision or joint problems, have low blood pressure, or are not active.    DISCHARGE INSTRUCTIONS:    Call 911 or have someone else call if:   •You have fallen and are unconscious.      •You have fallen and cannot move part of your body.      Contact your healthcare provider if:   •You have fallen and have pain or a headache.      •You have questions or concerns about your condition or care.      Fall prevention tips:   •Stay active. Exercise can help strengthen your muscles and improve your balance. Your healthcare provider may recommend water aerobics, walking, or Alfredo Chi. He or she may also recommend physical therapy to improve your coordination. Never start an exercise program without asking your healthcare provider first.  Water Aerobics for Seniors       Alfredo Chi for Seniors           •Wear shoes that fit well and have soles that . Wear shoes both inside and outside. Use slippers with good . Avoid shoes with high heels.      •Use assistive devices as directed. Your healthcare provider may suggest that you use a cane or walker to help you keep your balance. You may need to have grab bars put in your bathroom near the toilet or in the shower.      •Stand or sit up slowly. This may help you keep your balance and prevent falls.      •Wear a personal alarm. This is a device that allows you to call 911 if you need help. Ask for more information on personal alarms.      •Manage your medical conditions.  Keep all appointments with your healthcare providers. Visit your eye doctor as directed.      Home safety tips:     Fall Prevention for Seniors     •Add items to prevent falls in the bathroom. Put nonslip strips on your bath or shower floor to prevent you from slipping. Use a bath mat if you do not have carpet in the bathroom. This will prevent you from falling when you step out of the bath or shower. Use a shower seat so you do not need to stand while you shower. Sit on the toilet or a chair in your bathroom to dry yourself and put on clothing. This will prevent you from losing your balance from drying or dressing yourself while you are standing.      •Keep paths clear. Remove books, shoes, and other objects from walkways and stairs. Place cords for telephones and lamps out of the way so that you do not need to walk over them. Tape them down if you cannot move them. Remove small rugs. If you cannot remove a rug, secure it with double-sided tape. This will prevent you from tripping.      •Install bright lights in your home. Use night lights to help light paths to the bathroom or kitchen. Always turn on the light before you start walking.      •Keep items you use often on shelves within reach. Do not use a step stool to help you reach an item.      •Paint or place reflective tape on the edges of your stairs. This will help you see the stairs better.      Follow up with your healthcare provider as directed: Write down your questions so you remember to ask them during your visits.

## 2023-10-11 NOTE — ED PROVIDER NOTE - ATTENDING APP SHARED VISIT CONTRIBUTION OF CARE
Dr. Graham ROSALES: I performed a face to face bedside interview with patient regarding history of present illness, review of symptoms and past medical history. I completed an independent physical exam.  I have discussed patient's plan of care with PA.   I agree with note as stated above, having amended the EMR as needed to reflect my findings.   This includes HISTORY OF PRESENT ILLNESS, HIV, PAST MEDICAL/SURGICAL/FAMILY/SOCIAL HISTORY, ALLERGIES AND HOME MEDICATIONS, REVIEW OF SYSTEMS, PHYSICAL EXAM, and any PROGRESS NOTES during the time I functioned as the attending physician for this patient.

## 2023-10-11 NOTE — ED PROVIDER NOTE - CARE PLAN
1 Principal Discharge DX:	Fall at home   Principal Discharge DX:	Fall at home  Secondary Diagnosis:	Contusion

## 2023-10-11 NOTE — ED PROVIDER NOTE - PHYSICAL EXAMINATION
Gen: Well appearing in NAD.   ENT: oral mucosa moist   Head: atraumatic  Heart: s1/s2, RRR  Lung: CTA b/l,   Abd: soft, NT/ND, no rebound or guarding  Msk: Pelvis stable.  No tenderness to hips bilaterally.  Patient moving both legs  Neuro: AAO x2 (disoriented to time), patient moving all extremity equally,   Skin: Normal for race

## 2023-10-12 NOTE — CHART NOTE - NSCHARTNOTEFT_GEN_A_CORE
SW called pt to discuss and assist with follow up care.  Pt is a an 89 y/o female presented to ED for fall.  Pt did not respond to the call, left VM with call back # for further assistance.

## 2023-10-20 ENCOUNTER — LABORATORY RESULT (OUTPATIENT)
Age: 88
End: 2023-10-20

## 2023-10-20 ENCOUNTER — RX RENEWAL (OUTPATIENT)
Age: 88
End: 2023-10-20

## 2023-10-24 ENCOUNTER — RX RENEWAL (OUTPATIENT)
Age: 88
End: 2023-10-24

## 2023-11-03 ENCOUNTER — RX RENEWAL (OUTPATIENT)
Age: 88
End: 2023-11-03

## 2023-11-10 ENCOUNTER — LABORATORY RESULT (OUTPATIENT)
Age: 88
End: 2023-11-10

## 2023-11-28 ENCOUNTER — APPOINTMENT (OUTPATIENT)
Dept: ELECTROPHYSIOLOGY | Facility: CLINIC | Age: 88
End: 2023-11-28
Payer: MEDICARE

## 2023-11-28 ENCOUNTER — NON-APPOINTMENT (OUTPATIENT)
Age: 88
End: 2023-11-28

## 2023-11-29 PROCEDURE — 93294 REM INTERROG EVL PM/LDLS PM: CPT

## 2023-11-29 PROCEDURE — 93296 REM INTERROG EVL PM/IDS: CPT

## 2023-12-01 ENCOUNTER — LABORATORY RESULT (OUTPATIENT)
Age: 88
End: 2023-12-01

## 2023-12-07 ENCOUNTER — RX RENEWAL (OUTPATIENT)
Age: 88
End: 2023-12-07

## 2023-12-22 ENCOUNTER — LABORATORY RESULT (OUTPATIENT)
Age: 88
End: 2023-12-22

## 2023-12-27 LAB
INR PPP: 1.81
PT BLD: 20.1

## 2024-01-01 ENCOUNTER — APPOINTMENT (OUTPATIENT)
Dept: HOME HEALTH SERVICES | Facility: HOME HEALTH | Age: 89
End: 2024-01-01
Payer: MEDICARE

## 2024-01-01 ENCOUNTER — RX RENEWAL (OUTPATIENT)
Age: 89
End: 2024-01-01

## 2024-01-01 VITALS
SYSTOLIC BLOOD PRESSURE: 110 MMHG | OXYGEN SATURATION: 95 % | HEART RATE: 68 BPM | DIASTOLIC BLOOD PRESSURE: 60 MMHG | TEMPERATURE: 98.2 F

## 2024-01-01 DIAGNOSIS — Z23 ENCOUNTER FOR IMMUNIZATION: ICD-10-CM

## 2024-01-01 DIAGNOSIS — F03.90 UNSPECIFIED DEMENTIA W/OUT BEHAVIORAL DISTURBANCE: ICD-10-CM

## 2024-01-01 DIAGNOSIS — N18.30 CHRONIC KIDNEY DISEASE, STAGE 3 UNSPECIFIED: ICD-10-CM

## 2024-01-01 DIAGNOSIS — Z51.5 ENCOUNTER FOR PALLIATIVE CARE: ICD-10-CM

## 2024-01-01 DIAGNOSIS — I48.91 UNSPECIFIED ATRIAL FIBRILLATION: ICD-10-CM

## 2024-01-01 DIAGNOSIS — L89.154 PRESSURE ULCER OF SACRAL REGION, STAGE 4: ICD-10-CM

## 2024-01-01 PROCEDURE — 99349 HOME/RES VST EST MOD MDM 40: CPT

## 2024-01-01 PROCEDURE — G0008: CPT

## 2024-01-01 PROCEDURE — 90662 IIV NO PRSV INCREASED AG IM: CPT

## 2024-01-01 NOTE — ED ADULT NURSE REASSESSMENT NOTE - NS ED NURSE REASSESS COMMENT FT1
Received report from THERESE Correia. Patient a/ox2-3, VSS, ambulatory, sensory/motor function intact and in NAD at this time. Per patient's daughter, patient experiences short term memory loss which is patient's baseline. Patient denies CP/SOB, f/c, n/v/d, abdominal pain, dizziness/lightheadedness, numbness/tingling/weakness at this time. Lung sounds are clear and equal b/l with no labored breathing noted. Abdomen is soft, nontender and nondistended. peripheral pulses are strong and equal b/l with b/l edema noted to the LE. Skin is warm, dry and intact. Plan of care discussed with patient and family members. Patient verbalizes understanding. Non-slip socks in place, call bell within reach and cardiac monitor in place p/w NSR at this time. Will continue to monitor and reassess.
[id number]

## 2024-01-01 NOTE — ED PROVIDER NOTE - PHYSICAL EXAMINATION
Neonatology Daily Progress Note    Juanita Friedman is a 7 day old female infant  MRN: [id number]  Gestational Age: 30w4d  Birth weight: No birth weight on file.     DOL: 7 days    PMA: 31w4d    HOSPITAL PROBLEMS:  Principal Problem:    Duodenal atresia  Active Problems:    Respiratory distress    Premature infant of 30 weeks gestation    SGA (small for gestational age)    [place] affected by IUGR  Resolved Problems:    * No resolved hospital problems. *       INTERVAL EVENTS SINCE PREVIOUS NOTE:  Admitted to the NICU from [facility] for suspected duodenal atresia and prematurity management. NPO with IV fluids via PICC. OG to LIS.     PHYSICAL EXAM    Vital signs:     Vital Last Value 24 Hour Range   Temperature 97.9 °F (36.6 °C) (24 0600) Temp  Min: 97.9 °F (36.6 °C)  Max: 99.7 °F (37.6 °C)   Pulse 157 (24 0700) Pulse  Min: 144  Max: 180   Respiratory (!) 81 (24 0700) Resp  Min: 27  Max: 81   Non-Invasive  Blood Pressure 77/34 (24 0400) BP  Min: 62/35  Max: 86/38   Pulse Oximetry 99 % (24 07) SpO2  Min: 96 %  Max: 100 %   Arterial   Blood Pressure   No data recorded     General: Well appearing, no acute distress, responds to stimuli  HENT: AFSOF, normocephalic, overriding sutures ears normally set, no cleft, palate intact, HFNC and OG in place  Neck: supple, full range of motion  CV: RRR, no murmurs, 2+ pulses, good perfusion  Lungs: clear and equal bilaterally, no retractions, no nasal flaring  Abdomen: soft, full, non-tender, no organomegaly  Extremities: negative O/B; FROM x 4  Neuro: tone appropriate for gestational age  Skin: no rash, jaundice  : normal for GA; anus patent  Back: no cindy, no dimples    Labs (Last 24 hours)  Recent Results (from the past 24 hour(s))   GLUCOSE, BEDSIDE - POINT OF CARE    Collection Time: 24  3:59 AM   Result Value Ref Range    GLUCOSE, BEDSIDE - POINT OF CARE 110 47 - 110 mg/dL   Basic Metabolic Panel    Collection Time:  02/29/24  4:11 AM   Result Value Ref Range    Fasting Status      Sodium 139 135 - 145 mmol/L    Potassium 5.4 3.5 - 6.0 mmol/L    Chloride 104 97 - 110 mmol/L    Carbon Dioxide 24 21 - 32 mmol/L    Anion Gap 16 7 - 19 mmol/L    Glucose 109 47 - 110 mg/dL    BUN 16 5 - 19 mg/dL    Creatinine 0.54 0.31 - 1.03 mg/dL    Glomerular Filtration Rate      BUN/Cr 30 (H) 7 - 25    Calcium 10.9 7.6 - 11.3 mg/dL   Bilirubin Panel    Collection Time: 02/29/24  4:11 AM   Result Value Ref Range    Bilirubin, Total 8.4 (H) 0.2 - 3.5 mg/dL    Bilirubin, Direct 1.0 (H) 0.0 - 0.6 mg/dL        ASSESSMENT AND PLAN BY SYSTEM       FLUID ELECTROLYTES NUTRITION     Weight Length Head circumference      Wt Readings from Last 2 Encounters:   02/28/24 (!) 930 g (4 %, Z= -1.77)*     * Growth percentiles are based on Danielle (Girls, 22-50 Weeks) data.      current - 14.37\" (36.5 cm) current - 26 cm (10.24\")   Weight change: -20 g          Dosing Weight: 1000 g          Recent Labs   Lab 02/29/24  0411 02/27/24  0413 02/26/24  0925 02/25/24  0545   SODIUM 139 137   < > 140   POTASSIUM 5.4 4.6   < > 5.4   CHLORIDE 104 105   < > 110   CO2 24 22   < > 24   BUN 16 19   < > 26*   CREATININE 0.54 0.58   < > 0.75   GLUCOSE 109 105   < > 69   CALCIUM 10.9 11.1   < > 10.6   PHOS  --   --   --  5.7   MG  --   --   --  1.9   TRIGLYCERIDE  --  156*  --  74    < > = values in this interval not displayed.         POC GLUCOSE:     Recent Labs   Lab 02/29/24  0359   GLUCOSE BEDSIDE 110         Alkaline Phosphatase:  No results found       INPUT:    IVF:     Total Fluids written for 150 ml/kg/day  TPN: D 11% P: 3.5 grams/kg/day L 3 grams/kg/day                 Feeding: NPO                          GIR: 10.3 mg/kg/min        % PO:          TPN/IVF WORKSHEET:                                   Dosing Weight: 1000 g    Ordered TFV 150ml/kg/d x 1kg = 150ml/d This value must be entered in Total Fluid Volume order      - IL @ 3g/kg 0.63ml/hr = 15ml/d       - UVC fluids  @ ml/hr = ml/d       - Other drips @ ml/hr = ml/d       - Feeds  ml qhr = ml/d       = TPN volume  135 ml/d  = 5.6ml/hr     OUTPUT:    Intake/Output          0700   0659  0700   0659    IV Piggyback (mL/kg) 1 (1.08)     TPN (mL/kg) 143.08 (153.85) 6.23 (6.7)    Total Intake(mL/kg) 144.08 (154.92) 6.23 (6.7)    Urine (mL/kg/hr) 75 (3.36)     Emesis (mL/kg/hr) 0 (0)     Drains (mL/kg/hr) 11 (0.49) 1 (24.81)    Total Output(mL/kg) 86 (92.47) 1 (1.08)    Net +58.08 +5.23          Urine Occurrence 6 x     Emesis Occurrence 1 x              Assessment:    infant, On total parenteral nutrition, NPO for suspected duodenal atresia, and IUGR    Plan:  - Maintain total fluid goal to 150 ml/kg/day  - Continue TPN with SMOF lipids and adjust for lytes  - Continue OG to LIS- monitor output   - will replace fluids 09NS for fluid loss > 20 ml/kg over 8 hrs.   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     Assessment:  infant with abdominal x-ray concerning for duodenal atresia    Plan:   Surgery on consult, appreciate recommendations ():  -NPO, TPN  -Sump to LIS  -OR planning TBD   -Primary care per NICU  - Per surgery goal weight for intervention is ~1.5kg    CENTRAL LINE AND CATHETER DISCUSSION     Central Line  Type of Central Line:  PICC -  UVC -    Line Functioning appropriately: Yes  Necessity/Indication: TPN and IV Medications, Limited Peripheral Access  Continued need for Central Line discussed: yes 2024    Urinary Catheter  Necessity/Indication: N/A  Continued need for Urinary Catheter discussed: N/A    PAIN/SEDATION     NPASS Pain Score  Min: 0  Max: 0    Pain/Sedation Medications: None in use    Plan:   - continue to monitor N-PASS scores    BILIRUBIN     Assessment:Hyperbilirubinemia requiring phototherapy  Hyperbilirubinemia of Prematurity    Baby's blood type: O+ , LINDA unknown  Maternal blood type  unknown This patient's mother  is not on file.Antibody: This patient's mother is not on file.    Last Bilirubin:     Recent Labs   Lab 02/29/24  0411 02/27/24  0413 02/26/24  0414 02/25/24  0545 02/24/24  0420 02/23/24  1533   BILIRUBIN 8.4* 9.5* 10.3* 10.7* 8.9* 6.0          Light level: 10.2 mg/dL     Phototherapy 2/24 -     2/26: DBili 0.6  2/27: Dbili 0.7  2/29: Dbili 1.0  Plan:   - double phototherapy  - Bili in AM  - Will need to trend dbili with time  - Dbili Monday    APNEA/BRADYCARDIA/DESATURATION     Assessment: Apnea of Prematurity  At Risk for Apnea of Prematurity    24H Events:     No data found.    Last Significant Event:        -Medications: - Caffeine loading dose 20mg/kg/dose - Yes Date 2/24/24  - Caffeine maintenance 10mg/kg qday     Plan:   - continue to monitor clinically  - continue caffeine 10 mg/kg daily    RESPIRATORY     Most recent blood gas:    Mode: High Flow Nasal Canula       HFNC SETTINGS:    SETTING LAST VALUE   FLOW  1 L/min (02/29/24 0425)   FiO2  21 % (02/29/24 0425)       Ventilator days: 0  Days on CPAP/HFNC: 2/23-  Days on Oxygen: 2/23-     Assessment: Respiratory Distress Syndrome (surfactant deficiency)    Intubation  Necessity/Indication: Not Intubated  Readiness for Extubation discussed: N/A 2024    Plan:   - Continue HFNC 1 LPM, do not wean   - CBG/CXR PRN  - continue SpO2 monitoring    CARDIOVASCULAR     Assessment: No active cardiac issues     ECHO 2/23 SUMMARY:  Normal intracardiac connections   Trivial patent foramen ovale with bidirectional shunt   Trivial closing PDA with left to right shunt   Normal valve structure and function.    Qualitatively normal right ventricular chamber size, wall thickness, and systolic function.  Normal left ventricular size, wall thickness, and systolic function.  No aortic arch abnormalities demonstrated.  No evidence of significant pulmonary hypertension.  No pericardial or obvious pleural effusion.    Plan:  - continue to monitor clinically    HEMATOLOGY      Assessment: No active hematologic issues    No results found    Transfusions:   PRBC: None    Platelet: None    FFP: None     Plan:  - monitor clinically    INFECTIOUS DISEASE     Assessment: Sepsis considered unlikely and Observation/Evaluation of  for possible sepsis -  for maternal elevated blood pressures    No results found    Microbiology Results       None           - Blood culture and CBC on admission: No   - Initial antibiotic course: No Antibiotics were initiated due to low risk of Sepsis.       Plan:  - continue to monitor clinically   - follow blood culture from outside hospital    NEUROLOGY     Assessment: Premature infant at risk for IVH    Head Ultrasound Day of LIfe 7:   Head Ultrasound Day of Life 28:     Plan:   - continue to monitor clinically  - HUS DOL 7 - ordered for     OPHTHALMOLOGY     Assessment: At risk for Retinopathy of Prematurity    Awaiting first exam  Last Ophthalmologic exam:    Next Ophthalmologic exam due: 24 (24 1600)    Plan:   - ROP Exam as per unit policy @ 4-6 weeks of age    MEDICATIONS     Current Facility-Administered Medications   Medication    parenteral nutrition /pediatric (less than 5 kg)    fat emul fish oil/plant based 20% (SMOFLIPID) 15 mL /pediatric infusion    sodium chloride 0.9% infusion    caffeine citrate (CAFCIT) 10 mg in D5W /peds IV syringe    heparin (porcine) 10 UNIT/ML lock flush 10 Units        DISCHARGE TASKS     Discharge planning discussed.  infant requiring oxygen, TPN, and NPO for suspected duodenal atresia      HEALTH MAINTENANCE AND DISCHARGE PLANNING     Immunizations:   There is no immunization history on file for this patient.   Hearing Test:       ROP Eye Exam Needed?: Yes (24 1600)     Car Seat Screen:      CCHD Screening:   Screening complete:    Right hand reading %:    Foot reading %:    CHD:      [place] Screen- date drawn (most recent results):     Last 3 results:      Synagis Candidate:      Pediatrician: WONG    PARENTAL COMMUNICATION     Family present during rounds: Mother updated at bedside on 2/27         s/p b/l knee replacement, FROM of both knees no point TTP   FROm of b/l hips with no point TTP   (+) over b/l lower rib cage, no ecchymoses, no crepitus noted

## 2024-01-04 DIAGNOSIS — H90.3 SENSORINEURAL HEARING LOSS, BILATERAL: ICD-10-CM

## 2024-01-12 ENCOUNTER — LABORATORY RESULT (OUTPATIENT)
Age: 89
End: 2024-01-12

## 2024-01-15 ENCOUNTER — LABORATORY RESULT (OUTPATIENT)
Age: 89
End: 2024-01-15

## 2024-01-18 ENCOUNTER — RX RENEWAL (OUTPATIENT)
Age: 89
End: 2024-01-18

## 2024-01-23 ENCOUNTER — LABORATORY RESULT (OUTPATIENT)
Age: 89
End: 2024-01-23

## 2024-02-02 ENCOUNTER — LABORATORY RESULT (OUTPATIENT)
Age: 89
End: 2024-02-02

## 2024-02-23 ENCOUNTER — LABORATORY RESULT (OUTPATIENT)
Age: 89
End: 2024-02-23

## 2024-02-25 ENCOUNTER — RX RENEWAL (OUTPATIENT)
Age: 89
End: 2024-02-25

## 2024-02-27 ENCOUNTER — APPOINTMENT (OUTPATIENT)
Dept: ELECTROPHYSIOLOGY | Facility: CLINIC | Age: 89
End: 2024-02-27
Payer: MEDICARE

## 2024-02-27 ENCOUNTER — NON-APPOINTMENT (OUTPATIENT)
Age: 89
End: 2024-02-27

## 2024-02-27 PROCEDURE — 93294 REM INTERROG EVL PM/LDLS PM: CPT

## 2024-02-27 PROCEDURE — 93296 REM INTERROG EVL PM/IDS: CPT

## 2024-03-01 ENCOUNTER — INPATIENT (INPATIENT)
Facility: HOSPITAL | Age: 89
LOS: 13 days | Discharge: HOME | DRG: 344 | End: 2024-03-15
Attending: STUDENT IN AN ORGANIZED HEALTH CARE EDUCATION/TRAINING PROGRAM | Admitting: INTERNAL MEDICINE
Payer: MEDICARE

## 2024-03-01 VITALS
SYSTOLIC BLOOD PRESSURE: 140 MMHG | RESPIRATION RATE: 18 BRPM | DIASTOLIC BLOOD PRESSURE: 80 MMHG | HEART RATE: 64 BPM | TEMPERATURE: 98 F | WEIGHT: 199.96 LBS | OXYGEN SATURATION: 96 %

## 2024-03-01 DIAGNOSIS — Z96.659 PRESENCE OF UNSPECIFIED ARTIFICIAL KNEE JOINT: Chronic | ICD-10-CM

## 2024-03-01 DIAGNOSIS — R19.7 DIARRHEA, UNSPECIFIED: ICD-10-CM

## 2024-03-01 DIAGNOSIS — Z90.10 ACQUIRED ABSENCE OF UNSPECIFIED BREAST AND NIPPLE: Chronic | ICD-10-CM

## 2024-03-01 LAB
ALBUMIN SERPL ELPH-MCNC: 3.2 G/DL — LOW (ref 3.3–5)
ALP SERPL-CCNC: 188 U/L — HIGH (ref 40–120)
ALT FLD-CCNC: 12 U/L — SIGNIFICANT CHANGE UP (ref 10–45)
ANION GAP SERPL CALC-SCNC: 8 MMOL/L — SIGNIFICANT CHANGE UP (ref 5–17)
APPEARANCE UR: CLEAR — SIGNIFICANT CHANGE UP
APTT BLD: 39.4 SEC — HIGH (ref 24.5–35.6)
AST SERPL-CCNC: 15 U/L — SIGNIFICANT CHANGE UP (ref 10–40)
BASOPHILS # BLD AUTO: 0.01 K/UL — SIGNIFICANT CHANGE UP (ref 0–0.2)
BASOPHILS NFR BLD AUTO: 0.1 % — SIGNIFICANT CHANGE UP (ref 0–2)
BILIRUB SERPL-MCNC: 0.9 MG/DL — SIGNIFICANT CHANGE UP (ref 0.2–1.2)
BILIRUB UR-MCNC: NEGATIVE — SIGNIFICANT CHANGE UP
BUN SERPL-MCNC: 30 MG/DL — HIGH (ref 7–23)
CALCIUM SERPL-MCNC: 9.5 MG/DL — SIGNIFICANT CHANGE UP (ref 8.4–10.5)
CHLORIDE SERPL-SCNC: 107 MMOL/L — SIGNIFICANT CHANGE UP (ref 96–108)
CO2 SERPL-SCNC: 32 MMOL/L — HIGH (ref 22–31)
COLOR SPEC: YELLOW — SIGNIFICANT CHANGE UP
CREAT SERPL-MCNC: 1.79 MG/DL — HIGH (ref 0.5–1.3)
DIFF PNL FLD: NEGATIVE — SIGNIFICANT CHANGE UP
EGFR: 27 ML/MIN/1.73M2 — LOW
EOSINOPHIL # BLD AUTO: 0.16 K/UL — SIGNIFICANT CHANGE UP (ref 0–0.5)
EOSINOPHIL NFR BLD AUTO: 1.8 % — SIGNIFICANT CHANGE UP (ref 0–6)
GLUCOSE SERPL-MCNC: 95 MG/DL — SIGNIFICANT CHANGE UP (ref 70–99)
GLUCOSE UR QL: NEGATIVE MG/DL — SIGNIFICANT CHANGE UP
HCT VFR BLD CALC: 32.8 % — LOW (ref 34.5–45)
HGB BLD-MCNC: 10.6 G/DL — LOW (ref 11.5–15.5)
IMM GRANULOCYTES NFR BLD AUTO: 0.5 % — SIGNIFICANT CHANGE UP (ref 0–0.9)
INR BLD: 3.07 RATIO — HIGH (ref 0.85–1.18)
KETONES UR-MCNC: NEGATIVE MG/DL — SIGNIFICANT CHANGE UP
LEUKOCYTE ESTERASE UR-ACNC: NEGATIVE — SIGNIFICANT CHANGE UP
LIDOCAIN IGE QN: 26 U/L — SIGNIFICANT CHANGE UP (ref 16–77)
LYMPHOCYTES # BLD AUTO: 0.86 K/UL — LOW (ref 1–3.3)
LYMPHOCYTES # BLD AUTO: 9.8 % — LOW (ref 13–44)
MAGNESIUM SERPL-MCNC: 1.8 MG/DL — SIGNIFICANT CHANGE UP (ref 1.6–2.6)
MCHC RBC-ENTMCNC: 30.6 PG — SIGNIFICANT CHANGE UP (ref 27–34)
MCHC RBC-ENTMCNC: 32.3 GM/DL — SIGNIFICANT CHANGE UP (ref 32–36)
MCV RBC AUTO: 94.8 FL — SIGNIFICANT CHANGE UP (ref 80–100)
MONOCYTES # BLD AUTO: 0.56 K/UL — SIGNIFICANT CHANGE UP (ref 0–0.9)
MONOCYTES NFR BLD AUTO: 6.4 % — SIGNIFICANT CHANGE UP (ref 2–14)
NEUTROPHILS # BLD AUTO: 7.16 K/UL — SIGNIFICANT CHANGE UP (ref 1.8–7.4)
NEUTROPHILS NFR BLD AUTO: 81.4 % — HIGH (ref 43–77)
NITRITE UR-MCNC: NEGATIVE — SIGNIFICANT CHANGE UP
NRBC # BLD: 0 /100 WBCS — SIGNIFICANT CHANGE UP (ref 0–0)
PH UR: 5.5 — SIGNIFICANT CHANGE UP (ref 5–8)
PLATELET # BLD AUTO: 171 K/UL — SIGNIFICANT CHANGE UP (ref 150–400)
POTASSIUM SERPL-MCNC: 2.8 MMOL/L — CRITICAL LOW (ref 3.5–5.3)
POTASSIUM SERPL-SCNC: 2.8 MMOL/L — CRITICAL LOW (ref 3.5–5.3)
PROT SERPL-MCNC: 7.6 G/DL — SIGNIFICANT CHANGE UP (ref 6–8.3)
PROT UR-MCNC: NEGATIVE MG/DL — SIGNIFICANT CHANGE UP
PROTHROM AB SERPL-ACNC: 33.9 SEC — HIGH (ref 9.5–13)
RAPID RVP RESULT: SIGNIFICANT CHANGE UP
RBC # BLD: 3.46 M/UL — LOW (ref 3.8–5.2)
RBC # FLD: 16.5 % — HIGH (ref 10.3–14.5)
SARS-COV-2 RNA SPEC QL NAA+PROBE: SIGNIFICANT CHANGE UP
SODIUM SERPL-SCNC: 147 MMOL/L — HIGH (ref 135–145)
SP GR SPEC: 1.01 — SIGNIFICANT CHANGE UP (ref 1–1.03)
UROBILINOGEN FLD QL: 0.2 MG/DL — SIGNIFICANT CHANGE UP (ref 0.2–1)
WBC # BLD: 8.79 K/UL — SIGNIFICANT CHANGE UP (ref 3.8–10.5)
WBC # FLD AUTO: 8.79 K/UL — SIGNIFICANT CHANGE UP (ref 3.8–10.5)

## 2024-03-01 PROCEDURE — 99285 EMERGENCY DEPT VISIT HI MDM: CPT

## 2024-03-01 PROCEDURE — 74176 CT ABD & PELVIS W/O CONTRAST: CPT | Mod: 26,MC

## 2024-03-01 PROCEDURE — 71045 X-RAY EXAM CHEST 1 VIEW: CPT | Mod: 26

## 2024-03-01 PROCEDURE — 93010 ELECTROCARDIOGRAM REPORT: CPT

## 2024-03-01 PROCEDURE — 99223 1ST HOSP IP/OBS HIGH 75: CPT | Mod: GC

## 2024-03-01 RX ORDER — WARFARIN SODIUM 2.5 MG/1
2.5 TABLET ORAL ONCE
Refills: 0 | Status: COMPLETED | OUTPATIENT
Start: 2024-03-01 | End: 2024-03-01

## 2024-03-01 RX ORDER — QUETIAPINE FUMARATE 200 MG/1
50 TABLET, FILM COATED ORAL ONCE
Refills: 0 | Status: COMPLETED | OUTPATIENT
Start: 2024-03-01 | End: 2024-03-01

## 2024-03-01 RX ORDER — MEMANTINE HYDROCHLORIDE 10 MG/1
5 TABLET ORAL
Refills: 0 | Status: DISCONTINUED | OUTPATIENT
Start: 2024-03-02 | End: 2024-03-15

## 2024-03-01 RX ORDER — CHOLECALCIFEROL (VITAMIN D3) 125 MCG
1 CAPSULE ORAL
Refills: 0 | DISCHARGE

## 2024-03-01 RX ORDER — LEVOTHYROXINE SODIUM 125 MCG
25 TABLET ORAL DAILY
Refills: 0 | Status: DISCONTINUED | OUTPATIENT
Start: 2024-03-01 | End: 2024-03-15

## 2024-03-01 RX ORDER — ALLOPURINOL 300 MG
100 TABLET ORAL DAILY
Refills: 0 | Status: DISCONTINUED | OUTPATIENT
Start: 2024-03-01 | End: 2024-03-15

## 2024-03-01 RX ORDER — ATORVASTATIN CALCIUM 80 MG/1
1 TABLET, FILM COATED ORAL
Qty: 0 | Refills: 0 | DISCHARGE

## 2024-03-01 RX ORDER — MEMANTINE HYDROCHLORIDE 10 MG/1
5 TABLET ORAL ONCE
Refills: 0 | Status: COMPLETED | OUTPATIENT
Start: 2024-03-01 | End: 2024-03-01

## 2024-03-01 RX ORDER — MEMANTINE HYDROCHLORIDE 10 MG/1
1 TABLET ORAL
Qty: 0 | Refills: 0 | DISCHARGE

## 2024-03-01 RX ORDER — POTASSIUM CHLORIDE 20 MEQ
40 PACKET (EA) ORAL ONCE
Refills: 0 | Status: COMPLETED | OUTPATIENT
Start: 2024-03-01 | End: 2024-03-01

## 2024-03-01 RX ORDER — ACETAMINOPHEN 500 MG
650 TABLET ORAL EVERY 6 HOURS
Refills: 0 | Status: DISCONTINUED | OUTPATIENT
Start: 2024-03-01 | End: 2024-03-15

## 2024-03-01 RX ORDER — ALLOPURINOL 300 MG
1 TABLET ORAL
Qty: 0 | Refills: 0 | DISCHARGE

## 2024-03-01 RX ORDER — SODIUM CHLORIDE 9 MG/ML
1000 INJECTION, SOLUTION INTRAVENOUS
Refills: 0 | Status: DISCONTINUED | OUTPATIENT
Start: 2024-03-01 | End: 2024-03-02

## 2024-03-01 RX ORDER — POTASSIUM CHLORIDE 20 MEQ
10 PACKET (EA) ORAL
Refills: 0 | Status: COMPLETED | OUTPATIENT
Start: 2024-03-01 | End: 2024-03-01

## 2024-03-01 RX ORDER — ONDANSETRON 8 MG/1
4 TABLET, FILM COATED ORAL EVERY 8 HOURS
Refills: 0 | Status: DISCONTINUED | OUTPATIENT
Start: 2024-03-01 | End: 2024-03-15

## 2024-03-01 RX ORDER — WARFARIN SODIUM 2.5 MG/1
1 TABLET ORAL
Qty: 0 | Refills: 0 | DISCHARGE

## 2024-03-01 RX ORDER — ATORVASTATIN CALCIUM 80 MG/1
10 TABLET, FILM COATED ORAL AT BEDTIME
Refills: 0 | Status: DISCONTINUED | OUTPATIENT
Start: 2024-03-02 | End: 2024-03-15

## 2024-03-01 RX ORDER — QUETIAPINE FUMARATE 200 MG/1
50 TABLET, FILM COATED ORAL AT BEDTIME
Refills: 0 | Status: DISCONTINUED | OUTPATIENT
Start: 2024-03-02 | End: 2024-03-15

## 2024-03-01 RX ORDER — LANOLIN ALCOHOL/MO/W.PET/CERES
3 CREAM (GRAM) TOPICAL AT BEDTIME
Refills: 0 | Status: DISCONTINUED | OUTPATIENT
Start: 2024-03-01 | End: 2024-03-15

## 2024-03-01 RX ORDER — SODIUM CHLORIDE 9 MG/ML
1000 INJECTION INTRAMUSCULAR; INTRAVENOUS; SUBCUTANEOUS ONCE
Refills: 0 | Status: COMPLETED | OUTPATIENT
Start: 2024-03-01 | End: 2024-03-01

## 2024-03-01 RX ORDER — ATORVASTATIN CALCIUM 80 MG/1
10 TABLET, FILM COATED ORAL ONCE
Refills: 0 | Status: COMPLETED | OUTPATIENT
Start: 2024-03-01 | End: 2024-03-01

## 2024-03-01 RX ORDER — MEMANTINE HYDROCHLORIDE 10 MG/1
1 TABLET ORAL
Refills: 0 | DISCHARGE

## 2024-03-01 RX ORDER — QUETIAPINE FUMARATE 200 MG/1
50 TABLET, FILM COATED ORAL AT BEDTIME
Refills: 0 | Status: DISCONTINUED | OUTPATIENT
Start: 2024-03-01 | End: 2024-03-01

## 2024-03-01 RX ORDER — LEVOTHYROXINE SODIUM 125 MCG
1 TABLET ORAL
Qty: 0 | Refills: 0 | DISCHARGE

## 2024-03-01 RX ORDER — CHOLECALCIFEROL (VITAMIN D3) 125 MCG
1000 CAPSULE ORAL DAILY
Refills: 0 | Status: DISCONTINUED | OUTPATIENT
Start: 2024-03-01 | End: 2024-03-15

## 2024-03-01 RX ORDER — FUROSEMIDE 40 MG
1 TABLET ORAL
Qty: 0 | Refills: 0 | DISCHARGE

## 2024-03-01 RX ORDER — QUETIAPINE FUMARATE 200 MG/1
2 TABLET, FILM COATED ORAL
Refills: 0 | DISCHARGE

## 2024-03-01 RX ADMIN — Medication 100 MILLIEQUIVALENT(S): at 21:45

## 2024-03-01 RX ADMIN — Medication 100 MILLIEQUIVALENT(S): at 20:18

## 2024-03-01 RX ADMIN — SODIUM CHLORIDE 1000 MILLILITER(S): 9 INJECTION INTRAMUSCULAR; INTRAVENOUS; SUBCUTANEOUS at 17:50

## 2024-03-01 RX ADMIN — ATORVASTATIN CALCIUM 10 MILLIGRAM(S): 80 TABLET, FILM COATED ORAL at 20:19

## 2024-03-01 RX ADMIN — MEMANTINE HYDROCHLORIDE 5 MILLIGRAM(S): 10 TABLET ORAL at 21:45

## 2024-03-01 RX ADMIN — QUETIAPINE FUMARATE 50 MILLIGRAM(S): 200 TABLET, FILM COATED ORAL at 21:45

## 2024-03-01 RX ADMIN — SODIUM CHLORIDE 1000 MILLILITER(S): 9 INJECTION INTRAMUSCULAR; INTRAVENOUS; SUBCUTANEOUS at 18:50

## 2024-03-01 NOTE — ED ADULT NURSE NOTE - ED STAT RN HANDOFF DETAILS
Report given to THERESE Melo in ER until patient's swab results as she is going to a double room as per admitting THERESE Beltrán

## 2024-03-01 NOTE — H&P ADULT - NSHPLABSRESULTS_GEN_ALL_CORE
LABS:                        10.6   8.79  )-----------( 171      ( 01 Mar 2024 18:30 )             32.8     03    147<H>  |  107  |  30<H>  ----------------------------<  95  2.8<LL>   |  32<H>  |  1.79<H>    Ca    9.5      01 Mar 2024 18:30  Mg     1.8         TPro  7.6  /  Alb  3.2<L>  /  TBili  0.9  /  DBili  x   /  AST  15  /  ALT  12  /  AlkPhos  188<H>  03    PT/INR - ( 01 Mar 2024 18:30 )   PT: 33.9 sec;   INR: 3.07 ratio         PTT - ( 01 Mar 2024 18:30 )  PTT:39.4 sec  Urinalysis Basic - ( 01 Mar 2024 21:50 )    Color: Yellow / Appearance: Clear / S.010 / pH: x  Gluc: x / Ketone: Negative mg/dL  / Bili: Negative / Urobili: 0.2 mg/dL   Blood: x / Protein: Negative mg/dL / Nitrite: Negative   Leuk Esterase: Negative / RBC: x / WBC x   Sq Epi: x / Non Sq Epi: x / Bacteria: x       CAPILLARY BLOOD GLUCOSE            Urinalysis Basic - ( 01 Mar 2024 21:50 )    Color: Yellow / Appearance: Clear / S.010 / pH: x  Gluc: x / Ketone: Negative mg/dL  / Bili: Negative / Urobili: 0.2 mg/dL   Blood: x / Protein: Negative mg/dL / Nitrite: Negative   Leuk Esterase: Negative / RBC: x / WBC x   Sq Epi: x / Non Sq Epi: x / Bacteria: x        RADIOLOGY & ADDITIONAL TESTS:  < from: CT Abdomen and Pelvis No Cont (24 @ 19:54) >    BOWEL: No bowel obstruction. Small hiatal hernia. Fluid again noted in   the rectosigmoid colon along with air distention of the sigmoid colon to   8.0 cm previously 5.7 cm.  PERITONEUM: No ascites.  VESSELS: Atherosclerotic changes. 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  RETROPERITONEUM/LYMPH NODES: No lymphadenopathy.  ABDOMINAL WALL: Within normal limits.  BONES: Degenerative changes. Dextroscoliosis of the lumbar spine.    IMPRESSION:  Fluid in the rectosigmoid colon again noted with increase in degree of   air distention of the sigmoid colon since 10/11/2023.    < end of copied text >        Consultant(s) Notes Reviewed:  [x ] YES  [ ] NO  Care Discussed with Consultants/Other Providers [ x] YES  [ ] NO  Imaging Personally Reviewed:  [ ] YES  [ ] NO

## 2024-03-01 NOTE — ED ADULT NURSE NOTE - NSFALLHARMRISKINTERV_ED_ALL_ED
Assistance OOB with selected safe patient handling equipment if applicable/Assistance with ambulation/Communicate risk of Fall with Harm to all staff, patient, and family/Monitor gait and stability/Provide visual cue: red socks, yellow wristband, yellow gown, etc/Reinforce activity limits and safety measures with patient and family/Bed in lowest position, wheels locked, appropriate side rails in place/Call bell, personal items and telephone in reach/Instruct patient to call for assistance before getting out of bed/chair/stretcher/Non-slip footwear applied when patient is off stretcher/[place] to call system/Physically safe environment - no spills, clutter or unnecessary equipment/Purposeful Proactive Rounding/Room/bathroom lighting operational, light cord in reach

## 2024-03-01 NOTE — ED ADULT NURSE NOTE - OBJECTIVE STATEMENT
89 year old Female comes to the ER with diarrhea. Has 24 hour aids at home. History of dementia, does not follow commands or answer questions appropriately, disoriented. Patient verbally aggressive and confused 89 year old Female comes to the ER with diarrhea. Has 24 hour aids at home. History of dementia, Afib and pacemaker, does not follow commands or answer questions appropriately, disoriented. Patient verbally aggressive and confused. Daughter at bedside, involved in patient's care, able to provide descriptive history. 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 Denies pain at this time. Bed locked and in lowest position for safety. Medication sheet updated today in ER with family.

## 2024-03-01 NOTE — ED PROVIDER NOTE - CLINICAL SUMMARY MEDICAL DECISION MAKING FREE TEXT BOX
89-year-old female presents to the emergency department for diarrhea.  According to aide today, patient had a least 3 episodes of loose watery stool.  There is no complaints of pain per the patient.  No fever.  No vomiting.  No known sick contacts.  States that the last day she saw her was on Monday and there was no diarrhea at that time.  She states another aide was taking care of her throughout the week and when she came in today, the patient had already soiled his diaper.  Zunilda states that she heard there is a virus going around.  She would like to know if there is a medication she can give her.  Daughter is on the way    Exam as stated. Plan for labs and CT scan. Evaluate. Will discuss with family. 89-year-old female presents to the emergency department for diarrhea.  According to aide today, patient had a least 3 episodes of loose watery stool.  There is no complaints of pain per the patient.  No fever.  No vomiting.  No known sick contacts.  States that the last day she saw her was on Monday and there was no diarrhea at that time.  She states another aide was taking care of her throughout the week and when she came in today, the patient had already soiled his diaper.  Aide states that she heard there is a virus going around.  She would like to know if there is a medication she can give her.  Daughter is on the way    Exam as stated. Plan for labs and CT scan. Evaluate. Will discuss with family.    Daughter presented at bedside and reports the same history. 89-year-old female presents to the emergency department for diarrhea.  According to aide today, patient had a least 3 episodes of loose watery stool.  There is no complaints of pain per the patient.  No fever.  No vomiting.  No known sick contacts.  States that the last day she saw her was on Monday and there was no diarrhea at that time.  She states another aide was taking care of her throughout the week and when she came in today, the patient had already soiled his diaper.  Aide states that she heard there is a virus going around.  She would like to know if there is a medication she can give her.  Daughter is on the way    Exam as stated. Plan for labs and CT scan. Evaluate. Will discuss with family.    Daughter presented at bedside and reports the same history.    CT reviewed. Distention of the sigmoid to 8cm. Fluid filled and with air. Will admit. d/w aide at bedside.

## 2024-03-01 NOTE — ED ADULT TRIAGE NOTE - PRO INTERPRETER NEED 2
[FreeTextEntry1] : 4/4/2022\par MS. Looney presents today for a PTE. She is c/o redness and soreness of the left breast. She has moist desquamation under the breast that is uncomfortable. She has been using the Aquaphor and Xerofoam dressings and has been trying to let it open to air to dry it out. She is looking for some comfort relief. Dr. Alberts to see the patient with the recommendation of Silvadene cream to the area.  English

## 2024-03-01 NOTE — H&P ADULT - CONVERSATION DETAILS
Discussed with daughter Lisa at bedside regarding GOC. Lisa is designated health care agent. She does not wish to decide at this time regarding DNR/DNI. Patient will remain full code.

## 2024-03-01 NOTE — H&P ADULT - NSHPPHYSICALEXAM_GEN_ALL_CORE
T(C): 36.5 (03-01-24 @ 16:57), Max: 36.5 (03-01-24 @ 16:57)  HR: 68 (03-01-24 @ 22:00) (64 - 68)  BP: 144/82 (03-01-24 @ 22:00) (140/80 - 144/82)  RR: 18 (03-01-24 @ 22:00) (18 - 18)  SpO2: 96% (03-01-24 @ 22:00) (96% - 96%)    CONSTITUTIONAL: NAD  EYES: EOMI, No conjunctival or scleral injection, non-icteric  ENMT: slightly dry mucous membranes  RESP: CTA b/l, normal respiratory effort  CV: RRR, +S1S2, no MRG; no peripheral edema  GI: +distended, Soft, NT, no rebound, no guarding; no palpable masses  MSK: No digital clubbing or cyanosis  SKIN: heel stage 1 pressure injury  NEURO: grossly moves all 4 extremities  PSYCH: A+O x 2, baseline dementia, poor insight, pleasant affect T(C): 36.5 (03-01-24 @ 16:57), Max: 36.5 (03-01-24 @ 16:57)  HR: 68 (03-01-24 @ 22:00) (64 - 68)  BP: 144/82 (03-01-24 @ 22:00) (140/80 - 144/82)  RR: 18 (03-01-24 @ 22:00) (18 - 18)  SpO2: 96% (03-01-24 @ 22:00) (96% - 96%)    CONSTITUTIONAL: NAD  EYES: EOMI, No conjunctival or scleral injection, non-icteric  ENMT: slightly dry mucous membranes  RESP: CTA b/l, normal respiratory effort  CV: RRR, systolic murmur LSB, no MRG; no peripheral edema  GI: +distended, Soft, NT, no rebound, no guarding; no palpable masses  MSK: No digital clubbing or cyanosis  SKIN: intertrigo of groin, heel stage 1 pressure injury  NEURO: grossly moves all 4 extremities  PSYCH: A+O x 2, baseline dementia, poor insight, pleasant affect T(C): 36.5 (03-01-24 @ 16:57), Max: 36.5 (03-01-24 @ 16:57)  HR: 68 (03-01-24 @ 22:00) (64 - 68)  BP: 144/82 (03-01-24 @ 22:00) (140/80 - 144/82)  RR: 18 (03-01-24 @ 22:00) (18 - 18)  SpO2: 96% (03-01-24 @ 22:00) (96% - 96%)    CONSTITUTIONAL: NAD  EYES: EOMI, No conjunctival or scleral injection, non-icteric  ENMT: slightly dry mucous membranes  RESP: CTA b/l, normal respiratory effort  CV: RRR, systolic murmur LSB, no MRG; no peripheral edema  GI: +distended, Soft, NT, no rebound, no guarding; no palpable masses  MSK: No digital clubbing or cyanosis  SKIN: intertrigo of groin, heel stage 1 pressure injury, stage 2 buttock  NEURO: grossly moves all 4 extremities  PSYCH: A+O x 2, baseline dementia, poor insight, pleasant affect

## 2024-03-01 NOTE — H&P ADULT - HISTORY OF PRESENT ILLNESS
89F h/o dementia, Afib on coumadin, HLD, hypothyroidism, HTN, spinal stenosis, has pacemaker, presented to [facility] from home for diarrhea noted by HHA. History provided by patient's daughter. This morning her HHA noted >4 instances of watery diarrhea whenever she changed the patient. No noted abdominal pain, vomiting, fever, sweats. HHA last took care of patient on Monday and had no diarrhea then. Patient had a different aide from Tuesday to Thursday, she was unable to be reached. Limited ROS obtained due to patient's dementia, however she denies headache, abdominal pain, nausea/vomiting. 89F h/o dementia, Afib on coumadin, HLD, hypothyroidism, HTN, spinal stenosis, has pacemaker, presented to [facility] from home for diarrhea noted by HHA. History provided by patient's daughter. This morning her HHA noted >4 instances of watery diarrhea whenever she changed the patient. No noted abdominal pain, vomiting, fever, sweats. HHA last took care of patient on Monday and had no diarrhea then. Patient had a different aide from Tuesday to Thursday, she was unable to be reached. Limited ROS obtained due to patient's dementia, however she denies headache, abdominal pain, nausea/vomiting.    In ED pt afebrile, HR 64, /80, RR 18, sat 96% RA. Pt hypernatremic and hypokalemic. Given potassium repletion, NS bolus.

## 2024-03-01 NOTE — H&P ADULT - ATTENDING COMMENTS
I have personally seen and examined patient on the above date.  I discussed the case with Dr Caballero and I agree with findings and plan as detailed per note above, which I have amended where appropriate.    Superseding the above.   89 F hx of dementia, PPM, afib on coumadin, mitral/tricuspid insufficiency, HTN, HLD, hypothyroid, gout, [place] pw diarrhea. Pt alert awake but unable to provide any hx sec to dementia. Denied any HA, chest pain or abd pain. Per daughter Lisa at bedside [phone], pt's aide reported at least 4 episodes of watery diarrhea with no noted fevers, cough, abd pain, nausea or vomiting. No URI sxs. No recent antibx use.   Daughter reported recent small sacral wound Stage 2  In ED afebrile P: 64 BP: 140/80 sat 96% on RA.  PShx: PPM, R breast mastectomy for tumors -nonmalignant, bl TKR  Sochx: per daughter no tob, ETOH or illicit drug use.  FMHx; +CAD, CVA  NKDA  Med; Allopurinol 100mg qd, lipitor 10, Enalapril 5mg qd, lasix 40mg, levo 25, vit D 1000, memantine 5mg bid, seroquel 50mg qhs, coumadin as per prescription writer.  PE  obese, NAD, alert awake, conversant, +[place]  CV: S1S2, irreg, +systolic murmur  CL: CTA bl   Abd: soft, distended. NT, no omar or rigidity  Ext; neg CCE.   Skin: mild groin bl intertrigo. Stage 1 on R heel.   2 small Stage 2 in perianal region  Labs:   WBC: 8.8 hgb: 10.6 81%N INR: 3.07 Na: 147 K:2.8 Co2: 32 BUN/cr; 30/1.79 AP: 188 AST/ALT; 15/22 lip: 26  CXR: wet read. personally rev. cardiomegaly. +microPPM, no acute infiltrate.  EKG: personally rev. paced rhythm at 62bpm.  CTAP:   IMPRESSION:  Fluid in the rectosigmoid colon again noted with increase in degree of   air distention of the sigmoid colon since 10/11/2023.  AP: 89 F hx of dementia, PPM, afib on coumadin, mitral/tricuspid insufficiency, HTN, HLD, hypothyroid, gout, [place] pw diarrhea.  #Diarrhea/Dehydration with hypernatremia and hypokalemia.  IVFs with LR. monitor Na may need more hypotonic fluids.   replete K. monitor Mg  check GI pcr panel, stool cx. O/P  hold ACE/lasix until diarrhea resolves.   daily wts, I/Os dose lasix prn.   #CKD stable.   hold ace/lasix as above until diarrhea resolves.  #Hypothyroid  cont synthroid  #HLD  cont statin  #Dementia  cont memantine and seroquel.  #Stage2  decubiti prevention protocol.   #afib on coumadin  supratherapeutic INR  hold coumadin tonight  daily INR and dose coumadin daily  #GOC  D/W daughter. There are no advanced directives and undecided at this time. Pt is FULL CODE.    Rowena beck  D/W Dr Cruz ED.     Time spent includes direct patient care (interview and examination of patient), discussion with other providers, support staff and/or patient's family members, review of medical records, ordering diagnostic tests and analyzing results, and documentation.

## 2024-03-01 NOTE — ED PROVIDER NOTE - PHYSICAL EXAMINATION
General:     NAD, well kempt.   Eyes: PERRL, white sclera  Head:     NC/AT, EOMI  Pharynx: pharynx wnl, oral mucosa moist  Neck:     trachea midline  Lungs:     CTA b/l  CVS:     RRR  Abd:     +BS, soft, distended (aide states it has been this way for the 3-4 weeks that she has known her). Nontender.    Ext:   no deformities or edema.   Skin: no rash  Neuro: AAOx2, no sensory/motor deficits

## 2024-03-01 NOTE — ED PROVIDER NOTE - OBJECTIVE STATEMENT
89-year-old female presents to the emergency department for diarrhea.  According to aide today, patient had a least 3 episodes of loose watery stool.  There is no complaints of pain per the patient.  No fever.  No vomiting.  No known sick contacts.  States that the last day she saw her was on Monday and there was no diarrhea at that time.  She states another aide was taking care of her throughout the week and when she came in today, the patient had already soiled his diaper.  Aide states that she heard there is a virus going around.  She would like to know if there is a medication she can give her.  Daughter is on the way.    Patient has history of dementia.  Patient is also on Coumadin.  Other past medical history includes hypertension A-fib hyperlipidemia and hypothyroidism.

## 2024-03-01 NOTE — H&P ADULT - ASSESSMENT
- GI PCR, stool cx, O&P  - repeat BMP  - AM lytes, Mg, phos 89F h/o dementia, Afib on coumadin, HLD, hypothyroidism, HTN, spinal stenosis, has pacemaker, presented to [facility] from home for diarrhea    #Diarrhea, acute  - GI PCR, stool cx, O&P    #Hypernatremia  - Na 147    #Hypokalemia  - K 2.8 in ED  - s/p PO KCl 40mEq, IV KCl 10mEq x 3  - f/u repeat BMP  - AM lytes, Mg, phos 89F h/o dementia, Afib on coumadin, HLD, hypothyroidism, HTN, spinal stenosis, has pacemaker, presented to [facility] from home for diarrhea    #Diarrhea, acute  - GI PCR, stool cx, O&P    #Hypernatremia  - Na 147    #Hypokalemia  - K 2.8 in ED  - s/p PO KCl 40mEq, IV KCl 10mEq x 3  - f/u repeat BMP  - AM lytes, Mg, phos    Patient's daughter Lisa ([phone]) updated at bedside. 89F h/o dementia, Afib on coumadin, HLD, hypothyroidism, HTN, spinal stenosis, has pacemaker, presented to ED from home for diarrhea    #Diarrhea, acute  #Hypernatremia  #Hypokalemia  - Na 147  - K 2.8  - s/p PO KCl 40mEq, IV KCl 10mEq x 3  - hold home lasix for now until diarrhea resolves  - GI PCR, stool cx, O&P  - f/u repeat BMP  - AM lytes, Mg, phos    Patient's daughter Lisa ([phone]) updated at bedside. 89F h/o dementia, Afib on coumadin, HLD, hypothyroidism, HTN, spinal stenosis, has pacemaker, presented to [facility] from home for diarrhea    #Diarrhea, acute  #Hypernatremia  #Hypokalemia  - Na 147  - K 2.8  - s/p PO KCl 40mEq, IV KCl 10mEq x 3  - hold home lasix for now until diarrhea resolves  - IV LR for dehydration 2/2 fluid loss  - GI PCR, stool cx, O&P  - f/u repeat BMP  - daily weights, strict I/Os  - AM lytes, Mg, phos    #Afib on coumadin  - at home takes coumadin 1.25mg Monday, Thursday, takes 2.5mg other days.  - INR supratherapeutic, will hold coumadin tonight  - daily coumadin dosing based on PT/INR    #Dementia  #HTN  #HLD  #Hypothyroidism  #h/o gout  - c/w home memantine, atorvastatin, allopurinol, quetiapine, levothyroxine  - holding home enalapril    #VTE ppx  - coumadin daily dosing    Patient's daughter Lisa ([phone]) updated at bedside.    Full code. 89F h/o dementia, Afib on coumadin, HLD, hypothyroidism, HTN, spinal stenosis, has pacemaker, presented to ED from home for diarrhea    #Diarrhea, acute  #Hypernatremia  #Hypokalemia  - Na 147  - K 2.8  - s/p PO KCl 40mEq, IV KCl 10mEq x 3  - hold home lasix for now until diarrhea resolves  - IV LR for dehydration 2/2 fluid loss  - GI PCR, stool cx, O&P  - f/u repeat BMP  - daily weights, strict I/Os  - AM lytes, Mg, phos    #Pressure injury, heel, sacrum  - decubitus prevention protocol    #Afib on coumadin  - at home takes coumadin 1.25mg Monday, Thursday, takes 2.5mg other days.  - INR supratherapeutic, will hold coumadin tonight  - daily coumadin dosing based on PT/INR    #Dementia  #HTN  #HLD  #Hypothyroidism  #h/o gout  - c/w home memantine, atorvastatin, allopurinol, quetiapine, levothyroxine  - holding home enalapril    #VTE ppx  - coumadin daily dosing    Patient's daughter Lisa ([phone]) updated at bedside.    Full code.    Discussed with Dr. Ramos. 89F h/o dementia, Afib on coumadin, HLD, hypothyroidism, HTN, spinal stenosis, has pacemaker, presented to ED from home for diarrhea    #Diarrhea, acute  #Hypernatremia  #Hypokalemia  - Na 147  - K 2.8  - s/p PO KCl 40mEq, IV KCl 10mEq x 3  - hold home lasix, enalapril for now until diarrhea resolves  - IV LR for dehydration 2/2 fluid loss  - GI PCR, stool cx, O&P  - f/u repeat BMP  - daily weights, strict I/Os  - AM lytes, Mg, phos    #CKD  - Cr near baseline  - hold enalapril  - c/w fluids    #Pressure injury, heel, sacrum  - decubitus prevention protocol    #Afib on coumadin  - at home takes coumadin 1.25mg Monday, Thursday, takes 2.5mg other days.  - INR supratherapeutic, will hold coumadin tonight  - daily coumadin dosing based on PT/INR    #Dementia  #HTN  #HLD  #Hypothyroidism  #h/o gout  - c/w home memantine, atorvastatin, allopurinol, quetiapine, levothyroxine  - holding home enalapril    #VTE ppx  - coumadin daily dosing    Patient's daughter Lisa ([phone]) updated at bedside.    Full code.    Discussed with Dr. Ramos.

## 2024-03-02 LAB
ALBUMIN SERPL ELPH-MCNC: 3.1 G/DL — LOW (ref 3.3–5)
ALP SERPL-CCNC: 173 U/L — HIGH (ref 40–120)
ALT FLD-CCNC: 9 U/L — LOW (ref 10–45)
ANION GAP SERPL CALC-SCNC: 7 MMOL/L — SIGNIFICANT CHANGE UP (ref 5–17)
ANION GAP SERPL CALC-SCNC: 9 MMOL/L — SIGNIFICANT CHANGE UP (ref 5–17)
AST SERPL-CCNC: 16 U/L — SIGNIFICANT CHANGE UP (ref 10–40)
BASOPHILS # BLD AUTO: 0.01 K/UL — SIGNIFICANT CHANGE UP (ref 0–0.2)
BASOPHILS NFR BLD AUTO: 0.1 % — SIGNIFICANT CHANGE UP (ref 0–2)
BILIRUB SERPL-MCNC: 0.8 MG/DL — SIGNIFICANT CHANGE UP (ref 0.2–1.2)
BUN SERPL-MCNC: 25 MG/DL — HIGH (ref 7–23)
BUN SERPL-MCNC: 26 MG/DL — HIGH (ref 7–23)
CALCIUM SERPL-MCNC: 9 MG/DL — SIGNIFICANT CHANGE UP (ref 8.4–10.5)
CALCIUM SERPL-MCNC: 9.6 MG/DL — SIGNIFICANT CHANGE UP (ref 8.4–10.5)
CHLORIDE SERPL-SCNC: 108 MMOL/L — SIGNIFICANT CHANGE UP (ref 96–108)
CHLORIDE SERPL-SCNC: 110 MMOL/L — HIGH (ref 96–108)
CO2 SERPL-SCNC: 29 MMOL/L — SIGNIFICANT CHANGE UP (ref 22–31)
CO2 SERPL-SCNC: 30 MMOL/L — SIGNIFICANT CHANGE UP (ref 22–31)
CREAT SERPL-MCNC: 1.56 MG/DL — HIGH (ref 0.5–1.3)
CREAT SERPL-MCNC: 1.63 MG/DL — HIGH (ref 0.5–1.3)
CULTURE RESULTS: SIGNIFICANT CHANGE UP
EGFR: 30 ML/MIN/1.73M2 — LOW
EGFR: 32 ML/MIN/1.73M2 — LOW
EOSINOPHIL # BLD AUTO: 0.12 K/UL — SIGNIFICANT CHANGE UP (ref 0–0.5)
EOSINOPHIL NFR BLD AUTO: 1.5 % — SIGNIFICANT CHANGE UP (ref 0–6)
GLUCOSE SERPL-MCNC: 97 MG/DL — SIGNIFICANT CHANGE UP (ref 70–99)
GLUCOSE SERPL-MCNC: 98 MG/DL — SIGNIFICANT CHANGE UP (ref 70–99)
HCT VFR BLD CALC: 28.5 % — LOW (ref 34.5–45)
HGB BLD-MCNC: 9.1 G/DL — LOW (ref 11.5–15.5)
IMM GRANULOCYTES NFR BLD AUTO: 0.4 % — SIGNIFICANT CHANGE UP (ref 0–0.9)
INR BLD: 2.61 RATIO — HIGH (ref 0.85–1.18)
LYMPHOCYTES # BLD AUTO: 0.76 K/UL — LOW (ref 1–3.3)
LYMPHOCYTES # BLD AUTO: 9.6 % — LOW (ref 13–44)
MAGNESIUM SERPL-MCNC: 2.3 MG/DL — SIGNIFICANT CHANGE UP (ref 1.6–2.6)
MCHC RBC-ENTMCNC: 29.7 PG — SIGNIFICANT CHANGE UP (ref 27–34)
MCHC RBC-ENTMCNC: 31.9 GM/DL — LOW (ref 32–36)
MCV RBC AUTO: 93.1 FL — SIGNIFICANT CHANGE UP (ref 80–100)
MONOCYTES # BLD AUTO: 0.44 K/UL — SIGNIFICANT CHANGE UP (ref 0–0.9)
MONOCYTES NFR BLD AUTO: 5.5 % — SIGNIFICANT CHANGE UP (ref 2–14)
NEUTROPHILS # BLD AUTO: 6.59 K/UL — SIGNIFICANT CHANGE UP (ref 1.8–7.4)
NEUTROPHILS NFR BLD AUTO: 82.9 % — HIGH (ref 43–77)
NRBC # BLD: 0 /100 WBCS — SIGNIFICANT CHANGE UP (ref 0–0)
PHOSPHATE SERPL-MCNC: 3 MG/DL — SIGNIFICANT CHANGE UP (ref 2.5–4.5)
PLATELET # BLD AUTO: 167 K/UL — SIGNIFICANT CHANGE UP (ref 150–400)
POTASSIUM SERPL-MCNC: 2.6 MMOL/L — CRITICAL LOW (ref 3.5–5.3)
POTASSIUM SERPL-MCNC: 2.8 MMOL/L — CRITICAL LOW (ref 3.5–5.3)
POTASSIUM SERPL-MCNC: 3 MMOL/L — LOW (ref 3.5–5.3)
POTASSIUM SERPL-SCNC: 2.6 MMOL/L — CRITICAL LOW (ref 3.5–5.3)
POTASSIUM SERPL-SCNC: 2.8 MMOL/L — CRITICAL LOW (ref 3.5–5.3)
POTASSIUM SERPL-SCNC: 3 MMOL/L — LOW (ref 3.5–5.3)
PROT SERPL-MCNC: 7.2 G/DL — SIGNIFICANT CHANGE UP (ref 6–8.3)
PROTHROM AB SERPL-ACNC: 29.7 SEC — HIGH (ref 9.5–13)
RBC # BLD: 3.06 M/UL — LOW (ref 3.8–5.2)
RBC # FLD: 16.6 % — HIGH (ref 10.3–14.5)
SODIUM SERPL-SCNC: 145 MMOL/L — SIGNIFICANT CHANGE UP (ref 135–145)
SODIUM SERPL-SCNC: 148 MMOL/L — HIGH (ref 135–145)
SPECIMEN SOURCE: SIGNIFICANT CHANGE UP
WBC # BLD: 7.95 K/UL — SIGNIFICANT CHANGE UP (ref 3.8–10.5)
WBC # FLD AUTO: 7.95 K/UL — SIGNIFICANT CHANGE UP (ref 3.8–10.5)

## 2024-03-02 PROCEDURE — 99233 SBSQ HOSP IP/OBS HIGH 50: CPT | Mod: GC

## 2024-03-02 RX ORDER — POTASSIUM CHLORIDE 20 MEQ
10 PACKET (EA) ORAL
Refills: 0 | Status: COMPLETED | OUTPATIENT
Start: 2024-03-02 | End: 2024-03-02

## 2024-03-02 RX ORDER — WARFARIN SODIUM 2.5 MG/1
2.5 TABLET ORAL DAILY
Refills: 0 | Status: DISCONTINUED | OUTPATIENT
Start: 2024-03-02 | End: 2024-03-03

## 2024-03-02 RX ORDER — MAGNESIUM SULFATE 500 MG/ML
1 VIAL (ML) INJECTION ONCE
Refills: 0 | Status: COMPLETED | OUTPATIENT
Start: 2024-03-02 | End: 2024-03-02

## 2024-03-02 RX ORDER — SODIUM CHLORIDE 9 MG/ML
500 INJECTION, SOLUTION INTRAVENOUS
Refills: 0 | Status: DISCONTINUED | OUTPATIENT
Start: 2024-03-02 | End: 2024-03-03

## 2024-03-02 RX ORDER — POTASSIUM CHLORIDE 20 MEQ
40 PACKET (EA) ORAL ONCE
Refills: 0 | Status: COMPLETED | OUTPATIENT
Start: 2024-03-02 | End: 2024-03-02

## 2024-03-02 RX ORDER — POTASSIUM CHLORIDE 20 MEQ
10 PACKET (EA) ORAL
Refills: 0 | Status: DISCONTINUED | OUTPATIENT
Start: 2024-03-02 | End: 2024-03-02

## 2024-03-02 RX ADMIN — Medication 100 MILLIGRAM(S): at 13:22

## 2024-03-02 RX ADMIN — Medication 25 MICROGRAM(S): at 06:26

## 2024-03-02 RX ADMIN — ATORVASTATIN CALCIUM 10 MILLIGRAM(S): 80 TABLET, FILM COATED ORAL at 23:47

## 2024-03-02 RX ADMIN — Medication 100 MILLIEQUIVALENT(S): at 07:12

## 2024-03-02 RX ADMIN — Medication 40 MILLIEQUIVALENT(S): at 18:23

## 2024-03-02 RX ADMIN — Medication 100 GRAM(S): at 05:01

## 2024-03-02 RX ADMIN — Medication 100 MILLIEQUIVALENT(S): at 05:01

## 2024-03-02 RX ADMIN — SODIUM CHLORIDE 80 MILLILITER(S): 9 INJECTION, SOLUTION INTRAVENOUS at 05:01

## 2024-03-02 RX ADMIN — WARFARIN SODIUM 2.5 MILLIGRAM(S): 2.5 TABLET ORAL at 23:45

## 2024-03-02 RX ADMIN — Medication 100 MILLIEQUIVALENT(S): at 09:33

## 2024-03-02 RX ADMIN — Medication 100 MILLIEQUIVALENT(S): at 00:22

## 2024-03-02 RX ADMIN — MEMANTINE HYDROCHLORIDE 5 MILLIGRAM(S): 10 TABLET ORAL at 18:30

## 2024-03-02 RX ADMIN — QUETIAPINE FUMARATE 50 MILLIGRAM(S): 200 TABLET, FILM COATED ORAL at 23:47

## 2024-03-02 RX ADMIN — Medication 1000 UNIT(S): at 13:12

## 2024-03-02 RX ADMIN — MEMANTINE HYDROCHLORIDE 5 MILLIGRAM(S): 10 TABLET ORAL at 06:26

## 2024-03-02 NOTE — DIETITIAN INITIAL EVALUATION ADULT - ORAL INTAKE PTA/DIET HISTORY
Pt with hx dementia, interview conducted with pt's daughter, Lisa, via phone. Pt tolerates regular diet at baseline- has HHA who prepare 3 meals/day. Appetite generally good- daughter states that she has been providing Ensure qd just in case her mom dislikes meals provided by aides. NKFA. States that pt's BM's have always been on the looser side. Uses Lactaid milk at home (no hx lactose intolerance). Follows low Na, consistent vitamin K for chronic coumadin use.

## 2024-03-02 NOTE — PATIENT PROFILE ADULT - FALL HARM RISK - HARM RISK INTERVENTIONS

## 2024-03-02 NOTE — DIETITIAN INITIAL EVALUATION ADULT - PERSON TAUGHT/METHOD
coumadin/vitamin K, optimal nutrition/verbal instruction/teach back - (Patient repeats in own words)/daughter instructed

## 2024-03-02 NOTE — PROGRESS NOTE ADULT - SUBJECTIVE AND OBJECTIVE BOX
Patient is a 89y old  Female who presents with a chief complaint of diarrhea (02 Mar 2024 15:07)      INTERVAL HPI/ OVERNIGHT EVENTS: Patient seen and examined at bedside. No overnight events.    MEDICATIONS  (STANDING):  allopurinol 100 milliGRAM(s) Oral daily  atorvastatin 10 milliGRAM(s) Oral at bedtime  cholecalciferol 1000 Unit(s) Oral daily  lactated ringers. 500 milliLiter(s) (60 mL/Hr) IV Continuous <Continuous>  levothyroxine 25 MICROGram(s) Oral daily  memantine 5 milliGRAM(s) Oral two times a day  QUEtiapine 50 milliGRAM(s) Oral at bedtime  warfarin 2.5 milliGRAM(s) Oral daily    MEDICATIONS  (PRN):  acetaminophen     Tablet .. 650 milliGRAM(s) Oral every 6 hours PRN Temp greater or equal to 38C (100.4F), Mild Pain (1 - 3)  aluminum hydroxide/magnesium hydroxide/simethicone Suspension 30 milliLiter(s) Oral every 4 hours PRN Dyspepsia  melatonin 3 milliGRAM(s) Oral at bedtime PRN Insomnia  ondansetron Injectable 4 milliGRAM(s) IV Push every 8 hours PRN Nausea and/or Vomiting      Allergies    No Known Allergies    Intolerances        REVIEW OF SYSTEMS:  CONSTITUTIONAL: No fever or chills  HEENT:  No headache, no sore throat  RESPIRATORY: No cough, wheezing, or shortness of breath  CARDIOVASCULAR: No chest pain, palpitations  GASTROINTESTINAL: No abd pain, nausea, vomiting, or diarrhea  GENITOURINARY: No dysuria, frequency, or hematuria  NEUROLOGICAL: no focal weakness or dizziness  MUSCULOSKELETAL: no myalgias     Vital Signs Last 24 Hrs  T(C): 36.6 (02 Mar 2024 12:45), Max: 36.6 (02 Mar 2024 00:06)  T(F): 97.9 (02 Mar 2024 12:45), Max: 97.9 (02 Mar 2024 00:06)  HR: 73 (02 Mar 2024 12:45) (59 - 73)  BP: 135/84 (02 Mar 2024 12:45) (122/70 - 144/82)  BP(mean): --  RR: 18 (02 Mar 2024 12:45) (18 - 19)  SpO2: 94% (02 Mar 2024 12:45) (92% - 98%)    Parameters below as of 02 Mar 2024 12:45  Patient On (Oxygen Delivery Method): room air      I&O's Summary    BMI (kg/m2): 41.8 (03-02-24 @ 11:16)    PHYSICAL EXAM:  Physical Exam: T(C): 36.5 (03-01-24 @ 16:57), Max: 36.5 (03-01-24 @ 16:57)  HR: 68 (03-01-24 @ 22:00) (64 - 68)  BP: 144/82 (03-01-24 @ 22:00) (140/80 - 144/82)  RR: 18 (03-01-24 @ 22:00) (18 - 18)  SpO2: 96% (03-01-24 @ 22:00) (96% - 96%)    CONSTITUTIONAL: NAD  EYES: EOMI, No conjunctival or scleral injection, non-icteric  ENMT: slightly dry mucous membranes  RESP: CTA b/l, normal respiratory effort  CV: RRR, systolic murmur LSB, no MRG; no peripheral edema  GI: +distended, Soft, NT, no rebound, no guarding; no palpable masses  MSK: No digital clubbing or cyanosis  SKIN: intertrigo of groin, heel stage 1 pressure injury, stage 2 buttock  NEURO: grossly moves all 4 extremities  PSYCH: A+O x 2, baseline dementia, poor insight, pleasant affect      LABS: Personally reviewed  CBC                        9.1    7.95  )-----------( 167      ( 02 Mar 2024 07:52 )             28.5     CMP  03-02    x   |  x   |  x   ----------------------------<  x   3.0   |  x   |  x     Ca    9.6      02 Mar 2024 07:52  Phos  3.0     03-02  Mg     2.3     03-02    TPro  7.2  /  Alb  3.1  /  TBili  0.8  /  DBili  x   /  AST  16  /  ALT  9   /  AlkPhos  173  03-02      Lipase: 26 U/L (03-01-24 @ 18:30)      PT/INR - ( 02 Mar 2024 07:52 )   PT: 29.7 sec;   INR: 2.61 ratio         PTT - ( 01 Mar 2024 18:30 )  PTT:39.4 sec                            Urinalysis Basic - ( 02 Mar 2024 07:52 )    Color: x / Appearance: x / SG: x / pH: x  Gluc: 97 mg/dL / Ketone: x  / Bili: x / Urobili: x   Blood: x / Protein: x / Nitrite: x   Leuk Esterase: x / RBC: x / WBC x   Sq Epi: x / Non Sq Epi: x / Bacteria: x                RADIOLOGY & ADDITIONAL TESTS: Personally reviewed.     Consultant(s) Notes Reviewed:  [x] YES  [ ] NO   Discussed with SW/CRYSTAL, RN

## 2024-03-02 NOTE — DIETITIAN INITIAL EVALUATION ADULT - PERTINENT LABORATORY DATA
03-02    148<H>  |  110<H>  |  25<H>  ----------------------------<  97  2.8<LL>   |  29  |  1.56<H>    Ca    9.6      02 Mar 2024 07:52  Phos  3.0     03-02  Mg     2.3     03-02    TPro  7.2  /  Alb  3.1<L>  /  TBili  0.8  /  DBili  x   /  AST  16  /  ALT  9<L>  /  AlkPhos  173<H>  03-02

## 2024-03-02 NOTE — DIETITIAN INITIAL EVALUATION ADULT - OTHER INFO
89F h/o dementia, Afib on coumadin, HLD, hypothyroidism, HTN, spinal stenosis, has pacemaker, presented to [facility] from home for diarrhea noted by HHA. History provided by patient's daughter. This morning her HHA noted >4 instances of watery diarrhea whenever she changed the patient. No noted abdominal pain, vomiting, fever, sweats. HHA last took care of patient on Monday and had no diarrhea then. Patient had a different aide from Tuesday to Thursday, she was unable to be reached. Limited ROS obtained due to patient's dementia, however she denies headache, abdominal pain, nausea/vomiting.  Pt currently remains NPO + IV LR @80ml/hr. Daughter unsure of pt's UBW as she is unable to stand on a scale. .8lbs. Possible weight gain in recent months. Height ~5'0" per daughter. Last BM 3/1 liquid. Hyperkalemia with repletion in progress. No pressure ulcers documented in nursing flow sheets: left heel callus, perirectal skin tear. Reviewed coumadin/vitamin K interaction + highest sources of Vitamin K. Recommend diet advancement to DASH/TLC when medically feasible.

## 2024-03-02 NOTE — CHART NOTE - NSCHARTNOTEFT_GEN_A_CORE
Pt pulled out her 3rd IV today and IV potassium could not be given. Ordered potassium chloride 40mg ER tablet.    Will recheck K at 9:30pm.

## 2024-03-02 NOTE — DIETITIAN INITIAL EVALUATION ADULT - PERTINENT MEDS FT
MEDICATIONS  (STANDING):  allopurinol 100 milliGRAM(s) Oral daily  atorvastatin 10 milliGRAM(s) Oral at bedtime  cholecalciferol 1000 Unit(s) Oral daily  lactated ringers. 1000 milliLiter(s) (80 mL/Hr) IV Continuous <Continuous>  levothyroxine 25 MICROGram(s) Oral daily  memantine 5 milliGRAM(s) Oral two times a day  QUEtiapine 50 milliGRAM(s) Oral at bedtime    MEDICATIONS  (PRN):  acetaminophen     Tablet .. 650 milliGRAM(s) Oral every 6 hours PRN Temp greater or equal to 38C (100.4F), Mild Pain (1 - 3)  aluminum hydroxide/magnesium hydroxide/simethicone Suspension 30 milliLiter(s) Oral every 4 hours PRN Dyspepsia  melatonin 3 milliGRAM(s) Oral at bedtime PRN Insomnia  ondansetron Injectable 4 milliGRAM(s) IV Push every 8 hours PRN Nausea and/or Vomiting

## 2024-03-02 NOTE — DIETITIAN INITIAL EVALUATION ADULT - NSPROEDAREADYLEARNOTH_GEN_A_NUR
In an effort to ensure that our patients LiveWell, a Team Member has reviewed your chart and identified an opportunity to provide the best care possible. An attempt was made to discuss or schedule overdue Preventive or Disease Management screening.     The Outcome was Contact was not made, left messageIf you have any questions or need help with scheduling, contact your primary care provider.. Care Gaps include Immunizations and Wellness Visits.    Due for 15 month well exam and immunizations due     none

## 2024-03-02 NOTE — PATIENT PROFILE ADULT - TRANSPORTATION
[facility]  Progress Note    Jordyn Monteiro Patient Status:  Inpatient    1963 MRN [id number]   [facility] 0SW-A Attending Yoly Dunne MD   Hosp Day # 2 PCP Misha Marcos MD     Subjective:    Pain controlled. Positive flatus. gastric bypass     History of incisional hernia repair     Chronic constipation     Tension headache     TMJ dysfunction     Azotemia     Hyperglycemia     Abdominal pain, acute     Small bowel obstruction (HCC)     Transient left leg weakness     Left zuhair no

## 2024-03-02 NOTE — PROVIDER CONTACT NOTE (CRITICAL VALUE NOTIFICATION) - TEST AND RESULT REPORTED:
----- Message from Moncho Severino MD sent at 6/1/2023  8:55 AM CDT -----  Please sasha Kim know that his labs are essentially normal. Follow up with sleep study as discussed.  
K+ 2.8
Potassium 2.6
potassium 2.8

## 2024-03-02 NOTE — PROGRESS NOTE ADULT - ASSESSMENT
89F h/o dementia, Afib on warfarin, Hyperlipidemia, hypothyroidism, HTN, spinal stenosis, has pacemaker, presented to [facility] from home for diarrhea    #Diarrhea, acute  - continues to have episodes of diarrhea  - contact precautions  - hold home lasix, enalapril for now until diarrhea resolves  - follow up GI PCR, stool cx, O&P  - CT abd/pelvis: Fluid in the rectosigmoid colon again noted with increase in degree of air distention of the sigmoid colon since 10/11/2023    #Hypernatremia  - Na 147 > 148  - IVF LR @60ml/hr for 12 hours    #Hypokalemia  - K 2.8> 3  - s/p IV KCl 10mEq x 6  - ordered IV KCl 10mEq x3, repeat K  - continue to monitor and replete  - daily weights, strict I/Os    #CKD  - Cr near baseline  - hold enalapril  - c/w fluids    #Pressure injury, heel, sacrum  - decubitus prevention protocol    #Afib on warfarin  - at home takes warfarin 1.25mg Monday, Thursday, takes 2.5mg other days  - INR 3.07 on admission, INR 2.61 today  - resume warfarin 2.5mg tonight  - daily warfarin dosing based on PT/INR    #Dementia  - c/w memantine 5mg,  quetiapine 50mg    #HTN  - hold enalapril     #HLD  - atorvastatin 10mg    #Hypothyroidism  - levothyroxine 25mcg    #h/o gout  - c/w  allopurinol 100mg    #VTE ppx  - coumadin daily dosing    #GOC  - Full code    Dispo: pt from home, follow up PT eval    Patient's daughter Lisa ([phone]) updated

## 2024-03-02 NOTE — PATIENT PROFILE ADULT - FUNCTIONAL ASSESSMENT - DAILY ACTIVITY 3.
Patient was given Cipro 500 mg for prophylaxis before cystoscopy.  Pt was prepped with betadine and lidocaine.    Please see doctors progress note from date of service 7/1/2021      The patient was prepped and draped in the usual sterile fashion. The urethra was anesthetized using 2% lidocaine jelly local.       2 = A lot of assistance

## 2024-03-02 NOTE — PROGRESS NOTE ADULT - ATTENDING COMMENTS
Please see resident d/c note for full details regarding the service.     PE: A+Ox1, no murmurs, lungs CTA b/l, abd NT/ND, no lower extremity swelling    Assessment:   - Diarrhea r/o infectious etiology   - Mild hypernatremia   - Hypokalemia   - Heel ulcer   - Supratherpeutic INR (improved)   - History of dementia, Afib on coumadin, HLD, hypothyroidism, HTN, spinal stenosis, has pacemaker     Plan:   - INR 2.61 -> will give dose of Warfarin tonight and f/u AM INR   - Mg 1.8 -> 2.3 s/p repletion   - K+ 2.6 -> 2.8 -> repleting and will f/u repeat K+ in the evening   - f/u GI PCR, stool O+P, stool culture   - Holding lasix and enalapril for now -> restart when diarrhea improving   - c/w gentle IVF for now (monitor closely for fluid overload)   - Will discuss GOC with pt's family   - CT abd/pelvis: Fluid in the rectosigmoid colon again noted with increase in degree of air distention of the sigmoid colon since 10/11/2023 -> f/u stool studies, does not require GI consult at this time, likely in the setting of diarrhea   - Dispo: Medically active, possible d/c in 24-48 hours     I spent a total of 45 minutes on the date of this encounter coordinating the patient's care. This includes reviewing results/imaging and discussions with specialists, nursing, case management/social work. Further tests, medications, and procedures have been ordered as indicated. Results and the plan of care were communicated to the patient and/or their family member. Supporting documentation was completed and added to the patient's chart.

## 2024-03-02 NOTE — PROVIDER CONTACT NOTE (CRITICAL VALUE NOTIFICATION) - SITUATION
Patient has had another critical low potassium had one last night
Pt. admitted for diarrhea, r/o cdiff. Post receiving 3 potassium riders, critical value potassium 2.6.

## 2024-03-03 LAB
ANION GAP SERPL CALC-SCNC: 10 MMOL/L — SIGNIFICANT CHANGE UP (ref 5–17)
BUN SERPL-MCNC: 24 MG/DL — HIGH (ref 7–23)
CALCIUM SERPL-MCNC: 9.6 MG/DL — SIGNIFICANT CHANGE UP (ref 8.4–10.5)
CHLORIDE SERPL-SCNC: 112 MMOL/L — HIGH (ref 96–108)
CO2 SERPL-SCNC: 27 MMOL/L — SIGNIFICANT CHANGE UP (ref 22–31)
CREAT SERPL-MCNC: 1.55 MG/DL — HIGH (ref 0.5–1.3)
EGFR: 32 ML/MIN/1.73M2 — LOW
GLUCOSE SERPL-MCNC: 89 MG/DL — SIGNIFICANT CHANGE UP (ref 70–99)
HCT VFR BLD CALC: 31.5 % — LOW (ref 34.5–45)
HGB BLD-MCNC: 9.8 G/DL — LOW (ref 11.5–15.5)
INR BLD: 2.14 RATIO — HIGH (ref 0.85–1.18)
MCHC RBC-ENTMCNC: 30.1 PG — SIGNIFICANT CHANGE UP (ref 27–34)
MCHC RBC-ENTMCNC: 31.1 GM/DL — LOW (ref 32–36)
MCV RBC AUTO: 96.6 FL — SIGNIFICANT CHANGE UP (ref 80–100)
NRBC # BLD: 0 /100 WBCS — SIGNIFICANT CHANGE UP (ref 0–0)
PLATELET # BLD AUTO: 163 K/UL — SIGNIFICANT CHANGE UP (ref 150–400)
POTASSIUM SERPL-MCNC: 3 MMOL/L — LOW (ref 3.5–5.3)
POTASSIUM SERPL-MCNC: 3.6 MMOL/L — SIGNIFICANT CHANGE UP (ref 3.5–5.3)
POTASSIUM SERPL-SCNC: 3 MMOL/L — LOW (ref 3.5–5.3)
POTASSIUM SERPL-SCNC: 3.6 MMOL/L — SIGNIFICANT CHANGE UP (ref 3.5–5.3)
PROTHROM AB SERPL-ACNC: 23.9 SEC — HIGH (ref 9.5–13)
RBC # BLD: 3.26 M/UL — LOW (ref 3.8–5.2)
RBC # FLD: 16.8 % — HIGH (ref 10.3–14.5)
SODIUM SERPL-SCNC: 149 MMOL/L — HIGH (ref 135–145)
WBC # BLD: 7.52 K/UL — SIGNIFICANT CHANGE UP (ref 3.8–10.5)
WBC # FLD AUTO: 7.52 K/UL — SIGNIFICANT CHANGE UP (ref 3.8–10.5)

## 2024-03-03 PROCEDURE — 99232 SBSQ HOSP IP/OBS MODERATE 35: CPT | Mod: GC

## 2024-03-03 PROCEDURE — 74018 RADEX ABDOMEN 1 VIEW: CPT | Mod: 26

## 2024-03-03 RX ORDER — WARFARIN SODIUM 2.5 MG/1
2.5 TABLET ORAL ONCE
Refills: 0 | Status: COMPLETED | OUTPATIENT
Start: 2024-03-03 | End: 2024-03-03

## 2024-03-03 RX ORDER — POLYETHYLENE GLYCOL 3350 17 G/17G
17 POWDER, FOR SOLUTION ORAL DAILY
Refills: 0 | Status: DISCONTINUED | OUTPATIENT
Start: 2024-03-03 | End: 2024-03-06

## 2024-03-03 RX ORDER — SENNA PLUS 8.6 MG/1
1 TABLET ORAL DAILY
Refills: 0 | Status: DISCONTINUED | OUTPATIENT
Start: 2024-03-03 | End: 2024-03-15

## 2024-03-03 RX ORDER — FUROSEMIDE 40 MG
40 TABLET ORAL DAILY
Refills: 0 | Status: DISCONTINUED | OUTPATIENT
Start: 2024-03-03 | End: 2024-03-04

## 2024-03-03 RX ORDER — POTASSIUM CHLORIDE 20 MEQ
40 PACKET (EA) ORAL EVERY 4 HOURS
Refills: 0 | Status: COMPLETED | OUTPATIENT
Start: 2024-03-03 | End: 2024-03-03

## 2024-03-03 RX ADMIN — Medication 40 MILLIGRAM(S): at 18:02

## 2024-03-03 RX ADMIN — MEMANTINE HYDROCHLORIDE 5 MILLIGRAM(S): 10 TABLET ORAL at 18:04

## 2024-03-03 RX ADMIN — Medication 100 MILLIGRAM(S): at 12:10

## 2024-03-03 RX ADMIN — SENNA PLUS 1 TABLET(S): 8.6 TABLET ORAL at 18:03

## 2024-03-03 RX ADMIN — Medication 25 MICROGRAM(S): at 05:52

## 2024-03-03 RX ADMIN — ATORVASTATIN CALCIUM 10 MILLIGRAM(S): 80 TABLET, FILM COATED ORAL at 22:21

## 2024-03-03 RX ADMIN — MEMANTINE HYDROCHLORIDE 5 MILLIGRAM(S): 10 TABLET ORAL at 05:52

## 2024-03-03 RX ADMIN — Medication 10 MILLIGRAM(S): at 22:18

## 2024-03-03 RX ADMIN — QUETIAPINE FUMARATE 50 MILLIGRAM(S): 200 TABLET, FILM COATED ORAL at 22:22

## 2024-03-03 RX ADMIN — Medication 40 MILLIEQUIVALENT(S): at 05:51

## 2024-03-03 RX ADMIN — WARFARIN SODIUM 2.5 MILLIGRAM(S): 2.5 TABLET ORAL at 22:18

## 2024-03-03 RX ADMIN — Medication 1000 UNIT(S): at 12:10

## 2024-03-03 RX ADMIN — Medication 40 MILLIEQUIVALENT(S): at 01:01

## 2024-03-03 NOTE — PHYSICAL THERAPY INITIAL EVALUATION ADULT - PERTINENT HX OF CURRENT PROBLEM, REHAB EVAL
89F h/o dementia, Afib on coumadin, HLD, hypothyroidism, HTN, spinal stenosis, has pacemaker, presented to [facility] from home for diarrhea noted by HHA. History provided by patient's daughter-HHA noted >4 instances of watery diarrhea whenever she changed the patient. No noted abdominal pain, vomiting, fever, sweats. HHA last took care of patient on Monday and had no diarrhea then. Patient had a different aide from Tuesday to Thursday, she was unable to be reached. Limited ROS obtained due to patient's dementia, however she denies headache, abdominal pain, nausea/vomiting.

## 2024-03-03 NOTE — PHYSICAL THERAPY INITIAL EVALUATION ADULT - ADDITIONAL COMMENTS
pt lives alone, has 24/7 aides, per dtr pt mainly sits in the chair all day, has RW, WC and commode, was receiving PT at home but it stopped a month or so ago, dtr reports pt needs assist with all ADLs, limited ambulation, was able to transfer from bed to chair with RW and assist but has recently declined and required increased assist from aides

## 2024-03-03 NOTE — PHYSICAL THERAPY INITIAL EVALUATION ADULT - PRECAUTIONS/LIMITATIONS, REHAB EVAL
no known precautions/limitations +contact r/o CDiff/no known precautions/limitations/isolation precautions

## 2024-03-03 NOTE — PHYSICAL THERAPY INITIAL EVALUATION ADULT - NSPTDMEREC_GEN_A_CORE
discussed with daughter use of joann at home however dtr would like to hold off at this time and see how pt is managing with aides once home discussed with daughter use of joann at home however dtr would like to hold off at this time and see how pt is managing with aides once home/patient handling equipment/hospital bed/wheelchair bariatric hospital bed/patient handling equipment/hospital bed/wheelchair patient handling equipment/hospital bed/wheelchair

## 2024-03-03 NOTE — PROGRESS NOTE ADULT - ASSESSMENT
89F h/o dementia, Afib on warfarin, Hyperlipidemia, hypothyroidism, HTN, spinal stenosis, has pacemaker, presented to [facility] from home for diarrhea    #Diarrhea, Acute- unclear etiology   - Resolved   - Labs and vitals are stable   - No bowel movement since admission - Unable to collect stool samples.   - CT abd/pelvis: Fluid in the rectosigmoid colon again noted with increase in degree of air distention of the sigmoid colon since 10/11/2023  - will continue home enalapril  and Lasix   - d/c Isolation precautions    #Hypernatremia  - Na 147 > 148  - IVF LR @60ml/hr for 12 hours    #Hypokalemia  - K 2.8> 3  - s/p IV KCl 10mEq x 6  - ordered IV KCl 10mEq x3, repeat K  - continue to monitor and replete  - daily weights, strict I/Os    #CKD  - Cr near baseline  - hold enalapril  - c/w fluids    #Pressure injury, heel, sacrum  - decubitus prevention protocol    #Afib on warfarin  - at home takes warfarin 1.25mg Monday, Thursday, takes 2.5mg other days  - INR 3.07 on admission, INR 2.61 today  - resume warfarin 2.5mg tonight  - daily warfarin dosing based on PT/INR    #Dementia  - c/w memantine 5mg,  quetiapine 50mg    #HTN  - hold enalapril     #HLD  - atorvastatin 10mg    #Hypothyroidism  - levothyroxine 25mcg    #h/o gout  - c/w  allopurinol 100mg    #VTE ppx  - coumadin daily dosing    #GOC  - Full code    Dispo: pt from home, follow up PT eval    Patient's daughter Lisa ([phone]) updated    89F h/o dementia, Afib on warfarin, Hyperlipidemia, hypothyroidism, HTN, spinal stenosis, has pacemaker, presented to [facility] from home for diarrhea    #Diarrhea, Acute- unclear etiology   - Resolved   - Labs and vitals are stable   - No bowel movement since admission - Unable to collect stool samples.   - CT abd/pelvis: Fluid in the rectosigmoid colon again noted with increase in degree of air distention of the sigmoid colon since 10/11/2023  - will continue home enalapril  and Lasix   - d/c Isolation precautions    #Air distention of the sigmoid colon  -No bowel movement since 2 days   - Start  Senna and Miralax     #Hypernatremia  - Na 147 > 148 >149   -D/C IV fluids   - F/U am labs    #Hypokalemia  -Resolved     #CKD  - Cr near baseline  - C/W  enalapril    #Pressure injury, heel, sacrum  - decubitus prevention protocol    #Afib on warfarin  - at home takes warfarin 1.25mg Monday, Thursday, takes 2.5mg other days  - INR 3.07 on admission, INR 2.14 today  - resume warfarin 2.5mg tonight  - daily warfarin dosing based on PT/INR    #Dementia  - c/w memantine 5mg,  quetiapine 50mg    #HTN  - On  enalapril     #HLD  - atorvastatin 10mg    #Hypothyroidism  - levothyroxine 25mcg    #h/o gout  - c/w  allopurinol 100mg    #VTE ppx  - coumadin daily dosing    #GOC  - Full code    Dispo: pt from home, follow up PT eval    Patient's daughter Lisa ([phone]) updated

## 2024-03-03 NOTE — PROGRESS NOTE ADULT - SUBJECTIVE AND OBJECTIVE BOX
Patient is a 89y old  Female who presents with a chief complaint of diarrhea (02 Mar 2024 15:07)      INTERVAL HPI/ OVERNIGHT EVENTS: Patient seen and examined at bedside. No overnight events.    REVIEW OF SYSTEMS:  CONSTITUTIONAL: No fever or chills  HEENT:  No headache, no sore throat  RESPIRATORY: No cough, wheezing, or shortness of breath  CARDIOVASCULAR: No chest pain, palpitations  GASTROINTESTINAL: No abd pain, nausea, vomiting, or diarrhea  GENITOURINARY: No dysuria, frequency, or hematuria  NEUROLOGICAL: no focal weakness or dizziness  MUSCULOSKELETAL: no myalgias     Vital Signs Last 24 Hrs  T(C): 36.9 (03 Mar 2024 13:15), Max: 37 (03 Mar 2024 05:22)  T(F): 98.4 (03 Mar 2024 13:15), Max: 98.6 (03 Mar 2024 05:22)  HR: 67 (03 Mar 2024 13:15) (63 - 71)  BP: 165/97 (03 Mar 2024 13:15) (126/72 - 168/96)  BP(mean): --  RR: 17 (03 Mar 2024 13:15) (16 - 18)  SpO2: 94% (03 Mar 2024 13:15) (91% - 98%)    Parameters below as of 03 Mar 2024 13:15  Patient On (Oxygen Delivery Method): room air    CONSTITUTIONAL: NAD  EYES: EOMI, No conjunctival or scleral injection, non-icteric  ENMT: slightly dry mucous membranes  RESP: CTA b/l, normal respiratory effort  CV: RRR, systolic murmur LSB, no MRG; no peripheral edema  GI: +distended, Soft, NT, no rebound, no guarding; no palpable masses  MSK: No digital clubbing or cyanosis  SKIN: intertrigo of groin, heel stage 1 pressure injury, stage 2 buttock  NEURO: grossly moves all 4 extremities  PSYCH: A+O x 2, baseline dementia, poor insight, pleasant affect      LABS: Personally reviewed                          9.8    7.52  )-----------( 163      ( 03 Mar 2024 05:41 )             31.5     03-03    149<H>  |  112<H>  |  24<H>  ----------------------------<  89  3.6   |  27  |  1.55<H>    Ca    9.6      03 Mar 2024 05:41  Phos  3.0     03-02  Mg     2.3     03-02    TPro  7.2  /  Alb  3.1<L>  /  TBili  0.8  /  DBili  x   /  AST  16  /  ALT  9<L>  /  AlkPhos  173<H>  03-02      MEDICATIONS  (STANDING):  allopurinol 100 milliGRAM(s) Oral daily  atorvastatin 10 milliGRAM(s) Oral at bedtime  cholecalciferol 1000 Unit(s) Oral daily  furosemide    Tablet 40 milliGRAM(s) Oral daily  levothyroxine 25 MICROGram(s) Oral daily  memantine 5 milliGRAM(s) Oral two times a day  QUEtiapine 50 milliGRAM(s) Oral at bedtime  warfarin 2.5 milliGRAM(s) Oral once    MEDICATIONS  (PRN):  acetaminophen     Tablet .. 650 milliGRAM(s) Oral every 6 hours PRN Temp greater or equal to 38C (100.4F), Mild Pain (1 - 3)  aluminum hydroxide/magnesium hydroxide/simethicone Suspension 30 milliLiter(s) Oral every 4 hours PRN Dyspepsia  melatonin 3 milliGRAM(s) Oral at bedtime PRN Insomnia  ondansetron Injectable 4 milliGRAM(s) IV Push every 8 hours PRN Nausea and/or Vomiting

## 2024-03-03 NOTE — PROGRESS NOTE ADULT - ATTENDING COMMENTS
Please see resident note for full details regarding the service.     PE: A+Ox1, no murmurs, lungs CTA b/l, abd +distension, hyperactive bowel sounds, non-tender, no lower extremity swelling    Assessment:   - Diarrhea r/o infectious etiology   - Mild hypernatremia   - Hypokalemia   - Heel ulcer   - Supratherpeutic INR (improved)   - History of dementia, Afib on coumadin, HLD, hypothyroidism, HTN, spinal stenosis, has pacemaker     Plan:   - INR 2.14 -> will give dose of Warfarin tonight and f/u AM INR   - K+ WNL today s/p repletion   - f/u GI PCR, stool O+P, stool culture   - d/c IVF -> restart lasix and enalapril   - Will discuss GOC with pt's family   - Given abdominal distension, will order abd XR (prior CT showed air in the colon and fluid)   - Dispo: Medically active, possible d/c in 24-48 hours     I spent a total of 30 minutes on the date of this encounter coordinating the patient's care. This includes reviewing results/imaging and discussions with specialists, nursing, case management/social work. Further tests, medications, and procedures have been ordered as indicated. Results and the plan of care were communicated to the patient and/or their family member. Supporting documentation was completed and added to the patient's chart. Please see resident note for full details regarding the service.     PE: A+Ox1, no murmurs, lungs CTA b/l, abd +distension, hyperactive bowel sounds, non-tender, no lower extremity swelling    Assessment:   - Diarrhea r/o infectious etiology   - Mild hypernatremia   - Hypokalemia   - Heel ulcer   - Supratherpeutic INR (improved)   - History of dementia, Afib on coumadin, HLD, hypothyroidism, HTN, spinal stenosis, has pacemaker     Plan:   - INR 2.14 -> will give dose of Warfarin tonight and f/u AM INR   - K+ WNL today s/p repletion   - f/u GI PCR, stool O+P, stool culture   - d/c IVF -> restart lasix and enalapril   - Will discuss GOC with pt's family   - Given abdominal distension, will order abd XR (prior CT showed air in the colon and fluid)   - Patient has 24/7 aids at home -> PT recommends return home with home PT (may need joann lift, family to decide if needed)   - Dispo: Medically active, possible d/c in 24-48 hours     I spent a total of 30 minutes on the date of this encounter coordinating the patient's care. This includes reviewing results/imaging and discussions with specialists, nursing, case management/social work. Further tests, medications, and procedures have been ordered as indicated. Results and the plan of care were communicated to the patient and/or their family member. Supporting documentation was completed and added to the patient's chart.

## 2024-03-04 LAB
ALBUMIN SERPL ELPH-MCNC: 3 G/DL — LOW (ref 3.3–5)
ALP SERPL-CCNC: 159 U/L — HIGH (ref 40–120)
ALT FLD-CCNC: 12 U/L — SIGNIFICANT CHANGE UP (ref 10–45)
ANION GAP SERPL CALC-SCNC: 11 MMOL/L — SIGNIFICANT CHANGE UP (ref 5–17)
AST SERPL-CCNC: 22 U/L — SIGNIFICANT CHANGE UP (ref 10–40)
BILIRUB SERPL-MCNC: 0.8 MG/DL — SIGNIFICANT CHANGE UP (ref 0.2–1.2)
BUN SERPL-MCNC: 26 MG/DL — HIGH (ref 7–23)
CALCIUM SERPL-MCNC: 9.5 MG/DL — SIGNIFICANT CHANGE UP (ref 8.4–10.5)
CHLORIDE SERPL-SCNC: 112 MMOL/L — HIGH (ref 96–108)
CO2 SERPL-SCNC: 26 MMOL/L — SIGNIFICANT CHANGE UP (ref 22–31)
CREAT SERPL-MCNC: 1.63 MG/DL — HIGH (ref 0.5–1.3)
EGFR: 30 ML/MIN/1.73M2 — LOW
GLUCOSE SERPL-MCNC: 94 MG/DL — SIGNIFICANT CHANGE UP (ref 70–99)
HCT VFR BLD CALC: 30.1 % — LOW (ref 34.5–45)
HGB BLD-MCNC: 9.1 G/DL — LOW (ref 11.5–15.5)
INR BLD: 2.4 RATIO — HIGH (ref 0.85–1.18)
MAGNESIUM SERPL-MCNC: 1.9 MG/DL — SIGNIFICANT CHANGE UP (ref 1.6–2.6)
MCHC RBC-ENTMCNC: 29.4 PG — SIGNIFICANT CHANGE UP (ref 27–34)
MCHC RBC-ENTMCNC: 30.2 GM/DL — LOW (ref 32–36)
MCV RBC AUTO: 97.1 FL — SIGNIFICANT CHANGE UP (ref 80–100)
NRBC # BLD: 0 /100 WBCS — SIGNIFICANT CHANGE UP (ref 0–0)
PLATELET # BLD AUTO: 177 K/UL — SIGNIFICANT CHANGE UP (ref 150–400)
POTASSIUM SERPL-MCNC: 3.3 MMOL/L — LOW (ref 3.5–5.3)
POTASSIUM SERPL-MCNC: 4.7 MMOL/L — SIGNIFICANT CHANGE UP (ref 3.5–5.3)
POTASSIUM SERPL-SCNC: 3.3 MMOL/L — LOW (ref 3.5–5.3)
POTASSIUM SERPL-SCNC: 4.7 MMOL/L — SIGNIFICANT CHANGE UP (ref 3.5–5.3)
PROT SERPL-MCNC: 7.3 G/DL — SIGNIFICANT CHANGE UP (ref 6–8.3)
PROTHROM AB SERPL-ACNC: 27.4 SEC — HIGH (ref 9.5–13)
RBC # BLD: 3.1 M/UL — LOW (ref 3.8–5.2)
RBC # FLD: 16.7 % — HIGH (ref 10.3–14.5)
SODIUM SERPL-SCNC: 149 MMOL/L — HIGH (ref 135–145)
WBC # BLD: 7.85 K/UL — SIGNIFICANT CHANGE UP (ref 3.8–10.5)
WBC # FLD AUTO: 7.85 K/UL — SIGNIFICANT CHANGE UP (ref 3.8–10.5)

## 2024-03-04 PROCEDURE — 99232 SBSQ HOSP IP/OBS MODERATE 35: CPT | Mod: GC

## 2024-03-04 PROCEDURE — 99223 1ST HOSP IP/OBS HIGH 75: CPT

## 2024-03-04 RX ORDER — WARFARIN SODIUM 2.5 MG/1
2.5 TABLET ORAL ONCE
Refills: 0 | Status: COMPLETED | OUTPATIENT
Start: 2024-03-04 | End: 2024-03-04

## 2024-03-04 RX ORDER — SIMETHICONE 80 MG/1
80 TABLET, CHEWABLE ORAL DAILY
Refills: 0 | Status: DISCONTINUED | OUTPATIENT
Start: 2024-03-04 | End: 2024-03-15

## 2024-03-04 RX ORDER — POTASSIUM CHLORIDE 20 MEQ
40 PACKET (EA) ORAL ONCE
Refills: 0 | Status: COMPLETED | OUTPATIENT
Start: 2024-03-04 | End: 2024-03-04

## 2024-03-04 RX ORDER — POTASSIUM CHLORIDE 20 MEQ
40 PACKET (EA) ORAL EVERY 4 HOURS
Refills: 0 | Status: DISCONTINUED | OUTPATIENT
Start: 2024-03-04 | End: 2024-03-04

## 2024-03-04 RX ADMIN — QUETIAPINE FUMARATE 50 MILLIGRAM(S): 200 TABLET, FILM COATED ORAL at 22:18

## 2024-03-04 RX ADMIN — MEMANTINE HYDROCHLORIDE 5 MILLIGRAM(S): 10 TABLET ORAL at 16:46

## 2024-03-04 RX ADMIN — Medication 100 MILLIGRAM(S): at 11:39

## 2024-03-04 RX ADMIN — SENNA PLUS 1 TABLET(S): 8.6 TABLET ORAL at 11:39

## 2024-03-04 RX ADMIN — SIMETHICONE 80 MILLIGRAM(S): 80 TABLET, CHEWABLE ORAL at 16:46

## 2024-03-04 RX ADMIN — ATORVASTATIN CALCIUM 10 MILLIGRAM(S): 80 TABLET, FILM COATED ORAL at 22:18

## 2024-03-04 RX ADMIN — Medication 40 MILLIEQUIVALENT(S): at 11:38

## 2024-03-04 RX ADMIN — POLYETHYLENE GLYCOL 3350 17 GRAM(S): 17 POWDER, FOR SOLUTION ORAL at 11:39

## 2024-03-04 RX ADMIN — Medication 1000 UNIT(S): at 11:39

## 2024-03-04 RX ADMIN — Medication 40 MILLIEQUIVALENT(S): at 08:51

## 2024-03-04 RX ADMIN — WARFARIN SODIUM 2.5 MILLIGRAM(S): 2.5 TABLET ORAL at 22:18

## 2024-03-04 RX ADMIN — Medication 25 MICROGRAM(S): at 05:09

## 2024-03-04 RX ADMIN — MEMANTINE HYDROCHLORIDE 5 MILLIGRAM(S): 10 TABLET ORAL at 05:09

## 2024-03-04 RX ADMIN — Medication 40 MILLIGRAM(S): at 05:09

## 2024-03-04 NOTE — PROGRESS NOTE ADULT - ATTENDING COMMENTS
Please see resident note for full details regarding the service.     PE: A+Ox1, no murmurs, lungs CTA b/l, abd +distension, hyperactive bowel sounds, non-tender, no lower extremity swelling    Assessment:   - Diarrhea (resolved) -> now with ileus and colonic distension   - Mild hypernatremia   - Hypokalemia   - Heel ulcer   - Supratherpeutic INR (improved)   - History of dementia, Afib on coumadin, HLD, hypothyroidism, HTN, spinal stenosis, has pacemaker     Plan:   - INR 2.4 -> will give dose of Warfarin tonight and f/u AM INR   - K+ 3.3, replete and f/u in AM   - Stool cultures unable to be completed given no diarrhea while admitted   Surgery recs appreciated - agressive bowel regimen, enemas, electrolyte repletion, AM abd XR< no acute surgical intervention, if no improvement may need rectal tube placement   - Will discuss GOC with pt's family   - Patient has 24/7 aids at home -> PT recommends return home with home PT (may need joann lift, family to decide if needed)   - Dispo: Medically active, possible d/c in 24-48 hours if having bowel movements     I spent a total of 30 minutes on the date of this encounter coordinating the patient's care. This includes reviewing results/imaging and discussions with specialists, nursing, case management/social work. Further tests, medications, and procedures have been ordered as indicated. Results and the plan of care were communicated to the patient and/or their family member. Supporting documentation was completed and added to the patient's chart.

## 2024-03-04 NOTE — PROGRESS NOTE ADULT - ASSESSMENT
89F h/o dementia, Afib on warfarin, Hyperlipidemia, hypothyroidism, HTN, spinal stenosis, has pacemaker, presented to [facility] from home for diarrhea    #Diarrhea, Acute- unclear etiology   - Resolved   - Labs and vitals are stable   - No bowel movement since admission - Unable to collect stool samples.   - CT abd/pelvis: Fluid in the rectosigmoid colon again noted with increase in degree of air distention of the sigmoid colon since 10/11/2023  - will continue home enalapril  and Lasix   - d/c Isolation precautions    #Air distention of the sigmoid colon  -No bowel movement since 2 days   - Start  Senna and Miralax     #Hypernatremia  - Na 147 > 148 >149   -D/C IV fluids   - F/U am labs    #Hypokalemia  -Resolved     #CKD  - Cr near baseline  - C/W  enalapril    #Pressure injury, heel, sacrum  - decubitus prevention protocol    #Afib on warfarin  - at home takes warfarin 1.25mg Monday, Thursday, takes 2.5mg other days  - INR 3.07 on admission, INR 2.14 today  - resume warfarin 2.5mg tonight  - daily warfarin dosing based on PT/INR    #Dementia  - c/w memantine 5mg,  quetiapine 50mg    #HTN  - On  enalapril     #HLD  - atorvastatin 10mg    #Hypothyroidism  - levothyroxine 25mcg    #h/o gout  - c/w  allopurinol 100mg    #VTE ppx  - coumadin daily dosing    #GOC  - Full code    Dispo: pt from home, follow up PT eval    Patient's daughter Lisa ([phone]) updated    89F h/o dementia, Afib on warfarin, Hyperlipidemia, hypothyroidism, HTN, spinal stenosis, has pacemaker, presented to [facility] from home for diarrhea    #Diarrhea, Acute- unclear etiology   - Resolved   - Labs and vitals are stable   - No bowel movement since admission - Unable to collect stool samples.   - CT abd/pelvis: Fluid in the rectosigmoid colon again noted with increase in degree of air distention of the sigmoid colon since 10/11/2023  - will continue home enalapril  and Lasix   - d/c Isolation precautions    #Air distention of the sigmoid colon  -No bowel movement for 2 days   - c/w Senna and Miralax   - surgery    #Hypernatremia  - Na 147 > 148 >149   -D/C IV fluids   - F/U am labs    #Hypokalemia  -Resolved     #CKD  - Cr near baseline  - C/W  enalapril    #Pressure injury, heel, sacrum  - decubitus prevention protocol    #Afib on warfarin  - at home takes warfarin 1.25mg Monday, Thursday, takes 2.5mg other days  - INR 3.07 on admission, INR 2.14 today  - resume warfarin 2.5mg tonight  - daily warfarin dosing based on PT/INR    #Dementia  - c/w memantine 5mg,  quetiapine 50mg    #HTN  - On  enalapril     #HLD  - atorvastatin 10mg    #Hypothyroidism  - levothyroxine 25mcg    #h/o gout  - c/w  allopurinol 100mg    #VTE ppx  - coumadin daily dosing    #GOC  - Full code    Dispo: pt from home, follow up PT eval    Patient's daughter Lisa ([phone]) updated    89F h/o dementia, Afib on warfarin, Hyperlipidemia, hypothyroidism, HTN, spinal stenosis, has pacemaker, presented to [facility] from home for diarrhea    #Diarrhea, Acute- unclear etiology    - Labs and vitals are stable   - No bowel movement since admission 2 days  - CT abd/pelvis: Fluid in the rectosigmoid colon again noted with increase in degree of air distention of the sigmoid colon since 10/11/2023  - will continue home enalapril  and Lasix   - d/c Isolation precautions    #Air distention of the sigmoid colon  -No bowel movement for 2 days   - surgery consulted due to abdominal distension 3/4/24- rectal exam yielded copious amounts of liquid stool and gas  - abdominal xray in the AM  - per surgery recommendations-  If sigmoid distension doesn't improve / bowel habits don't normalize with conservative management, she may benefit from decompressive colonoscopy / rectal tube placement.   - c/w Senna, Miralax, daily enemas    #Hypernatremia  - Na 147 > 148 >149     #Hypokalemia  - K 3.3  - repleted today, repeat serum K  - continue to monitor    #CKD  - Cr near baseline  - C/W  enalapril    #Pressure injury, heel, sacrum  - decubitus prevention protocol    #Afib on warfarin  - at home takes warfarin 1.25mg Monday, Thursday, takes 2.5mg other days  - INR 3.07 on admission, INR 2.4 today  - ordered warfarin 2.5mg tonight  - daily warfarin dosing based on PT/INR    #Dementia  - c/w memantine 5mg,  quetiapine 50mg    #HTN  - c/w enalapril     #HLD  - atorvastatin 10mg    #Hypothyroidism  - levothyroxine 25mcg    #h/o gout  - c/w  allopurinol 100mg    #VTE ppx  - coumadin daily dosing    #GOC  - Full code    Dispo: pt has full time aide, home PT- once medically optimized    Patient's daughter Lisa ([phone]) updated

## 2024-03-04 NOTE — PROGRESS NOTE ADULT - SUBJECTIVE AND OBJECTIVE BOX
Patient is a 89y old  Female who presents with a chief complaint of diarrhea (04 Mar 2024 12:00)      INTERVAL HPI/OVERNIGHT EVENTS: Patient seen and examined at bedside. No overnight events.    MEDICATIONS  (STANDING):  allopurinol 100 milliGRAM(s) Oral daily  atorvastatin 10 milliGRAM(s) Oral at bedtime  cholecalciferol 1000 Unit(s) Oral daily  levothyroxine 25 MICROGram(s) Oral daily  memantine 5 milliGRAM(s) Oral two times a day  polyethylene glycol 3350 17 Gram(s) Oral daily  QUEtiapine 50 milliGRAM(s) Oral at bedtime  senna 1 Tablet(s) Oral daily  simethicone 80 milliGRAM(s) Chew daily    MEDICATIONS  (PRN):  acetaminophen     Tablet .. 650 milliGRAM(s) Oral every 6 hours PRN Temp greater or equal to 38C (100.4F), Mild Pain (1 - 3)  aluminum hydroxide/magnesium hydroxide/simethicone Suspension 30 milliLiter(s) Oral every 4 hours PRN Dyspepsia  melatonin 3 milliGRAM(s) Oral at bedtime PRN Insomnia  ondansetron Injectable 4 milliGRAM(s) IV Push every 8 hours PRN Nausea and/or Vomiting      Allergies    No Known Allergies    Intolerances        REVIEW OF SYSTEMS: limited due to mentation    Vital Signs Last 24 Hrs  T(C): 36.7 (04 Mar 2024 11:46), Max: 36.9 (03 Mar 2024 21:00)  T(F): 98 (04 Mar 2024 11:46), Max: 98.4 (03 Mar 2024 21:00)  HR: 72 (04 Mar 2024 11:46) (62 - 98)  BP: 138/79 (04 Mar 2024 11:46) (138/79 - 149/80)  BP(mean): --  RR: 17 (04 Mar 2024 11:46) (17 - 18)  SpO2: 96% (04 Mar 2024 11:46) (95% - 96%)    Parameters below as of 04 Mar 2024 11:46  Patient On (Oxygen Delivery Method): room air      I&O's Summary    BMI (kg/m2): 41.8 (03-02-24 @ 11:16)    PHYSICAL EXAM:  CONSTITUTIONAL: NAD  EYES: EOMI, No conjunctival or scleral injection, non-icteric  ENMT: slightly dry mucous membranes  RESP: CTA b/l, normal respiratory effort  CV: RRR, systolic murmur LSB, no MRG; no peripheral edema  GI: +distended, Soft, NT, no rebound, no guarding; no palpable masses  MSK: No digital clubbing or cyanosis  SKIN: intertrigo of groin, heel stage 1 pressure injury, stage 2 buttock  NEURO: grossly moves all 4 extremities  PSYCH: A+O x 2, baseline dementia, poor insight, pleasant affect    LABS: Personally reviewed  CBC                        9.1    7.85  )-----------( 177      ( 04 Mar 2024 05:30 )             30.1     CMP  03-04    149  |  112  |  26  ----------------------------<  94  3.3   |  26  |  1.63    Ca    9.5      04 Mar 2024 05:30  Phos  3.0     03-02  Mg     1.9     03-04    TPro  7.3  /  Alb  3.0  /  TBili  0.8  /  DBili  x   /  AST  22  /  ALT  12  /  AlkPhos  159  03-04      Lipase: 26 U/L (03-01-24 @ 18:30)      PT/INR - ( 04 Mar 2024 08:38 )   PT: 27.4 sec;   INR: 2.40 ratio         PTT - ( 01 Mar 2024 18:30 )  PTT:39.4 sec        Urinalysis Basic - ( 04 Mar 2024 05:30 )    Color: x / Appearance: x / SG: x / pH: x  Gluc: 94 mg/dL / Ketone: x  / Bili: x / Urobili: x   Blood: x / Protein: x / Nitrite: x   Leuk Esterase: x / RBC: x / WBC x   Sq Epi: x / Non Sq Epi: x / Bacteria: x        Culture - Urine (collected 01 Mar 2024 21:50)  Source: Catheterized Catheterized  Final Report (02 Mar 2024 22:20):    <10,000 CFU/mL Normal Urogenital Chary            Culture - Urine (collected 03-01-24 @ 21:50)  Source: Catheterized Catheterized  Final Report (03-02-24 @ 22:20):    <10,000 CFU/mL Normal Urogenital Chary        RADIOLOGY & ADDITIONAL TESTS: Personally reviewed.     Consultant(s) Notes Reviewed:  [x] YES  [ ] NO   Discussed with SW/CM, RN     Patient is a 89y old  Female who presents with a chief complaint of diarrhea (04 Mar 2024 12:00)      INTERVAL HPI/ OVERNIGHT EVENTS: Patient seen and examined at bedside. No overnight events.    MEDICATIONS  (STANDING):  allopurinol 100 milliGRAM(s) Oral daily  atorvastatin 10 milliGRAM(s) Oral at bedtime  cholecalciferol 1000 Unit(s) Oral daily  levothyroxine 25 MICROGram(s) Oral daily  memantine 5 milliGRAM(s) Oral two times a day  polyethylene glycol 3350 17 Gram(s) Oral daily  QUEtiapine 50 milliGRAM(s) Oral at bedtime  senna 1 Tablet(s) Oral daily  simethicone 80 milliGRAM(s) Chew daily    MEDICATIONS  (PRN):  acetaminophen     Tablet .. 650 milliGRAM(s) Oral every 6 hours PRN Temp greater or equal to 38C (100.4F), Mild Pain (1 - 3)  aluminum hydroxide/magnesium hydroxide/simethicone Suspension 30 milliLiter(s) Oral every 4 hours PRN Dyspepsia  melatonin 3 milliGRAM(s) Oral at bedtime PRN Insomnia  ondansetron Injectable 4 milliGRAM(s) IV Push every 8 hours PRN Nausea and/or Vomiting      Allergies    No Known Allergies    Intolerances        REVIEW OF SYSTEMS: limited due to mentation    Vital Signs Last 24 Hrs  T(C): 36.7 (04 Mar 2024 11:46), Max: 36.9 (03 Mar 2024 21:00)  T(F): 98 (04 Mar 2024 11:46), Max: 98.4 (03 Mar 2024 21:00)  HR: 72 (04 Mar 2024 11:46) (62 - 98)  BP: 138/79 (04 Mar 2024 11:46) (138/79 - 149/80)  BP(mean): --  RR: 17 (04 Mar 2024 11:46) (17 - 18)  SpO2: 96% (04 Mar 2024 11:46) (95% - 96%)    Parameters below as of 04 Mar 2024 11:46  Patient On (Oxygen Delivery Method): room air      I&O's Summary    BMI (kg/m2): 41.8 (03-02-24 @ 11:16)    PHYSICAL EXAM:  CONSTITUTIONAL: NAD  EYES: EOMI, No conjunctival or scleral injection, non-icteric  ENMT: slightly dry mucous membranes  RESP: CTA b/l, normal respiratory effort  CV: RRR, systolic murmur LSB, no MRG; no peripheral edema  GI: abd very distended, Soft, NT, no rebound, no guarding; no palpable masses  MSK: No digital clubbing or cyanosis  SKIN: intertrigo of groin, heel stage 1 pressure injury, stage 2 buttock  NEURO: grossly moves all 4 extremities  PSYCH: A+O x 1-2, baseline dementia, poor insight, pleasant affect    LABS: Personally reviewed  CBC                        9.1    7.85  )-----------( 177      ( 04 Mar 2024 05:30 )             30.1     CMP  03-04    149  |  112  |  26  ----------------------------<  94  3.3   |  26  |  1.63    Ca    9.5      04 Mar 2024 05:30  Phos  3.0     03-02  Mg     1.9     03-04    TPro  7.3  /  Alb  3.0  /  TBili  0.8  /  DBili  x   /  AST  22  /  ALT  12  /  AlkPhos  159  03-04      Lipase: 26 U/L (03-01-24 @ 18:30)      PT/INR - ( 04 Mar 2024 08:38 )   PT: 27.4 sec;   INR: 2.40 ratio         PTT - ( 01 Mar 2024 18:30 )  PTT:39.4 sec        Urinalysis Basic - ( 04 Mar 2024 05:30 )    Color: x / Appearance: x / SG: x / pH: x  Gluc: 94 mg/dL / Ketone: x  / Bili: x / Urobili: x   Blood: x / Protein: x / Nitrite: x   Leuk Esterase: x / RBC: x / WBC x   Sq Epi: x / Non Sq Epi: x / Bacteria: x        Culture - Urine (collected 01 Mar 2024 21:50)  Source: Catheterized Catheterized  Final Report (02 Mar 2024 22:20):    <10,000 CFU/mL Normal Urogenital Chary            Culture - Urine (collected 03-01-24 @ 21:50)  Source: Catheterized Catheterized  Final Report (03-02-24 @ 22:20):    <10,000 CFU/mL Normal Urogenital Chary        RADIOLOGY & ADDITIONAL TESTS: Personally reviewed.     Consultant(s) Notes Reviewed:  [x] YES  [ ] NO   Discussed with SW/CM, RN

## 2024-03-04 NOTE — CONSULT NOTE ADULT - ASSESSMENT
89F w/ PMH significant for afib and dementia who presented with ileus and marked sigmoid redundancy. No evidence of sigmoid volvulus. No acute abdomen. She is afebrile hemodynamically stable. Rectal exam yielded torrential amounts of liquid stool and gas which were released which were under a great amount of pressure.     - Continue daily aggressive bowel regimen including daily enemas.  - Daily electrolyte repletion. K goal of 4.0 Mg goal of 2 Phos goal of 4 and normal calcium.  - AXR in the am.  - No acute surgical intervention.  - If sigmoid distension doesn't improve / bowel habits don't normalize with conservative management, she may benefit from decompressive colonoscopy / rectal tube placement. Will defer to GI who is also following. She likely will have a large amount of improvement from the decompression that happened during the rectal exam.    Plan was discussed at length with the surgical PA as well as the family resident taking care of the patient, Dr. Wood.

## 2024-03-05 DIAGNOSIS — K56.7 ILEUS, UNSPECIFIED: ICD-10-CM

## 2024-03-05 LAB
ALBUMIN SERPL ELPH-MCNC: 2.9 G/DL — LOW (ref 3.3–5)
ALP SERPL-CCNC: 156 U/L — HIGH (ref 40–120)
ALT FLD-CCNC: 12 U/L — SIGNIFICANT CHANGE UP (ref 10–45)
ANION GAP SERPL CALC-SCNC: 9 MMOL/L — SIGNIFICANT CHANGE UP (ref 5–17)
APTT BLD: 34.2 SEC — SIGNIFICANT CHANGE UP (ref 24.5–35.6)
AST SERPL-CCNC: 15 U/L — SIGNIFICANT CHANGE UP (ref 10–40)
BILIRUB SERPL-MCNC: 0.9 MG/DL — SIGNIFICANT CHANGE UP (ref 0.2–1.2)
BUN SERPL-MCNC: 27 MG/DL — HIGH (ref 7–23)
CALCIUM SERPL-MCNC: 9.5 MG/DL — SIGNIFICANT CHANGE UP (ref 8.4–10.5)
CHLORIDE SERPL-SCNC: 111 MMOL/L — HIGH (ref 96–108)
CO2 SERPL-SCNC: 29 MMOL/L — SIGNIFICANT CHANGE UP (ref 22–31)
CREAT SERPL-MCNC: 1.55 MG/DL — HIGH (ref 0.5–1.3)
EGFR: 32 ML/MIN/1.73M2 — LOW
GLUCOSE SERPL-MCNC: 102 MG/DL — HIGH (ref 70–99)
HCT VFR BLD CALC: 29.5 % — LOW (ref 34.5–45)
HGB BLD-MCNC: 9.3 G/DL — LOW (ref 11.5–15.5)
INR BLD: 2.61 RATIO — HIGH (ref 0.85–1.18)
MAGNESIUM SERPL-MCNC: 1.9 MG/DL — SIGNIFICANT CHANGE UP (ref 1.6–2.6)
MCHC RBC-ENTMCNC: 29.9 PG — SIGNIFICANT CHANGE UP (ref 27–34)
MCHC RBC-ENTMCNC: 31.5 GM/DL — LOW (ref 32–36)
MCV RBC AUTO: 94.9 FL — SIGNIFICANT CHANGE UP (ref 80–100)
NRBC # BLD: 0 /100 WBCS — SIGNIFICANT CHANGE UP (ref 0–0)
PHOSPHATE SERPL-MCNC: 2.8 MG/DL — SIGNIFICANT CHANGE UP (ref 2.5–4.5)
PLATELET # BLD AUTO: 179 K/UL — SIGNIFICANT CHANGE UP (ref 150–400)
POTASSIUM SERPL-MCNC: 3.7 MMOL/L — SIGNIFICANT CHANGE UP (ref 3.5–5.3)
POTASSIUM SERPL-SCNC: 3.7 MMOL/L — SIGNIFICANT CHANGE UP (ref 3.5–5.3)
PROT SERPL-MCNC: 7 G/DL — SIGNIFICANT CHANGE UP (ref 6–8.3)
PROTHROM AB SERPL-ACNC: 28.9 SEC — HIGH (ref 9.5–13)
RBC # BLD: 3.11 M/UL — LOW (ref 3.8–5.2)
RBC # FLD: 16.7 % — HIGH (ref 10.3–14.5)
SODIUM SERPL-SCNC: 149 MMOL/L — HIGH (ref 135–145)
WBC # BLD: 9.01 K/UL — SIGNIFICANT CHANGE UP (ref 3.8–10.5)
WBC # FLD AUTO: 9.01 K/UL — SIGNIFICANT CHANGE UP (ref 3.8–10.5)

## 2024-03-05 PROCEDURE — 99232 SBSQ HOSP IP/OBS MODERATE 35: CPT | Mod: GC

## 2024-03-05 PROCEDURE — 99233 SBSQ HOSP IP/OBS HIGH 50: CPT

## 2024-03-05 PROCEDURE — 99223 1ST HOSP IP/OBS HIGH 75: CPT | Mod: FS

## 2024-03-05 PROCEDURE — 74019 RADEX ABDOMEN 2 VIEWS: CPT | Mod: 26

## 2024-03-05 RX ORDER — WARFARIN SODIUM 2.5 MG/1
1.25 TABLET ORAL ONCE
Refills: 0 | Status: COMPLETED | OUTPATIENT
Start: 2024-03-05 | End: 2024-03-05

## 2024-03-05 RX ORDER — WARFARIN SODIUM 2.5 MG/1
2.5 TABLET ORAL ONCE
Refills: 0 | Status: DISCONTINUED | OUTPATIENT
Start: 2024-03-05 | End: 2024-03-05

## 2024-03-05 RX ORDER — MAGNESIUM SULFATE 500 MG/ML
1 VIAL (ML) INJECTION ONCE
Refills: 0 | Status: COMPLETED | OUTPATIENT
Start: 2024-03-05 | End: 2024-03-05

## 2024-03-05 RX ADMIN — MEMANTINE HYDROCHLORIDE 5 MILLIGRAM(S): 10 TABLET ORAL at 06:04

## 2024-03-05 RX ADMIN — SIMETHICONE 80 MILLIGRAM(S): 80 TABLET, CHEWABLE ORAL at 12:19

## 2024-03-05 RX ADMIN — Medication 1000 UNIT(S): at 12:19

## 2024-03-05 RX ADMIN — Medication 25 MICROGRAM(S): at 06:04

## 2024-03-05 RX ADMIN — ATORVASTATIN CALCIUM 10 MILLIGRAM(S): 80 TABLET, FILM COATED ORAL at 22:27

## 2024-03-05 RX ADMIN — SENNA PLUS 1 TABLET(S): 8.6 TABLET ORAL at 12:19

## 2024-03-05 RX ADMIN — Medication 100 MILLIGRAM(S): at 12:19

## 2024-03-05 RX ADMIN — POLYETHYLENE GLYCOL 3350 17 GRAM(S): 17 POWDER, FOR SOLUTION ORAL at 12:19

## 2024-03-05 RX ADMIN — MEMANTINE HYDROCHLORIDE 5 MILLIGRAM(S): 10 TABLET ORAL at 17:03

## 2024-03-05 RX ADMIN — WARFARIN SODIUM 1.25 MILLIGRAM(S): 2.5 TABLET ORAL at 22:27

## 2024-03-05 RX ADMIN — QUETIAPINE FUMARATE 50 MILLIGRAM(S): 200 TABLET, FILM COATED ORAL at 22:27

## 2024-03-05 RX ADMIN — Medication 100 GRAM(S): at 15:52

## 2024-03-05 NOTE — PROGRESS NOTE ADULT - SUBJECTIVE AND OBJECTIVE BOX
INTERVAL HPI/OVERNIGHT EVENTS:  Pt. seen and examined at bedside resting comfortably, sleeping and in no acute distress. Denied abdominal pain on exam.    Vital Signs Last 24 Hrs  T(C): 36.6 (05 Mar 2024 05:17), Max: 36.7 (04 Mar 2024 11:46)  T(F): 97.8 (05 Mar 2024 05:17), Max: 98.1 (04 Mar 2024 19:13)  HR: 74 (05 Mar 2024 05:17) (72 - 75)  BP: 107/72 (05 Mar 2024 05:17) (107/72 - 138/79)  BP(mean): --  RR: 17 (05 Mar 2024 05:17) (17 - 18)  SpO2: 96% (05 Mar 2024 05:17) (96% - 96%)    Parameters below as of 05 Mar 2024 05:17  Patient On (Oxygen Delivery Method): room air        PHYSICAL EXAM:    GENERAL: NAD  HEAD:  Atraumatic, Normocephalic  CHEST/LUNG: Non labored breathing  HEART: Not tachycardic  Abdomen: soft, non-tender, distended, no rebound or guarding, tympanitic    I&O's Detail      LABS:                        9.3    9.01  )-----------( 179      ( 05 Mar 2024 06:22 )             29.5     03-05    149<H>  |  111<H>  |  27<H>  ----------------------------<  102<H>  3.7   |  29  |  1.55<H>    Ca    9.5      05 Mar 2024 06:22  Phos  2.8     03-05  Mg     1.9     03-05    TPro  7.0  /  Alb  2.9<L>  /  TBili  0.9  /  DBili  x   /  AST  15  /  ALT  12  /  AlkPhos  156<H>  03-05    PT/INR - ( 05 Mar 2024 06:22 )   PT: 28.9 sec;   INR: 2.61 ratio         PTT - ( 05 Mar 2024 06:22 )  PTT:34.2 sec  Urinalysis Basic - ( 05 Mar 2024 06:22 )    Color: x / Appearance: x / SG: x / pH: x  Gluc: 102 mg/dL / Ketone: x  / Bili: x / Urobili: x   Blood: x / Protein: x / Nitrite: x   Leuk Esterase: x / RBC: x / WBC x   Sq Epi: x / Non Sq Epi: x / Bacteria: x        type    RADIOLOGY & ADDITIONAL STUDIES:    Impression: 89F w/ PMH significant for afib and dementia who presented with ileus and marked sigmoid redundancy. No evidence of sigmoid volvulus. No acute abdomen. She is afebrile hemodynamically stable. Rectal exam completed 3/4/24 yielded torrential amounts of liquid stool and gas which were released which were under a great amount of pressure.     - Continue daily aggressive bowel regimen including daily enemas.  - Daily electrolyte repletion. K goal of 4.0 Mg goal of 2 Phos goal of 4 and normal calcium.  - AXR in the am still pending   - No acute surgical intervention.  - If sigmoid distension doesn't improve / bowel habits don't normalize with conservative management, she may benefit from decompressive colonoscopy / rectal tube placement. Will defer to GI who is also following.     Plan discussed with SAM Fitzgerald

## 2024-03-05 NOTE — CONSULT NOTE ADULT - PROBLEM SELECTOR RECOMMENDATION 9
Abdominal X-ray from 3/3 reviewed   Turn position frequently while in bed  Bowel regimen   Increase Miralax twice daily  Senna every night  Fleet enema daily   Abdominal Xray in AM  May need Rectal tube if abdominal distention persist   Will discuss further if colonoscopy with decompression RT needed   Before planning colonoscopy need PPM  interrogation and On Coumadin need to be held to get lower INR for procedure Abdominal X-ray from 3/3 reviewed   Turn position frequently while in bed  Bowel regimen   Increase Miralax twice daily  Senna every night  Fleet enema daily   Abdominal Xray in AM  May need Rectal tube if abdominal distention persist   Will discuss further if colonoscopy with decompression RT needed   Before potential colonoscopy (high risk) need PPM  interrogation and On Coumadin need to be held to get lower INR for procedure

## 2024-03-05 NOTE — PROGRESS NOTE ADULT - SUBJECTIVE AND OBJECTIVE BOX
Patient is a 89y old  Female who presents with a chief complaint of diarrhea (05 Mar 2024 14:14)      INTERVAL HPI/OVERNIGHT EVENTS: Patient seen and examined at bedside. No overnight events.    MEDICATIONS  (STANDING):  allopurinol 100 milliGRAM(s) Oral daily  atorvastatin 10 milliGRAM(s) Oral at bedtime  cholecalciferol 1000 Unit(s) Oral daily  levothyroxine 25 MICROGram(s) Oral daily  memantine 5 milliGRAM(s) Oral two times a day  polyethylene glycol 3350 17 Gram(s) Oral daily  QUEtiapine 50 milliGRAM(s) Oral at bedtime  senna 1 Tablet(s) Oral daily  simethicone 80 milliGRAM(s) Chew daily    MEDICATIONS  (PRN):  acetaminophen     Tablet .. 650 milliGRAM(s) Oral every 6 hours PRN Temp greater or equal to 38C (100.4F), Mild Pain (1 - 3)  aluminum hydroxide/magnesium hydroxide/simethicone Suspension 30 milliLiter(s) Oral every 4 hours PRN Dyspepsia  melatonin 3 milliGRAM(s) Oral at bedtime PRN Insomnia  ondansetron Injectable 4 milliGRAM(s) IV Push every 8 hours PRN Nausea and/or Vomiting      Allergies    No Known Allergies    Intolerances        REVIEW OF SYSTEMS:  CONSTITUTIONAL: No fever or chills  HEENT:  No headache, no sore throat  RESPIRATORY: No cough, wheezing, or shortness of breath  CARDIOVASCULAR: No chest pain, palpitations  GASTROINTESTINAL: No abd pain, nausea, vomiting, or diarrhea  GENITOURINARY: No dysuria, frequency, or hematuria  NEUROLOGICAL: no focal weakness or dizziness  MUSCULOSKELETAL: no myalgias     Vital Signs Last 24 Hrs  T(C): 36.3 (05 Mar 2024 12:34), Max: 36.6 (05 Mar 2024 05:17)  T(F): 97.4 (05 Mar 2024 12:34), Max: 97.8 (05 Mar 2024 05:17)  HR: 78 (05 Mar 2024 12:34) (74 - 78)  BP: 110/80 (05 Mar 2024 12:34) (107/72 - 110/80)  BP(mean): --  RR: 18 (05 Mar 2024 12:34) (17 - 18)  SpO2: 96% (05 Mar 2024 12:34) (96% - 96%)    Parameters below as of 05 Mar 2024 12:34  Patient On (Oxygen Delivery Method): room air      I&O's Summary    05 Mar 2024 07:01  -  05 Mar 2024 19:26  --------------------------------------------------------  IN: 300 mL / OUT: 400 mL / NET: -100 mL      BMI (kg/m2): 41.8 (03-02-24 @ 11:16)    PHYSICAL EXAM:  CONSTITUTIONAL: NAD  EYES: EOMI, No conjunctival or scleral injection, non-icteric  ENMT: slightly dry mucous membranes  RESP: CTA b/l, normal respiratory effort  CV: RRR, systolic murmur LSB, no MRG; no peripheral edema  GI: abd very distended, Soft, NT, no rebound, no guarding; no palpable masses  MSK: No digital clubbing or cyanosis  SKIN: intertrigo of groin, heel stage 1 pressure injury, stage 2 buttock  NEURO: grossly moves all 4 extremities  PSYCH: A+O x 1, baseline dementia, poor insight, pleasant affect      LABS: Personally reviewed  CBC                        9.3    9.01  )-----------( 179      ( 05 Mar 2024 06:22 )             29.5     CMP  03-05    149  |  111  |  27  ----------------------------<  102  3.7   |  29  |  1.55    Ca    9.5      05 Mar 2024 06:22  Phos  2.8     03-05  Mg     1.9     03-05    TPro  7.0  /  Alb  2.9  /  TBili  0.9  /  DBili  x   /  AST  15  /  ALT  12  /  AlkPhos  156  03-05          PT/INR - ( 05 Mar 2024 06:22 )   PT: 28.9 sec;   INR: 2.61 ratio         PTT - ( 05 Mar 2024 06:22 )  PTT:34.2 sec                            Urinalysis Basic - ( 05 Mar 2024 06:22 )    Color: x / Appearance: x / SG: x / pH: x  Gluc: 102 mg/dL / Ketone: x  / Bili: x / Urobili: x   Blood: x / Protein: x / Nitrite: x   Leuk Esterase: x / RBC: x / WBC x   Sq Epi: x / Non Sq Epi: x / Bacteria: x        Culture - Urine (collected 01 Mar 2024 21:50)  Source: Catheterized Catheterized  Final Report (02 Mar 2024 22:20):    <10,000 CFU/mL Normal Urogenital Chary            Culture - Urine (collected 03-01-24 @ 21:50)  Source: Catheterized Catheterized  Final Report (03-02-24 @ 22:20):    <10,000 CFU/mL Normal Urogenital Chary        RADIOLOGY & ADDITIONAL TESTS: Personally reviewed.     Consultant(s) Notes Reviewed:  [x] YES  [ ] NO   Discussed with BLAINE/CRYSTAL, RN

## 2024-03-05 NOTE — PROGRESS NOTE ADULT - ATTENDING COMMENTS
Please see resident note for full details regarding the service.     PE: A+Ox1, no murmurs, lungs CTA b/l, abd +distension, hyperactive bowel sounds, non-tender, no lower extremity swelling    Assessment:   - Diarrhea (resolved) -> now with ileus and colonic distension   - Mild hypernatremia   - Hypokalemia   - Heel ulcer   - Serotherapeutic INR (improved)   - History of dementia, Afib on coumadin, HLD, hypothyroidism, HTN, spinal stenosis, has pacemaker     Plan:   - INR 2.61 -> will give dose of Warfarin tonight and f/u AM INR   - K+ improved, f/u electrolytes in AM   - Surgery recs appreciated - aggressive bowel regimen -> daily enemas   - f/u abdominal XR   - Surgery recs appreciated - no acute surgical intervention (if no improvement may need rectal tube placement)   - f/u GI recs   - Stool cultures unable to be completed given no diarrhea while admitted   - Will discuss GOC with pt's family   - Patient has 24/7 aids at home -> PT recommends return home with home PT (may need joann lift, family to decide if needed)   - Dispo: Medically active, possible d/c in 24-48 hours if having bowel movements     I spent a total of 30 minutes on the date of this encounter coordinating the patient's care. This includes reviewing results/imaging and discussions with specialists, nursing, case management/social work. Further tests, medications, and procedures have been ordered as indicated. Results and the plan of care were communicated to the patient and/or their family member. Supporting documentation was completed and added to the patient's chart.

## 2024-03-05 NOTE — PROGRESS NOTE ADULT - ASSESSMENT
89F h/o dementia, Afib on warfarin, Hyperlipidemia, hypothyroidism, HTN, spinal stenosis, has pacemaker, presented to [facility] from home for diarrhea    #Diarrhea, Acute- unclear etiology    - Labs and vitals are stable   - CT abd/pelvis: Fluid in the rectosigmoid colon again noted with increase in degree of air distention of the sigmoid colon since 10/11/2023  - will continue home enalapril  and Lasix   - d/c Isolation precautions    #Air distention of the sigmoid colon  - Pt has been having large bowel movements  - surgery consulted due to abdominal distension 3/4/24- rectal exam yielded copious amounts of liquid stool and gas  - repeat abdominal xray in the AM  - per surgery recommendations-  If sigmoid distension doesn't improve / bowel habits don't normalize with conservative management, she may benefit from decompressive colonoscopy / rectal tube placement.   - GI consulted- may need rectal tube if abdominal distention persist. Possible colonoscopy with decompression RT needed, but may need PPM interrogated and warfarin should be held.  - c/w Senna, Miralax, daily enemas    #Hypernatremia  - Na 147 > 148 >149     #Hypokalemia- resolved  - K 3.7  - repleted today, repeat serum K  - continue to monitor    #CKD  - Cr near baseline  - C/W  enalapril    #Pressure injury, heel, sacrum  - decubitus prevention protocol    #Afib on warfarin  - at home takes warfarin 1.25mg Monday, Thursday, takes 2.5mg other days  - INR 3.07 on admission, INR 2.6 today  - c/w warfarin 1.25mg tonight  - daily warfarin dosing based on PT/INR    #Dementia  - c/w memantine 5mg,  quetiapine 50mg    #HTN  - c/w enalapril     #HLD  - atorvastatin 10mg    #Hypothyroidism  - levothyroxine 25mcg    #h/o gout  - c/w  allopurinol 100mg    #VTE ppx  - coumadin daily dosing    #GOC  - Full code    Dispo: pt has full time aide, home PT- once medically optimized    Patient's daughter Lisa ([phone]) updated

## 2024-03-06 LAB
ALBUMIN SERPL ELPH-MCNC: 2.7 G/DL — LOW (ref 3.3–5)
ALP SERPL-CCNC: 141 U/L — HIGH (ref 40–120)
ALT FLD-CCNC: 12 U/L — SIGNIFICANT CHANGE UP (ref 10–45)
ANION GAP SERPL CALC-SCNC: 7 MMOL/L — SIGNIFICANT CHANGE UP (ref 5–17)
APTT BLD: 36.6 SEC — HIGH (ref 24.5–35.6)
AST SERPL-CCNC: 15 U/L — SIGNIFICANT CHANGE UP (ref 10–40)
BILIRUB SERPL-MCNC: 0.8 MG/DL — SIGNIFICANT CHANGE UP (ref 0.2–1.2)
BUN SERPL-MCNC: 25 MG/DL — HIGH (ref 7–23)
CALCIUM SERPL-MCNC: 9.2 MG/DL — SIGNIFICANT CHANGE UP (ref 8.4–10.5)
CHLORIDE SERPL-SCNC: 111 MMOL/L — HIGH (ref 96–108)
CO2 SERPL-SCNC: 29 MMOL/L — SIGNIFICANT CHANGE UP (ref 22–31)
CREAT SERPL-MCNC: 1.5 MG/DL — HIGH (ref 0.5–1.3)
EGFR: 33 ML/MIN/1.73M2 — LOW
GLUCOSE SERPL-MCNC: 86 MG/DL — SIGNIFICANT CHANGE UP (ref 70–99)
HCT VFR BLD CALC: 28.1 % — LOW (ref 34.5–45)
HGB BLD-MCNC: 8.9 G/DL — LOW (ref 11.5–15.5)
INR BLD: 2.86 RATIO — HIGH (ref 0.85–1.18)
MAGNESIUM SERPL-MCNC: 2.2 MG/DL — SIGNIFICANT CHANGE UP (ref 1.6–2.6)
MCHC RBC-ENTMCNC: 29.9 PG — SIGNIFICANT CHANGE UP (ref 27–34)
MCHC RBC-ENTMCNC: 31.7 GM/DL — LOW (ref 32–36)
MCV RBC AUTO: 94.3 FL — SIGNIFICANT CHANGE UP (ref 80–100)
NRBC # BLD: 0 /100 WBCS — SIGNIFICANT CHANGE UP (ref 0–0)
PLATELET # BLD AUTO: 176 K/UL — SIGNIFICANT CHANGE UP (ref 150–400)
POTASSIUM SERPL-MCNC: 3 MMOL/L — LOW (ref 3.5–5.3)
POTASSIUM SERPL-MCNC: 4 MMOL/L — SIGNIFICANT CHANGE UP (ref 3.5–5.3)
POTASSIUM SERPL-SCNC: 3 MMOL/L — LOW (ref 3.5–5.3)
POTASSIUM SERPL-SCNC: 4 MMOL/L — SIGNIFICANT CHANGE UP (ref 3.5–5.3)
PROT SERPL-MCNC: 6.6 G/DL — SIGNIFICANT CHANGE UP (ref 6–8.3)
PROTHROM AB SERPL-ACNC: 32.4 SEC — HIGH (ref 9.5–13)
RBC # BLD: 2.98 M/UL — LOW (ref 3.8–5.2)
RBC # FLD: 16.6 % — HIGH (ref 10.3–14.5)
SODIUM SERPL-SCNC: 147 MMOL/L — HIGH (ref 135–145)
WBC # BLD: 7.6 K/UL — SIGNIFICANT CHANGE UP (ref 3.8–10.5)
WBC # FLD AUTO: 7.6 K/UL — SIGNIFICANT CHANGE UP (ref 3.8–10.5)

## 2024-03-06 PROCEDURE — 74019 RADEX ABDOMEN 2 VIEWS: CPT | Mod: 26

## 2024-03-06 PROCEDURE — 99233 SBSQ HOSP IP/OBS HIGH 50: CPT

## 2024-03-06 PROCEDURE — 99232 SBSQ HOSP IP/OBS MODERATE 35: CPT | Mod: GC

## 2024-03-06 PROCEDURE — 99233 SBSQ HOSP IP/OBS HIGH 50: CPT | Mod: FS

## 2024-03-06 RX ORDER — POLYETHYLENE GLYCOL 3350 17 G/17G
17 POWDER, FOR SOLUTION ORAL EVERY 12 HOURS
Refills: 0 | Status: DISCONTINUED | OUTPATIENT
Start: 2024-03-06 | End: 2024-03-15

## 2024-03-06 RX ORDER — POTASSIUM CHLORIDE 20 MEQ
40 PACKET (EA) ORAL EVERY 4 HOURS
Refills: 0 | Status: COMPLETED | OUTPATIENT
Start: 2024-03-06 | End: 2024-03-06

## 2024-03-06 RX ADMIN — Medication 1000 UNIT(S): at 13:16

## 2024-03-06 RX ADMIN — SENNA PLUS 1 TABLET(S): 8.6 TABLET ORAL at 13:16

## 2024-03-06 RX ADMIN — POLYETHYLENE GLYCOL 3350 17 GRAM(S): 17 POWDER, FOR SOLUTION ORAL at 17:52

## 2024-03-06 RX ADMIN — Medication 25 MICROGRAM(S): at 05:42

## 2024-03-06 RX ADMIN — Medication 100 MILLIGRAM(S): at 13:16

## 2024-03-06 RX ADMIN — POLYETHYLENE GLYCOL 3350 17 GRAM(S): 17 POWDER, FOR SOLUTION ORAL at 13:23

## 2024-03-06 RX ADMIN — SIMETHICONE 80 MILLIGRAM(S): 80 TABLET, CHEWABLE ORAL at 13:16

## 2024-03-06 RX ADMIN — MEMANTINE HYDROCHLORIDE 5 MILLIGRAM(S): 10 TABLET ORAL at 05:41

## 2024-03-06 RX ADMIN — MEMANTINE HYDROCHLORIDE 5 MILLIGRAM(S): 10 TABLET ORAL at 17:45

## 2024-03-06 RX ADMIN — Medication 40 MILLIEQUIVALENT(S): at 15:41

## 2024-03-06 RX ADMIN — QUETIAPINE FUMARATE 50 MILLIGRAM(S): 200 TABLET, FILM COATED ORAL at 21:56

## 2024-03-06 RX ADMIN — ATORVASTATIN CALCIUM 10 MILLIGRAM(S): 80 TABLET, FILM COATED ORAL at 21:55

## 2024-03-06 RX ADMIN — Medication 40 MILLIEQUIVALENT(S): at 11:23

## 2024-03-06 NOTE — DIETITIAN NUTRITION RISK NOTIFICATION - PHYSICAL ASSESSMENT ORBITAL
Add 50 - 100 mg of carbs to diet. Take Zyrtec 10 mg daily. Return to clinic if not improving or if worsens. Schedule a physical with PCP of choice. mild

## 2024-03-06 NOTE — CHART NOTE - NSCHARTNOTEFT_GEN_A_CORE
Nutrition Follow Up Note  Hospital Course (Per Electronic Medical Record):   Source: Medical Record [X] Patient [X] Family [X] Nursing Staff [X]     Diet: DASH.TLC     Patient noted with poor -fair po intake 25-50% as per nursing  Patient noted with poor po since admission due to same dx of acute severe protein calorie malnutrition , patient noted with mild muscle wasting /loss of body fat (Temporal/ Orbital regions ) suggest addition of Ensure High protein BID (700kcals, 40gms protein ) ,Surgery / GI consult reviewed  , rectal tube placed today for decompression , Labs reviewed,  Potassium Chloride provided , Senna/ Miralax provided,     Current Weight: (3/6) 211.8/96.1kg                          (3/5) 214.9/97.5kg     Pertinent Medications: MEDICATIONS  (STANDING):  allopurinol 100 milliGRAM(s) Oral daily  atorvastatin 10 milliGRAM(s) Oral at bedtime  cholecalciferol 1000 Unit(s) Oral daily  levothyroxine 25 MICROGram(s) Oral daily  memantine 5 milliGRAM(s) Oral two times a day  polyethylene glycol 3350 17 Gram(s) Oral daily  potassium chloride    Tablet ER 40 milliEquivalent(s) Oral every 4 hours  QUEtiapine 50 milliGRAM(s) Oral at bedtime  senna 1 Tablet(s) Oral daily  simethicone 80 milliGRAM(s) Chew daily    MEDICATIONS  (PRN):  acetaminophen     Tablet .. 650 milliGRAM(s) Oral every 6 hours PRN Temp greater or equal to 38C (100.4F), Mild Pain (1 - 3)  aluminum hydroxide/magnesium hydroxide/simethicone Suspension 30 milliLiter(s) Oral every 4 hours PRN Dyspepsia  melatonin 3 milliGRAM(s) Oral at bedtime PRN Insomnia  ondansetron Injectable 4 milliGRAM(s) IV Push every 8 hours PRN Nausea and/or Vomiting      Pertinent Labs:  03-06 Na147 mmol/L<H> Glu 86 mg/dL K+ 3.0 mmol/L<L> Cr  1.50 mg/dL<H> BUN 25 mg/dL<H> 03-05 Phos 2.8 mg/dL 03-06 Alb 2.7 g/dL<L>Mg 2.2mg/dl   Hgb 8.9g/dl<L> , Hct 28.1% <L> ,       Skin: perirectal skin tears noted     Edema: none    Last BM: (3/5)     Estimated Needs:   [X] No Change since Previous Assessment      Previous Nutrition Diagnosis: Inadequate Oral Intake    Nutrition Diagnosis is [X] Ongoing        New Nutrition Diagnosis: [X] Acute Severe Protein Calorie Malnutrition   related to suboptimal nutrient intake as evidence by patient 's inability to meet 50% of assessed  needs > 5 days & mild muscle wasting & fat loss observed .       Interventions:   1. Recommend addition of Ensure High Protein BID   2. monitor tolerance to diet     Monitoring & Evaluation: will monitor:  [X] Weights   [X] PO Intake   [X] Follow Up (Per Protocol)  [X] Tolerance to Diet Prescription       RD to follow as per Nutrition protocol  Nuria Tapia RDN

## 2024-03-06 NOTE — PROGRESS NOTE ADULT - PROBLEM SELECTOR PLAN 1
Abdominal X-ray from 3/3 reviewed   Turn position frequently while in bed  Bowel regimen   Increase Miralax twice daily  Senna every night  Fleet enema PRN   Abdominal Xray  pending   I placed Digni Shield Rectal tube with help of RN and PCA at bed side, got liquid stool and gas. Patient tolerated the rectal tube insertion.    Stool testing ordered   If condition not improving with rectal tube Will discuss further need of colonoscopy    Before potential colonoscopy (high risk) need PPM  interrogation and On Coumadin need to be held to get lower INR for procedure.

## 2024-03-06 NOTE — PROGRESS NOTE ADULT - ASSESSMENT
89F h/o dementia, Afib on coumadin, HLD, hypothyroidism, HTN, spinal stenosis, has pacemaker, presented to [facility] from home for diarrhea noted by [facility].     GI consultation for Ileus. CT Abdomen 3/1 Fluid in the rectosigmoid colon again noted with increase in degree of air distention of the sigmoid colon  3/3 Abdominal X ray Colonic distention again seen, greatest involving a redundant sigmoid colon,

## 2024-03-06 NOTE — PROGRESS NOTE ADULT - SUBJECTIVE AND OBJECTIVE BOX
Patient is a 89y old  Female who presents with a chief complaint of diarrhea (06 Mar 2024 12:58)      INTERVAL HPI/OVERNIGHT EVENTS: Patient seen and examined at bedside. No overnight events.    MEDICATIONS  (STANDING):  allopurinol 100 milliGRAM(s) Oral daily  atorvastatin 10 milliGRAM(s) Oral at bedtime  cholecalciferol 1000 Unit(s) Oral daily  levothyroxine 25 MICROGram(s) Oral daily  memantine 5 milliGRAM(s) Oral two times a day  polyethylene glycol 3350 17 Gram(s) Oral daily  potassium chloride    Tablet ER 40 milliEquivalent(s) Oral every 4 hours  QUEtiapine 50 milliGRAM(s) Oral at bedtime  senna 1 Tablet(s) Oral daily  simethicone 80 milliGRAM(s) Chew daily    MEDICATIONS  (PRN):  acetaminophen     Tablet .. 650 milliGRAM(s) Oral every 6 hours PRN Temp greater or equal to 38C (100.4F), Mild Pain (1 - 3)  aluminum hydroxide/magnesium hydroxide/simethicone Suspension 30 milliLiter(s) Oral every 4 hours PRN Dyspepsia  melatonin 3 milliGRAM(s) Oral at bedtime PRN Insomnia  ondansetron Injectable 4 milliGRAM(s) IV Push every 8 hours PRN Nausea and/or Vomiting      Allergies    No Known Allergies    Intolerances        REVIEW OF SYSTEMS:  CONSTITUTIONAL: No fever or chills  HEENT:  No headache, no sore throat  RESPIRATORY: No cough, wheezing, or shortness of breath  CARDIOVASCULAR: No chest pain, palpitations  GASTROINTESTINAL: No abd pain, nausea, vomiting, or diarrhea  GENITOURINARY: No dysuria, frequency, or hematuria  NEUROLOGICAL: no focal weakness or dizziness  MUSCULOSKELETAL: no myalgias     Vital Signs Last 24 Hrs  T(C): 36.8 (06 Mar 2024 13:38), Max: 37.1 (05 Mar 2024 20:15)  T(F): 98.3 (06 Mar 2024 13:38), Max: 98.7 (05 Mar 2024 20:15)  HR: 63 (06 Mar 2024 13:38) (63 - 68)  BP: 128/78 (06 Mar 2024 13:38) (124/72 - 140/83)  BP(mean): --  RR: 18 (06 Mar 2024 13:38) (17 - 18)  SpO2: 96% (06 Mar 2024 13:38) (95% - 96%)    Parameters below as of 06 Mar 2024 13:38  Patient On (Oxygen Delivery Method): room air      I&O's Summary    05 Mar 2024 07:01  -  06 Mar 2024 07:00  --------------------------------------------------------  IN: 300 mL / OUT: 600 mL / NET: -300 mL    06 Mar 2024 07:01  -  06 Mar 2024 15:37  --------------------------------------------------------  IN: 200 mL / OUT: 300 mL / NET: -100 mL      BMI (kg/m2): 41.8 (03-02-24 @ 11:16)    PHYSICAL EXAM:  CONSTITUTIONAL: NAD  EYES: EOMI, No conjunctival or scleral injection, non-icteric  ENMT: slightly dry mucous membranes  RESP: CTA b/l, normal respiratory effort  CV: RRR, systolic murmur LSB, no MRG; no peripheral edema  GI: abd very distended, Soft, NT, no rebound, no guarding; no palpable masses  MSK: No digital clubbing or cyanosis  SKIN: intertrigo of groin, heel stage 1 pressure injury, stage 2 buttock  NEURO: grossly moves all 4 extremities  PSYCH: A+O x 1, baseline dementia, poor insight, pleasant affect      LABS: Personally reviewed  CBC                        8.9    7.60  )-----------( 176      ( 06 Mar 2024 05:11 )             28.1     CMP  03-06    147  |  111  |  25  ----------------------------<  86  3.0   |  29  |  1.50    Ca    9.2      06 Mar 2024 05:11  Phos  2.8     03-05  Mg     2.2     03-06    TPro  6.6  /  Alb  2.7  /  TBili  0.8  /  DBili  x   /  AST  15  /  ALT  12  /  AlkPhos  141  03-06          PT/INR - ( 06 Mar 2024 10:30 )   PT: 32.4 sec;   INR: 2.86 ratio         PTT - ( 06 Mar 2024 10:30 )  PTT:36.6 sec                            Urinalysis Basic - ( 06 Mar 2024 05:11 )    Color: x / Appearance: x / SG: x / pH: x  Gluc: 86 mg/dL / Ketone: x  / Bili: x / Urobili: x   Blood: x / Protein: x / Nitrite: x   Leuk Esterase: x / RBC: x / WBC x   Sq Epi: x / Non Sq Epi: x / Bacteria: x        Culture - Urine (collected 01 Mar 2024 21:50)  Source: Catheterized Catheterized  Final Report (02 Mar 2024 22:20):    <10,000 CFU/mL Normal Urogenital Chary            Culture - Urine (collected 03-01-24 @ 21:50)  Source: Catheterized Catheterized  Final Report (03-02-24 @ 22:20):    <10,000 CFU/mL Normal Urogenital Chary        RADIOLOGY & ADDITIONAL TESTS: Personally reviewed.     Consultant(s) Notes Reviewed:  [x] YES  [ ] NO   Discussed with SW/CRYSTAL, RN

## 2024-03-06 NOTE — PROGRESS NOTE ADULT - NUTRITIONAL ASSESSMENT
This patient has been assessed with a concern for Malnutrition and has been determined to have a diagnosis/diagnoses of Severe protein-calorie malnutrition.    This patient is being managed with:   Diet DASH/TLC-  Sodium & Cholesterol Restricted  Supplement Feeding Modality:  Oral  Ensure Plus High Protein Cans or Servings Per Day:  1       Frequency:  Two Times a day  Entered: Mar  6 2024 11:25AM    Diet DASH/TLC-  Sodium & Cholesterol Restricted  Entered: Mar  2 2024 10:49AM    The following pending diet order is being considered for treatment of Severe protein-calorie malnutrition:null

## 2024-03-06 NOTE — PROGRESS NOTE ADULT - ATTENDING COMMENTS
Please see resident note for full details regarding the service.     PE: A+Ox1, no murmurs, lungs CTA b/l, abd +distension, hyperactive bowel sounds, non-tender, no lower extremity swelling    Assessment:   - Diarrhea (resolved) -> now with ileus and colonic distension   - Mild hypernatremia   - Hypokalemia   - Heel ulcer   - Serotherapeutic INR (improved)   - History of dementia, Afib on coumadin, HLD, hypothyroidism, HTN, spinal stenosis, has pacemaker     Plan:   - Will f/u with GI on whether dose of Coumadin should be given tonight or not   - Replete K+   - f/u AM abdominal XR, stool cultures   - We spoke to GI today: rectal tube placement -> if no improvement may need decompressive sigmoidoscopy/colonoscopy, c/w bowel regimen, enema   - Surgery recs appreciated - colorectal surgery evaluation   - PPM interrogation - pt may have micra PPM? -> will need interrogation   - Will discuss GOC with pt's family   - Patient has 24/7 aids at home -> PT recommends return home with home PT (may need joann lift, family to decide if needed)   - Dispo: Medically active, possible d/c in 24-48 hours if having bowel movements     I spent a total of 30 minutes on the date of this encounter coordinating the patient's care. This includes reviewing results/imaging and discussions with specialists, nursing, case management/social work. Further tests, medications, and procedures have been ordered as indicated. Results and the plan of care were communicated to the patient and/or their family member. Supporting documentation was completed and added to the patient's chart.

## 2024-03-06 NOTE — PROGRESS NOTE ADULT - ASSESSMENT
89F h/o dementia, Afib on warfarin, Hyperlipidemia, hypothyroidism, HTN, spinal stenosis, has pacemaker, presented to [facility] from home for diarrhea    #Diarrhea, Acute- unclear etiology    - Labs and vitals are stable   - CT abd/pelvis: Fluid in the rectosigmoid colon again noted with increase in degree of air distention of the sigmoid colon since 10/11/2023  - will continue home enalapril  and Lasix   - d/c Isolation precautions    #Air distention of the sigmoid colon  - Pt has been having large bowel movements  - surgery consulted due to abdominal distension 3/4/24- rectal exam yielded copious amounts of liquid stool and gas  - repeat abdominal xray in the AM  - surgery defers further management to colorectal group  - GI consulted- placed  dignishield rectal tube  Possible colonoscopy with decompression RT needed, but may need PPM interrogated and warfarin should be held.  - c/w Senna daily, Miralax BID, enemas PRN    #Hypernatremia  - Na 147 > 148 >149     #Hypokalemia- resolved  - K 3.7  - repleted today, repeat serum K  - continue to monitor    #CKD  - Cr near baseline  - C/W  enalapril    #Pressure injury, heel, sacrum  - decubitus prevention protocol    #Afib on warfarin  - at home takes warfarin 1.25mg Monday, Thursday, takes 2.5mg other days  - INR 3.07 on admission, INR 2.6 today  - c/w warfarin 1.25mg tonight  - daily warfarin dosing based on PT/INR    #Dementia  - c/w memantine 5mg,  quetiapine 50mg    #HTN  - c/w enalapril     #HLD  - atorvastatin 10mg    #Hypothyroidism  - levothyroxine 25mcg    #h/o gout  - c/w  allopurinol 100mg    #VTE ppx  - coumadin daily dosing    #GOC  - Full code    Dispo: pt has full time aide, home PT- once medically optimized    Patient's daughter Lisa ([phone]) updated    89F h/o dementia, Afib on warfarin, Hyperlipidemia, hypothyroidism, HTN, spinal stenosis, has pacemaker, presented to [facility] from home for diarrhea    #Diarrhea, Acute- unclear etiology    - Labs and vitals are stable   - CT abd/pelvis: Fluid in the rectosigmoid colon again noted with increase in degree of air distention of the sigmoid colon since 10/11/2023  - will continue home enalapril  and Lasix   - d/c Isolation precautions    #Air distention of the sigmoid colon  - Pt has been having large bowel movements  - surgery consulted due to abdominal distension 3/4/24- rectal exam yielded copious amounts of liquid stool and gas  - repeat abdominal xray in the AM  - GI recommendations appreciated-- GI placed  dignishield rectal tube today.   - Will continue to monitor to see if possible colonoscopy with decompression RT needed.  - pt may need PPM interrogated before colonoscopy  - surgery defers further management to colorectal group  - c/w Senna daily, Miralax BID, enemas PRN    #Hypernatremia  - Na 147     #Hypokalemia  - K 3.0 today  - repleted today, repeat serum K  - continue to monitor    #CKD  - Cr near baseline  - C/W  enalapril    #Pressure injury, heel, sacrum  - decubitus prevention protocol    #Afib on warfarin  - at home takes warfarin 1.25mg Monday, Thursday, takes 2.5mg other days  - INR 3.07 on admission, INR 2.86 today  - warfarin 1.25mg tonight  - daily warfarin dosing based on PT/INR    #Dementia  - c/w memantine 5mg,  quetiapine 50mg    #HTN  - c/w enalapril     #HLD  - atorvastatin 10mg    #Hypothyroidism  - levothyroxine 25mcg    #h/o gout  - c/w  allopurinol 100mg    #VTE ppx  - coumadin daily dosing    #GOC  - Full code    Dispo: pt has full time aide, home PT- once medically optimized    Patient's daughter Lisa ([phone]) updated

## 2024-03-06 NOTE — PROGRESS NOTE ADULT - SUBJECTIVE AND OBJECTIVE BOX
INTERVAL HPI/OVERNIGHT EVENTS:  Patient seen and examined at bed side. As per staff she had liquid bowel movement yesterday, non today.  No event over night.    MEDICATIONS  (STANDING):  allopurinol 100 milliGRAM(s) Oral daily  atorvastatin 10 milliGRAM(s) Oral at bedtime  cholecalciferol 1000 Unit(s) Oral daily  levothyroxine 25 MICROGram(s) Oral daily  memantine 5 milliGRAM(s) Oral two times a day  polyethylene glycol 3350 17 Gram(s) Oral daily  potassium chloride    Tablet ER 40 milliEquivalent(s) Oral every 4 hours  QUEtiapine 50 milliGRAM(s) Oral at bedtime  senna 1 Tablet(s) Oral daily  simethicone 80 milliGRAM(s) Chew daily    MEDICATIONS  (PRN):  acetaminophen     Tablet .. 650 milliGRAM(s) Oral every 6 hours PRN Temp greater or equal to 38C (100.4F), Mild Pain (1 - 3)  aluminum hydroxide/magnesium hydroxide/simethicone Suspension 30 milliLiter(s) Oral every 4 hours PRN Dyspepsia  melatonin 3 milliGRAM(s) Oral at bedtime PRN Insomnia  ondansetron Injectable 4 milliGRAM(s) IV Push every 8 hours PRN Nausea and/or Vomiting      Allergies    No Known Allergies    Intolerances    Vital Signs Last 24 Hrs  T(C): 36.7 (06 Mar 2024 05:11), Max: 37.1 (05 Mar 2024 20:15)  T(F): 98.1 (06 Mar 2024 05:11), Max: 98.7 (05 Mar 2024 20:15)  HR: 64 (06 Mar 2024 05:11) (64 - 78)  BP: 124/72 (06 Mar 2024 05:11) (110/80 - 140/83)  BP(mean): --  RR: 17 (06 Mar 2024 05:11) (17 - 18)  SpO2: 96% (06 Mar 2024 05:11) (95% - 96%)    Parameters below as of 06 Mar 2024 05:11  Patient On (Oxygen Delivery Method): room air        PHYSICAL EXAM:    Constitutional: NAD, well-developed  Neck: No LAD, supple  Respiratory: No SOB No distress   Cardiovascular: S1 and S2  Gastrointestinal:Soft non tender, non distended + BS   Extremities: No peripheral edema, neg clubbing, cyanosis  Neurological: Awake   Skin: No rashes      LABS:                        8.9    7.60  )-----------( 176      ( 06 Mar 2024 05:11 )             28.1     03-06    147<H>  |  111<H>  |  25<H>  ----------------------------<  86  3.0<L>   |  29  |  1.50<H>    Ca    9.2      06 Mar 2024 05:11  Phos  2.8     03-05  Mg     2.2     03-06    TPro  6.6  /  Alb  2.7<L>  /  TBili  0.8  /  DBili  x   /  AST  15  /  ALT  12  /  AlkPhos  141<H>  03-06    PT/INR - ( 06 Mar 2024 10:30 )   PT: 32.4 sec;   INR: 2.86 ratio         PTT - ( 06 Mar 2024 10:30 )  PTT:36.6 sec  Urinalysis Basic - ( 06 Mar 2024 05:11 )    Color: x / Appearance: x / SG: x / pH: x  Gluc: 86 mg/dL / Ketone: x  / Bili: x / Urobili: x   Blood: x / Protein: x / Nitrite: x   Leuk Esterase: x / RBC: x / WBC x   Sq Epi: x / Non Sq Epi: x / Bacteria: x      LIVER FUNCTIONS - ( 06 Mar 2024 05:11 )  Alb: 2.7 g/dL / Pro: 6.6 g/dL / ALK PHOS: 141 U/L / ALT: 12 U/L / AST: 15 U/L / GGT: x             RADIOLOGY & ADDITIONAL TESTS:

## 2024-03-06 NOTE — CHART NOTE - NSCHARTNOTEFT_GEN_A_CORE
89F h/o dementia, Afib on coumadin, HLD, hypothyroidism, HTN, spinal stenosis, has pacemaker, presented to [facility] from home for diarrhea noted by HHA.   CT Abdomen 3/1 Fluid in the rectosigmoid colon again noted with increase in degree of air distention of the sigmoid colon 3/3 Abdominal X ray Colonic distention again seen, greatest involving a redundant sigmoid colon. General surgery following, Pt currently on bed, rectal tube intact with stool noted on catheter. no signs or symptoms of chest pain sob, n,v.      PE: Vital Signs Last 24 Hrs  T(C): 36.7 (06 Mar 2024 05:11), Max: 37.1 (05 Mar 2024 20:15)  T(F): 98.1 (06 Mar 2024 05:11), Max: 98.7 (05 Mar 2024 20:15)  HR: 64 (06 Mar 2024 05:11) (64 - 78)  BP: 124/72 (06 Mar 2024 05:11) (110/80 - 140/83)  BP(mean): --  RR: 17 (06 Mar 2024 05:11) (17 - 18)  SpO2: 96% (06 Mar 2024 05:11) (95% - 96%)    Parameters below as of 06 Mar 2024 05:11  Patient On (Oxygen Delivery Method): room air    Gen: awake in nad  abd: softly distended, nonguarding nontender,   rectal tube intact  : voiding  LE; calfs soft, nontender, no swelling                           8.9    7.60  )-----------( 176      ( 06 Mar 2024 05:11 )             28.1   03-06    147<H>  |  111<H>  |  25<H>  ----------------------------<  86  3.0<L>   |  29  |  1.50<H>    Ca    9.2      06 Mar 2024 05:11  Phos  2.8     03-05  Mg     2.2     03-06    TPro  6.6  /  Alb  2.7<L>  /  TBili  0.8  /  DBili  x   /  AST  15  /  ALT  12  /  AlkPhos  141<H>  03-06            Plan: '  Continue rectal tube as per GI   Daily aggressive bowel regimen including daily enemas.  electrolyte repletion. K goal of 4.0 Mg goal of 2 Phos goal of 4 and normal calcium.  AXR in the am still pending   No acute surgical intervention.  will discuss with surgeon 89F h/o dementia, Afib on coumadin, HLD, hypothyroidism, HTN, spinal stenosis, has pacemaker, presented to [facility] from home for diarrhea noted by HHA.   CT Abdomen 3/1 Fluid in the rectosigmoid colon again noted with increase in degree of air distention of the sigmoid colon 3/3 Abdominal X ray Colonic distention again seen, greatest involving a redundant sigmoid colon. General surgery following, Pt currently on bed, rectal tube intact with stool noted on catheter. no signs or symptoms of chest pain sob, n,v.      PE: Vital Signs Last 24 Hrs  T(C): 36.7 (06 Mar 2024 05:11), Max: 37.1 (05 Mar 2024 20:15)  T(F): 98.1 (06 Mar 2024 05:11), Max: 98.7 (05 Mar 2024 20:15)  HR: 64 (06 Mar 2024 05:11) (64 - 78)  BP: 124/72 (06 Mar 2024 05:11) (110/80 - 140/83)  BP(mean): --  RR: 17 (06 Mar 2024 05:11) (17 - 18)  SpO2: 96% (06 Mar 2024 05:11) (95% - 96%)    Parameters below as of 06 Mar 2024 05:11  Patient On (Oxygen Delivery Method): room air    Gen: awake in nad  abd: softly distended, nonguarding nontender,   rectal tube intact  : voiding  LE; calfs soft, nontender, no swelling                           8.9    7.60  )-----------( 176      ( 06 Mar 2024 05:11 )             28.1   03-06    147<H>  |  111<H>  |  25<H>  ----------------------------<  86  3.0<L>   |  29  |  1.50<H>    Ca    9.2      06 Mar 2024 05:11  Phos  2.8     03-05  Mg     2.2     03-06    TPro  6.6  /  Alb  2.7<L>  /  TBili  0.8  /  DBili  x   /  AST  15  /  ALT  12  /  AlkPhos  141<H>  03-06            Plan: '  Continue rectal tube as per GI   Daily aggressive bowel regimen including daily enemas.  electrolyte repletion. K goal of 4.0 Mg goal of 2 Phos goal of 4 and normal calcium.  AXR in the am still pending   No acute surgical intervention.  recommend Colorectal group consult 89F h/o dementia, Afib on coumadin, HLD, hypothyroidism, HTN, spinal stenosis, has pacemaker, presented to [facility] from home for diarrhea noted by HHA.   CT Abdomen 3/1 Fluid in the rectosigmoid colon again noted with increase in degree of air distention of the sigmoid colon 3/3 Abdominal X ray Colonic distention again seen, greatest involving a redundant sigmoid colon. General surgery following, Pt currently on bed, rectal tube intact with stool noted on catheter. no signs or symptoms of chest pain sob, n,v.      PE: Vital Signs Last 24 Hrs  T(C): 36.7 (06 Mar 2024 05:11), Max: 37.1 (05 Mar 2024 20:15)  T(F): 98.1 (06 Mar 2024 05:11), Max: 98.7 (05 Mar 2024 20:15)  HR: 64 (06 Mar 2024 05:11) (64 - 78)  BP: 124/72 (06 Mar 2024 05:11) (110/80 - 140/83)  BP(mean): --  RR: 17 (06 Mar 2024 05:11) (17 - 18)  SpO2: 96% (06 Mar 2024 05:11) (95% - 96%)    Parameters below as of 06 Mar 2024 05:11  Patient On (Oxygen Delivery Method): room air    Gen: awake in nad  abd: softly distended, nonguarding nontender,   rectal tube intact  : voiding  LE; calfs soft, nontender, no swelling                           8.9    7.60  )-----------( 176      ( 06 Mar 2024 05:11 )             28.1   03-06    147<H>  |  111<H>  |  25<H>  ----------------------------<  86  3.0<L>   |  29  |  1.50<H>    Ca    9.2      06 Mar 2024 05:11  Phos  2.8     03-05  Mg     2.2     03-06    TPro  6.6  /  Alb  2.7<L>  /  TBili  0.8  /  DBili  x   /  AST  15  /  ALT  12  /  AlkPhos  141<H>  03-06            Plan: '  Continue rectal tube as per GI   Daily aggressive bowel regimen including daily enemas.  electrolyte repletion. K goal of 4.0 Mg goal of 2 Phos goal of 4 and normal calcium.  AXR in the am still pending   No acute surgical intervention.  recommend Colorectal group consult  Defer surgical evaluation to colorectal group 89F h/o dementia, Afib on coumadin, HLD, hypothyroidism, HTN, spinal stenosis, has pacemaker, presented to [facility] from home for diarrhea noted by HHA.   CT Abdomen 3/1 Fluid in the rectosigmoid colon again noted with increase in degree of air distention of the sigmoid colon 3/3 Abdominal X ray Colonic distention again seen, greatest involving a redundant sigmoid colon. General surgery following, Pt currently on bed, rectal tube intact with stool noted on catheter. no signs or symptoms of chest pain sob, n,v.      PE: Vital Signs Last 24 Hrs  T(C): 36.7 (06 Mar 2024 05:11), Max: 37.1 (05 Mar 2024 20:15)  T(F): 98.1 (06 Mar 2024 05:11), Max: 98.7 (05 Mar 2024 20:15)  HR: 64 (06 Mar 2024 05:11) (64 - 78)  BP: 124/72 (06 Mar 2024 05:11) (110/80 - 140/83)  BP(mean): --  RR: 17 (06 Mar 2024 05:11) (17 - 18)  SpO2: 96% (06 Mar 2024 05:11) (95% - 96%)    Parameters below as of 06 Mar 2024 05:11  Patient On (Oxygen Delivery Method): room air    Gen: awake in nad  abd: softly distended, nonguarding nontender,   rectal tube intact  : voiding  LE; calfs soft, nontender, no swelling                           8.9    7.60  )-----------( 176      ( 06 Mar 2024 05:11 )             28.1   03-06    147<H>  |  111<H>  |  25<H>  ----------------------------<  86  3.0<L>   |  29  |  1.50<H>    Ca    9.2      06 Mar 2024 05:11  Phos  2.8     03-05  Mg     2.2     03-06    TPro  6.6  /  Alb  2.7<L>  /  TBili  0.8  /  DBili  x   /  AST  15  /  ALT  12  /  AlkPhos  141<H>  03-06            Plan: '  Continue rectal tube as per GI   Daily aggressive bowel regimen including daily enemas.  electrolyte repletion. K goal of 4.0 Mg goal of 2 Phos goal of 4 and normal calcium.  AXR in the am still pending   No acute surgical intervention.  recommend Colorectal group consult  Defer surgical evaluation to colorectal group    Attending attestation:  89F w/ PMH significant for afib and dementia who presented with ileus and marked sigmoid redundancy. No evidence of sigmoid volvulus. No acute abdomen. She is afebrile hemodynamically stable.     GI placed decompressive rectal tube. However, they are considering decompressive colonoscopy if things do not improve.  - Daily electrolyte repletion. K goal of 4.0 Mg goal of 2 Phos goal of 4 and normal calcium.  - No acute surgical intervention at this time, but informed primary team to have them get Colorectal group involved for evaluation for potential resection of redundant sigmoid. Will defer further surgical management to them.    Plan discussed with the surgical PA.

## 2024-03-07 LAB
ANION GAP SERPL CALC-SCNC: 7 MMOL/L — SIGNIFICANT CHANGE UP (ref 5–17)
APTT BLD: 38.1 SEC — HIGH (ref 24.5–35.6)
BUN SERPL-MCNC: 23 MG/DL — SIGNIFICANT CHANGE UP (ref 7–23)
CALCIUM SERPL-MCNC: 9.3 MG/DL — SIGNIFICANT CHANGE UP (ref 8.4–10.5)
CHLORIDE SERPL-SCNC: 114 MMOL/L — HIGH (ref 96–108)
CO2 SERPL-SCNC: 28 MMOL/L — SIGNIFICANT CHANGE UP (ref 22–31)
CREAT SERPL-MCNC: 1.52 MG/DL — HIGH (ref 0.5–1.3)
EGFR: 33 ML/MIN/1.73M2 — LOW
GI PCR PANEL: SIGNIFICANT CHANGE UP
GLUCOSE SERPL-MCNC: 103 MG/DL — HIGH (ref 70–99)
HCT VFR BLD CALC: 28 % — LOW (ref 34.5–45)
HGB BLD-MCNC: 8.8 G/DL — LOW (ref 11.5–15.5)
INR BLD: 2.71 RATIO — HIGH (ref 0.85–1.18)
MAGNESIUM SERPL-MCNC: 2.3 MG/DL — SIGNIFICANT CHANGE UP (ref 1.6–2.6)
MCHC RBC-ENTMCNC: 29.4 PG — SIGNIFICANT CHANGE UP (ref 27–34)
MCHC RBC-ENTMCNC: 31.4 GM/DL — LOW (ref 32–36)
MCV RBC AUTO: 93.6 FL — SIGNIFICANT CHANGE UP (ref 80–100)
NRBC # BLD: 0 /100 WBCS — SIGNIFICANT CHANGE UP (ref 0–0)
PLATELET # BLD AUTO: 183 K/UL — SIGNIFICANT CHANGE UP (ref 150–400)
POTASSIUM SERPL-MCNC: 3.8 MMOL/L — SIGNIFICANT CHANGE UP (ref 3.5–5.3)
POTASSIUM SERPL-SCNC: 3.8 MMOL/L — SIGNIFICANT CHANGE UP (ref 3.5–5.3)
PROTHROM AB SERPL-ACNC: 30 SEC — HIGH (ref 9.5–13)
RBC # BLD: 2.99 M/UL — LOW (ref 3.8–5.2)
RBC # FLD: 16.5 % — HIGH (ref 10.3–14.5)
SODIUM SERPL-SCNC: 149 MMOL/L — HIGH (ref 135–145)
WBC # BLD: 8.41 K/UL — SIGNIFICANT CHANGE UP (ref 3.8–10.5)
WBC # FLD AUTO: 8.41 K/UL — SIGNIFICANT CHANGE UP (ref 3.8–10.5)

## 2024-03-07 PROCEDURE — 93288 INTERROG EVL PM/LDLS PM IP: CPT | Mod: 26

## 2024-03-07 PROCEDURE — 99232 SBSQ HOSP IP/OBS MODERATE 35: CPT | Mod: GC

## 2024-03-07 PROCEDURE — 74019 RADEX ABDOMEN 2 VIEWS: CPT | Mod: 26

## 2024-03-07 PROCEDURE — 99233 SBSQ HOSP IP/OBS HIGH 50: CPT | Mod: FS

## 2024-03-07 RX ORDER — WARFARIN SODIUM 2.5 MG/1
2.5 TABLET ORAL AT BEDTIME
Refills: 0 | Status: COMPLETED | OUTPATIENT
Start: 2024-03-07 | End: 2024-03-07

## 2024-03-07 RX ORDER — SODIUM CHLORIDE 9 MG/ML
1000 INJECTION, SOLUTION INTRAVENOUS
Refills: 0 | Status: DISCONTINUED | OUTPATIENT
Start: 2024-03-07 | End: 2024-03-13

## 2024-03-07 RX ORDER — WARFARIN SODIUM 2.5 MG/1
2.5 TABLET ORAL AT BEDTIME
Refills: 0 | Status: DISCONTINUED | OUTPATIENT
Start: 2024-03-07 | End: 2024-03-07

## 2024-03-07 RX ADMIN — POLYETHYLENE GLYCOL 3350 17 GRAM(S): 17 POWDER, FOR SOLUTION ORAL at 18:32

## 2024-03-07 RX ADMIN — POLYETHYLENE GLYCOL 3350 17 GRAM(S): 17 POWDER, FOR SOLUTION ORAL at 05:45

## 2024-03-07 RX ADMIN — Medication 100 MILLIGRAM(S): at 12:13

## 2024-03-07 RX ADMIN — SODIUM CHLORIDE 75 MILLILITER(S): 9 INJECTION, SOLUTION INTRAVENOUS at 18:39

## 2024-03-07 RX ADMIN — MEMANTINE HYDROCHLORIDE 5 MILLIGRAM(S): 10 TABLET ORAL at 18:32

## 2024-03-07 RX ADMIN — SIMETHICONE 80 MILLIGRAM(S): 80 TABLET, CHEWABLE ORAL at 12:14

## 2024-03-07 RX ADMIN — WARFARIN SODIUM 2.5 MILLIGRAM(S): 2.5 TABLET ORAL at 21:43

## 2024-03-07 RX ADMIN — ATORVASTATIN CALCIUM 10 MILLIGRAM(S): 80 TABLET, FILM COATED ORAL at 21:43

## 2024-03-07 RX ADMIN — SENNA PLUS 1 TABLET(S): 8.6 TABLET ORAL at 12:13

## 2024-03-07 RX ADMIN — Medication 25 MICROGRAM(S): at 05:41

## 2024-03-07 RX ADMIN — Medication 1000 UNIT(S): at 12:13

## 2024-03-07 RX ADMIN — QUETIAPINE FUMARATE 50 MILLIGRAM(S): 200 TABLET, FILM COATED ORAL at 21:43

## 2024-03-07 RX ADMIN — MEMANTINE HYDROCHLORIDE 5 MILLIGRAM(S): 10 TABLET ORAL at 05:41

## 2024-03-07 NOTE — PROGRESS NOTE ADULT - ASSESSMENT
89F h/o dementia, Afib on coumadin, HLD, hypothyroidism, HTN, spinal stenosis, has pacemaker, presented to [facility] from home for diarrhea noted by HHA.     GI consultation for Ileus. CT Abdomen 3/1 Fluid in the rectosigmoid colon again noted with increase in degree of air distention of the sigmoid colon  3/3 Abdominal X ray Colonic distention again seen, greatest involving a redundant sigmoid colon,

## 2024-03-07 NOTE — PROGRESS NOTE ADULT - SUBJECTIVE AND OBJECTIVE BOX
Patient is a 89y old  Female who presents with a chief complaint of diarrhea (07 Mar 2024 11:06)      INTERVAL HPI/OVERNIGHT EVENTS: Patient seen and examined at bedside. No overnight events.    MEDICATIONS  (STANDING):  allopurinol 100 milliGRAM(s) Oral daily  atorvastatin 10 milliGRAM(s) Oral at bedtime  cholecalciferol 1000 Unit(s) Oral daily  levothyroxine 25 MICROGram(s) Oral daily  memantine 5 milliGRAM(s) Oral two times a day  polyethylene glycol 3350 17 Gram(s) Oral every 12 hours  QUEtiapine 50 milliGRAM(s) Oral at bedtime  senna 1 Tablet(s) Oral daily  simethicone 80 milliGRAM(s) Chew daily    MEDICATIONS  (PRN):  acetaminophen     Tablet .. 650 milliGRAM(s) Oral every 6 hours PRN Temp greater or equal to 38C (100.4F), Mild Pain (1 - 3)  aluminum hydroxide/magnesium hydroxide/simethicone Suspension 30 milliLiter(s) Oral every 4 hours PRN Dyspepsia  melatonin 3 milliGRAM(s) Oral at bedtime PRN Insomnia  ondansetron Injectable 4 milliGRAM(s) IV Push every 8 hours PRN Nausea and/or Vomiting      Allergies    No Known Allergies    Intolerances        REVIEW OF SYSTEMS: limited due to mentation    Vital Signs Last 24 Hrs  T(C): 36.2 (07 Mar 2024 06:00), Max: 36.8 (06 Mar 2024 13:38)  T(F): 97.1 (07 Mar 2024 06:00), Max: 98.3 (06 Mar 2024 13:38)  HR: 66 (07 Mar 2024 06:00) (63 - 84)  BP: 152/78 (07 Mar 2024 06:00) (128/78 - 156/89)  BP(mean): --  RR: 16 (07 Mar 2024 06:00) (16 - 18)  SpO2: 95% (07 Mar 2024 06:00) (94% - 96%)    Parameters below as of 07 Mar 2024 06:00  Patient On (Oxygen Delivery Method): room air      I&O's Summary    06 Mar 2024 07:01  -  07 Mar 2024 07:00  --------------------------------------------------------  IN: 200 mL / OUT: 300 mL / NET: -100 mL          PHYSICAL EXAM:  CONSTITUTIONAL: NAD  EYES: EOMI, No conjunctival or scleral injection, non-icteric  ENMT: slightly dry mucous membranes  RESP: CTA b/l, normal respiratory effort  CV: RRR, systolic murmur LSB, no MRG; no peripheral edema  GI: abd distension improved, Soft, NT, no rebound, no guarding; no palpable masses  MSK: No digital clubbing or cyanosis  SKIN: intertrigo of groin, heel stage 1 pressure injury, stage 2 buttock  NEURO: grossly moves all 4 extremities  PSYCH: A+O x 1, baseline dementia, poor insight, pleasant affect    LABS: Personally reviewed  CBC                        8.8    8.41  )-----------( 183      ( 07 Mar 2024 05:59 )             28.0     CMP  03-07    149  |  114  |  23  ----------------------------<  103  3.8   |  28  |  1.52    Ca    9.3      07 Mar 2024 05:59  Phos  2.8     03-05  Mg     2.3     03-07    TPro  6.6  /  Alb  2.7  /  TBili  0.8  /  DBili  x   /  AST  15  /  ALT  12  /  AlkPhos  141  03-06          PT/INR - ( 07 Mar 2024 05:59 )   PT: 30.0 sec;   INR: 2.71 ratio         PTT - ( 07 Mar 2024 05:59 )  PTT:38.1 sec                            Urinalysis Basic - ( 07 Mar 2024 05:59 )    Color: x / Appearance: x / SG: x / pH: x  Gluc: 103 mg/dL / Ketone: x  / Bili: x / Urobili: x   Blood: x / Protein: x / Nitrite: x   Leuk Esterase: x / RBC: x / WBC x   Sq Epi: x / Non Sq Epi: x / Bacteria: x        Culture - Urine (collected 01 Mar 2024 21:50)  Source: Catheterized Catheterized  Final Report (02 Mar 2024 22:20):    <10,000 CFU/mL Normal Urogenital Chary            Culture - Urine (collected 03-01-24 @ 21:50)  Source: Catheterized Catheterized  Final Report (03-02-24 @ 22:20):    <10,000 CFU/mL Normal Urogenital Chary        RADIOLOGY & ADDITIONAL TESTS: Personally reviewed.     Consultant(s) Notes Reviewed:  [x] YES  [ ] NO   Discussed with BLAINE/CRYSTAL, RN

## 2024-03-07 NOTE — PROGRESS NOTE ADULT - PROBLEM SELECTOR PLAN 1
Abdominal X-ray from 3/3 reviewed   Turn position frequently while in bed  Bowel regimen   Miralax twice daily  Senna every night  Fleet enema PRN   Digni Shield Rectal tube in place with watery, brown stool in bag  Stool testing pending collection , discussed with RN  If condition not improving with rectal tube will discuss further need of colonoscopy    PPM interrogation requested pending need for colonoscopy

## 2024-03-07 NOTE — PROGRESS NOTE ADULT - ATTENDING COMMENTS
89y old female on warfarin for afib, initially admitted with diarrhea, later found to have likely ileus, colonic distension on imaging, now with rectal tube, with watery/ brownish stool, being followed by GI.  No acute events overnight. patient did not seem to be in any distress.    T(C): 36.2 (03-07-24 @ 06:00), Max: 36.8 (03-06-24 @ 13:38)  T(F): 97.1 (03-07-24 @ 06:00), Max: 98.3 (03-06-24 @ 13:38)  HR: 66 (03-07-24 @ 06:00) (63 - 84)  BP: 152/78 (03-07-24 @ 06:00) (128/78 - 156/89)  ABP: --  ABP(mean): --  RR: 16 (03-07-24 @ 06:00) (16 - 18)  SpO2: 95% (03-07-24 @ 06:00) (94% - 96%)    on exam awake, does not follow commands. obese  both lungs clear  s1, s2, regular,   abdomen- soft, no distension, no tenderness to palpation  b/l calves with no tenderness.    labs reviewed  wbc- 8.41  h/h- 8.8/28  inr- 2.71  Na- 149  Creatinine- 1.52  UA- no nitrite/ LE  xray reviewed by me, continues to show distension, no free air    a/p:  # chronic ileus, no sign/ symptom of obstruction  # Hypernatremia  # anemia  # ckd stage 3  # Underlying history of afib on coumadin, ppm, hypothyroidism. dementia, HLD  - gentle hydration, monitor serum sodium. continue to maintain rectal tube. patient followed by GI, may go for colonoscopy for decompression. If they decide to take patient for colonoscopy will hold coumadin and monitor INR. Patient should be transitioned to apixaban when ready for easier regime if approved by pharmacy. continue other home meds. monitor renal function, avoid nephrotoxic agents.    - rest as per resident not.     - Dispo: Medically active, possible d/c in 24-48 hours depending on GI recommendations.    I spent a total of 30 minutes on the date of this encounter coordinating the patient's care. This includes reviewing results/imaging and discussions with specialists, nursing, case management/social work. Further tests, medications, and procedures have been ordered as indicated. Results and the plan of care were communicated to the patient and/or their family member. Supporting documentation was completed and added to the patient's chart. 89y old female on warfarin for afib, initially admitted with diarrhea, later found to have likely ileus, colonic distension on imaging, now with rectal tube, with watery/ brownish stool, being followed by GI.  No acute events overnight. patient did not seem to be in any distress.    T(C): 36.2 (03-07-24 @ 06:00), Max: 36.8 (03-06-24 @ 13:38)  T(F): 97.1 (03-07-24 @ 06:00), Max: 98.3 (03-06-24 @ 13:38)  HR: 66 (03-07-24 @ 06:00) (63 - 84)  BP: 152/78 (03-07-24 @ 06:00) (128/78 - 156/89)  ABP: --  ABP(mean): --  RR: 16 (03-07-24 @ 06:00) (16 - 18)  SpO2: 95% (03-07-24 @ 06:00) (94% - 96%)    on exam awake, does not follow commands. obese  both lungs clear  s1, s2, regular,   abdomen- soft, no distension, no tenderness to palpation  b/l calves with no tenderness.    labs reviewed  wbc- 8.41  h/h- 8.8/28  inr- 2.71  Na- 149  Creatinine- 1.52  UA- no nitrite/ LE  xray reviewed by me, continues to show distension, no free air    a/p:  # chronic ileus, no sign/ symptom of obstruction  # Hypernatremia  # anemia  # ckd stage 3  # Underlying history of afib on coumadin, ppm, hypothyroidism. dementia, HLD  - gentle hydration, monitor serum sodium. continue to maintain rectal tube. stool studies pending. patient followed by GI, may go for colonoscopy for decompression. If they decide to take patient for colonoscopy will hold coumadin and monitor INR. Patient should be transitioned to apixaban when ready for easier regime if approved by pharmacy. continue other home meds. monitor renal function, avoid nephrotoxic agents.    - rest as per resident not.     - Dispo: Medically active, possible d/c in 24-48 hours depending on GI recommendations.    I spent a total of 30 minutes on the date of this encounter coordinating the patient's care. This includes reviewing results/imaging and discussions with specialists, nursing, case management/social work. Further tests, medications, and procedures have been ordered as indicated. Results and the plan of care were communicated to the patient and/or their family member. Supporting documentation was completed and added to the patient's chart.

## 2024-03-07 NOTE — CHART NOTE - NSCHARTNOTEFT_GEN_A_CORE
Date/Time: 3/7/24 13:40  :           [place]ail               Model:                        Micra VR TCP [id number]   Mode:   VVI                                                 Battery voltage: 3V  Impedence: 590 ohms  Threshold 0.50 V  Sensitivity: 2 mV    VS 42.9%   57.1%

## 2024-03-07 NOTE — PROGRESS NOTE ADULT - SUBJECTIVE AND OBJECTIVE BOX
INTERVAL HPI/OVERNIGHT EVENTS:  HPI:    88 y/o female seen and examined, pleasantly confused. Rectal tube noted with loose watery brown stool in bag.     MEDICATIONS  (STANDING):  allopurinol 100 milliGRAM(s) Oral daily  atorvastatin 10 milliGRAM(s) Oral at bedtime  cholecalciferol 1000 Unit(s) Oral daily  levothyroxine 25 MICROGram(s) Oral daily  memantine 5 milliGRAM(s) Oral two times a day  polyethylene glycol 3350 17 Gram(s) Oral every 12 hours  QUEtiapine 50 milliGRAM(s) Oral at bedtime  senna 1 Tablet(s) Oral daily  simethicone 80 milliGRAM(s) Chew daily    MEDICATIONS  (PRN):  acetaminophen     Tablet .. 650 milliGRAM(s) Oral every 6 hours PRN Temp greater or equal to 38C (100.4F), Mild Pain (1 - 3)  aluminum hydroxide/magnesium hydroxide/simethicone Suspension 30 milliLiter(s) Oral every 4 hours PRN Dyspepsia  melatonin 3 milliGRAM(s) Oral at bedtime PRN Insomnia  ondansetron Injectable 4 milliGRAM(s) IV Push every 8 hours PRN Nausea and/or Vomiting      Allergies    No Known Allergies    Intolerances        PAST MEDICAL & SURGICAL HISTORY:  Atrial fibrillation, unspecified type      Benign hypertension      Hypothyroid      HLD (hyperlipidemia)      Dementia      S/P knee replacement      H/O mastectomy  	    PHYSICAL EXAM:   Vital Signs:  Vital Signs Last 24 Hrs  T(C): 36.2 (07 Mar 2024 06:00), Max: 36.8 (06 Mar 2024 13:38)  T(F): 97.1 (07 Mar 2024 06:00), Max: 98.3 (06 Mar 2024 13:38)  HR: 66 (07 Mar 2024 06:00) (63 - 84)  BP: 152/78 (07 Mar 2024 06:00) (128/78 - 156/89)  BP(mean): --  RR: 16 (07 Mar 2024 06:00) (16 - 18)  SpO2: 95% (07 Mar 2024 06:00) (94% - 96%)    Parameters below as of 07 Mar 2024 06:00  Patient On (Oxygen Delivery Method): room air      Daily     Daily I&O's Summary    06 Mar 2024 07:01  -  07 Mar 2024 07:00  --------------------------------------------------------  IN: 200 mL / OUT: 300 mL / NET: -100 mL        GENERAL:  Appears stated age  HEENT:  NC/AT,  conjunctivae clear and pink  CHEST:  Full & symmetric excursion, no increased effort, breath sounds clear  HEART:  Regular rhythm, S1, S2, no murmur  ABDOMEN:  Soft, non-tender, non-distended, normoactive bowel sounds  EXTEREMITIES:  no cyanosis,clubbing or edema  SKIN:  No rash/warm/dry  NEURO:  Alert,      LABS:                        8.8    8.41  )-----------( 183      ( 07 Mar 2024 05:59 )             28.0     03-07    149<H>  |  114<H>  |  23  ----------------------------<  103<H>  3.8   |  28  |  1.52<H>    Ca    9.3      07 Mar 2024 05:59  Mg     2.3     03-07    TPro  6.6  /  Alb  2.7<L>  /  TBili  0.8  /  DBili  x   /  AST  15  /  ALT  12  /  AlkPhos  141<H>  03-06    PT/INR - ( 07 Mar 2024 05:59 )   PT: 30.0 sec;   INR: 2.71 ratio         PTT - ( 07 Mar 2024 05:59 )  PTT:38.1 sec  Urinalysis Basic - ( 07 Mar 2024 05:59 )    Color: x / Appearance: x / SG: x / pH: x  Gluc: 103 mg/dL / Ketone: x  / Bili: x / Urobili: x   Blood: x / Protein: x / Nitrite: x   Leuk Esterase: x / RBC: x / WBC x   Sq Epi: x / Non Sq Epi: x / Bacteria: x

## 2024-03-07 NOTE — PROGRESS NOTE ADULT - ASSESSMENT
89F h/o dementia, Afib on warfarin, Hyperlipidemia, hypothyroidism, HTN, spinal stenosis, has pacemaker, presented to [facility] from home for diarrhea    #Diarrhea, Acute- unclear etiology    - Labs and vitals are stable   - CT abd/pelvis: Fluid in the rectosigmoid colon again noted with increase in degree of air distention of the sigmoid colon since 10/11/2023    #Air distention of the sigmoid colon  - surgery consulted due to abdominal distension 3/4/24- rectal exam yielded copious amounts of liquid stool and gas  - repeat abdominal xray in the AM  - GI recommendations appreciated-- GI placed  dignishield rectal tube 3/6/24  - Will continue to monitor to see if possible colonoscopy with decompression RT needed.  - pt may need PPM interrogated before colonoscopy  - surgery defers further management to colorectal group  - c/w Senna daily, Miralax BID, enemas PRN    #Hypernatremia  - Na 149    #Hypokalemia- resolved  - K 3.8 today  - continue to monitor and replete as needed    #CKD  - Cr near baseline  - Cr 1.52    #Pressure injury, heel, sacrum  - decubitus prevention protocol    #Afib on warfarin  - at home takes warfarin 1.25mg Monday, Thursday, takes 2.5mg other days  - INR 3.07 on admission, INR 2.71 today  - warfarin 2.5mg tonight  - daily warfarin dosing based on PT/INR    #Dementia  - c/w memantine 5mg,  quetiapine 50mg    #HTN  - c/w  home enalapril  and Lasix     #HLD  - atorvastatin 10mg    #Hypothyroidism  - levothyroxine 25mcg    #h/o gout  - c/w  allopurinol 100mg    #VTE ppx  - coumadin daily dosing    #GOC  - Full code    Dispo: pt has full time aide, home PT- once medically optimized    Patient's daughter Lisa ([phone]) updated

## 2024-03-08 LAB
ALBUMIN SERPL ELPH-MCNC: 2.6 G/DL — LOW (ref 3.3–5)
ALP SERPL-CCNC: 140 U/L — HIGH (ref 40–120)
ALT FLD-CCNC: 11 U/L — SIGNIFICANT CHANGE UP (ref 10–45)
ANION GAP SERPL CALC-SCNC: 10 MMOL/L — SIGNIFICANT CHANGE UP (ref 5–17)
APTT BLD: 35.9 SEC — HIGH (ref 24.5–35.6)
AST SERPL-CCNC: 13 U/L — SIGNIFICANT CHANGE UP (ref 10–40)
BILIRUB SERPL-MCNC: 0.9 MG/DL — SIGNIFICANT CHANGE UP (ref 0.2–1.2)
BUN SERPL-MCNC: 20 MG/DL — SIGNIFICANT CHANGE UP (ref 7–23)
CALCIUM SERPL-MCNC: 8.9 MG/DL — SIGNIFICANT CHANGE UP (ref 8.4–10.5)
CHLORIDE SERPL-SCNC: 108 MMOL/L — SIGNIFICANT CHANGE UP (ref 96–108)
CO2 SERPL-SCNC: 27 MMOL/L — SIGNIFICANT CHANGE UP (ref 22–31)
CREAT SERPL-MCNC: 1.37 MG/DL — HIGH (ref 0.5–1.3)
EGFR: 37 ML/MIN/1.73M2 — LOW
GLUCOSE SERPL-MCNC: 90 MG/DL — SIGNIFICANT CHANGE UP (ref 70–99)
HCT VFR BLD CALC: 28.4 % — LOW (ref 34.5–45)
HGB BLD-MCNC: 8.7 G/DL — LOW (ref 11.5–15.5)
INR BLD: 2.21 RATIO — HIGH (ref 0.85–1.18)
MAGNESIUM SERPL-MCNC: 2.1 MG/DL — SIGNIFICANT CHANGE UP (ref 1.6–2.6)
MCHC RBC-ENTMCNC: 29.6 PG — SIGNIFICANT CHANGE UP (ref 27–34)
MCHC RBC-ENTMCNC: 30.6 GM/DL — LOW (ref 32–36)
MCV RBC AUTO: 96.6 FL — SIGNIFICANT CHANGE UP (ref 80–100)
NRBC # BLD: 0 /100 WBCS — SIGNIFICANT CHANGE UP (ref 0–0)
PLATELET # BLD AUTO: 170 K/UL — SIGNIFICANT CHANGE UP (ref 150–400)
POTASSIUM SERPL-MCNC: 3 MMOL/L — LOW (ref 3.5–5.3)
POTASSIUM SERPL-MCNC: 3.2 MMOL/L — LOW (ref 3.5–5.3)
POTASSIUM SERPL-SCNC: 3 MMOL/L — LOW (ref 3.5–5.3)
POTASSIUM SERPL-SCNC: 3.2 MMOL/L — LOW (ref 3.5–5.3)
PROT SERPL-MCNC: 6.6 G/DL — SIGNIFICANT CHANGE UP (ref 6–8.3)
PROTHROM AB SERPL-ACNC: 25.3 SEC — HIGH (ref 9.5–13)
RBC # BLD: 2.94 M/UL — LOW (ref 3.8–5.2)
RBC # FLD: 16.3 % — HIGH (ref 10.3–14.5)
SODIUM SERPL-SCNC: 145 MMOL/L — SIGNIFICANT CHANGE UP (ref 135–145)
WBC # BLD: 8.22 K/UL — SIGNIFICANT CHANGE UP (ref 3.8–10.5)
WBC # FLD AUTO: 8.22 K/UL — SIGNIFICANT CHANGE UP (ref 3.8–10.5)

## 2024-03-08 PROCEDURE — 99223 1ST HOSP IP/OBS HIGH 75: CPT

## 2024-03-08 PROCEDURE — 99497 ADVNCD CARE PLAN 30 MIN: CPT | Mod: 25

## 2024-03-08 PROCEDURE — 99231 SBSQ HOSP IP/OBS SF/LOW 25: CPT | Mod: GC

## 2024-03-08 PROCEDURE — 99233 SBSQ HOSP IP/OBS HIGH 50: CPT | Mod: FS

## 2024-03-08 PROCEDURE — 74019 RADEX ABDOMEN 2 VIEWS: CPT | Mod: 26

## 2024-03-08 RX ORDER — POTASSIUM CHLORIDE 20 MEQ
10 PACKET (EA) ORAL ONCE
Refills: 0 | Status: COMPLETED | OUTPATIENT
Start: 2024-03-08 | End: 2024-03-08

## 2024-03-08 RX ORDER — WARFARIN SODIUM 2.5 MG/1
2.5 TABLET ORAL AT BEDTIME
Refills: 0 | Status: DISCONTINUED | OUTPATIENT
Start: 2024-03-08 | End: 2024-03-09

## 2024-03-08 RX ADMIN — WARFARIN SODIUM 2.5 MILLIGRAM(S): 2.5 TABLET ORAL at 22:49

## 2024-03-08 RX ADMIN — Medication 25 MICROGRAM(S): at 05:17

## 2024-03-08 RX ADMIN — POLYETHYLENE GLYCOL 3350 17 GRAM(S): 17 POWDER, FOR SOLUTION ORAL at 18:01

## 2024-03-08 RX ADMIN — SENNA PLUS 1 TABLET(S): 8.6 TABLET ORAL at 11:37

## 2024-03-08 RX ADMIN — Medication 100 MILLIEQUIVALENT(S): at 11:37

## 2024-03-08 RX ADMIN — Medication 1000 UNIT(S): at 17:53

## 2024-03-08 RX ADMIN — MEMANTINE HYDROCHLORIDE 5 MILLIGRAM(S): 10 TABLET ORAL at 17:56

## 2024-03-08 RX ADMIN — SIMETHICONE 80 MILLIGRAM(S): 80 TABLET, CHEWABLE ORAL at 11:38

## 2024-03-08 RX ADMIN — MEMANTINE HYDROCHLORIDE 5 MILLIGRAM(S): 10 TABLET ORAL at 05:17

## 2024-03-08 RX ADMIN — QUETIAPINE FUMARATE 50 MILLIGRAM(S): 200 TABLET, FILM COATED ORAL at 22:49

## 2024-03-08 RX ADMIN — POLYETHYLENE GLYCOL 3350 17 GRAM(S): 17 POWDER, FOR SOLUTION ORAL at 05:18

## 2024-03-08 RX ADMIN — Medication 100 MILLIGRAM(S): at 11:45

## 2024-03-08 RX ADMIN — ATORVASTATIN CALCIUM 10 MILLIGRAM(S): 80 TABLET, FILM COATED ORAL at 22:49

## 2024-03-08 NOTE — PROGRESS NOTE ADULT - PROBLEM SELECTOR PLAN 1
Abdominal X-ray from 3/3 reviewed   Turn position frequently while in bed  Bowel regimen   Miralax twice daily  Senna every night  Fleet enema PRN   Digni Shield Rectal tube in place with watery, brown stool in bag  Stool testing negative  If condition not improving with rectal tube will discuss further need of colonoscopy    PPM interrogation completed Abdominal X-ray from 3/7 reviewed   Turn position frequently while in bed  Bowel regimen   Miralax twice daily  Senna every night  Fleet enema PRN   Stool testing negative  If condition not improving with rectal tube will discuss further need of colonoscopy    PPM interrogation completed

## 2024-03-08 NOTE — PROGRESS NOTE ADULT - ASSESSMENT
89F h/o dementia, Afib on coumadin, HLD, hypothyroidism, HTN, spinal stenosis, has pacemaker, presented to [facility] from home for diarrhea noted by [facility].     GI consultation for Ileus. CT Abdomen 3/1 Fluid in the rectosigmoid colon again noted with increase in degree of air distention of the sigmoid colon  3/3 Abdominal X ray Colonic distention again seen, greatest involving a redundant sigmoid colon,    89F h/o dementia, Afib on coumadin, HLD, hypothyroidism, HTN, spinal stenosis, has pacemaker, presented to  ED from home for diarrhea noted by [facility].     GI consultation for Ileus. CT Abdomen 3/1 Fluid in the rectosigmoid colon again noted with increase in degree of air distention of the sigmoid colon  3/3 Abdominal X ray Colonic distention again seen, greatest involving a redundant sigmoid colon

## 2024-03-08 NOTE — PROGRESS NOTE ADULT - SUBJECTIVE AND OBJECTIVE BOX
Patient is a 89y old  Female who presents with a chief complaint of diarrhea (08 Mar 2024 10:18)      INTERVAL HPI/OVERNIGHT EVENTS: Patient seen and examined at bedside. No overnight events.    MEDICATIONS  (STANDING):  allopurinol 100 milliGRAM(s) Oral daily  atorvastatin 10 milliGRAM(s) Oral at bedtime  cholecalciferol 1000 Unit(s) Oral daily  dextrose 5%. 1000 milliLiter(s) (75 mL/Hr) IV Continuous <Continuous>  levothyroxine 25 MICROGram(s) Oral daily  memantine 5 milliGRAM(s) Oral two times a day  polyethylene glycol 3350 17 Gram(s) Oral every 12 hours  potassium chloride  10 mEq/100 mL IVPB 10 milliEquivalent(s) IV Intermittent once  QUEtiapine 50 milliGRAM(s) Oral at bedtime  senna 1 Tablet(s) Oral daily  simethicone 80 milliGRAM(s) Chew daily    MEDICATIONS  (PRN):  acetaminophen     Tablet .. 650 milliGRAM(s) Oral every 6 hours PRN Temp greater or equal to 38C (100.4F), Mild Pain (1 - 3)  aluminum hydroxide/magnesium hydroxide/simethicone Suspension 30 milliLiter(s) Oral every 4 hours PRN Dyspepsia  melatonin 3 milliGRAM(s) Oral at bedtime PRN Insomnia  ondansetron Injectable 4 milliGRAM(s) IV Push every 8 hours PRN Nausea and/or Vomiting      Allergies    No Known Allergies    Intolerances        REVIEW OF SYSTEMS:  CONSTITUTIONAL: No fever or chills  HEENT:  No headache, no sore throat  RESPIRATORY: No cough, wheezing, or shortness of breath  CARDIOVASCULAR: No chest pain, palpitations  GASTROINTESTINAL: No abd pain, nausea, vomiting, or diarrhea  GENITOURINARY: No dysuria, frequency, or hematuria  NEUROLOGICAL: no focal weakness or dizziness  MUSCULOSKELETAL: no myalgias     Vital Signs Last 24 Hrs  T(C): 36.9 (08 Mar 2024 05:00), Max: 37.6 (07 Mar 2024 20:48)  T(F): 98.4 (08 Mar 2024 05:00), Max: 99.6 (07 Mar 2024 20:48)  HR: 62 (08 Mar 2024 05:00) (62 - 86)  BP: 131/71 (08 Mar 2024 05:00) (131/71 - 174/66)  BP(mean): --  RR: 18 (08 Mar 2024 05:00) (17 - 18)  SpO2: 97% (08 Mar 2024 05:00) (94% - 97%)    Parameters below as of 08 Mar 2024 05:00  Patient On (Oxygen Delivery Method): room air      I&O's Summary    07 Mar 2024 07:01  -  08 Mar 2024 07:00  --------------------------------------------------------  IN: 200 mL / OUT: 300 mL / NET: -100 mL          PHYSICAL EXAM:  CONSTITUTIONAL: NAD  EYES: EOMI, No conjunctival or scleral injection, non-icteric  ENMT: slightly dry mucous membranes  RESP: CTA b/l, normal respiratory effort  CV: RRR, systolic murmur LSB, no MRG; no peripheral edema  GI: abd distension improved, Soft, NT, no rebound, no guarding; no palpable masses  MSK: No digital clubbing or cyanosis  SKIN: intertrigo of groin, heel stage 1 pressure injury, stage 2 buttock  NEURO: grossly moves all 4 extremities  PSYCH: A+O x 1, baseline dementia, poor insight, pleasant affect    LABS: Personally reviewed  CBC                        8.7    8.22  )-----------( 170      ( 08 Mar 2024 05:18 )             28.4     CMP  03-08    145  |  108  |  20  ----------------------------<  90  3.0   |  27  |  1.37    Ca    8.9      08 Mar 2024 05:18  Mg     2.1     03-08    TPro  6.6  /  Alb  2.6  /  TBili  0.9  /  DBili  x   /  AST  13  /  ALT  11  /  AlkPhos  140  03-08          PT/INR - ( 08 Mar 2024 05:18 )   PT: 25.3 sec;   INR: 2.21 ratio         PTT - ( 08 Mar 2024 05:18 )  PTT:35.9 sec            Urinalysis Basic - ( 08 Mar 2024 05:18 )    Color: x / Appearance: x / SG: x / pH: x  Gluc: 90 mg/dL / Ketone: x  / Bili: x / Urobili: x   Blood: x / Protein: x / Nitrite: x   Leuk Esterase: x / RBC: x / WBC x   Sq Epi: x / Non Sq Epi: x / Bacteria: x        Culture - Urine (collected 01 Mar 2024 21:50)  Source: Catheterized Catheterized  Final Report (02 Mar 2024 22:20):    <10,000 CFU/mL Normal Urogenital Chary            Culture - Urine (collected 03-01-24 @ 21:50)  Source: Catheterized Catheterized  Final Report (03-02-24 @ 22:20):    <10,000 CFU/mL Normal Urogenital Chary        RADIOLOGY & ADDITIONAL TESTS: Personally reviewed.     Consultant(s) Notes Reviewed:  [x] YES  [ ] NO   Discussed with BLAINE/CRYSTAL, RN     Patient is a 89y old  Female who presents with a chief complaint of diarrhea (08 Mar 2024 10:18)      INTERVAL HPI/OVERNIGHT EVENTS: Patient seen and examined at bedside. Deep asleep No overnight events.    MEDICATIONS  (STANDING):  allopurinol 100 milliGRAM(s) Oral daily  atorvastatin 10 milliGRAM(s) Oral at bedtime  cholecalciferol 1000 Unit(s) Oral daily  dextrose 5%. 1000 milliLiter(s) (75 mL/Hr) IV Continuous <Continuous>  levothyroxine 25 MICROGram(s) Oral daily  memantine 5 milliGRAM(s) Oral two times a day  polyethylene glycol 3350 17 Gram(s) Oral every 12 hours  potassium chloride  10 mEq/100 mL IVPB 10 milliEquivalent(s) IV Intermittent once  QUEtiapine 50 milliGRAM(s) Oral at bedtime  senna 1 Tablet(s) Oral daily  simethicone 80 milliGRAM(s) Chew daily    MEDICATIONS  (PRN):  acetaminophen     Tablet .. 650 milliGRAM(s) Oral every 6 hours PRN Temp greater or equal to 38C (100.4F), Mild Pain (1 - 3)  aluminum hydroxide/magnesium hydroxide/simethicone Suspension 30 milliLiter(s) Oral every 4 hours PRN Dyspepsia  melatonin 3 milliGRAM(s) Oral at bedtime PRN Insomnia  ondansetron Injectable 4 milliGRAM(s) IV Push every 8 hours PRN Nausea and/or Vomiting      Allergies    No Known Allergies    Intolerances        REVIEW OF SYSTEMS: deferred    Vital Signs Last 24 Hrs  T(C): 36.9 (08 Mar 2024 05:00), Max: 37.6 (07 Mar 2024 20:48)  T(F): 98.4 (08 Mar 2024 05:00), Max: 99.6 (07 Mar 2024 20:48)  HR: 62 (08 Mar 2024 05:00) (62 - 86)  BP: 131/71 (08 Mar 2024 05:00) (131/71 - 174/66)  BP(mean): --  RR: 18 (08 Mar 2024 05:00) (17 - 18)  SpO2: 97% (08 Mar 2024 05:00) (94% - 97%)    Parameters below as of 08 Mar 2024 05:00  Patient On (Oxygen Delivery Method): room air      I&O's Summary    07 Mar 2024 07:01  -  08 Mar 2024 07:00  --------------------------------------------------------  IN: 200 mL / OUT: 300 mL / NET: -100 mL          PHYSICAL EXAM:  CONSTITUTIONAL: NAD  EYES: EOMI, No conjunctival or scleral injection, non-icteric  ENMT: slightly dry mucous membranes  RESP: CTA b/l, normal respiratory effort  CV: RRR, systolic murmur LSB, no MRG; no peripheral edema  GI: abd distension improved, Soft, NT, no rebound, no guarding; no palpable masses  MSK: No digital clubbing or cyanosis  SKIN: intertrigo of groin, heel stage 1 pressure injury, stage 2 buttock  NEURO: grossly moves all 4 extremities  PSYCH: A+O x 1, baseline dementia, poor insight, pleasant affect    LABS: Personally reviewed  CBC                        8.7    8.22  )-----------( 170      ( 08 Mar 2024 05:18 )             28.4     CMP  03-08    145  |  108  |  20  ----------------------------<  90  3.0   |  27  |  1.37    Ca    8.9      08 Mar 2024 05:18  Mg     2.1     03-08    TPro  6.6  /  Alb  2.6  /  TBili  0.9  /  DBili  x   /  AST  13  /  ALT  11  /  AlkPhos  140  03-08          PT/INR - ( 08 Mar 2024 05:18 )   PT: 25.3 sec;   INR: 2.21 ratio    PTT - ( 08 Mar 2024 05:18 )  PTT:35.9 sec      Urinalysis Basic - ( 08 Mar 2024 05:18 )    Color: x / Appearance: x / SG: x / pH: x  Gluc: 90 mg/dL / Ketone: x  / Bili: x / Urobili: x   Blood: x / Protein: x / Nitrite: x   Leuk Esterase: x / RBC: x / WBC x   Sq Epi: x / Non Sq Epi: x / Bacteria: x        Culture - Urine (collected 01 Mar 2024 21:50)  Source: Catheterized Catheterized  Final Report (02 Mar 2024 22:20):    <10,000 CFU/mL Normal Urogenital Chary        Culture - Urine (collected 03-01-24 @ 21:50)  Source: Catheterized Catheterized  Final Report (03-02-24 @ 22:20):    <10,000 CFU/mL Normal Urogenital Chary        RADIOLOGY & ADDITIONAL TESTS: Personally reviewed.     Consultant(s) Notes Reviewed:  [x] YES  [ ] NO   Discussed with BLAINE/CRYSTAL, RN

## 2024-03-08 NOTE — PROGRESS NOTE ADULT - ATTENDING COMMENTS
No change in condition    Briefly 89y old female on warfarin for afib, initially admitted with diarrhea, later found to have likely ileus, colonic distension on imaging, now with rectal tube, with watery/ brownish stool, being followed by GI.  T(C): 37.3 (03-08-24 @ 11:37), Max: 37.6 (03-07-24 @ 20:48)  T(F): 99.2 (03-08-24 @ 11:37), Max: 99.6 (03-07-24 @ 20:48)  HR: 61 (03-08-24 @ 11:37) (61 - 86)  BP: 155/82 (03-08-24 @ 11:37) (131/71 - 174/66)  ABP: --  ABP(mean): --  RR: 18 (03-08-24 @ 11:37) (18 - 18)  SpO2: 98% (03-08-24 @ 11:37) (94% - 98%)      on exam awake, does not follow commands. obese  both lungs clear  s1, s2, regular,   abdomen- soft, no distension, no tenderness to palpation  b/l calves with no tenderness.    labs reviewed  wbc- 8.22  h/h- 8.7/28.4  inr- 2.71  Na- 145  Creatinine- 1.37  UA- no nitrite/ LE  xray reviewed by me, continues to show distension, no free air    a/p:  # chronic ileus, no sign/ symptom of obstruction  # Hypernatremia- improved  # anemia- stable  # ckd stage 3  # Underlying history of afib on coumadin, ppm, hypothyroidism. dementia, HLD  - gentle hydration, monitor serum sodium. continue to maintain rectal tube. stool studies pending. patient followed by GI, may go for colonoscopy for decompression. If they decide to take patient for colonoscopy will hold coumadin and monitor INR. Patient should be transitioned to apixaban when ready for easier regime if approved by pharmacy. continue other home meds. monitor renal function, avoid nephrotoxic agents.    - rest as per resident not.     - Dispo: Medically active, possible d/c in 24-48 hours depending on GI recommendations.    I spent a total of 30 minutes on the date of this encounter coordinating the patient's care. This includes reviewing results/imaging and discussions with specialists, nursing, case management/social work. Further tests, medications, and procedures have been ordered as indicated. Results and the plan of care were communicated to the patient and/or their family member. Supporting documentation was completed and added to the patient's chart.

## 2024-03-08 NOTE — PROGRESS NOTE ADULT - NUTRITIONAL ASSESSMENT
This patient has been assessed with a concern for Malnutrition and has been determined to have a diagnosis/diagnoses of Severe protein-calorie malnutrition.    This patient is being managed with:   Diet DASH/TLC-  Sodium & Cholesterol Restricted  Supplement Feeding Modality:  Oral  Ensure Plus High Protein Cans or Servings Per Day:  1       Frequency:  Two Times a day  Entered: Mar  6 2024 11:25AM    Diet DASH/TLC-  Sodium & Cholesterol Restricted  Entered: Mar  2 2024 10:49AM    The following pending diet order is being considered for treatment of Severe protein-calorie malnutrition:null This patient has been assessed with a concern for Malnutrition and has been determined to have a diagnosis/diagnoses of Severe protein-calorie malnutrition.    This patient is being managed with:   Diet DASH/TLC-  Sodium & Cholesterol Restricted  Supplement Feeding Modality:  Oral  Ensure Plus High Protein Cans or Servings Per Day:  1       Frequency:  Two Times a day  Entered: Mar  6 2024 11:25AM    Diet DASH/TLC-  Sodium & Cholesterol Restricted  Entered: Mar  2 2024 10:49AM

## 2024-03-08 NOTE — CONSULT NOTE ADULT - CONVERSATION DETAILS
Spoke with daughter Lisa today , she is at work, unable to be here at this time.   I explained role of palliative care and reason for my call.  daughter  remembers  palliative team from her father 2 years ago.  We talked about how pt has been doing, pt lives at home w/  HHA in place and daughter lives nearby and she is also involved in her care.  daughter says pt memeory started to change back in 2019, but really declined after her   2 years ago.  daughter aware and updated on her mom's condition, aware she has an issue with her colon that may resolve w/ rectal tube or that pt may need a colonoscopy .  daughter plans to cont to speak w/ GI about this situation. In addition to her dementia, pt is slowed down by her spinal stenosis, which daughter says can be painful at times, and pt basically goes from bed to chair at home.  Noting that her mom's quality of life is not too good anymore.  daughter began to ask about the 'dnr' ordering, so I reviewed that  with her, explained the molst and dnr/wdni . daughter expresses that she would like to have that put in place. I took a verbal consent today to complete the molst form with dnr /dni.  I also spoke w/ daughter about feeding tubes, both ng and Peg and when they might be needed. daughter stated understanding, and would want to think about this and speak w/ her family about it before making any decisions.   I then discussed with her the option of adding home hospice services to her HHA's at home. Explained what home hospice can offer , a  support team by phone, equipment, a hospital bed, medications, ,and a nurse once a week . I explained the idea of home hospice is to not treat aggressively , but to help keep pt at home and comfortable.   Daughter was interested , wants to think about it, and will talk w/ family about this as well, and will let me know .

## 2024-03-08 NOTE — PROGRESS NOTE ADULT - SUBJECTIVE AND OBJECTIVE BOX
INTERVAL HPI/OVERNIGHT EVENTS:  HPI:    89 y/o female seen and examined.       MEDICATIONS  (STANDING):  allopurinol 100 milliGRAM(s) Oral daily  atorvastatin 10 milliGRAM(s) Oral at bedtime  cholecalciferol 1000 Unit(s) Oral daily  dextrose 5%. 1000 milliLiter(s) (75 mL/Hr) IV Continuous <Continuous>  levothyroxine 25 MICROGram(s) Oral daily  memantine 5 milliGRAM(s) Oral two times a day  polyethylene glycol 3350 17 Gram(s) Oral every 12 hours  potassium chloride  10 mEq/100 mL IVPB 10 milliEquivalent(s) IV Intermittent once  QUEtiapine 50 milliGRAM(s) Oral at bedtime  senna 1 Tablet(s) Oral daily  simethicone 80 milliGRAM(s) Chew daily    MEDICATIONS  (PRN):  acetaminophen     Tablet .. 650 milliGRAM(s) Oral every 6 hours PRN Temp greater or equal to 38C (100.4F), Mild Pain (1 - 3)  aluminum hydroxide/magnesium hydroxide/simethicone Suspension 30 milliLiter(s) Oral every 4 hours PRN Dyspepsia  melatonin 3 milliGRAM(s) Oral at bedtime PRN Insomnia  ondansetron Injectable 4 milliGRAM(s) IV Push every 8 hours PRN Nausea and/or Vomiting      Allergies    No Known Allergies    Intolerances        PAST MEDICAL & SURGICAL HISTORY:  Atrial fibrillation, unspecified type      Benign hypertension      Hypothyroid      HLD (hyperlipidemia)      Dementia      S/P knee replacement      H/O mastectomy    PHYSICAL EXAM:   Vital Signs:  Vital Signs Last 24 Hrs  T(C): 36.9 (08 Mar 2024 05:00), Max: 37.6 (07 Mar 2024 20:48)  T(F): 98.4 (08 Mar 2024 05:00), Max: 99.6 (07 Mar 2024 20:48)  HR: 62 (08 Mar 2024 05:00) (62 - 86)  BP: 131/71 (08 Mar 2024 05:00) (131/71 - 174/66)  BP(mean): --  RR: 18 (08 Mar 2024 05:00) (17 - 18)  SpO2: 97% (08 Mar 2024 05:00) (94% - 97%)    Parameters below as of 08 Mar 2024 05:00  Patient On (Oxygen Delivery Method): room air      Daily     Daily I&O's Summary    07 Mar 2024 07:01  -  08 Mar 2024 07:00  --------------------------------------------------------  IN: 200 mL / OUT: 300 mL / NET: -100 mL        GENERAL:  Appears stated age,   HEENT:  NC/AT,  conjunctivae clear and pink,   CHEST:  Full & symmetric excursion, no increased effort, breath sounds clear  HEART:  Regular rhythm, S1, S2, no murmur  ABDOMEN:  Soft, non-tender, non-distended, normoactive bowel sounds,  rectal tube with liquid brown watery stool  EXTEREMITIES:  no edema  SKIN:  No rash/warm/dry  NEURO:  Alert, Confused       LABS:                        8.7    8.22  )-----------( 170      ( 08 Mar 2024 05:18 )             28.4     03-08    145  |  108  |  20  ----------------------------<  90  3.0<L>   |  27  |  1.37<H>    Ca    8.9      08 Mar 2024 05:18  Mg     2.1     03-08    TPro  6.6  /  Alb  2.6<L>  /  TBili  0.9  /  DBili  x   /  AST  13  /  ALT  11  /  AlkPhos  140<H>  03-08    PT/INR - ( 08 Mar 2024 05:18 )   PT: 25.3 sec;   INR: 2.21 ratio         PTT - ( 08 Mar 2024 05:18 )  PTT:35.9 sec  Urinalysis Basic - ( 08 Mar 2024 05:18 )    Color: x / Appearance: x / SG: x / pH: x  Gluc: 90 mg/dL / Ketone: x  / Bili: x / Urobili: x   Blood: x / Protein: x / Nitrite: x   Leuk Esterase: x / RBC: x / WBC x   Sq Epi: x / Non Sq Epi: x / Bacteria: x     INTERVAL HPI/OVERNIGHT EVENTS:  HPI:    89 y/o female seen and examined.       MEDICATIONS  (STANDING):  allopurinol 100 milliGRAM(s) Oral daily  atorvastatin 10 milliGRAM(s) Oral at bedtime  cholecalciferol 1000 Unit(s) Oral daily  dextrose 5%. 1000 milliLiter(s) (75 mL/Hr) IV Continuous <Continuous>  levothyroxine 25 MICROGram(s) Oral daily  memantine 5 milliGRAM(s) Oral two times a day  polyethylene glycol 3350 17 Gram(s) Oral every 12 hours  potassium chloride  10 mEq/100 mL IVPB 10 milliEquivalent(s) IV Intermittent once  QUEtiapine 50 milliGRAM(s) Oral at bedtime  senna 1 Tablet(s) Oral daily  simethicone 80 milliGRAM(s) Chew daily    MEDICATIONS  (PRN):  acetaminophen     Tablet .. 650 milliGRAM(s) Oral every 6 hours PRN Temp greater or equal to 38C (100.4F), Mild Pain (1 - 3)  aluminum hydroxide/magnesium hydroxide/simethicone Suspension 30 milliLiter(s) Oral every 4 hours PRN Dyspepsia  melatonin 3 milliGRAM(s) Oral at bedtime PRN Insomnia  ondansetron Injectable 4 milliGRAM(s) IV Push every 8 hours PRN Nausea and/or Vomiting      Allergies    No Known Allergies    Intolerances        PAST MEDICAL & SURGICAL HISTORY:  Atrial fibrillation, unspecified type      Benign hypertension      Hypothyroid      HLD (hyperlipidemia)      Dementia      S/P knee replacement      H/O mastectomy    PHYSICAL EXAM:   Vital Signs:  Vital Signs Last 24 Hrs  T(C): 36.9 (08 Mar 2024 05:00), Max: 37.6 (07 Mar 2024 20:48)  T(F): 98.4 (08 Mar 2024 05:00), Max: 99.6 (07 Mar 2024 20:48)  HR: 62 (08 Mar 2024 05:00) (62 - 86)  BP: 131/71 (08 Mar 2024 05:00) (131/71 - 174/66)  BP(mean): --  RR: 18 (08 Mar 2024 05:00) (17 - 18)  SpO2: 97% (08 Mar 2024 05:00) (94% - 97%)    Parameters below as of 08 Mar 2024 05:00  Patient On (Oxygen Delivery Method): room air      Daily     Daily I&O's Summary    07 Mar 2024 07:01  -  08 Mar 2024 07:00  --------------------------------------------------------  IN: 200 mL / OUT: 300 mL / NET: -100 mL        GENERAL:  Appears stated age,   HEENT:  NC/AT,  conjunctivae clear and pink,   CHEST:  Full & symmetric excursion, no increased effort, breath sounds clear  HEART:  Regular rhythm, S1, S2, no murmur  ABDOMEN:  Soft, non-tender, non-distended, normoactive bowel sounds,  EXTEREMITIES:  no edema  SKIN:  No rash/warm/dry  NEURO:  Alert, Confused       LABS:                        8.7    8.22  )-----------( 170      ( 08 Mar 2024 05:18 )             28.4     03-08    145  |  108  |  20  ----------------------------<  90  3.0<L>   |  27  |  1.37<H>    Ca    8.9      08 Mar 2024 05:18  Mg     2.1     03-08    TPro  6.6  /  Alb  2.6<L>  /  TBili  0.9  /  DBili  x   /  AST  13  /  ALT  11  /  AlkPhos  140<H>  03-08    PT/INR - ( 08 Mar 2024 05:18 )   PT: 25.3 sec;   INR: 2.21 ratio         PTT - ( 08 Mar 2024 05:18 )  PTT:35.9 sec  Urinalysis Basic - ( 08 Mar 2024 05:18 )    Color: x / Appearance: x / SG: x / pH: x  Gluc: 90 mg/dL / Ketone: x  / Bili: x / Urobili: x   Blood: x / Protein: x / Nitrite: x   Leuk Esterase: x / RBC: x / WBC x   Sq Epi: x / Non Sq Epi: x / Bacteria: x     GI Follow up    89 y/o female seen and examined at the bedside. No new issues reported.      MEDICATIONS  (STANDING):  allopurinol 100 milliGRAM(s) Oral daily  atorvastatin 10 milliGRAM(s) Oral at bedtime  cholecalciferol 1000 Unit(s) Oral daily  dextrose 5%. 1000 milliLiter(s) (75 mL/Hr) IV Continuous <Continuous>  levothyroxine 25 MICROGram(s) Oral daily  memantine 5 milliGRAM(s) Oral two times a day  polyethylene glycol 3350 17 Gram(s) Oral every 12 hours  potassium chloride  10 mEq/100 mL IVPB 10 milliEquivalent(s) IV Intermittent once  QUEtiapine 50 milliGRAM(s) Oral at bedtime  senna 1 Tablet(s) Oral daily  simethicone 80 milliGRAM(s) Chew daily    MEDICATIONS  (PRN):  acetaminophen     Tablet .. 650 milliGRAM(s) Oral every 6 hours PRN Temp greater or equal to 38C (100.4F), Mild Pain (1 - 3)  aluminum hydroxide/magnesium hydroxide/simethicone Suspension 30 milliLiter(s) Oral every 4 hours PRN Dyspepsia  melatonin 3 milliGRAM(s) Oral at bedtime PRN Insomnia  ondansetron Injectable 4 milliGRAM(s) IV Push every 8 hours PRN Nausea and/or Vomiting      Allergies    No Known Allergies    Intolerances        PHYSICAL EXAM:   Vital Signs:  Vital Signs Last 24 Hrs  T(C): 36.9 (08 Mar 2024 05:00), Max: 37.6 (07 Mar 2024 20:48)  T(F): 98.4 (08 Mar 2024 05:00), Max: 99.6 (07 Mar 2024 20:48)  HR: 62 (08 Mar 2024 05:00) (62 - 86)  BP: 131/71 (08 Mar 2024 05:00) (131/71 - 174/66)  BP(mean): --  RR: 18 (08 Mar 2024 05:00) (17 - 18)  SpO2: 97% (08 Mar 2024 05:00) (94% - 97%)    Parameters below as of 08 Mar 2024 05:00  Patient On (Oxygen Delivery Method): room air      Daily     Daily I&O's Summary    07 Mar 2024 07:01  -  08 Mar 2024 07:00  --------------------------------------------------------  IN: 200 mL / OUT: 300 mL / NET: -100 mL        GENERAL:  Elderly, NAD  HEENT:  NC/AT, conjunctivae clear and pink   CHEST:  Full & symmetric excursion, no increased effort, breath sounds clear  HEART:  Regular rhythm, S1, S2, no murmur  ABDOMEN:  Soft, non-tender, non-distended, normoactive bowel sounds  EXTREMITIES:  no edema  SKIN:  No rash/warm/dry  NEURO:  Alert, Confused                        8.7    8.22  )-----------( 170      ( 08 Mar 2024 05:18 )             28.4     03-08    145  |  108  |  20  ----------------------------<  90  3.0<L>   |  27  |  1.37<H>    Ca    8.9      08 Mar 2024 05:18  Mg     2.1     03-08    TPro  6.6  /  Alb  2.6<L>  /  TBili  0.9  /  DBili  x   /  AST  13  /  ALT  11  /  AlkPhos  140<H>  03-08    PT/INR - ( 08 Mar 2024 05:18 )   PT: 25.3 sec;   INR: 2.21 ratio         PTT - ( 08 Mar 2024 05:18 )  PTT:35.9 sec  Urinalysis Basic - ( 08 Mar 2024 05:18 )    Color: x / Appearance: x / SG: x / pH: x  Gluc: 90 mg/dL / Ketone: x  / Bili: x / Urobili: x   Blood: x / Protein: x / Nitrite: x   Leuk Esterase: x / RBC: x / WBC x   Sq Epi: x / Non Sq Epi: x / Bacteria: x

## 2024-03-08 NOTE — CHART NOTE - NSCHARTNOTEFT_GEN_A_CORE
Standard Wheelchair     Pt ROBSON ESTRELLA who has a Dx of spinal stenosis, morbid obesity, ileus needs a wheelchair to help perform ADL activities safely at home. patient is able to self propel a standard wheelchair  and patent  has a aid or family member at home to  assist.      SHAY LIFT     Patient requires shay lift for transferring from hospital bed to wheelchair, without shay lift the patient will be bedbound    Hospital beds   Pt ROBSON ESTRELLA will need a hospital bed for elevation and transferring purposes due to their dx of spinal stenosis, morbid obesity, and ileus to have their head elevated 30° to help assist in proper breathing and frequent body positioning to reduce the risk of skin breakdowns in ways not feasible with an ordinary bed.

## 2024-03-08 NOTE — CONSULT NOTE ADULT - ASSESSMENT
A/P 89F h/o dementia, Afib on warfarin, Hyperlipidemia, hypothyroidism, HTN, spinal stenosis, has pacemaker, presented to  ED from home for diarrhea          Assessment/plans:      Diarrhea, Acute- unclear etiology    - Labs and vitals are stable   - CT abd/pelvis: Fluid in the rectosigmoid colon again noted with increase in degree of air distention of the sigmoid colon since 10/11/2023    Air distention of the sigmoid colon/ Ileus   - surgery consulted due to abdominal distension 3/4/24          - rectal exam yielded copious amounts of liquid stool and gas  - repeat abdominal xrays     - stool testing negative  - colorectal group following   - c/w Senna daily, Miralax BID, enemas PRN  - GI recommendations appreciated-- GI placed  digni shield rectal tube 3/6/24  - May need colonoscopy     CKD  - Cr near baseline    Pressure injury, heel, sacrum  - decubitus prevention protocol    Afib on warfarin  - PPM  interrogated    - at home takes warfarin   - Cards consulted       Dementia  - c/w memantine 5mg,   - quetiapine 50mg  -supportive  care     Palliative :   A/P 89F h/o dementia, Afib on warfarin, Hyperlipidemia, hypothyroidism, HTN, spinal stenosis, has pacemaker, presented to  ED from home for diarrhea          Assessment/plans:      Diarrhea, Acute- unclear etiology    - Labs and vitals are stable   - CT abd/pelvis: Fluid in the rectosigmoid colon again noted with increase in degree of air distention of the sigmoid colon since 10/11/2023    Air distention of the sigmoid colon/ Ileus   - surgery consulted due to abdominal distension 3/4/24          - rectal exam yielded copious amounts of liquid stool and gas  - repeat abdominal xrays     - stool testing negative  - colorectal group following   - c/w Senna daily, Miralax BID, enemas PRN  - GI recommendations appreciated-- GI placed  digni shield rectal tube 3/6/24  - May need colonoscopy     CKD  - Cr near baseline    Pressure injury, heel, sacrum  - decubitus prevention protocol    Afib on warfarin  - PPM  interrogated    - at home takes warfarin   - Cards consulted       Dementia  - c/w memantine 5mg,   - quetiapine 50mg  -supportive  care     Palliative :  asked for c assist , case d/w med team and I reviewed pt chart for more hx.   Saw pt bedside, she was asleep, wakes w/ stim., spoke, knows name , but not situation or location.   Pt denied pain , or n/v. Stated she was hungry , and mostly just tired and wanted to sleep.   I called and spoke w/ daughter  Lisa  today, see GOC note above.   Reviewed pt dementia hx, and present reason for hospitalization.  Discussed code status, recommended dnr/dni , then we reviewed molst - daughter agrees to Dnr/Dni status.   Molst completed and in chart. Med team made aware .  we also had a  conversation about home hospice services, daughter undecided, and presently wants to pursue all med treatments ,but  she will  think about it  and let us know.   cont supportive  care

## 2024-03-08 NOTE — PROGRESS NOTE ADULT - ASSESSMENT
89F h/o dementia, Afib on warfarin, Hyperlipidemia, hypothyroidism, HTN, spinal stenosis, has pacemaker, presented to [facility] from home for diarrhea    #Diarrhea, Acute- unclear etiology    - Labs and vitals are stable   - CT abd/pelvis: Fluid in the rectosigmoid colon again noted with increase in degree of air distention of the sigmoid colon since 10/11/2023    #Air distention of the sigmoid colon/ Ileus   - surgery consulted due to abdominal distension 3/4/24- rectal exam yielded copious amounts of liquid stool and gas  - repeat abdominal xray in the AM  -PPM interrogated   - stool testing negative  - colorectal group following   - c/w Senna daily, Miralax BID, enemas PRN  - turn frequently in bed   - GI recommendations appreciated-- GI placed  dignishield rectal tube 3/6/24  - still producing watery stool output; will need possible colonoscopy with decompression  - consider palliative consult     #Hypernatremia - resolved      #Hypokalemia-   - K 3.0 today  - continue to monitor and replete as needed    #CKD  - Cr near baseline    #Pressure injury, heel, sacrum  - decubitus prevention protocol    #Afib on warfarin  - at home takes warfarin 1.25mg Monday, Thursday, takes 2.5mg other days  - INR 3.07 on admission,  - daily warfarin dosing based on PT/INR    #Dementia  - c/w memantine 5mg,  quetiapine 50mg    #HTN  - c/w  home enalapril  and Lasix     #HLD  - atorvastatin 10mg    #Hypothyroidism  - levothyroxine 25mcg    #h/o gout  - c/w  allopurinol 100mg    #VTE ppx  - coumadin daily dosing    #GOC  - Full code    Dispo: pt has full time aide, home PT- once medically optimized

## 2024-03-09 LAB
ALBUMIN SERPL ELPH-MCNC: 2.8 G/DL — LOW (ref 3.3–5)
ALP SERPL-CCNC: 161 U/L — HIGH (ref 40–120)
ALT FLD-CCNC: 8 U/L — LOW (ref 10–45)
ANION GAP SERPL CALC-SCNC: 9 MMOL/L — SIGNIFICANT CHANGE UP (ref 5–17)
APTT BLD: 37.5 SEC — HIGH (ref 24.5–35.6)
AST SERPL-CCNC: 14 U/L — SIGNIFICANT CHANGE UP (ref 10–40)
BILIRUB SERPL-MCNC: 1.1 MG/DL — SIGNIFICANT CHANGE UP (ref 0.2–1.2)
BUN SERPL-MCNC: 19 MG/DL — SIGNIFICANT CHANGE UP (ref 7–23)
CALCIUM SERPL-MCNC: 9.1 MG/DL — SIGNIFICANT CHANGE UP (ref 8.4–10.5)
CHLORIDE SERPL-SCNC: 110 MMOL/L — HIGH (ref 96–108)
CO2 SERPL-SCNC: 24 MMOL/L — SIGNIFICANT CHANGE UP (ref 22–31)
CREAT SERPL-MCNC: 1.45 MG/DL — HIGH (ref 0.5–1.3)
CULTURE RESULTS: SIGNIFICANT CHANGE UP
CULTURE RESULTS: SIGNIFICANT CHANGE UP
EGFR: 34 ML/MIN/1.73M2 — LOW
GLUCOSE SERPL-MCNC: 89 MG/DL — SIGNIFICANT CHANGE UP (ref 70–99)
HCT VFR BLD CALC: 27.9 % — LOW (ref 34.5–45)
HGB BLD-MCNC: 9 G/DL — LOW (ref 11.5–15.5)
INR BLD: 2.23 RATIO — HIGH (ref 0.85–1.18)
MAGNESIUM SERPL-MCNC: 2.2 MG/DL — SIGNIFICANT CHANGE UP (ref 1.6–2.6)
MCHC RBC-ENTMCNC: 30.5 PG — SIGNIFICANT CHANGE UP (ref 27–34)
MCHC RBC-ENTMCNC: 32.3 GM/DL — SIGNIFICANT CHANGE UP (ref 32–36)
MCV RBC AUTO: 94.6 FL — SIGNIFICANT CHANGE UP (ref 80–100)
NRBC # BLD: 0 /100 WBCS — SIGNIFICANT CHANGE UP (ref 0–0)
PLATELET # BLD AUTO: 208 K/UL — SIGNIFICANT CHANGE UP (ref 150–400)
POTASSIUM SERPL-MCNC: 4 MMOL/L — SIGNIFICANT CHANGE UP (ref 3.5–5.3)
POTASSIUM SERPL-SCNC: 4 MMOL/L — SIGNIFICANT CHANGE UP (ref 3.5–5.3)
PROT SERPL-MCNC: 7.1 G/DL — SIGNIFICANT CHANGE UP (ref 6–8.3)
PROTHROM AB SERPL-ACNC: 24.8 SEC — HIGH (ref 9.5–13)
RBC # BLD: 2.95 M/UL — LOW (ref 3.8–5.2)
RBC # FLD: 16 % — HIGH (ref 10.3–14.5)
SODIUM SERPL-SCNC: 143 MMOL/L — SIGNIFICANT CHANGE UP (ref 135–145)
SPECIMEN SOURCE: SIGNIFICANT CHANGE UP
SPECIMEN SOURCE: SIGNIFICANT CHANGE UP
WBC # BLD: 9.19 K/UL — SIGNIFICANT CHANGE UP (ref 3.8–10.5)
WBC # FLD AUTO: 9.19 K/UL — SIGNIFICANT CHANGE UP (ref 3.8–10.5)

## 2024-03-09 PROCEDURE — 99231 SBSQ HOSP IP/OBS SF/LOW 25: CPT | Mod: GC

## 2024-03-09 PROCEDURE — 99233 SBSQ HOSP IP/OBS HIGH 50: CPT | Mod: FS

## 2024-03-09 RX ORDER — POTASSIUM CHLORIDE 20 MEQ
40 PACKET (EA) ORAL ONCE
Refills: 0 | Status: COMPLETED | OUTPATIENT
Start: 2024-03-09 | End: 2024-03-09

## 2024-03-09 RX ORDER — POTASSIUM CHLORIDE 20 MEQ
40 PACKET (EA) ORAL ONCE
Refills: 0 | Status: DISCONTINUED | OUTPATIENT
Start: 2024-03-09 | End: 2024-03-09

## 2024-03-09 RX ORDER — POTASSIUM CHLORIDE 20 MEQ
10 PACKET (EA) ORAL
Refills: 0 | Status: COMPLETED | OUTPATIENT
Start: 2024-03-09 | End: 2024-03-09

## 2024-03-09 RX ADMIN — Medication 40 MILLIEQUIVALENT(S): at 01:37

## 2024-03-09 RX ADMIN — ONDANSETRON 4 MILLIGRAM(S): 8 TABLET, FILM COATED ORAL at 09:07

## 2024-03-09 RX ADMIN — MEMANTINE HYDROCHLORIDE 5 MILLIGRAM(S): 10 TABLET ORAL at 17:13

## 2024-03-09 RX ADMIN — ATORVASTATIN CALCIUM 10 MILLIGRAM(S): 80 TABLET, FILM COATED ORAL at 21:45

## 2024-03-09 RX ADMIN — Medication 100 MILLIEQUIVALENT(S): at 01:37

## 2024-03-09 RX ADMIN — Medication 100 MILLIGRAM(S): at 11:42

## 2024-03-09 RX ADMIN — Medication 100 MILLIEQUIVALENT(S): at 04:39

## 2024-03-09 RX ADMIN — QUETIAPINE FUMARATE 50 MILLIGRAM(S): 200 TABLET, FILM COATED ORAL at 21:45

## 2024-03-09 RX ADMIN — Medication 25 MICROGRAM(S): at 06:11

## 2024-03-09 RX ADMIN — Medication 1000 UNIT(S): at 11:42

## 2024-03-09 RX ADMIN — MEMANTINE HYDROCHLORIDE 5 MILLIGRAM(S): 10 TABLET ORAL at 06:11

## 2024-03-09 RX ADMIN — SENNA PLUS 1 TABLET(S): 8.6 TABLET ORAL at 11:42

## 2024-03-09 RX ADMIN — POLYETHYLENE GLYCOL 3350 17 GRAM(S): 17 POWDER, FOR SOLUTION ORAL at 06:11

## 2024-03-09 RX ADMIN — SIMETHICONE 80 MILLIGRAM(S): 80 TABLET, CHEWABLE ORAL at 11:44

## 2024-03-09 RX ADMIN — Medication 100 MILLIEQUIVALENT(S): at 03:21

## 2024-03-09 RX ADMIN — POLYETHYLENE GLYCOL 3350 17 GRAM(S): 17 POWDER, FOR SOLUTION ORAL at 17:12

## 2024-03-09 NOTE — PROGRESS NOTE ADULT - NUTRITIONAL ASSESSMENT
This patient has been assessed with a concern for Malnutrition and has been determined to have a diagnosis/diagnoses of Severe protein-calorie malnutrition.    This patient is being managed with:   Diet DASH/TLC-  Sodium & Cholesterol Restricted  Supplement Feeding Modality:  Oral  Ensure Plus High Protein Cans or Servings Per Day:  1       Frequency:  Two Times a day  Entered: Mar  6 2024 11:25AM

## 2024-03-09 NOTE — PROGRESS NOTE ADULT - PROBLEM SELECTOR PLAN 1
Sitting in bed eating breakfast.  Liq brown stool in drainage bag from rectal tube. No leukocytosis, afebrile.  Abd XRay showed distal air-filled colon. Small bowel pattern nonspecific. Abd softly distended on exam. Stool studies negative    addn: notified rectal tube dislodged at 10am    [ ] con't diet  [ ] Turn position frequently while in bed  [ ] con't Bowel regimen with Miralax twice daily, Senna every night, Fleet enema PRN   [ ] leave rectal tube out for now, reinsert if abd pain, worsening abd distention  [ ] consider colonoscopy for decompression if condition without improvement

## 2024-03-09 NOTE — PROGRESS NOTE ADULT - ASSESSMENT
89F h/o dementia, Afib on warfarin, Hyperlipidemia, hypothyroidism, HTN, spinal stenosis, has pacemaker, presented to [facility] from home for diarrhea    #Diarrhea, Acute- unclear etiology    - Labs and vitals are stable   - CT abd/pelvis: Fluid in the rectosigmoid colon again noted with increase in degree of air distention of the sigmoid colon since 10/11/2023    #Air distention of the sigmoid colon/ Ileus   - surgery consulted due to abdominal distension 3/4/24- rectal exam yielded copious amounts of liquid stool and gas  -PPM interrogated   - stool testing negative  - colorectal group following   - c/w Senna daily, Miralax BID, enemas PRN  - turn frequently in bed   - GI recommendations appreciated-- GI placed dignishield rectal tube 3/6/24, removed 3/9/24  - monitor without rectal tube and reevaluate tomorrow   - repeat abdominal xray in the AM  - palliative consulted     #Hypernatremia - resolved      #Hypokalemia-   - K 3.0 today  - continue to monitor and replete as needed    #CKD  - Cr near baseline    #Pressure injury, heel, sacrum  - decubitus prevention protocol    #Afib on warfarin  - at home takes warfarin 1.25mg Monday, Thursday, takes 2.5mg other days  - INR 3.07 on admission,  - daily warfarin dosing based on PT/INR    #Dementia  - c/w memantine 5mg,  quetiapine 50mg    #HTN  - c/w  home enalapril  and Lasix     #HLD  - atorvastatin 10mg    #Hypothyroidism  - levothyroxine 25mcg    #h/o gout  - c/w  allopurinol 100mg    #VTE ppx  - coumadin daily dosing    #GOC  - Full code    Dispo: pt has full time aide, home PT- once medically optimized     89F h/o dementia, Afib on warfarin, Hyperlipidemia, hypothyroidism, HTN, spinal stenosis, has pacemaker, presented to [facility] from home for diarrhea    #Diarrhea, Acute- unclear etiology    - Labs and vitals are stable   - CT abd/pelvis: Fluid in the rectosigmoid colon again noted with increase in degree of air distention of the sigmoid colon since 10/11/2023    #Air distention of the sigmoid colon/ Ileus   - surgery consulted due to abdominal distension 3/4/24- rectal exam yielded copious amounts of liquid stool and gas  -PPM interrogated   - stool culture adn GI PCR negative  - colorectal group following   - c/w Senna daily, Miralax BID, enemas PRN  - turn frequently in bed   - abd xray 3/8/24: Redundant air distended sigmoid colon, maximal luminal diameter measuring 10.5 cm with a proximal air-filled colon.  - GI recommendations appreciated-- GI placed dignishield rectal tube 3/6/24, removed 3/9/24  - monitor without rectal tube and reevaluate tomorrow   - repeat abdominal xray in the AM  - palliative consulted     #Hypernatremia - resolved  -Na 143 today    #Hypokalemia- resolved  - K 4.0 today  - continue to monitor and replete as needed    #CKD  - Cr near baseline    #Pressure injury, heel, sacrum  - decubitus prevention protocol    #Afib on warfarin  - at home takes warfarin 1.25mg Monday, Thursday, takes 2.5mg other days  - INR 3.07 on admission,  - daily warfarin dosing based on PT/INR    #Dementia  - c/w memantine 5mg,  quetiapine 50mg    #HTN  - c/w  home enalapril  and Lasix     #HLD  - atorvastatin 10mg    #Hypothyroidism  - levothyroxine 25mcg    #h/o gout  - c/w  allopurinol 100mg    #VTE ppx  - coumadin daily dosing    #GOC  - Full code    Dispo: pt has full time aide, home PT- once medically optimized    **updated daughter Lisa

## 2024-03-09 NOTE — PROGRESS NOTE ADULT - ASSESSMENT
89F PMHx dementia, Afib on coumadin, HLD, hypothyroidism, HTN, spinal stenosis, has pacemaker, presented to [facility] from home for diarrhea noted by HHA. Sitting in bed eating breakfast.  Liq brown stool in drainage bag from rectal tube. No leukocytosis, afebrile.  Abd XRay showed distal air-filled colon. Small bowel pattern nonspecific. Abd softly distended on exam.

## 2024-03-09 NOTE — PROGRESS NOTE ADULT - ATTENDING COMMENTS
No change in condition  continues to have watery brownish stools. rectal tube leaks at times and needs to be readjusted.  T(C): 37.4 (03-09-24 @ 05:18), Max: 37.5 (03-08-24 @ 20:29)  T(F): 99.3 (03-09-24 @ 05:18), Max: 99.5 (03-08-24 @ 20:29)  HR: 63 (03-09-24 @ 13:00) (62 - 75)  BP: 129/69 (03-09-24 @ 13:00) (129/69 - 151/71)  ABP: --  ABP(mean): --  RR: 18 (03-09-24 @ 13:00) (16 - 18)  SpO2: 97% (03-09-24 @ 13:00) (94% - 97%)    on exam awake, does not follow commands. obese  both lungs clear  s1, s2, regular,   abdomen- soft, no distension, no tenderness to palpation  b/l calves with no tenderness.    labs reviewed  wbc-9.19  h/h- 9/27.9  inr- 2.23  Creatinine- 1.45  UA- no nitrite/ LE  xray reviewed by me, continues to show distension, no free air  GI PCR- negative  a/p:  # chronic ileus, no sign/ symptom of obstruction  # Hypernatremia- improved  # anemia- stable  # ckd stage 3  # Underlying history of afib on coumadin, ppm, hypothyroidism. dementia, HLD  - gentle hydration, monitor serum sodium. continue to maintain rectal tube. stool studies negative. patient followed by GI, may go for colonoscopy for decompression. If they decide to take patient for colonoscopy will hold coumadin and monitor INR. Patient should be transitioned to apixaban when ready for easier regime if approved by pharmacy. continue other home meds. monitor renal function, avoid nephrotoxic agents.    - rest as per resident not.     - Dispo: Medically active, possible d/c in 24-48 hours depending on GI recommendations.    I spent a total of 30 minutes on the date of this encounter coordinating the patient's care. This includes reviewing results/imaging and discussions with specialists, nursing, case management/social work. Further tests, medications, and procedures have been ordered as indicated. Results and the plan of care were communicated to the patient and/or their family member. Supporting documentation was completed and added to the patient's chart.

## 2024-03-09 NOTE — PROGRESS NOTE ADULT - SUBJECTIVE AND OBJECTIVE BOX
Patient is a 89y old  Female who presents with a chief complaint of diarrhea (09 Mar 2024 10:18)      INTERVAL HPI/OVERNIGHT EVENTS: Patient seen and examined at bedside. No overnight events.    MEDICATIONS  (STANDING):  allopurinol 100 milliGRAM(s) Oral daily  atorvastatin 10 milliGRAM(s) Oral at bedtime  cholecalciferol 1000 Unit(s) Oral daily  dextrose 5%. 1000 milliLiter(s) (75 mL/Hr) IV Continuous <Continuous>  levothyroxine 25 MICROGram(s) Oral daily  memantine 5 milliGRAM(s) Oral two times a day  polyethylene glycol 3350 17 Gram(s) Oral every 12 hours  QUEtiapine 50 milliGRAM(s) Oral at bedtime  senna 1 Tablet(s) Oral daily  simethicone 80 milliGRAM(s) Chew daily  warfarin 2.5 milliGRAM(s) Oral at bedtime    MEDICATIONS  (PRN):  acetaminophen     Tablet .. 650 milliGRAM(s) Oral every 6 hours PRN Temp greater or equal to 38C (100.4F), Mild Pain (1 - 3)  aluminum hydroxide/magnesium hydroxide/simethicone Suspension 30 milliLiter(s) Oral every 4 hours PRN Dyspepsia  melatonin 3 milliGRAM(s) Oral at bedtime PRN Insomnia  ondansetron Injectable 4 milliGRAM(s) IV Push every 8 hours PRN Nausea and/or Vomiting      Allergies    No Known Allergies    Intolerances        REVIEW OF SYSTEMS:  CONSTITUTIONAL: No fever or chills  HEENT:  No headache, no sore throat  RESPIRATORY: No cough, wheezing, or shortness of breath  CARDIOVASCULAR: No chest pain, palpitations  GASTROINTESTINAL: No abd pain, nausea, vomiting, or diarrhea  GENITOURINARY: No dysuria, frequency, or hematuria  NEUROLOGICAL: no focal weakness or dizziness  MUSCULOSKELETAL: no myalgias     Vital Signs Last 24 Hrs  T(C): 37.4 (09 Mar 2024 05:18), Max: 37.5 (08 Mar 2024 20:29)  T(F): 99.3 (09 Mar 2024 05:18), Max: 99.5 (08 Mar 2024 20:29)  HR: 75 (09 Mar 2024 05:18) (62 - 75)  BP: 138/89 (09 Mar 2024 05:18) (138/89 - 151/71)  BP(mean): --  RR: 16 (09 Mar 2024 05:18) (16 - 18)  SpO2: 97% (09 Mar 2024 05:18) (94% - 97%)    Parameters below as of 09 Mar 2024 05:18  Patient On (Oxygen Delivery Method): room air      I&O's Summary    08 Mar 2024 07:01  -  09 Mar 2024 07:00  --------------------------------------------------------  IN: 0 mL / OUT: 100 mL / NET: -100 mL      BMI (kg/m2): 25.8 (03-08-24 @ 15:25)    PHYSICAL EXAM:  CONSTITUTIONAL: NAD  EYES: EOMI, No conjunctival or scleral injection, non-icteric  ENMT: slightly dry mucous membranes  RESP: CTA b/l, normal respiratory effort  CV: RRR, systolic murmur LSB, no MRG; no peripheral edema  GI: abd distension improved, Soft, NT, no rebound, no guarding; no palpable masses  MSK: No digital clubbing or cyanosis  SKIN: intertrigo of groin, heel stage 1 pressure injury, stage 2 buttock  NEURO: grossly moves all 4 extremities  PSYCH: A+O x 1, baseline dementia, poor insight, pleasant affect      LABS: Personally reviewed  CBC                        9.0    9.19  )-----------( 208      ( 09 Mar 2024 05:35 )             27.9     CMP  03-09    143  |  110  |  19  ----------------------------<  89  4.0   |  24  |  1.45    Ca    9.1      09 Mar 2024 05:35  Mg     2.2     03-09    TPro  7.1  /  Alb  2.8  /  TBili  1.1  /  DBili  x   /  AST  14  /  ALT  8   /  AlkPhos  161  03-09          PT/INR - ( 09 Mar 2024 05:35 )   PT: 24.8 sec;   INR: 2.23 ratio         PTT - ( 09 Mar 2024 05:35 )  PTT:37.5 sec                            Urinalysis Basic - ( 09 Mar 2024 05:35 )    Color: x / Appearance: x / SG: x / pH: x  Gluc: 89 mg/dL / Ketone: x  / Bili: x / Urobili: x   Blood: x / Protein: x / Nitrite: x   Leuk Esterase: x / RBC: x / WBC x   Sq Epi: x / Non Sq Epi: x / Bacteria: x        Culture - Stool (collected 07 Mar 2024 11:30)  Source: .Stool Feces  Preliminary Report (08 Mar 2024 17:58):    No enteric pathogens to date: Final culture pending            Culture - Stool (collected 03-07-24 @ 11:30)  Source: .Stool Feces  Preliminary Report (03-08-24 @ 17:58):    No enteric pathogens to date: Final culture pending        RADIOLOGY & ADDITIONAL TESTS: Personally reviewed.     Consultant(s) Notes Reviewed:  [x] YES  [ ] NO   Discussed with BLAINE/CRYSTAL, RN

## 2024-03-09 NOTE — PROGRESS NOTE ADULT - SUBJECTIVE AND OBJECTIVE BOX
INTERVAL HPI/OVERNIGHT EVENTS:  HPI:  89F h/o dementia, Afib on coumadin, HLD, hypothyroidism, HTN, spinal stenosis, has pacemaker, presented to [facility] from home for diarrhea noted by HHA. History provided by patient's daughter. This morning her HHA noted >4 instances of watery diarrhea whenever she changed the patient. No noted abdominal pain, vomiting, fever, sweats. HHA last took care of patient on Monday and had no diarrhea then. Patient had a different aide from Tuesday to Thursday, she was unable to be reached. Limited ROS obtained due to patient's dementia, however she denies headache, abdominal pain, nausea/vomiting.    In ED pt afebrile, HR 64, /80, RR 18, sat 96% RA. Pt hypernatremic and hypokalemic. Given potassium repletion, NS bolus. (01 Mar 2024 22:05)    3/9/24: GI Progress Note- Pt seen and examined.  Sitting in bed eating breakfast.  Liq brown stool in drainage bag from rectal tube.    MEDICATIONS  (STANDING):  allopurinol 100 milliGRAM(s) Oral daily  atorvastatin 10 milliGRAM(s) Oral at bedtime  cholecalciferol 1000 Unit(s) Oral daily  dextrose 5%. 1000 milliLiter(s) (75 mL/Hr) IV Continuous <Continuous>  levothyroxine 25 MICROGram(s) Oral daily  memantine 5 milliGRAM(s) Oral two times a day  polyethylene glycol 3350 17 Gram(s) Oral every 12 hours  QUEtiapine 50 milliGRAM(s) Oral at bedtime  senna 1 Tablet(s) Oral daily  simethicone 80 milliGRAM(s) Chew daily  warfarin 2.5 milliGRAM(s) Oral at bedtime    MEDICATIONS  (PRN):  acetaminophen     Tablet .. 650 milliGRAM(s) Oral every 6 hours PRN Temp greater or equal to 38C (100.4F), Mild Pain (1 - 3)  aluminum hydroxide/magnesium hydroxide/simethicone Suspension 30 milliLiter(s) Oral every 4 hours PRN Dyspepsia  melatonin 3 milliGRAM(s) Oral at bedtime PRN Insomnia  ondansetron Injectable 4 milliGRAM(s) IV Push every 8 hours PRN Nausea and/or Vomiting      Allergies    No Known Allergies    Intolerances        PAST MEDICAL & SURGICAL HISTORY:  Atrial fibrillation, unspecified type      Benign hypertension      Hypothyroid      HLD (hyperlipidemia)      Dementia      S/P knee replacement      H/O mastectomy        PHYSICAL EXAM:   Vital Signs:  Vital Signs Last 24 Hrs  T(C): 37.4 (09 Mar 2024 05:18), Max: 37.5 (08 Mar 2024 20:29)  T(F): 99.3 (09 Mar 2024 05:18), Max: 99.5 (08 Mar 2024 20:29)  HR: 75 (09 Mar 2024 05:18) (61 - 75)  BP: 138/89 (09 Mar 2024 05:18) (138/89 - 155/82)  BP(mean): --  RR: 16 (09 Mar 2024 05:18) (16 - 18)  SpO2: 97% (09 Mar 2024 05:18) (94% - 98%)    Parameters below as of 09 Mar 2024 05:18  Patient On (Oxygen Delivery Method): room air      Daily Height in cm: 167.64 (08 Mar 2024 15:25)    Daily Weight in k (09 Mar 2024 05:18)I&O's Summary    08 Mar 2024 07:01  -  09 Mar 2024 07:00  --------------------------------------------------------  IN: 0 mL / OUT: 100 mL / NET: -100 mL        GENERAL:  Frail, elderly female, no distress  HEENT:  NC/AT,  conjunctivae clear and pink, sclera -anicteric  CHEST:  Full & symmetric excursion, no increased effort  HEART:  Regular rhythm, no edema  ABDOMEN:  Soft, non-tender, soft-distended, normoactive bowel sounds  EXTEREMITIES:  no cyanosis,clubbing or edema  SKIN:  No rash//warm/dry  NEURO:  Alert, oriented, x 1  +rectal tube    LABS:                        9.0    9.19  )-----------( 208      ( 09 Mar 2024 05:35 )             27.9     03-09    143  |  110<H>  |  19  ----------------------------<  89  4.0   |  24  |  1.45<H>    Ca    9.1      09 Mar 2024 05:35  Mg     2.2         TPro  7.1  /  Alb  2.8<L>  /  TBili  1.1  /  DBili  x   /  AST  14  /  ALT  8<L>  /  AlkPhos  161<H>      PT/INR - ( 09 Mar 2024 05:35 )   PT: 24.8 sec;   INR: 2.23 ratio         PTT - ( 09 Mar 2024 05:35 )  PTT:37.5 sec  Urinalysis Basic - ( 09 Mar 2024 05:35 )    Color: x / Appearance: x / SG: x / pH: x  Gluc: 89 mg/dL / Ketone: x  / Bili: x / Urobili: x   Blood: x / Protein: x / Nitrite: x   Leuk Esterase: x / RBC: x / WBC x   Sq Epi: x / Non Sq Epi: x / Bacteria: x      amylase   lipase  RADIOLOGY & ADDITIONAL TESTS:   GI Follow up     Pt seen and examined.  Sitting in bed eating breakfast.  Liq brown stool in drainage bag from rectal tube.    MEDICATIONS  (STANDING):  allopurinol 100 milliGRAM(s) Oral daily  atorvastatin 10 milliGRAM(s) Oral at bedtime  cholecalciferol 1000 Unit(s) Oral daily  dextrose 5%. 1000 milliLiter(s) (75 mL/Hr) IV Continuous <Continuous>  levothyroxine 25 MICROGram(s) Oral daily  memantine 5 milliGRAM(s) Oral two times a day  polyethylene glycol 3350 17 Gram(s) Oral every 12 hours  QUEtiapine 50 milliGRAM(s) Oral at bedtime  senna 1 Tablet(s) Oral daily  simethicone 80 milliGRAM(s) Chew daily  warfarin 2.5 milliGRAM(s) Oral at bedtime    MEDICATIONS  (PRN):  acetaminophen     Tablet .. 650 milliGRAM(s) Oral every 6 hours PRN Temp greater or equal to 38C (100.4F), Mild Pain (1 - 3)  aluminum hydroxide/magnesium hydroxide/simethicone Suspension 30 milliLiter(s) Oral every 4 hours PRN Dyspepsia  melatonin 3 milliGRAM(s) Oral at bedtime PRN Insomnia  ondansetron Injectable 4 milliGRAM(s) IV Push every 8 hours PRN Nausea and/or Vomiting      Allergies    No Known Allergies    Intolerances            PHYSICAL EXAM:   Vital Signs:  Vital Signs Last 24 Hrs  T(C): 37.4 (09 Mar 2024 05:18), Max: 37.5 (08 Mar 2024 20:29)  T(F): 99.3 (09 Mar 2024 05:18), Max: 99.5 (08 Mar 2024 20:29)  HR: 75 (09 Mar 2024 05:18) (61 - 75)  BP: 138/89 (09 Mar 2024 05:18) (138/89 - 155/82)  BP(mean): --  RR: 16 (09 Mar 2024 05:18) (16 - 18)  SpO2: 97% (09 Mar 2024 05:18) (94% - 98%)    Parameters below as of 09 Mar 2024 05:18  Patient On (Oxygen Delivery Method): room air      Daily Height in cm: 167.64 (08 Mar 2024 15:25)    Daily Weight in k (09 Mar 2024 05:18)I&O's Summary    08 Mar 2024 07:01  -  09 Mar 2024 07:00  --------------------------------------------------------  IN: 0 mL / OUT: 100 mL / NET: -100 mL        GENERAL:  Elderly woman, no distress  HEENT:  NC/AT,  conjunctivae clear and pink, sclera -anicteric  CHEST:  Full & symmetric excursion, no increased effort  HEART:  Regular rhythm, no edema  ABDOMEN:  Soft, non-tender, soft-distended, normoactive bowel sounds  RECTAL: +rectal tube  EXTREMITIES:  no edema  SKIN:  No rash or jaundice  NEURO:  Alert, oriented, x 1      LABS:                        9.0    9.19  )-----------( 208      ( 09 Mar 2024 05:35 )             27.9     03-09    143  |  110<H>  |  19  ----------------------------<  89  4.0   |  24  |  1.45<H>    Ca    9.1      09 Mar 2024 05:35  Mg     2.2     03-    TPro  7.1  /  Alb  2.8<L>  /  TBili  1.1  /  DBili  x   /  AST  14  /  ALT  8<L>  /  AlkPhos  161<H>  03-09    PT/INR - ( 09 Mar 2024 05:35 )   PT: 24.8 sec;   INR: 2.23 ratio         PTT - ( 09 Mar 2024 05:35 )  PTT:37.5 sec  Urinalysis Basic - ( 09 Mar 2024 05:35 )    Color: x / Appearance: x / SG: x / pH: x  Gluc: 89 mg/dL / Ketone: x  / Bili: x / Urobili: x   Blood: x / Protein: x / Nitrite: x   Leuk Esterase: x / RBC: x / WBC x   Sq Epi: x / Non Sq Epi: x / Bacteria: x

## 2024-03-10 LAB
ANION GAP SERPL CALC-SCNC: 8 MMOL/L — SIGNIFICANT CHANGE UP (ref 5–17)
APTT BLD: 35.6 SEC — SIGNIFICANT CHANGE UP (ref 24.5–35.6)
BUN SERPL-MCNC: 24 MG/DL — HIGH (ref 7–23)
CALCIUM SERPL-MCNC: 9.3 MG/DL — SIGNIFICANT CHANGE UP (ref 8.4–10.5)
CHLORIDE SERPL-SCNC: 110 MMOL/L — HIGH (ref 96–108)
CO2 SERPL-SCNC: 27 MMOL/L — SIGNIFICANT CHANGE UP (ref 22–31)
CREAT SERPL-MCNC: 1.38 MG/DL — HIGH (ref 0.5–1.3)
EGFR: 37 ML/MIN/1.73M2 — LOW
GLUCOSE SERPL-MCNC: 92 MG/DL — SIGNIFICANT CHANGE UP (ref 70–99)
HCT VFR BLD CALC: 27.5 % — LOW (ref 34.5–45)
HGB BLD-MCNC: 8.6 G/DL — LOW (ref 11.5–15.5)
INR BLD: 1.96 RATIO — HIGH (ref 0.85–1.18)
MAGNESIUM SERPL-MCNC: 2.3 MG/DL — SIGNIFICANT CHANGE UP (ref 1.6–2.6)
MCHC RBC-ENTMCNC: 30 PG — SIGNIFICANT CHANGE UP (ref 27–34)
MCHC RBC-ENTMCNC: 31.3 GM/DL — LOW (ref 32–36)
MCV RBC AUTO: 95.8 FL — SIGNIFICANT CHANGE UP (ref 80–100)
NRBC # BLD: 0 /100 WBCS — SIGNIFICANT CHANGE UP (ref 0–0)
PLATELET # BLD AUTO: 215 K/UL — SIGNIFICANT CHANGE UP (ref 150–400)
POTASSIUM SERPL-MCNC: 3.8 MMOL/L — SIGNIFICANT CHANGE UP (ref 3.5–5.3)
POTASSIUM SERPL-SCNC: 3.8 MMOL/L — SIGNIFICANT CHANGE UP (ref 3.5–5.3)
PROTHROM AB SERPL-ACNC: 22.5 SEC — HIGH (ref 9.5–13)
RBC # BLD: 2.87 M/UL — LOW (ref 3.8–5.2)
RBC # FLD: 16 % — HIGH (ref 10.3–14.5)
SODIUM SERPL-SCNC: 145 MMOL/L — SIGNIFICANT CHANGE UP (ref 135–145)
WBC # BLD: 7.53 K/UL — SIGNIFICANT CHANGE UP (ref 3.8–10.5)
WBC # FLD AUTO: 7.53 K/UL — SIGNIFICANT CHANGE UP (ref 3.8–10.5)

## 2024-03-10 PROCEDURE — 74019 RADEX ABDOMEN 2 VIEWS: CPT | Mod: 26

## 2024-03-10 PROCEDURE — 99233 SBSQ HOSP IP/OBS HIGH 50: CPT | Mod: FS

## 2024-03-10 PROCEDURE — 99231 SBSQ HOSP IP/OBS SF/LOW 25: CPT | Mod: GC

## 2024-03-10 RX ORDER — APIXABAN 2.5 MG/1
5 TABLET, FILM COATED ORAL
Refills: 0 | Status: DISCONTINUED | OUTPATIENT
Start: 2024-03-10 | End: 2024-03-11

## 2024-03-10 RX ORDER — APIXABAN 2.5 MG/1
2.5 TABLET, FILM COATED ORAL
Refills: 0 | Status: DISCONTINUED | OUTPATIENT
Start: 2024-03-10 | End: 2024-03-10

## 2024-03-10 RX ADMIN — POLYETHYLENE GLYCOL 3350 17 GRAM(S): 17 POWDER, FOR SOLUTION ORAL at 05:46

## 2024-03-10 RX ADMIN — SIMETHICONE 80 MILLIGRAM(S): 80 TABLET, CHEWABLE ORAL at 12:13

## 2024-03-10 RX ADMIN — SENNA PLUS 1 TABLET(S): 8.6 TABLET ORAL at 12:13

## 2024-03-10 RX ADMIN — ATORVASTATIN CALCIUM 10 MILLIGRAM(S): 80 TABLET, FILM COATED ORAL at 21:50

## 2024-03-10 RX ADMIN — MEMANTINE HYDROCHLORIDE 5 MILLIGRAM(S): 10 TABLET ORAL at 17:21

## 2024-03-10 RX ADMIN — Medication 25 MICROGRAM(S): at 05:47

## 2024-03-10 RX ADMIN — APIXABAN 5 MILLIGRAM(S): 2.5 TABLET, FILM COATED ORAL at 17:21

## 2024-03-10 RX ADMIN — QUETIAPINE FUMARATE 50 MILLIGRAM(S): 200 TABLET, FILM COATED ORAL at 21:50

## 2024-03-10 RX ADMIN — Medication 1000 UNIT(S): at 12:13

## 2024-03-10 RX ADMIN — POLYETHYLENE GLYCOL 3350 17 GRAM(S): 17 POWDER, FOR SOLUTION ORAL at 17:21

## 2024-03-10 RX ADMIN — MEMANTINE HYDROCHLORIDE 5 MILLIGRAM(S): 10 TABLET ORAL at 05:47

## 2024-03-10 RX ADMIN — Medication 100 MILLIGRAM(S): at 12:13

## 2024-03-10 NOTE — PROGRESS NOTE ADULT - SUBJECTIVE AND OBJECTIVE BOX
Patient is a 89y old  Female who presents with a chief complaint of diarrhea (09 Mar 2024 10:18)      INTERVAL HPI/OVERNIGHT EVENTS: Patient seen and examined at bedside. No overnight events.      REVIEW OF SYSTEMS:  CONSTITUTIONAL: No fever or chills  HEENT:  No headache, no sore throat  RESPIRATORY: No cough, wheezing, or shortness of breath  CARDIOVASCULAR: No chest pain, palpitations  GASTROINTESTINAL: No abd pain, nausea, vomiting, or diarrhea  GENITOURINARY: No dysuria, frequency, or hematuria  NEUROLOGICAL: no focal weakness or dizziness  MUSCULOSKELETAL: no myalgias     Vital Signs Last 24 Hrs  T(C): 36.2 (10 Mar 2024 05:08), Max: 36.8 (09 Mar 2024 21:11)  T(F): 97.1 (10 Mar 2024 05:08), Max: 98.2 (09 Mar 2024 21:11)  HR: 63 (10 Mar 2024 05:08) (63 - 65)  BP: 121/64 (10 Mar 2024 05:08) (121/64 - 149/76)  BP(mean): 89 (09 Mar 2024 13:00) (89 - 89)  RR: 18 (10 Mar 2024 05:08) (18 - 18)  SpO2: 94% (10 Mar 2024 05:08) (93% - 97%)    Parameters below as of 10 Mar 2024 05:08  Patient On (Oxygen Delivery Method): room air        I&O's Summary    08 Mar 2024 07:01  -  09 Mar 2024 07:00  --------------------------------------------------------  IN: 0 mL / OUT: 100 mL / NET: -100 mL      BMI (kg/m2): 25.8 (03-08-24 @ 15:25)    PHYSICAL EXAM:  CONSTITUTIONAL: NAD  EYES: EOMI, No conjunctival or scleral injection, non-icteric  ENMT: slightly dry mucous membranes  RESP: CTA b/l, normal respiratory effort  CV: RRR, systolic murmur LSB, no MRG; no peripheral edema  GI: abd distension improved, Soft, NT, no rebound, no guarding; no palpable masses  MSK: No digital clubbing or cyanosis  SKIN: intertrigo of groin, heel stage 1 pressure injury, stage 2 buttock  NEURO: grossly moves all 4 extremities  PSYCH: A+O x 1, baseline dementia, poor insight, pleasant affect      LABS: Personally reviewed  CBC                               8.6    7.53  )-----------( 215      ( 10 Mar 2024 06:16 )             27.5       03-10    145  |  110<H>  |  24<H>  ----------------------------<  92  3.8   |  27  |  1.38<H>    Ca    9.3      10 Mar 2024 06:16  Mg     2.3     03-10    TPro  7.1  /  Alb  2.8<L>  /  TBili  1.1  /  DBili  x   /  AST  14  /  ALT  8<L>  /  AlkPhos  161<H>  03-09            PT/INR - ( 09 Mar 2024 05:35 )   PT: 24.8 sec;   INR: 2.23 ratio         PTT - ( 09 Mar 2024 05:35 )  PTT:37.5 sec    MEDICATIONS  (STANDING):  allopurinol 100 milliGRAM(s) Oral daily  atorvastatin 10 milliGRAM(s) Oral at bedtime  cholecalciferol 1000 Unit(s) Oral daily  dextrose 5%. 1000 milliLiter(s) (75 mL/Hr) IV Continuous <Continuous>  levothyroxine 25 MICROGram(s) Oral daily  memantine 5 milliGRAM(s) Oral two times a day  polyethylene glycol 3350 17 Gram(s) Oral every 12 hours  QUEtiapine 50 milliGRAM(s) Oral at bedtime  senna 1 Tablet(s) Oral daily  simethicone 80 milliGRAM(s) Chew daily    MEDICATIONS  (PRN):  acetaminophen     Tablet .. 650 milliGRAM(s) Oral every 6 hours PRN Temp greater or equal to 38C (100.4F), Mild Pain (1 - 3)  aluminum hydroxide/magnesium hydroxide/simethicone Suspension 30 milliLiter(s) Oral every 4 hours PRN Dyspepsia  melatonin 3 milliGRAM(s) Oral at bedtime PRN Insomnia  ondansetron Injectable 4 milliGRAM(s) IV Push every 8 hours PRN Nausea and/or Vomiting

## 2024-03-10 NOTE — PROGRESS NOTE ADULT - SUBJECTIVE AND OBJECTIVE BOX
INTERVAL HPI/OVERNIGHT EVENTS:  HPI:  89F h/o dementia, Afib on coumadin, HLD, hypothyroidism, HTN, spinal stenosis, has pacemaker, presented to [facility] from home for diarrhea noted by HHA. History provided by patient's daughter. This morning her HHA noted >4 instances of watery diarrhea whenever she changed the patient. No noted abdominal pain, vomiting, fever, sweats. HHA last took care of patient on Monday and had no diarrhea then. Patient had a different aide from Tuesday to Thursday, she was unable to be reached. Limited ROS obtained due to patient's dementia, however she denies headache, abdominal pain, nausea/vomiting.    In ED pt afebrile, HR 64, /80, RR 18, sat 96% RA. Pt hypernatremic and hypokalemic. Given potassium repletion, NS bolus. (01 Mar 2024 22:05)    MEDICATIONS  (STANDING):  allopurinol 100 milliGRAM(s) Oral daily  apixaban 5 milliGRAM(s) Oral two times a day  atorvastatin 10 milliGRAM(s) Oral at bedtime  cholecalciferol 1000 Unit(s) Oral daily  dextrose 5%. 1000 milliLiter(s) (75 mL/Hr) IV Continuous <Continuous>  levothyroxine 25 MICROGram(s) Oral daily  memantine 5 milliGRAM(s) Oral two times a day  polyethylene glycol 3350 17 Gram(s) Oral every 12 hours  QUEtiapine 50 milliGRAM(s) Oral at bedtime  senna 1 Tablet(s) Oral daily  simethicone 80 milliGRAM(s) Chew daily    MEDICATIONS  (PRN):  acetaminophen     Tablet .. 650 milliGRAM(s) Oral every 6 hours PRN Temp greater or equal to 38C (100.4F), Mild Pain (1 - 3)  aluminum hydroxide/magnesium hydroxide/simethicone Suspension 30 milliLiter(s) Oral every 4 hours PRN Dyspepsia  melatonin 3 milliGRAM(s) Oral at bedtime PRN Insomnia  ondansetron Injectable 4 milliGRAM(s) IV Push every 8 hours PRN Nausea and/or Vomiting      Allergies    No Known Allergies    Intolerances        PAST MEDICAL & SURGICAL HISTORY:  Atrial fibrillation, unspecified type      Benign hypertension      Hypothyroid      HLD (hyperlipidemia)      Dementia      S/P knee replacement      H/O mastectomy          REVIEW OF SYSTEMS      General:	    Skin/Breast:  	  Ophthalmologic:  	  ENMT:	    Respiratory and Thorax:  	  Cardiovascular:	    Gastrointestinal:	    Genitourinary:	    Musculoskeletal:	    Neurological:	    Psychiatric:	    Hematology/Lymphatics:	    Endocrine:	    Allergic/Immunologic:	    PHYSICAL EXAM:   Vital Signs:  Vital Signs Last 24 Hrs  T(C): 36.2 (10 Mar 2024 05:08), Max: 36.8 (09 Mar 2024 21:11)  T(F): 97.1 (10 Mar 2024 05:08), Max: 98.2 (09 Mar 2024 21:11)  HR: 63 (10 Mar 2024 05:08) (63 - 65)  BP: 121/64 (10 Mar 2024 05:08) (121/64 - 149/76)  BP(mean): --  RR: 18 (10 Mar 2024 05:08) (18 - 18)  SpO2: 94% (10 Mar 2024 05:08) (93% - 94%)    Parameters below as of 10 Mar 2024 05:08  Patient On (Oxygen Delivery Method): room air      Daily     Daily I&O's Summary    09 Mar 2024 06:01  -  10 Mar 2024 07:00  --------------------------------------------------------  IN: 0 mL / OUT: 600 mL / NET: -600 mL        GENERAL:  Appears stated age, no distress  HEENT:  NC/AT,  conjunctivae clear and pink  CHEST:  Full & symmetric excursion  HEART:  Regular rhythm  ABDOMEN:  Soft, non-tender, soft-distended, normoactive bowel sounds  EXTEREMITIES:  no cyanosis,clubbing or edema  SKIN:  No rash/warm/dry  NEURO:  Alert, min verbal      LABS:                        8.6    7.53  )-----------( 215      ( 10 Mar 2024 06:16 )             27.5     03-10    145  |  110<H>  |  24<H>  ----------------------------<  92  3.8   |  27  |  1.38<H>    Ca    9.3      10 Mar 2024 06:16  Mg     2.3     03-10    TPro  7.1  /  Alb  2.8<L>  /  TBili  1.1  /  DBili  x   /  AST  14  /  ALT  8<L>  /  AlkPhos  161<H>  03-09    PT/INR - ( 10 Mar 2024 06:16 )   PT: 22.5 sec;   INR: 1.96 ratio         PTT - ( 10 Mar 2024 06:16 )  PTT:35.6 sec  Urinalysis Basic - ( 10 Mar 2024 06:16 )    Color: x / Appearance: x / SG: x / pH: x  Gluc: 92 mg/dL / Ketone: x  / Bili: x / Urobili: x   Blood: x / Protein: x / Nitrite: x   Leuk Esterase: x / RBC: x / WBC x   Sq Epi: x / Non Sq Epi: x / Bacteria: x      amylase   lipase  RADIOLOGY & ADDITIONAL TESTS:   GI Follow up    Seen and examined at the bedside.  No distress.  Rectal tube out.      MEDICATIONS  (STANDING):  allopurinol 100 milliGRAM(s) Oral daily  apixaban 5 milliGRAM(s) Oral two times a day  atorvastatin 10 milliGRAM(s) Oral at bedtime  cholecalciferol 1000 Unit(s) Oral daily  dextrose 5%. 1000 milliLiter(s) (75 mL/Hr) IV Continuous <Continuous>  levothyroxine 25 MICROGram(s) Oral daily  memantine 5 milliGRAM(s) Oral two times a day  polyethylene glycol 3350 17 Gram(s) Oral every 12 hours  QUEtiapine 50 milliGRAM(s) Oral at bedtime  senna 1 Tablet(s) Oral daily  simethicone 80 milliGRAM(s) Chew daily    MEDICATIONS  (PRN):  acetaminophen     Tablet .. 650 milliGRAM(s) Oral every 6 hours PRN Temp greater or equal to 38C (100.4F), Mild Pain (1 - 3)  aluminum hydroxide/magnesium hydroxide/simethicone Suspension 30 milliLiter(s) Oral every 4 hours PRN Dyspepsia  melatonin 3 milliGRAM(s) Oral at bedtime PRN Insomnia  ondansetron Injectable 4 milliGRAM(s) IV Push every 8 hours PRN Nausea and/or Vomiting      Allergies    No Known Allergies    Intolerances        PHYSICAL EXAM:   Vital Signs:  Vital Signs Last 24 Hrs  T(C): 36.2 (10 Mar 2024 05:08), Max: 36.8 (09 Mar 2024 21:11)  T(F): 97.1 (10 Mar 2024 05:08), Max: 98.2 (09 Mar 2024 21:11)  HR: 63 (10 Mar 2024 05:08) (63 - 65)  BP: 121/64 (10 Mar 2024 05:08) (121/64 - 149/76)  BP(mean): --  RR: 18 (10 Mar 2024 05:08) (18 - 18)  SpO2: 94% (10 Mar 2024 05:08) (93% - 94%)    Parameters below as of 10 Mar 2024 05:08  Patient On (Oxygen Delivery Method): room air      Daily     Daily I&O's Summary    09 Mar 2024 06:01  -  10 Mar 2024 07:00  --------------------------------------------------------  IN: 0 mL / OUT: 600 mL / NET: -600 mL        GENERAL:  Appears stated age, no distress  HEENT:  NC/AT,  conjunctivae clear and pink  CHEST:  Full & symmetric excursion  HEART:  Regular rhythm  ABDOMEN:  Soft, non-tender, soft-distended, normoactive bowel sounds  EXTREMITIES:  no cyanosis, clubbing or edema  SKIN:  No rash or jaundice  NEURO:  Alert, min verbal      LABS:                        8.6    7.53  )-----------( 215      ( 10 Mar 2024 06:16 )             27.5     03-10    145  |  110<H>  |  24<H>  ----------------------------<  92  3.8   |  27  |  1.38<H>    Ca    9.3      10 Mar 2024 06:16  Mg     2.3     03-10    TPro  7.1  /  Alb  2.8<L>  /  TBili  1.1  /  DBili  x   /  AST  14  /  ALT  8<L>  /  AlkPhos  161<H>  03-09    PT/INR - ( 10 Mar 2024 06:16 )   PT: 22.5 sec;   INR: 1.96 ratio    PTT - ( 10 Mar 2024 06:16 )  PTT:35.6 sec  Urinalysis Basic - ( 10 Mar 2024 06:16 )    Color: x / Appearance: x / SG: x / pH: x  Gluc: 92 mg/dL / Ketone: x  / Bili: x / Urobili: x   Blood: x / Protein: x / Nitrite: x   Leuk Esterase: x / RBC: x / WBC x   Sq Epi: x / Non Sq Epi: x / Bacteria: x

## 2024-03-10 NOTE — PROGRESS NOTE ADULT - ASSESSMENT
89F h/o dementia, Afib on warfarin, Hyperlipidemia, hypothyroidism, HTN, spinal stenosis, has pacemaker, presented to [facility] from home for diarrhea    #Ileus   - Surgery consulted due to abdominal distension 3/4/24- rectal exam yielded copious amounts of liquid stool and gas  -PPM interrogated   - stool culture adn GI PCR negative  - colorectal group following   - c/w Senna daily, Miralax BID, enemas PRN  - turn frequently in bed   - abd xray 3/8/24: Redundant air distended sigmoid colon, maximal luminal diameter measuring 10.5 cm with a proximal air-filled colon.  - GI recommendations appreciated-- GI placed dignishield rectal tube 3/6/24, removed 3/9/24  - monitor without rectal tube    - repeat abdominal xray in the AM  - palliative consulted     #Diarrhea, Acute- unclear etiology    - Labs and vitals are stable   - CT abd/pelvis: Fluid in the rectosigmoid colon again noted with increase in degree of air distention of the sigmoid colon since 10/11/2023    #Hypernatremia - resolved  -Na 143 today    #Hypokalemia- resolved  - K 4.0 today  - continue to monitor and replete as needed    #CKD  - Cr near baseline    #Pressure injury, heel, sacrum  - decubitus prevention protocol    #Afib on warfarin  - at home takes warfarin 1.25mg Monday, Thursday, takes 2.5mg other days  - INR 3.07 on admission,  - INR TODAY-1.9   -will transition to Apixaban 2.5 mg po bid     #Dementia  - c/w memantine 5mg,  quetiapine 50mg    #HTN  - c/w  home enalapril  and Lasix     #HLD  - atorvastatin 10mg    #Hypothyroidism  - levothyroxine 25mcg    #h/o gout  - c/w  allopurinol 100mg    #VTE ppx  - coumadin daily dosing    #GOC  - Full code    Dispo: pt has full time aide, home PT- once medically optimized    **updated daughter Lisa

## 2024-03-10 NOTE — PROGRESS NOTE ADULT - ATTENDING COMMENTS
No change in condition  rectal tube out.    T(C): 36.2 (03-10-24 @ 05:08), Max: 36.8 (03-09-24 @ 21:11)  T(F): 97.1 (03-10-24 @ 05:08), Max: 98.2 (03-09-24 @ 21:11)  HR: 63 (03-10-24 @ 05:08) (63 - 65)  BP: 121/64 (03-10-24 @ 05:08) (121/64 - 149/76)  ABP: --  ABP(mean): --  RR: 18 (03-10-24 @ 05:08) (18 - 18)  SpO2: 94% (03-10-24 @ 05:08) (93% - 97%)      on exam awake, does not follow commands. obese  both lungs clear  s1, s2, regular,   abdomen- soft, no distension, no tenderness to palpation  b/l calves with no tenderness.    labs reviewed                        8.6    7.53  )-----------( 215      ( 10 Mar 2024 06:16 )             27.5     03-10    145  |  110<H>  |  24<H>  ----------------------------<  92  3.8   |  27  |  1.38<H>    Ca    9.3      10 Mar 2024 06:16  Mg     2.3     03-10    TPro  7.1  /  Alb  2.8<L>  /  TBili  1.1  /  DBili  x   /  AST  14  /  ALT  8<L>  /  AlkPhos  161<H>  03-09    xray reviewed by me, continues to show distension, no free air  GI PCR- negative    a/p:  # chronic ileus, no sign/ symptom of obstruction  # Hypernatremia- improved  # anemia- stable  # ckd stage 3  # Underlying history of afib on coumadin, ppm, hypothyroidism. dementia, HLD  - gentle hydration, monitor serum sodium. continue to maintain rectal tube. stool studies negative. patient followed by GI, may go for colonoscopy for decompression. If they decide to take patient for colonoscopy will hold coumadin and monitor INR. Patient should be transitioned to apixaban when ready for easier regime if approved by pharmacy. continue other home meds. monitor renal function, avoid nephrotoxic agents.    - rest as per resident not.     - Dispo: Medically active, possible d/c in 24-48 hours depending on GI recommendations.    I spent a total of 30 minutes on the date of this encounter coordinating the patient's care. This includes reviewing results/imaging and discussions with specialists, nursing, case management/social work. Further tests, medications, and procedures have been ordered as indicated. Results and the plan of care were communicated to the patient and/or their family member. Supporting documentation was completed and added to the patient's chart.

## 2024-03-10 NOTE — PROGRESS NOTE ADULT - ASSESSMENT
89F PMHx dementia, Afib on coumadin, HLD, hypothyroidism, HTN, spinal stenosis, has pacemaker, presented to [facility] from home for diarrhea noted by HHA. Sitting in bed eating breakfast.  Liq brown stool in drainage bag from rectal tube. No leukocytosis, afebrile.  Abd XRay showed distal air-filled colon. Small bowel pattern nonspecific. Abd softly distended on exam. 89F PMHx dementia, Afib on coumadin, HLD, hypothyroidism, HTN, spinal stenosis, has pacemaker, presented to  ED from home for diarrhea noted by HHA. Sitting in bed eating breakfast.  Liq brown stool in drainage bag from rectal tube. No leukocytosis, afebrile.  Abd XRay showed distal air-filled colon. Small bowel pattern nonspecific. Abd softly distended on exam.

## 2024-03-10 NOTE — PROGRESS NOTE ADULT - PROBLEM SELECTOR PLAN 1
No leukocytosis, afebrile.  Abd XRay showed distal air-filled colon. Small bowel pattern nonspecific. Abd softly more distended on exam today. Stool studies negative.  Rectal tube out since yesterday am.     [ ] con't diet  [ ] Turn position frequently while in bed  [ ] con't Bowel regimen with Miralax twice daily, Senna every night, Fleet enema PRN   [ ] will likely need rectal tube reinserted   [ ] consider colonoscopy for decompression if condition without improvement No leukocytosis, afebrile.  Abd X-ray showed distal air-filled colon. Small bowel pattern nonspecific. Abd softly more distended on exam today. Stool studies negative.  Rectal tube out since yesterday am.     [ ] con't diet  [ ] Turn position frequently while in bed  [ ] con't Bowel regimen with Miralax twice daily, Senna every night, Fleet enema PRN   [ ] will likely need rectal tube reinserted vs colonoscopy for decompression if condition without improvement

## 2024-03-11 ENCOUNTER — TRANSCRIPTION ENCOUNTER (OUTPATIENT)
Age: 89
End: 2024-03-11

## 2024-03-11 PROCEDURE — 99232 SBSQ HOSP IP/OBS MODERATE 35: CPT | Mod: GC

## 2024-03-11 PROCEDURE — 99233 SBSQ HOSP IP/OBS HIGH 50: CPT | Mod: FS

## 2024-03-11 RX ORDER — SOD SULF/SODIUM/NAHCO3/KCL/PEG
1000 SOLUTION, RECONSTITUTED, ORAL ORAL ONCE
Refills: 0 | Status: COMPLETED | OUTPATIENT
Start: 2024-03-11 | End: 2024-03-11

## 2024-03-11 RX ADMIN — Medication 100 MILLIGRAM(S): at 12:09

## 2024-03-11 RX ADMIN — POLYETHYLENE GLYCOL 3350 17 GRAM(S): 17 POWDER, FOR SOLUTION ORAL at 18:30

## 2024-03-11 RX ADMIN — QUETIAPINE FUMARATE 50 MILLIGRAM(S): 200 TABLET, FILM COATED ORAL at 21:31

## 2024-03-11 RX ADMIN — Medication 25 MICROGRAM(S): at 05:32

## 2024-03-11 RX ADMIN — SENNA PLUS 1 TABLET(S): 8.6 TABLET ORAL at 12:04

## 2024-03-11 RX ADMIN — APIXABAN 5 MILLIGRAM(S): 2.5 TABLET, FILM COATED ORAL at 05:32

## 2024-03-11 RX ADMIN — MEMANTINE HYDROCHLORIDE 5 MILLIGRAM(S): 10 TABLET ORAL at 05:32

## 2024-03-11 RX ADMIN — Medication 1000 MILLILITER(S): at 21:29

## 2024-03-11 RX ADMIN — APIXABAN 5 MILLIGRAM(S): 2.5 TABLET, FILM COATED ORAL at 18:31

## 2024-03-11 RX ADMIN — ATORVASTATIN CALCIUM 10 MILLIGRAM(S): 80 TABLET, FILM COATED ORAL at 21:30

## 2024-03-11 RX ADMIN — POLYETHYLENE GLYCOL 3350 17 GRAM(S): 17 POWDER, FOR SOLUTION ORAL at 05:31

## 2024-03-11 RX ADMIN — MEMANTINE HYDROCHLORIDE 5 MILLIGRAM(S): 10 TABLET ORAL at 18:30

## 2024-03-11 RX ADMIN — SIMETHICONE 80 MILLIGRAM(S): 80 TABLET, CHEWABLE ORAL at 12:04

## 2024-03-11 RX ADMIN — Medication 1000 UNIT(S): at 12:04

## 2024-03-11 NOTE — PROGRESS NOTE ADULT - ASSESSMENT
89F PMHx dementia, Afib on coumadin, HLD, hypothyroidism, HTN, spinal stenosis, has pacemaker, presented to  ED from home for diarrhea noted by HHA. Sitting in bed eating breakfast.  Liq brown stool in drainage bag from rectal tube. No leukocytosis, afebrile.  Abd XRay showed distal air-filled colon. Small bowel pattern nonspecific.     3/11- abdomen distended on exam, rectal exam performed no stool in vault

## 2024-03-11 NOTE — PROGRESS NOTE ADULT - SUBJECTIVE AND OBJECTIVE BOX
Patient is a 89y old  Female who presents with a chief complaint of diarrhea (09 Mar 2024 10:18)      INTERVAL HPI/OVERNIGHT EVENTS: Patient seen and examined at bedside. No overnight events.      REVIEW OF SYSTEMS:  CONSTITUTIONAL: No fever or chills  HEENT:  No headache, no sore throat  RESPIRATORY: No cough, wheezing, or shortness of breath  CARDIOVASCULAR: No chest pain, palpitations  GASTROINTESTINAL: No abd pain, nausea, vomiting, or diarrhea  GENITOURINARY: No dysuria, frequency, or hematuria  NEUROLOGICAL: no focal weakness or dizziness  MUSCULOSKELETAL: no myalgias     Vital Signs Last 24 Hrs  T(C): 36.2 (10 Mar 2024 05:08), Max: 36.8 (09 Mar 2024 21:11)  T(F): 97.1 (10 Mar 2024 05:08), Max: 98.2 (09 Mar 2024 21:11)  HR: 63 (10 Mar 2024 05:08) (63 - 65)  BP: 121/64 (10 Mar 2024 05:08) (121/64 - 149/76)  BP(mean): 89 (09 Mar 2024 13:00) (89 - 89)  RR: 18 (10 Mar 2024 05:08) (18 - 18)  SpO2: 94% (10 Mar 2024 05:08) (93% - 97%)    Parameters below as of 10 Mar 2024 05:08  Patient On (Oxygen Delivery Method): room air        I&O's Summary    08 Mar 2024 07:01  -  09 Mar 2024 07:00  --------------------------------------------------------  IN: 0 mL / OUT: 100 mL / NET: -100 mL      BMI (kg/m2): 25.8 (03-08-24 @ 15:25)    PHYSICAL EXAM:  CONSTITUTIONAL: NAD  EYES: EOMI, No conjunctival or scleral injection, non-icteric  ENMT: slightly dry mucous membranes  RESP: CTA b/l, normal respiratory effort  CV: RRR, systolic murmur LSB, no MRG; no peripheral edema  GI: abd distension improved, Soft, NT, no rebound, no guarding; no palpable masses  MSK: No digital clubbing or cyanosis  SKIN: intertrigo of groin, heel stage 1 pressure injury, stage 2 buttock  NEURO: grossly moves all 4 extremities  PSYCH: A+O x 1, baseline dementia, poor insight, pleasant affect      LABS: Personally reviewed  CBC                               8.6    7.53  )-----------( 215      ( 10 Mar 2024 06:16 )             27.5       03-10    145  |  110<H>  |  24<H>  ----------------------------<  92  3.8   |  27  |  1.38<H>    Ca    9.3      10 Mar 2024 06:16  Mg     2.3     03-10    TPro  7.1  /  Alb  2.8<L>  /  TBili  1.1  /  DBili  x   /  AST  14  /  ALT  8<L>  /  AlkPhos  161<H>  03-09            PT/INR - ( 09 Mar 2024 05:35 )   PT: 24.8 sec;   INR: 2.23 ratio         PTT - ( 09 Mar 2024 05:35 )  PTT:37.5 sec    MEDICATIONS  (STANDING):  allopurinol 100 milliGRAM(s) Oral daily  atorvastatin 10 milliGRAM(s) Oral at bedtime  cholecalciferol 1000 Unit(s) Oral daily  dextrose 5%. 1000 milliLiter(s) (75 mL/Hr) IV Continuous <Continuous>  levothyroxine 25 MICROGram(s) Oral daily  memantine 5 milliGRAM(s) Oral two times a day  polyethylene glycol 3350 17 Gram(s) Oral every 12 hours  QUEtiapine 50 milliGRAM(s) Oral at bedtime  senna 1 Tablet(s) Oral daily  simethicone 80 milliGRAM(s) Chew daily    MEDICATIONS  (PRN):  acetaminophen     Tablet .. 650 milliGRAM(s) Oral every 6 hours PRN Temp greater or equal to 38C (100.4F), Mild Pain (1 - 3)  aluminum hydroxide/magnesium hydroxide/simethicone Suspension 30 milliLiter(s) Oral every 4 hours PRN Dyspepsia  melatonin 3 milliGRAM(s) Oral at bedtime PRN Insomnia  ondansetron Injectable 4 milliGRAM(s) IV Push every 8 hours PRN Nausea and/or Vomiting               Patient is a 89y old  Female who presents with a chief complaint of diarrhea (09 Mar 2024 10:18)      INTERVAL HPI/OVERNIGHT EVENTS: Patient seen and examined at bedside. No overnight events.      REVIEW OF SYSTEMS:  CONSTITUTIONAL: No fever or chills  HEENT:  No headache, no sore throat  RESPIRATORY: No cough, wheezing, or shortness of breath  CARDIOVASCULAR: No chest pain, palpitations  GASTROINTESTINAL: distension, No abd pain, nausea, vomiting, or diarrhea  GENITOURINARY: No dysuria, frequency, or hematuria  NEUROLOGICAL: no focal weakness or dizziness  MUSCULOSKELETAL: no myalgias       Vital Signs Last 24 Hrs  T(C): 36.6 (11 Mar 2024 13:13), Max: 36.8 (10 Mar 2024 21:05)  T(F): 97.8 (11 Mar 2024 13:13), Max: 98.3 (10 Mar 2024 21:05)  HR: 65 (11 Mar 2024 13:13) (61 - 65)  BP: 139/72 (11 Mar 2024 13:13) (111/68 - 150/79)  BP(mean): --  RR: 17 (11 Mar 2024 13:13) (16 - 18)  SpO2: 92% (11 Mar 2024 13:13) (92% - 93%)    Parameters below as of 11 Mar 2024 13:13  Patient On (Oxygen Delivery Method): room air        PHYSICAL EXAM:  CONSTITUTIONAL: NAD  EYES: EOMI, No conjunctival or scleral injection, non-icteric  ENMT: slightly dry mucous membranes  RESP: CTA b/l, normal respiratory effort  CV: RRR, systolic murmur LSB, no MRG; no peripheral edema  GI: abd distension improved, Soft, NT, no rebound, no guarding; no palpable masses  MSK: No digital clubbing or cyanosis  SKIN: intertrigo of groin, heel stage 1 pressure injury, stage 2 buttock  NEURO: grossly moves all 4 extremities  PSYCH: A+O x 1, baseline dementia, poor insight, pleasant affect      LABS: Personally reviewed  CBC                         8.6    7.53  )-----------( 215      ( 10 Mar 2024 06:16 )             27.5     03-10  145  |  110<H>  |  24<H>  ----------------------------<  92  3.8   |  27  |  1.38<H>  Ca    9.3      10 Mar 2024 06:16  Mg     2.3     03-10         Urinalysis Basic - ( 10 Mar 2024 06:16 )  Color: x / Appearance: x / SG: x / pH: x  Gluc: 92 mg/dL / Ketone: x  / Bili: x / Urobili: x   Blood: x / Protein: x / Nitrite: x   Leuk Esterase: x / RBC: x / WBC x   Sq Epi: x / Non Sq Epi: x / Bacteria: x        PT/INR - ( 10 Mar 2024 06:16 )   PT: 22.5 sec;   INR: 1.96 ratio    PTT - ( 10 Mar 2024 06:16 )  PTT:35.6 sec          MEDICATIONS  (STANDING):  allopurinol 100 milliGRAM(s) Oral daily  atorvastatin 10 milliGRAM(s) Oral at bedtime  cholecalciferol 1000 Unit(s) Oral daily  dextrose 5%. 1000 milliLiter(s) (75 mL/Hr) IV Continuous <Continuous>  levothyroxine 25 MICROGram(s) Oral daily  memantine 5 milliGRAM(s) Oral two times a day  polyethylene glycol 3350 17 Gram(s) Oral every 12 hours  QUEtiapine 50 milliGRAM(s) Oral at bedtime  senna 1 Tablet(s) Oral daily  simethicone 80 milliGRAM(s) Chew daily    MEDICATIONS  (PRN):  acetaminophen     Tablet .. 650 milliGRAM(s) Oral every 6 hours PRN Temp greater or equal to 38C (100.4F), Mild Pain (1 - 3)  aluminum hydroxide/magnesium hydroxide/simethicone Suspension 30 milliLiter(s) Oral every 4 hours PRN Dyspepsia  melatonin 3 milliGRAM(s) Oral at bedtime PRN Insomnia  ondansetron Injectable 4 milliGRAM(s) IV Push every 8 hours PRN Nausea and/or Vomiting

## 2024-03-11 NOTE — PROGRESS NOTE ADULT - PROBLEM SELECTOR PLAN 1
No leukocytosis, afebrile.  Abd X-ray showed distal air-filled colon. Small bowel pattern nonspecific. Abd softly more distended on exam today. Stool studies negative.  Rectal tube out since 3/9/2024.    -clear liquid diet  -turn and position while in bed  -continue bowel regimen, Miralax, senna, fleet   - possible decompressing colonoscopy tomorrow, will discuss with son   -colorectal consult requested

## 2024-03-11 NOTE — PROGRESS NOTE ADULT - ASSESSMENT
89F h/o dementia, Afib on warfarin, Hyperlipidemia, hypothyroidism, HTN, spinal stenosis, has pacemaker, presented to [facility] from home for diarrhea    #Ileus   - Surgery consulted due to abdominal distension 3/4/24- rectal exam yielded copious amounts of liquid stool and gas  -PPM interrogated   - stool culture adn GI PCR negative  - colorectal group following   - c/w Senna daily, Miralax BID, enemas PRN  - turn frequently in bed   - abd xray 3/8/24: Redundant air distended sigmoid colon, maximal luminal diameter measuring 10.5 cm with a proximal air-filled colon.  - GI recommendations appreciated-- GI placed dignishield rectal tube 3/6/24, removed 3/9/24  - monitor without rectal tube    - repeat abdominal xray in the AM  - palliative consulted     #Diarrhea, Acute- unclear etiology    - Labs and vitals are stable   - CT abd/pelvis: Fluid in the rectosigmoid colon again noted with increase in degree of air distention of the sigmoid colon since 10/11/2023    #Hypernatremia - resolved  -Na 143 today    #Hypokalemia- resolved  - K 4.0 today  - continue to monitor and replete as needed    #CKD  - Cr near baseline    #Pressure injury, heel, sacrum  - decubitus prevention protocol    #Afib on warfarin  - at home takes warfarin 1.25mg Monday, Thursday, takes 2.5mg other days  - INR 3.07 on admission,  - INR TODAY-1.9   -will transition to Apixaban 2.5 mg po bid     #Dementia  - c/w memantine 5mg,  quetiapine 50mg    #HTN  - c/w  home enalapril  and Lasix     #HLD  - atorvastatin 10mg    #Hypothyroidism  - levothyroxine 25mcg    #h/o gout  - c/w  allopurinol 100mg    #VTE ppx  - coumadin daily dosing    #GOC  - Full code    Dispo: pt has full time aide, home PT- once medically optimized    **updated daughter Lisa 89F h/o dementia, Afib on warfarin, Hyperlipidemia, hypothyroidism, HTN, spinal stenosis, has pacemaker, presented to [facility] from home for diarrhea    #Ileus   - Surgery consulted due to abdominal distension 3/4/24- rectal exam yielded copious amounts of liquid stool and gas  -PPM interrogated   -stool culture/GI PCR negative  -c/w Senna daily, Miralax BID, enemas PRN  -turn frequently in bed   -abd xray 3/8/24: Redundant air distended sigmoid colon, maximal luminal diameter measuring 10.5 cm with a proximal air-filled colon.  -monitor without rectal tube with no improvement  -repeat abdominal xray in the AM: non obstructive bowel gas pattern with improvement in bowel dilation.  -palliative consulted  -Colorectal reccs appreciated, no surgical intervention  -GI plan for decompression colonoscopy tomorrow    #Diarrhea, Acute- unclear etiology    - Labs and vitals are stable   - CT abd/pelvis: Fluid in the rectosigmoid colon again noted with increase in degree of air distention of the sigmoid colon since 10/11/2023    #Hypernatremia - resolved  -Na 143 today    #Hypokalemia- resolved  - K 4.0 today  - continue to monitor and replete as needed    #CKD  - Cr near baseline    #Pressure injury, heel, sacrum  - decubitus prevention protocol    #Afib on warfarin  - at home takes warfarin 1.25mg Monday, Thursday, takes 2.5mg other days  - INR 3.07 on admission,  - INR TODAY-1.9   -will transition to Apixaban 2.5 mg po bid   -Hold apixaban tonight prior to colonoscopy    #Dementia  - c/w memantine 5mg,  quetiapine 50mg    #HTN  - c/w  home enalapril  and Lasix     #HLD  - atorvastatin 10mg    #Hypothyroidism  - levothyroxine 25mcg    #h/o gout  - c/w  allopurinol 100mg    #VTE ppx  - coumadin daily dosing  -transitioned to warfarin, daily INR    #GOC  - Full code    Dispo: pt has full time aide, home PT- once medically optimized    **updated daughter Lisa

## 2024-03-11 NOTE — CHART NOTE - NSCHARTNOTEFT_GEN_A_CORE
Nutrition Follow Up Note  Hospital Course (Per Electronic Medical Record):   Source: Medical Record [X] ] Nursing Staff [X]     Diet: DASH/TLC , Ensure High Protein BID     Patient continues with poor po intake ~ 25% as per nursing flow sheets , patient with dx of severe protein calorie malnutrition , providing po Ensure supplement due to same  Labs reviewed , Palliative note reviewed , ? Hospice ? PEG due to continued poor po , rectal tube to be reinserted ,chronic bowel issue noted , ? Weight status     Current Weight: (3/11) 165.3/75kg                          (3/9) 216/98kg                          (3/8) 160/72.6kg     Pertinent Medications: MEDICATIONS  (STANDING):  allopurinol 100 milliGRAM(s) Oral daily  apixaban 5 milliGRAM(s) Oral two times a day  atorvastatin 10 milliGRAM(s) Oral at bedtime  cholecalciferol 1000 Unit(s) Oral daily  dextrose 5%. 1000 milliLiter(s) (75 mL/Hr) IV Continuous <Continuous>  levothyroxine 25 MICROGram(s) Oral daily  memantine 5 milliGRAM(s) Oral two times a day  polyethylene glycol 3350 17 Gram(s) Oral every 12 hours  QUEtiapine 50 milliGRAM(s) Oral at bedtime  senna 1 Tablet(s) Oral daily  simethicone 80 milliGRAM(s) Chew daily    MEDICATIONS  (PRN):  acetaminophen     Tablet .. 650 milliGRAM(s) Oral every 6 hours PRN Temp greater or equal to 38C (100.4F), Mild Pain (1 - 3)  aluminum hydroxide/magnesium hydroxide/simethicone Suspension 30 milliLiter(s) Oral every 4 hours PRN Dyspepsia  melatonin 3 milliGRAM(s) Oral at bedtime PRN Insomnia  ondansetron Injectable 4 milliGRAM(s) IV Push every 8 hours PRN Nausea and/or Vomiting      Pertinent Labs:  03-10 Na145 mmol/L Glu 92 mg/dL K+ 3.8 mmol/L Cr  1.38 mg/dL<H> BUN 24 mg/dL<H> 03-05 Phos 2.8 mg/dL 03-09 Alb 2.8 g/dL<L>Hgb 8.4g/dl<L> , Hct27.5% <L>         Skin: perirectal skin tears noted     Edema: none    Last BM: (3/10)     Estimated Needs:   [X] No Change since Previous Assessment    Previous Nutrition Diagnosis: Severe protein calorie malnutrition     Nutrition Diagnosis is [X] Ongoing & addressed        New Nutrition Diagnosis: [X] Not Applicable      Interventions:   1. downgrade diet consistency to aid with po intake       Monitoring & Evaluation: will monitor:  [X] Weights   [X] PO Intake   [X] Follow Up (Per Protocol)  [X] Tolerance to Diet Prescription       RD to follow as per Nutrition protocol  Nuria Tapia RDN

## 2024-03-11 NOTE — CONSULT NOTE ADULT - SUBJECTIVE AND OBJECTIVE BOX
HPI: 89F w/ PMH significant for afib and dementia who presented with diarrhea and sigmoid redundancy, with marked distension. She was found to be profoundly hypokalemic on admission. She is still hypokalemic this am, but improving. She is tolerating a diet, but not eating much. Surgery was called today due to continued abdominal distension. She reports no abdominal pain, fevers, chills, nausea or vomiting. She is thirsty and is tolerating PO without abdominal pain. She was unable to tell me if she had a prior colonoscopy.    PMH/PSH:   afib, dementia, HLD, hypothyroid, mastectomy, BPH, knee replacement  Meds:   · 	Coumadin 2.5 mg oral tablet: Last Dose Taken:  , 0.5 tab(s) orally 2 times a week Monday, Thursday  · 	memantine 5 mg oral tablet: Last Dose Taken:  , 1 tab(s) orally 2 times a day  · 	enalapril 5 mg oral tablet: Last Dose Taken:  , 1 tab(s) orally once a day  · 	Coumadin 2.5 mg oral tablet: Last Dose Taken:  , 1 tab(s) orally 5 times a week Sunday, Tuesday, Wednesday, Friday, Saturday  · 	allopurinol 100 mg oral tablet: Last Dose Taken:  , 1 tab(s) orally once a day  · 	Lasix 40 mg oral tablet: Last Dose Taken:  , 1 tab(s) orally once a day  · 	levothyroxine 25 mcg (0.025 mg) oral tablet: Last Dose Taken:  , 1 tab(s) orally once a day  · 	D3 25 mcg (1000 intl units) oral tablet: 1 tab(s) orally once a day  · 	atorvastatin 10 mg oral tablet: Last Dose Taken:  , 1 tab(s) orally once a day (at bedtime)  · 	QUEtiapine 25 mg oral tablet: Last Dose Taken:  , 2 tab(s) orally once a day (at bedtime)  Fam hx: CVA mother, MI father  Smoking: no ETOH drug use or smoking  NKDA    ROS: A 10 point ROS was performed for which the pertinent positives and negatives were noted in the HPI. All others were reviewed and were negative.    Vitals:  T 98 HR 72 /79 RR 17 O2 sat 96%  General: No acute distress  HEENT: Normocephalic, atraumatic  Respiratory: nonlabored breathing  Abdomen: soft, non-tender, distended, no rebound or guarding, tympanitic  Rectal exam: no masses felt, no hard stool, but large torrential amounts of liquid stool and gas which were released which were under a great amount of pressure. no blood  Extremities: No edema  Psych: mildly agitated when asked questions  Neuro: No focal deficits appreciated    Labs:  WBC 7 Hb 9.1 INR 2.4 K 3.3 BUN/CR 26/1.63 Mg 1.9    AXR: redundant, dilated sigmoid, stool in colon    CT abdomen/pelvis: dilated redundant colon with stool in the proxmial colon  
HPI: 89F h/o dementia, Afib on coumadin, HLD, hypothyroidism, HTN, spinal stenosis, has pacemaker, presented to [facility] from home for diarrhea noted by HHA. History provided by patient's daughter. Her HHA noted >4 instances of watery diarrhea whenever she changed the patient. No noted abdominal pain, vomiting, fever, sweats. HHA last took care of patient on Monday and had no diarrhea then. Patient had a different aide from Tuesday to Thursday, she was unable to be reached. Limited ROS obtained due to patient's dementia, however she denies headache, abdominal pain, nausea/vomiting.    In ED pt afebrile, HR 64, /80, RR 18, sat 96% RA. Pt hypernatremic and hypokalemic. Given potassium repletion, NS bolus.       PAST MEDICAL & SURGICAL HISTORY:  Atrial fibrillation, unspecified type      Benign hypertension      Hypothyroid      HLD (hyperlipidemia)      Dementia      S/P knee replacement      H/O mastectomy          SOCIAL HISTORY:    Admitted from:  home  Substance abuse history:              Tobacco hx:                  Alcohol hx:              Home Opioid hx:  Nondenominational:                                    Preferred Language:    Surrogate/HCP/:       marino Gonzalez      Phone#:  [phone]    FAMILY HISTORY:  FH: myocardial infarction  father    FH: CVA (cerebrovascular accident)  Mother    Family history of pacemaker  mother      Baseline ADLs (prior to admission):    Allergies    No Known Allergies    Intolerances      Present Symptoms:   Dyspnea: no  Nausea/Vomiting:   Anxiety:  Depressed   Fatigue:  Loss of appetite:   Pain:         no                       location:          Review of Systems: [ Unable to obtain due to poor mentation]    MEDICATIONS  (STANDING):  allopurinol 100 milliGRAM(s) Oral daily  atorvastatin 10 milliGRAM(s) Oral at bedtime  cholecalciferol 1000 Unit(s) Oral daily  dextrose 5%. 1000 milliLiter(s) (75 mL/Hr) IV Continuous <Continuous>  levothyroxine 25 MICROGram(s) Oral daily  memantine 5 milliGRAM(s) Oral two times a day  polyethylene glycol 3350 17 Gram(s) Oral every 12 hours  QUEtiapine 50 milliGRAM(s) Oral at bedtime  senna 1 Tablet(s) Oral daily  simethicone 80 milliGRAM(s) Chew daily    MEDICATIONS  (PRN):  acetaminophen     Tablet .. 650 milliGRAM(s) Oral every 6 hours PRN Temp greater or equal to 38C (100.4F), Mild Pain (1 - 3)  aluminum hydroxide/magnesium hydroxide/simethicone Suspension 30 milliLiter(s) Oral every 4 hours PRN Dyspepsia  melatonin 3 milliGRAM(s) Oral at bedtime PRN Insomnia  ondansetron Injectable 4 milliGRAM(s) IV Push every 8 hours PRN Nausea and/or Vomiting      PHYSICAL EXAM:    Vital Signs Last 24 Hrs  T(C): 37.3 (08 Mar 2024 11:37), Max: 37.6 (07 Mar 2024 20:48)  T(F): 99.2 (08 Mar 2024 11:37), Max: 99.6 (07 Mar 2024 20:48)  HR: 61 (08 Mar 2024 11:37) (61 - 86)  BP: 155/82 (08 Mar 2024 11:37) (131/71 - 174/66)  BP(mean): --  RR: 18 (08 Mar 2024 11:37) (18 - 18)  SpO2: 98% (08 Mar 2024 11:37) (94% - 98%)    Parameters below as of 08 Mar 2024 11:37  Patient On (Oxygen Delivery Method): room air        General: alert to stim.,   oriented x 1, speaks few words , fatigued  Karnofsky Performance Score/Palliative Performance Status Version2:   40  %  PPSV: 40%  HEENT: normal  dry mouth    Lungs: ess cl dim to bases, resp non labored   CV: normal rate   GI: abd lrg., soft, n/t to palp    : normal  incontinent prima fit   Musculoskeletal: normal  w/ weakness  edema   Skin: normal, w/d   Neuro: asleep, wakes to stim., oriented to self, not location or time or situation   Oral intake ability: minimal moderate w/ assist   Diet: as kurt w/ assist     LABS:                        8.7    8.22  )-----------( 170      ( 08 Mar 2024 05:18 )             28.4     03-08    145  |  108  |  20  ----------------------------<  90  3.0<L>   |  27  |  1.37<H>    Ca    8.9      08 Mar 2024 05:18  Mg     2.1     03-08    TPro  6.6  /  Alb  2.6<L>  /  TBili  0.9  /  DBili  x   /  AST  13  /  ALT  11  /  AlkPhos  140<H>  03-08    Urinalysis Basic - ( 08 Mar 2024 05:18 )    Color: x / Appearance: x / SG: x / pH: x  Gluc: 90 mg/dL / Ketone: x  / Bili: x / Urobili: x   Blood: x / Protein: x / Nitrite: x   Leuk Esterase: x / RBC: x / WBC x   Sq Epi: x / Non Sq Epi: x / Bacteria: x        RADIOLOGY & ADDITIONAL STUDIES: < from: Xray Abdomen 2 Views (03.08.24 @ 09:35) >  INTERPRETATION:  KUB: AP, 2 images    COMPARISON: . Abdominal radiograph 10 3 7    CLINICAL INFORMATION: 3/7/2024 abdominal radiographs.    FINDINGS:  Redundant air distended sigmoid colon, maximal luminal diameter measuring   10.5 cm with a proximal air-filled colon. Small bowel pattern   nonspecific.. Sigmoid colon measures 6.9 cm in diameter on 3/1/2024   abdominal CT scan.    Distal air-filled colon. Small bowel pattern nonspecific..  .  No free intra-abdominal air.  No abnormal calcifications.  The osseous structures are intact.    IMPRESSION: 10.5 cm air distention of redundant sigmoid colon with   proximal air-filled colon as described.  Continued surveillance recommended.          ADVANCE DIRECTIVES: hcp   full code   Advanced Care Planning discussion total time spent:  
COLORECTAL SURGERY CONSULT NOTE    Patient is a 89y old  Female who presents with a chief complaint of diarrhea (11 Mar 2024 12:59). Colorectal consulted regarding Ileus and abdominal distention.      HPI:  89F h/o dementia, Afib on coumadin, HLD, hypothyroidism, HTN, spinal stenosis, has pacemaker, presented to [facility] from home for diarrhea noted by HHA. History provided by patient's daughter. This morning her HHA noted >4 instances of watery diarrhea whenever she changed the patient. No noted abdominal pain, vomiting, fever, sweats. HHA last took care of patient on Monday and had no diarrhea then. Patient had a different aide from Tuesday to Thursday, she was unable to be reached. Limited ROS obtained due to patient's dementia, however she denies headache, abdominal pain, nausea/vomiting.    In ED pt afebrile, HR 64, /80, RR 18, sat 96% RA. Pt hypernatremic and hypokalemic. Given potassium repletion, NS bolus. (01 Mar 2024 22:05)      PAST MEDICAL & SURGICAL HISTORY:  Atrial fibrillation, unspecified type      Benign hypertension      Hypothyroid      HLD (hyperlipidemia)      Dementia      S/P knee replacement      H/O mastectomy          Review of Systems:  Contained within HPI.    MEDICATIONS  (STANDING):  allopurinol 100 milliGRAM(s) Oral daily  apixaban 5 milliGRAM(s) Oral two times a day  atorvastatin 10 milliGRAM(s) Oral at bedtime  cholecalciferol 1000 Unit(s) Oral daily  dextrose 5%. 1000 milliLiter(s) (75 mL/Hr) IV Continuous <Continuous>  levothyroxine 25 MICROGram(s) Oral daily  memantine 5 milliGRAM(s) Oral two times a day  polyethylene glycol 3350 17 Gram(s) Oral every 12 hours  QUEtiapine 50 milliGRAM(s) Oral at bedtime  senna 1 Tablet(s) Oral daily  simethicone 80 milliGRAM(s) Chew daily    MEDICATIONS  (PRN):  acetaminophen     Tablet .. 650 milliGRAM(s) Oral every 6 hours PRN Temp greater or equal to 38C (100.4F), Mild Pain (1 - 3)  aluminum hydroxide/magnesium hydroxide/simethicone Suspension 30 milliLiter(s) Oral every 4 hours PRN Dyspepsia  melatonin 3 milliGRAM(s) Oral at bedtime PRN Insomnia  ondansetron Injectable 4 milliGRAM(s) IV Push every 8 hours PRN Nausea and/or Vomiting      Allergies    No Known Allergies    Intolerances        SOCIAL HISTORY       FAMILY HISTORY:  FH: myocardial infarction  father    FH: CVA (cerebrovascular accident)  Mother    Family history of pacemaker  mother        Vital Signs Last 24 Hrs  T(C): 36.6 (11 Mar 2024 13:13), Max: 36.8 (10 Mar 2024 21:05)  T(F): 97.8 (11 Mar 2024 13:13), Max: 98.3 (10 Mar 2024 21:05)  HR: 65 (11 Mar 2024 13:13) (61 - 65)  BP: 139/72 (11 Mar 2024 13:13) (111/68 - 150/79)  BP(mean): --  RR: 17 (11 Mar 2024 13:13) (16 - 18)  SpO2: 92% (11 Mar 2024 13:13) (92% - 93%)    Parameters below as of 11 Mar 2024 13:13  Patient On (Oxygen Delivery Method): room air      PHYSICAL EXAM:    GENERAL: NAD  HEAD:  Atraumatic, Normocephalic  CHEST/LUNG: Non labored breathing  HEART: Not tachycardic  Abdomen: soft, non-tender, distended, no rebound or guarding, tympanitic        LABS:                        8.6    7.53  )-----------( 215      ( 10 Mar 2024 06:16 )             27.5     03-10    145  |  110<H>  |  24<H>  ----------------------------<  92  3.8   |  27  |  1.38<H>    Ca    9.3      10 Mar 2024 06:16  Mg     2.3     03-10      PT/INR - ( 10 Mar 2024 06:16 )   PT: 22.5 sec;   INR: 1.96 ratio         PTT - ( 10 Mar 2024 06:16 )  PTT:35.6 sec  Urinalysis Basic - ( 10 Mar 2024 06:16 )    Color: x / Appearance: x / SG: x / pH: x  Gluc: 92 mg/dL / Ketone: x  / Bili: x / Urobili: x   Blood: x / Protein: x / Nitrite: x   Leuk Esterase: x / RBC: x / WBC x   Sq Epi: x / Non Sq Epi: x / Bacteria: x        RADIOLOGY & ADDITIONAL STUDIES:
INTERVAL HPI/OVERNIGHT EVENTS:  HPI:  89F h/o dementia, Afib on coumadin, HLD, hypothyroidism, HTN, spinal stenosis, has pacemaker, presented to [facility] from home for diarrhea noted by HHA. History provided by patient's daughter. This morning her HHA noted >4 instances of watery diarrhea whenever she changed the patient. No noted abdominal pain, vomiting, fever, sweats. HHA last took care of patient on Monday and had no diarrhea then. Patient had a different aide from Tuesday to Thursday, she was unable to be reached. Limited ROS obtained due to patient's dementia, however she denies headache, abdominal pain, nausea/vomiting.    In ED pt afebrile, HR 64, /80, RR 18, sat 96% RA. Pt hypernatremic and hypokalemic. Given potassium repletion, NS bolus. (01 Mar 2024 22:05)    GI consultation for Ileus.  Patient seen examined at bed side, she is confused information obtained from staff and chart. Her abdomen was very distended yesterday, after rectal exam she he had large amount of liquid stool and gas. she had large liquid bowels today after enema. She is tolerating diet.       MEDICATIONS  (STANDING):  allopurinol 100 milliGRAM(s) Oral daily  atorvastatin 10 milliGRAM(s) Oral at bedtime  cholecalciferol 1000 Unit(s) Oral daily  levothyroxine 25 MICROGram(s) Oral daily  magnesium sulfate  IVPB 1 Gram(s) IV Intermittent once  memantine 5 milliGRAM(s) Oral two times a day  polyethylene glycol 3350 17 Gram(s) Oral daily  QUEtiapine 50 milliGRAM(s) Oral at bedtime  senna 1 Tablet(s) Oral daily  simethicone 80 milliGRAM(s) Chew daily    MEDICATIONS  (PRN):  acetaminophen     Tablet .. 650 milliGRAM(s) Oral every 6 hours PRN Temp greater or equal to 38C (100.4F), Mild Pain (1 - 3)  aluminum hydroxide/magnesium hydroxide/simethicone Suspension 30 milliLiter(s) Oral every 4 hours PRN Dyspepsia  melatonin 3 milliGRAM(s) Oral at bedtime PRN Insomnia  ondansetron Injectable 4 milliGRAM(s) IV Push every 8 hours PRN Nausea and/or Vomiting      Allergies    No Known Allergies    Intolerances        PAST MEDICAL & SURGICAL HISTORY:  Atrial fibrillation, unspecified type      Benign hypertension      Hypothyroid      HLD (hyperlipidemia)      Dementia      S/P knee replacement      H/O mastectomy      REVIEW OF SYSTEMS	  See HPI     PHYSICAL EXAM:   Vital Signs:  Vital Signs Last 24 Hrs  T(C): 36.6 (05 Mar 2024 05:17), Max: 36.7 (04 Mar 2024 19:13)  T(F): 97.8 (05 Mar 2024 05:17), Max: 98.1 (04 Mar 2024 19:13)  HR: 74 (05 Mar 2024 05:17) (74 - 75)  BP: 107/72 (05 Mar 2024 05:17) (107/72 - 126/85)  BP(mean): --  RR: 17 (05 Mar 2024 05:17) (17 - 18)  SpO2: 96% (05 Mar 2024 05:17) (96% - 96%)    Parameters below as of 05 Mar 2024 05:17  Patient On (Oxygen Delivery Method): room air      Daily     Daily Weight in k.5 (05 Mar 2024 05:17)I&O's Summary      GENERAL:  Appears stated age  HEENT:  NC/AT,  conjunctivae clear and pink  CHEST:  Full & symmetric excursion  HEART:  Regular rhythm, S1, S2  ABDOMEN:  Soft, non-tender, mild distended, normoactive bowel sounds  EXTREMITIES:  no cyanosis, clubbing or edema  SKIN:  No rash/warm/dry  NEURO:  Alert, Awake,  confused   LABS:                        9.3    9.01  )-----------( 179      ( 05 Mar 2024 06:22 )             29.5     03-05    149<H>  |  111<H>  |  27<H>  ----------------------------<  102<H>  3.7   |  29  |  1.55<H>    Ca    9.5      05 Mar 2024 06:22  Phos  2.8     03-05  Mg     1.9     03-05    TPro  7.0  /  Alb  2.9<L>  /  TBili  0.9  /  DBili  x   /  AST  15  /  ALT  12  /  AlkPhos  156<H>  03-05    PT/INR - ( 05 Mar 2024 06:22 )   PT: 28.9 sec;   INR: 2.61 ratio         PTT - ( 05 Mar 2024 06:22 )  PTT:34.2 sec  Urinalysis Basic - ( 05 Mar 2024 06:22 )    Color: x / Appearance: x / SG: x / pH: x  Gluc: 102 mg/dL / Ketone: x  / Bili: x / Urobili: x   Blood: x / Protein: x / Nitrite: x   Leuk Esterase: x / RBC: x / WBC x   Sq Epi: x / Non Sq Epi: x / Bacteria: x      amylase   lipaseLipase: 26 U/L ( @ 18:30)    RADIOLOGY & ADDITIONAL TESTS:    ACC: [id number] EXAM:  CT ABDOMEN AND PELVIS   ORDERED BY: EVENS SIERRA     PROCEDURE DATE:  2024          INTERPRETATION:  CLINICAL INFORMATION: Diarrhea, distended abdomen.    COMPARISON: 10/11/2023 CT scan of the pelvis    CONTRAST/COMPLICATIONS:  IV Contrast: NONE  Oral Contrast: NONE  Complications: None reported at time of study completion    PROCEDURE:  CT of the Abdomen and Pelvis was performed.  Sagittal and coronal reformats were performed.    FINDINGS:  LOWER CHEST: Intracardiac pacer right ventricle. Cardiomegaly.   Subsegmental atelectasis at the lung bases.    LIVER: Within normal limits.  BILE DUCTS: Normal caliber.  GALLBLADDER: Gallstones.  SPLEEN: Within normal limits.  PANCREAS: Within normal limits.  ADRENALS: Within normal limits.  KIDNEYS/URETERS: Moderately atrophic kidneys and probable bilateral renal   cysts.    BLADDER: Within normal limits.  REPRODUCTIVE ORGANS: Calcified uterine myomas.    BOWEL: No bowel obstruction. Small hiatal hernia. Fluid again noted in   the rectosigmoid colon along with air distention of the sigmoid colon to   8.0 cm previously 5.7 cm.  PERITONEUM: No ascites.  VESSELS: Atherosclerotic changes. 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  RETROPERITONEUM/LYMPH NODES: No lymphadenopathy.  ABDOMINAL WALL: Within normal limits.  BONES: Degenerative changes. Dextroscoliosis of the lumbar spine.    IMPRESSION:  Fluid in the rectosigmoid colon again noted with increase in degree of   air distention of the sigmoid colon since 10/11/2023.  --- End of Report ---    BURTON MENJIVAR MD; Attending Radiologist  This document has been electronically signed. Mar  1 2024  8:22PM    ACC: [id number] EXAM:  XR ABDOMEN PORTABLE URGENT 1V   ORDERED BY:   SLOAN DAVIS     PROCEDURE DATE:  2024          INTERPRETATION:  XR ABDOMEN URGENT    HISTORY:  Evaluate for small bowel obstruction. Abdominal distention.    VIEWS: 2 frontal views of the abdomen and pelvis.    COMPARISON:  CT abdomen/pelvis 3/1/2024 and CT pelvis 10/11/2023    FINDINGS:    Colonic distention again seen, greatest involving a redundant sigmoid   colon, not significantly changed since the prior CT 3/1/2024. Moderate   amount retained fecal material in the colon, greatest in the left   hemicolon and transverse colon.    Air-filled loops of bowel seen throughout the abdomen and pelvis.    No acute osseous abnormality. Degenerative changes in the spine. Wireless   pacemaker noted.    IMPRESSION:    Colonic distention again seen, greatest involving a redundant sigmoid   colon, not significantly changed since 3/1/2024; a follow-up CT   abdomen/pelvis should be obtained as clinically warranted.    The findings were discussed with Dr. Chan on 3/3/2024 6:15 PM    --- End of Report ---            HATTIE MONCADA MD; Attending Radiologist  This document has been electronically signed. Mar  3 2024  6:21PM

## 2024-03-11 NOTE — PROGRESS NOTE ADULT - SUBJECTIVE AND OBJECTIVE BOX
INTERVAL HPI/OVERNIGHT EVENTS:  HPI:    88 y/o female seen and examined. Abdomen appears more distended today.      MEDICATIONS  (STANDING):  allopurinol 100 milliGRAM(s) Oral daily  apixaban 5 milliGRAM(s) Oral two times a day  atorvastatin 10 milliGRAM(s) Oral at bedtime  cholecalciferol 1000 Unit(s) Oral daily  dextrose 5%. 1000 milliLiter(s) (75 mL/Hr) IV Continuous <Continuous>  levothyroxine 25 MICROGram(s) Oral daily  memantine 5 milliGRAM(s) Oral two times a day  polyethylene glycol 3350 17 Gram(s) Oral every 12 hours  QUEtiapine 50 milliGRAM(s) Oral at bedtime  senna 1 Tablet(s) Oral daily  simethicone 80 milliGRAM(s) Chew daily    MEDICATIONS  (PRN):  acetaminophen     Tablet .. 650 milliGRAM(s) Oral every 6 hours PRN Temp greater or equal to 38C (100.4F), Mild Pain (1 - 3)  aluminum hydroxide/magnesium hydroxide/simethicone Suspension 30 milliLiter(s) Oral every 4 hours PRN Dyspepsia  melatonin 3 milliGRAM(s) Oral at bedtime PRN Insomnia  ondansetron Injectable 4 milliGRAM(s) IV Push every 8 hours PRN Nausea and/or Vomiting      Allergies    No Known Allergies    Intolerances        PAST MEDICAL & SURGICAL HISTORY:  Atrial fibrillation, unspecified type      Benign hypertension      Hypothyroid      HLD (hyperlipidemia)      Dementia      S/P knee replacement      H/O mastectomy      PHYSICAL EXAM:   Vital Signs:  Vital Signs Last 24 Hrs  T(C): 36.7 (11 Mar 2024 05:14), Max: 36.8 (10 Mar 2024 21:05)  T(F): 98.1 (11 Mar 2024 05:14), Max: 98.3 (10 Mar 2024 21:05)  HR: 62 (11 Mar 2024 05:14) (61 - 62)  BP: 111/68 (11 Mar 2024 05:14) (111/68 - 150/79)  BP(mean): --  RR: 16 (11 Mar 2024 05:14) (16 - 18)  SpO2: 93% (11 Mar 2024 05:14) (92% - 93%)    Parameters below as of 11 Mar 2024 05:14  Patient On (Oxygen Delivery Method): room air      Daily     Daily Weight in k (11 Mar 2024 05:14)I&O's Summary    10 Mar 2024 07:01  -  11 Mar 2024 07:00  --------------------------------------------------------  IN: 0 mL / OUT: 300 mL / NET: -300 mL        GENERAL:  Appears stated age,  HEENT:  NC/AT,  conjunctivae clear and pink,   CHEST:  Full & symmetric excursion, no increased effort, breath sounds clear  HEART:  Regular rhythm, S1, S2, no murmur  ABDOMEN:  Firm, non-tender, distended, normoactive bowel sounds,    EXTEREMITIES:  no edema  SKIN:  No rash/warm/dry  NEURO:  Alert,      LABS:                        8.6    7.53  )-----------( 215      ( 10 Mar 2024 06:16 )             27.5     03-10    145  |  110<H>  |  24<H>  ----------------------------<  92  3.8   |  27  |  1.38<H>    Ca    9.3      10 Mar 2024 06:16  Mg     2.3     03-10      PT/INR - ( 10 Mar 2024 06:16 )   PT: 22.5 sec;   INR: 1.96 ratio         PTT - ( 10 Mar 2024 06:16 )  PTT:35.6 sec  Urinalysis Basic - ( 10 Mar 2024 06:16 )    Color: x / Appearance: x / SG: x / pH: x  Gluc: 92 mg/dL / Ketone: x  / Bili: x / Urobili: x   Blood: x / Protein: x / Nitrite: x   Leuk Esterase: x / RBC: x / WBC x   Sq Epi: x / Non Sq Epi: x / Bacteria: x      amylase   lipase  RADIOLOGY & ADDITIONAL TESTS:   GI Follow up    88 y/o female seen and examined. Abdomen appears more distended today.      MEDICATIONS  (STANDING):  allopurinol 100 milliGRAM(s) Oral daily  apixaban 5 milliGRAM(s) Oral two times a day  atorvastatin 10 milliGRAM(s) Oral at bedtime  cholecalciferol 1000 Unit(s) Oral daily  dextrose 5%. 1000 milliLiter(s) (75 mL/Hr) IV Continuous <Continuous>  levothyroxine 25 MICROGram(s) Oral daily  memantine 5 milliGRAM(s) Oral two times a day  polyethylene glycol 3350 17 Gram(s) Oral every 12 hours  QUEtiapine 50 milliGRAM(s) Oral at bedtime  senna 1 Tablet(s) Oral daily  simethicone 80 milliGRAM(s) Chew daily    MEDICATIONS  (PRN):  acetaminophen     Tablet .. 650 milliGRAM(s) Oral every 6 hours PRN Temp greater or equal to 38C (100.4F), Mild Pain (1 - 3)  aluminum hydroxide/magnesium hydroxide/simethicone Suspension 30 milliLiter(s) Oral every 4 hours PRN Dyspepsia  melatonin 3 milliGRAM(s) Oral at bedtime PRN Insomnia  ondansetron Injectable 4 milliGRAM(s) IV Push every 8 hours PRN Nausea and/or Vomiting      Allergies    No Known Allergies    Intolerances        PHYSICAL EXAM:   Vital Signs:  Vital Signs Last 24 Hrs  T(C): 36.7 (11 Mar 2024 05:14), Max: 36.8 (10 Mar 2024 21:05)  T(F): 98.1 (11 Mar 2024 05:14), Max: 98.3 (10 Mar 2024 21:05)  HR: 62 (11 Mar 2024 05:14) (61 - 62)  BP: 111/68 (11 Mar 2024 05:14) (111/68 - 150/79)  BP(mean): --  RR: 16 (11 Mar 2024 05:14) (16 - 18)  SpO2: 93% (11 Mar 2024 05:14) (92% - 93%)    Parameters below as of 11 Mar 2024 05:14  Patient On (Oxygen Delivery Method): room air      Daily     Daily Weight in k (11 Mar 2024 05:14)I&O's Summary    10 Mar 2024 07:01  -  11 Mar 2024 07:00  --------------------------------------------------------  IN: 0 mL / OUT: 300 mL / NET: -300 mL        GENERAL:  Appears stated age,  HEENT:  NC/AT,  conjunctivae clear and pink,   CHEST:  Full & symmetric excursion, no increased effort, breath sounds clear  HEART:  Regular rhythm, S1, S2, no murmur  ABDOMEN:  Firm, non-tender, distended, normoactive bowel sounds,    RECTAL: digital examination showed no stool in rectal vault.  EXTREMITIES:  no edema  SKIN:  No rash/warm/dry  NEURO:  Alert,      LABS:                        8.6    7.53  )-----------( 215      ( 10 Mar 2024 06:16 )             27.5     03-10    145  |  110<H>  |  24<H>  ----------------------------<  92  3.8   |  27  |  1.38<H>    Ca    9.3      10 Mar 2024 06:16  Mg     2.3     03-10      PT/INR - ( 10 Mar 2024 06:16 )   PT: 22.5 sec;   INR: 1.96 ratio    PTT - ( 10 Mar 2024 06:16 )  PTT:35.6 sec      Urinalysis Basic - ( 10 Mar 2024 06:16 )    Color: x / Appearance: x / SG: x / pH: x  Gluc: 92 mg/dL / Ketone: x  / Bili: x / Urobili: x   Blood: x / Protein: x / Nitrite: x   Leuk Esterase: x / RBC: x / WBC x   Sq Epi: x / Non Sq Epi: x / Bacteria: x

## 2024-03-11 NOTE — PROGRESS NOTE ADULT - ATTENDING COMMENTS
Rectal tube out, had one large watery bowel movement.    T(C): 36.6 (03-11-24 @ 13:13), Max: 36.8 (03-10-24 @ 21:05)  T(F): 97.8 (03-11-24 @ 13:13), Max: 98.3 (03-10-24 @ 21:05)  HR: 65 (03-11-24 @ 13:13) (61 - 65)  BP: 139/72 (03-11-24 @ 13:13) (111/68 - 150/79)  ABP: --  ABP(mean): --  RR: 17 (03-11-24 @ 13:13) (16 - 18)  SpO2: 92% (03-11-24 @ 13:13) (92% - 93%)      on exam awake, does not follow commands. obese  both lungs clear  s1, s2, regular,   abdomen- distended, no tenderness to palpation, no rebound, no guarding  b/l calves with no tenderness.    labs reviewed                           8.6    7.53  )-----------( 215      ( 10 Mar 2024 06:16 )             27.5   03-10    145  |  110<H>  |  24<H>  ----------------------------<  92  3.8   |  27  |  1.38<H>    Ca    9.3      10 Mar 2024 06:16  Mg     2.3     03-10    xray reviewed by me, continues to show distension, no free air  GI PCR- negative    a/p:  # chronic ileus, no sign/ symptom of obstruction  # Hypernatremia- improved  # anemia- stable  # ckd stage 3  # Underlying history of afib on coumadin, ppm, hypothyroidism. dementia, HLD  - Possible decompressive colnoscopy by GI tomorrow, no colorectal surgery intervention recommended. stool studies negative. Hold coumadin. Patient should be transitioned to apixaban when ready for easier regime if approved by pharmacy. continue other home meds. monitor renal function, avoid nephrotoxic agents.    - rest as per resident not.     - Dispo: Medically active, possible d/c in 24-48 hours    I spent a total of 30 minutes on the date of this encounter coordinating the patient's care. This includes reviewing results/imaging and discussions with specialists, nursing, case management/social work. Further tests, medications, and procedures have been ordered as indicated. Results and the plan of care were communicated to the patient and/or their family member. Supporting documentation was completed and added to the patient's chart. Rectal tube out, had one large watery bowel movement.    T(C): 36.6 (03-11-24 @ 13:13), Max: 36.8 (03-10-24 @ 21:05)  T(F): 97.8 (03-11-24 @ 13:13), Max: 98.3 (03-10-24 @ 21:05)  HR: 65 (03-11-24 @ 13:13) (61 - 65)  BP: 139/72 (03-11-24 @ 13:13) (111/68 - 150/79)  ABP: --  ABP(mean): --  RR: 17 (03-11-24 @ 13:13) (16 - 18)  SpO2: 92% (03-11-24 @ 13:13) (92% - 93%)      on exam awake, does not follow commands. obese  both lungs clear  s1, s2, regular,   abdomen- distended, no tenderness to palpation, no rebound, no guarding  b/l calves with no tenderness.    labs reviewed                           8.6    7.53  )-----------( 215      ( 10 Mar 2024 06:16 )             27.5   03-10    145  |  110<H>  |  24<H>  ----------------------------<  92  3.8   |  27  |  1.38<H>    Ca    9.3      10 Mar 2024 06:16  Mg     2.3     03-10    xray reviewed by me, continues to show distension, no free air  GI PCR- negative    a/p:  # chronic ileus, no sign/ symptom of obstruction  # Hypernatremia- improved  # anemia- stable  # ckd stage 3  # Underlying history of afib on coumadin, ppm, hypothyroidism. dementia, HLD  - Possible decompressive colnoscopy by GI tomorrow, no colorectal surgery intervention recommended. stool studies negative. Hold coumadin. Patient should be transitioned to apixaban when ready for easier regime if approved by pharmacy. continue other home meds. monitor renal function, avoid nephrotoxic agents.    - rest as per resident not.     - Dispo: Medically active, possible d/c in 24-48 hours    I spent a total of 35 minutes on the date of this encounter coordinating the patient's care. This includes reviewing results/imaging and discussions with specialists, nursing, case management/social work. Further tests, medications, and procedures have been ordered as indicated. Results and the plan of care were communicated to the patient and/or their family member. Supporting documentation was completed and added to the patient's chart. Rectal tube out, had one large watery bowel movement.    T(C): 36.6 (03-11-24 @ 13:13), Max: 36.8 (03-10-24 @ 21:05)  T(F): 97.8 (03-11-24 @ 13:13), Max: 98.3 (03-10-24 @ 21:05)  HR: 65 (03-11-24 @ 13:13) (61 - 65)  BP: 139/72 (03-11-24 @ 13:13) (111/68 - 150/79)  ABP: --  ABP(mean): --  RR: 17 (03-11-24 @ 13:13) (16 - 18)  SpO2: 92% (03-11-24 @ 13:13) (92% - 93%)      on exam awake, does not follow commands. obese  both lungs clear  s1, s2, regular,   abdomen- distended, no tenderness to palpation, no rebound, no guarding  b/l calves with no tenderness.    labs reviewed                           8.6    7.53  )-----------( 215      ( 10 Mar 2024 06:16 )             27.5   03-10    145  |  110<H>  |  24<H>  ----------------------------<  92  3.8   |  27  |  1.38<H>    Ca    9.3      10 Mar 2024 06:16  Mg     2.3     03-10    xray reviewed by me, continues to show distension, no free air  GI PCR- negative    a/p:  # chronic ileus, no sign/ symptom of obstruction  # Hypernatremia- improved  # anemia- stable  # ckd stage 3  # Underlying history of afib on coumadin, ppm, hypothyroidism. dementia, HLD  - Possible decompressive colnoscopy by GI tomorrow, no colorectal surgery intervention recommended. stool studies negative. Hold apixaban. continue other home meds. monitor renal function, avoid nephrotoxic agents.    - rest as per resident not.     - Dispo: Medically active, possible d/c in 24-48 hours    I spent a total of 35 minutes on the date of this encounter coordinating the patient's care. This includes reviewing results/imaging and discussions with specialists, nursing, case management/social work. Further tests, medications, and procedures have been ordered as indicated. Results and the plan of care were communicated to the patient and/or their family member. Supporting documentation was completed and added to the patient's chart.

## 2024-03-11 NOTE — CONSULT NOTE ADULT - ASSESSMENT
Impression: 89F w/ PMH significant for afib and dementia who presented with ileus and marked sigmoid redundancy. No evidence of sigmoid volvulus. No acute abdomen. She is afebrile hemodynamically stable.     - Abd X-ray showed distal air-filled colon. Small bowel pattern nonspecific.   - Stool studies negative.    - Rectal tube out since 3/9/2024.      - Continue daily aggressive bowel regimen including daily enemas.  - Daily electrolyte repletion. K goal of 4.0 Mg goal of 2 Phos goal of 4 and normal calcium.  - Possible decompressing colonoscopy tomorrow with GI    No acute surgical intervention. Colorectal will remain on standby please reconsult with any further concerns.   Plan discussed with Colorectal team, Dr. Mckeon

## 2024-03-11 NOTE — CHART NOTE - NSCHARTNOTEFT_GEN_A_CORE
Apixaban discontinued so 3/12/24 am dose is held in preparation for possible decompression colonoscopy.  Keep NPO

## 2024-03-12 ENCOUNTER — TRANSCRIPTION ENCOUNTER (OUTPATIENT)
Age: 89
End: 2024-03-12

## 2024-03-12 LAB
ANION GAP SERPL CALC-SCNC: -5 MMOL/L — LOW (ref 5–17)
APTT BLD: 38.3 SEC — HIGH (ref 24.5–35.6)
BUN SERPL-MCNC: 21 MG/DL — SIGNIFICANT CHANGE UP (ref 7–23)
CALCIUM SERPL-MCNC: 9.6 MG/DL — SIGNIFICANT CHANGE UP (ref 8.4–10.5)
CHLORIDE SERPL-SCNC: 123 MMOL/L — HIGH (ref 96–108)
CO2 SERPL-SCNC: 29 MMOL/L — SIGNIFICANT CHANGE UP (ref 22–31)
CREAT SERPL-MCNC: 1.36 MG/DL — HIGH (ref 0.5–1.3)
EGFR: 37 ML/MIN/1.73M2 — LOW
GLUCOSE SERPL-MCNC: 103 MG/DL — HIGH (ref 70–99)
HCT VFR BLD CALC: 28.9 % — LOW (ref 34.5–45)
HGB BLD-MCNC: 8.8 G/DL — LOW (ref 11.5–15.5)
INR BLD: 1.96 RATIO — HIGH (ref 0.85–1.18)
MAGNESIUM SERPL-MCNC: 2.3 MG/DL — SIGNIFICANT CHANGE UP (ref 1.6–2.6)
MCHC RBC-ENTMCNC: 29.3 PG — SIGNIFICANT CHANGE UP (ref 27–34)
MCHC RBC-ENTMCNC: 30.4 GM/DL — LOW (ref 32–36)
MCV RBC AUTO: 96.3 FL — SIGNIFICANT CHANGE UP (ref 80–100)
NRBC # BLD: 0 /100 WBCS — SIGNIFICANT CHANGE UP (ref 0–0)
PHOSPHATE SERPL-MCNC: 2.5 MG/DL — SIGNIFICANT CHANGE UP (ref 2.5–4.5)
PLATELET # BLD AUTO: 232 K/UL — SIGNIFICANT CHANGE UP (ref 150–400)
POTASSIUM SERPL-MCNC: 3.3 MMOL/L — LOW (ref 3.5–5.3)
POTASSIUM SERPL-SCNC: 3.3 MMOL/L — LOW (ref 3.5–5.3)
PROTHROM AB SERPL-ACNC: 22.5 SEC — HIGH (ref 9.5–13)
RBC # BLD: 3 M/UL — LOW (ref 3.8–5.2)
RBC # FLD: 15.9 % — HIGH (ref 10.3–14.5)
SODIUM SERPL-SCNC: 147 MMOL/L — HIGH (ref 135–145)
WBC # BLD: 7.89 K/UL — SIGNIFICANT CHANGE UP (ref 3.8–10.5)
WBC # FLD AUTO: 7.89 K/UL — SIGNIFICANT CHANGE UP (ref 3.8–10.5)

## 2024-03-12 PROCEDURE — 99232 SBSQ HOSP IP/OBS MODERATE 35: CPT

## 2024-03-12 PROCEDURE — 99231 SBSQ HOSP IP/OBS SF/LOW 25: CPT | Mod: GC

## 2024-03-12 RX ORDER — POTASSIUM CHLORIDE 20 MEQ
40 PACKET (EA) ORAL EVERY 4 HOURS
Refills: 0 | Status: DISCONTINUED | OUTPATIENT
Start: 2024-03-12 | End: 2024-03-12

## 2024-03-12 RX ORDER — POTASSIUM CHLORIDE 20 MEQ
40 PACKET (EA) ORAL ONCE
Refills: 0 | Status: DISCONTINUED | OUTPATIENT
Start: 2024-03-12 | End: 2024-03-12

## 2024-03-12 RX ORDER — APIXABAN 2.5 MG/1
5 TABLET, FILM COATED ORAL
Refills: 0 | Status: DISCONTINUED | OUTPATIENT
Start: 2024-03-12 | End: 2024-03-15

## 2024-03-12 RX ORDER — POTASSIUM CHLORIDE 20 MEQ
40 PACKET (EA) ORAL ONCE
Refills: 0 | Status: COMPLETED | OUTPATIENT
Start: 2024-03-12 | End: 2024-03-12

## 2024-03-12 RX ADMIN — POLYETHYLENE GLYCOL 3350 17 GRAM(S): 17 POWDER, FOR SOLUTION ORAL at 18:27

## 2024-03-12 RX ADMIN — ATORVASTATIN CALCIUM 10 MILLIGRAM(S): 80 TABLET, FILM COATED ORAL at 20:48

## 2024-03-12 RX ADMIN — Medication 25 MICROGRAM(S): at 05:52

## 2024-03-12 RX ADMIN — MEMANTINE HYDROCHLORIDE 5 MILLIGRAM(S): 10 TABLET ORAL at 05:53

## 2024-03-12 RX ADMIN — Medication 1000 UNIT(S): at 11:29

## 2024-03-12 RX ADMIN — Medication 40 MILLIEQUIVALENT(S): at 18:22

## 2024-03-12 RX ADMIN — POLYETHYLENE GLYCOL 3350 17 GRAM(S): 17 POWDER, FOR SOLUTION ORAL at 05:52

## 2024-03-12 RX ADMIN — QUETIAPINE FUMARATE 50 MILLIGRAM(S): 200 TABLET, FILM COATED ORAL at 20:47

## 2024-03-12 RX ADMIN — Medication 40 MILLIEQUIVALENT(S): at 22:37

## 2024-03-12 RX ADMIN — SIMETHICONE 80 MILLIGRAM(S): 80 TABLET, CHEWABLE ORAL at 11:28

## 2024-03-12 RX ADMIN — MEMANTINE HYDROCHLORIDE 5 MILLIGRAM(S): 10 TABLET ORAL at 18:23

## 2024-03-12 NOTE — PROGRESS NOTE ADULT - ASSESSMENT
89F h/o dementia, Afib on warfarin, Hyperlipidemia, hypothyroidism, HTN, spinal stenosis, has pacemaker, presented to [facility] from home for diarrhea    #Ileus   - Surgery consulted due to abdominal distension 3/4/24- rectal exam yielded copious amounts of liquid stool and gas  -PPM interrogated   -stool culture/GI PCR negative  -c/w Senna daily, Miralax BID, enemas PRN  -turn frequently in bed   -abd xray 3/8/24: Redundant air distended sigmoid colon, maximal luminal diameter measuring 10.5 cm with a proximal air-filled colon.  -monitor without rectal tube with no improvement  -repeat abdominal xray in the AM: non obstructive bowel gas pattern with improvement in bowel dilation.  -palliative consulted  -Colorectal reccs appreciated, no surgical intervention  -GI plan for decompression colonoscopy tomorrow    #Diarrhea, Acute- unclear etiology    - Labs and vitals are stable   - CT abd/pelvis: Fluid in the rectosigmoid colon again noted with increase in degree of air distention of the sigmoid colon since 10/11/2023    #Hypernatremia - resolved  -Na 143 today    #Hypokalemia- resolved  - K 4.0 today  - continue to monitor and replete as needed    #CKD  - Cr near baseline    #Pressure injury, heel, sacrum  - decubitus prevention protocol    #Afib on warfarin  - at home takes warfarin 1.25mg Monday, Thursday, takes 2.5mg other days  - INR 3.07 on admission,  - INR TODAY-1.9   -will transition to Apixaban 2.5 mg po bid   -Hold apixaban tonight prior to colonoscopy    #Dementia  - c/w memantine 5mg,  quetiapine 50mg    #HTN  - c/w  home enalapril  and Lasix     #HLD  - atorvastatin 10mg    #Hypothyroidism  - levothyroxine 25mcg    #h/o gout  - c/w  allopurinol 100mg    #VTE ppx  - coumadin daily dosing  -transitioned to warfarin, daily INR    #GOC  - Full code    Dispo: pt has full time aide, home PT- once medically optimized    **updated daughter Lisa 89F h/o dementia, Afib on warfarin, Hyperlipidemia, hypothyroidism, HTN, spinal stenosis, has pacemaker, presented to [facility] from home for diarrhea    #Ileus   -Surgery consulted due to abdominal distension 3/4/24- rectal exam yielded copious amounts of liquid stool and gas  -PPM interrogated   -stool culture/GI PCR negative  -c/w Senna daily, Miralax BID, enemas PRN  -turn frequently in bed   -abd xray 3/8/24: Redundant air distended sigmoid colon, maximal luminal diameter measuring 10.5 cm with a proximal air-filled colon.  -monitor without rectal tube with no improvement  -repeat abdominal xray in the AM: non obstructive bowel gas pattern with improvement in bowel dilation.  -palliative consulted  -Colorectal reccs appreciated, no surgical intervention  -Today decompression colonoscopy, daughter updated by GI team    #Diarrhea, Acute- unclear etiology    -Labs and vitals are stable   -CT abd/pelvis: Fluid in the rectosigmoid colon again noted with increase in degree of air distention of the sigmoid colon since 10/11/2023  -S/P rectal tube with improvement and recurrent when removed    #Hypernatremia - resolved  -Na 143 today    #Hypokalemia  -K 3.3 today  -Start KCl supplementation  -F/up K+ levels    #CKD  - Cr near baseline, improving     #Pressure injury, heel, sacrum  -decubitus prevention protocol    #Afib on warfarin  -at home takes warfarin 1.25mg Monday, Thursday, takes 2.5mg other days  - INR 3.07 on admission,  - INR TODAY-1.9   -will transition to Apixaban 2.5 mg po bid   -Resume apixaban post colonoscopy    #Dementia  - c/w memantine 5mg,  quetiapine 50mg    #HTN  - c/w  home enalapril  and Lasix     #HLD  - atorvastatin 10mg    #Hypothyroidism  - levothyroxine 25mcg    #h/o gout  - c/w  allopurinol 100mg    #VTE ppx  - coumadin daily dosing  -transitioned to warfarin, daily INR    #GOC  - Full code    Dispo: pt has full time aide, home PT- once medically optimized    daughter Lisa updated by GI team prior to procedure 89F h/o dementia, Afib on warfarin, Hyperlipidemia, hypothyroidism, HTN, spinal stenosis, has pacemaker, presented to [facility] from home for diarrhea    #Ileus   -Surgery consulted due to abdominal distension 3/4/24- rectal exam yielded copious amounts of liquid stool and gas  -PPM interrogated   -stool culture/GI PCR negative  -c/w Senna daily, Miralax BID, enemas PRN  -turn frequently in bed   -abd xray 3/8/24: Redundant air distended sigmoid colon, maximal luminal diameter measuring 10.5 cm with a proximal air-filled colon.  -monitor without rectal tube with no improvement  -repeat abdominal xray in the AM: non obstructive bowel gas pattern with improvement in bowel dilation.  -palliative consulted  -Colorectal reccs appreciated, no surgical intervention  -Today decompression colonoscopy, daughter updated by GI team    #Diarrhea, Acute- unclear etiology    -Labs and vitals are stable   -CT abd/pelvis: Fluid in the rectosigmoid colon again noted with increase in degree of air distention of the sigmoid colon since 10/11/2023  -S/P rectal tube with improvement and recurrent when removed    #Hypernatremia - resolved  -Na 143 today    #Hypokalemia  -K 3.3 today  -Start KCl supplementation  -F/up K+ levels    #CKD  - Cr near baseline, improving     #Pressure injury, heel, sacrum  -decubitus prevention protocol    #Afib on warfarin  -at home takes warfarin 1.25mg Monday, Thursday, takes 2.5mg other days  - INR 3.07 on admission,  - INR TODAY-1.9   -will transition to Apixaban 2.5 mg po bid   -Resume apixaban post colonoscopy    #Dementia  - c/w memantine 5mg,  quetiapine 50mg    #HTN  - c/w  home enalapril  and Lasix     #HLD  - atorvastatin 10mg    #Hypothyroidism  - levothyroxine 25mcg    #h/o gout  - c/w  allopurinol 100mg    #VTE ppx  - coumadin daily dosing  -transitioned to warfarin, daily INR    #GOC  -Palliative consult reccs appreciated, Home hospice discussed  -Now DNR/DNI  -Form completed this admission as documented by palliative team  -Daughter has full time aide for patient and wishes for home PT- once medically optimized-          daughter Lisa updated by GI team prior to procedure

## 2024-03-12 NOTE — PROGRESS NOTE ADULT - NUTRITIONAL ASSESSMENT
This patient has been assessed with a concern for Malnutrition and has been determined to have a diagnosis/diagnoses of Severe protein-calorie malnutrition.    This patient is being managed with:   Diet NPO after Midnight-     NPO Start Date: 11-Mar-2024   NPO Start Time: 23:59  Entered: Mar 11 2024  3:32PM    Diet Clear Liquid-  Entered: Mar 11 2024  2:53PM

## 2024-03-12 NOTE — PROGRESS NOTE ADULT - ATTENDING COMMENTS
no acute events.    T(C): 36.7 (03-12-24 @ 12:29), Max: 36.9 (03-12-24 @ 05:01)  T(F): 98.1 (03-12-24 @ 12:29), Max: 98.5 (03-12-24 @ 10:50)  HR: 70 (03-12-24 @ 12:29) (64 - 74)  BP: 116/70 (03-12-24 @ 12:29) (116/70 - 160/79)  ABP: --  ABP(mean): --  RR: 18 (03-12-24 @ 12:29) (16 - 18)  SpO2: 95% (03-12-24 @ 12:29) (94% - 95%)        on exam awake, does not follow commands. obese  both lungs clear  s1, s2, regular,   abdomen- distended, no tenderness to palpation, no rebound, no guarding  b/l calves with no tenderness.    labs reviewed                          8.8    7.89  )-----------( 232      ( 12 Mar 2024 05:35 )             28.9   03-12    147<H>  |  123<H>  |  21  ----------------------------<  103<H>  3.3<L>   |  29  |  1.36<H>    Ca    9.6      12 Mar 2024 05:35  Phos  2.5     03-12  Mg     2.3     03-12    xray reviewed by me, continues to show distension, no free air  GI PCR- negative    a/p:  # chronic ileus, no sign/ symptom of obstruction  # Hypernatremia- improved  # anemia- stable  # ckd stage 3  # Underlying history of afib on coumadin, ppm, hypothyroidism. dementia, HLD  - for decompressive colnoscopy today, no colorectal surgery intervention recommended. stool studies negative. Hold apixaban. continue other home meds. monitor renal function, avoid nephrotoxic agents.    - rest as per resident not.     - Dispo: Medically active, possible d/c in 24-48 hours    I spent a total of 35 minutes on the date of this encounter coordinating the patient's care. This includes reviewing results/imaging and discussions with specialists, nursing, case management/social work. Further tests, medications, and procedures have been ordered as indicated. Results and the plan of care were communicated to the patient and/or their family member. Supporting documentation was completed and added to the patient's chart.

## 2024-03-12 NOTE — PROGRESS NOTE ADULT - ASSESSMENT
A/P   89F h/o dementia, Afib on warfarin, Hyperlipidemia, hypothyroidism, HTN, spinal stenosis, has pacemaker, presented to  ED from home for diarrhea      Assessment/Plans:       Ileus   - Surgery consulted due to abdominal distension 3/4/24    - rectal exam yielded copious amounts of liquid stool and gas   -stool culture/GI PCR negative  -c/w Senna daily, Miralax BID, enemas PRN  -turn frequently in bed   - colonoscopy today     -abd xray 3/8/24: Redundant air distended sigmoid colon, maximal luminal diameter measuring 10.5 cm with a proximal air-filled colon   -Colorectal recs appreciated, no surgical intervention   -GI plan for decompression colonoscopy today         Diarrhea, Acute- unclear etiology    - Labs and vitals are stable   - CT abd/pelvis: Fluid in the rectosigmoid colon again noted with increase in degree of air distention of the sigmoid colon since 10/11/2023      Pressure injury, heel, sacrum  - decubitus prevention protocol    Dementia  - c/w memantine 5mg,  quetiapine 50mg      Palliative :   as a f/u today , chart reviewed, have been following peripherally .  F/b GI for ileus, Pt has been mostly tired, wakes w/ stim., needs assist to eat meals.    Noted consultants recs:  colo rectal surgery - no surgical intervention   GI- pt will  undergo decompression colonoscopy today.  I called and spoke w/ pts daughter Lisa as f/u.  She is updated , aware of present poc for her mom .   She is planing to go day to day , ultimately she wishes to bring pt back home w/ home care .  We did again discuss option of adding home hospice services when ready in the future, daughter said she was thankful for the information, but will wait to see what if anything colon shows.  She has my contact info for any questions or help needed.        We did complete Molst this admission for Dnr/Dni

## 2024-03-12 NOTE — PROGRESS NOTE ADULT - SUBJECTIVE AND OBJECTIVE BOX
Progress: pt mostly sleeping, will wake w. stim       Review of Systems: [neg    MEDICATIONS  (STANDING):  allopurinol 100 milliGRAM(s) Oral daily  atorvastatin 10 milliGRAM(s) Oral at bedtime  cholecalciferol 1000 Unit(s) Oral daily  dextrose 5%. 1000 milliLiter(s) (75 mL/Hr) IV Continuous <Continuous>  levothyroxine 25 MICROGram(s) Oral daily  memantine 5 milliGRAM(s) Oral two times a day  polyethylene glycol 3350 17 Gram(s) Oral every 12 hours  QUEtiapine 50 milliGRAM(s) Oral at bedtime  senna 1 Tablet(s) Oral daily  simethicone 80 milliGRAM(s) Chew daily    MEDICATIONS  (PRN):  acetaminophen     Tablet .. 650 milliGRAM(s) Oral every 6 hours PRN Temp greater or equal to 38C (100.4F), Mild Pain (1 - 3)  aluminum hydroxide/magnesium hydroxide/simethicone Suspension 30 milliLiter(s) Oral every 4 hours PRN Dyspepsia  melatonin 3 milliGRAM(s) Oral at bedtime PRN Insomnia  ondansetron Injectable 4 milliGRAM(s) IV Push every 8 hours PRN Nausea and/or Vomiting      PHYSICAL EXAM:  Vital Signs Last 24 Hrs  T(C): 36.9 (12 Mar 2024 05:01), Max: 37.1 (11 Mar 2024 20:05)  T(F): 98.4 (12 Mar 2024 05:01), Max: 98.7 (11 Mar 2024 20:05)  HR: 64 (12 Mar 2024 05:01) (64 - 68)  BP: 145/84 (12 Mar 2024 05:01) (139/72 - 148/78)  BP(mean): --  RR: 16 (12 Mar 2024 05:01) (16 - 17)  SpO2: 94% (12 Mar 2024 05:01) (92% - 94%)    Parameters below as of 12 Mar 2024 05:01  Patient On (Oxygen Delivery Method): room air      General: alert  oriented x 1-2     HEENT: normal  , lips dry   Lungs: cl dim bases , non labored    CV: normal rate    GI: normal    : normal    Musculoskeletal: normal   Skin: normal    Neuro: no deficits   Oral intake ability:  oral feeding  Diet: NPO,     LABS:                          8.8    7.89  )-----------( 232      ( 12 Mar 2024 05:35 )             28.9     03-12    147<H>  |  123<H>  |  21  ----------------------------<  103<H>  3.3<L>   |  29  |  1.36<H>    Ca    9.6      12 Mar 2024 05:35  Phos  2.5     03-12  Mg     2.3     03-12      Urinalysis Basic - ( 12 Mar 2024 05:35 )    Color: x / Appearance: x / SG: x / pH: x  Gluc: 103 mg/dL / Ketone: x  / Bili: x / Urobili: x   Blood: x / Protein: x / Nitrite: x   Leuk Esterase: x / RBC: x / WBC x   Sq Epi: x / Non Sq Epi: x / Bacteria: x        RADIOLOGY & ADDITIONAL STUDIES:    ADVANCE DIRECTIVES:  Advanced Care Planning discussion total time spent:     Progress: pt mostly sleeping, will wake w. stim       Review of Systems: [neg    MEDICATIONS  (STANDING):  allopurinol 100 milliGRAM(s) Oral daily  atorvastatin 10 milliGRAM(s) Oral at bedtime  cholecalciferol 1000 Unit(s) Oral daily  dextrose 5%. 1000 milliLiter(s) (75 mL/Hr) IV Continuous <Continuous>  levothyroxine 25 MICROGram(s) Oral daily  memantine 5 milliGRAM(s) Oral two times a day  polyethylene glycol 3350 17 Gram(s) Oral every 12 hours  QUEtiapine 50 milliGRAM(s) Oral at bedtime  senna 1 Tablet(s) Oral daily  simethicone 80 milliGRAM(s) Chew daily    MEDICATIONS  (PRN):  acetaminophen     Tablet .. 650 milliGRAM(s) Oral every 6 hours PRN Temp greater or equal to 38C (100.4F), Mild Pain (1 - 3)  aluminum hydroxide/magnesium hydroxide/simethicone Suspension 30 milliLiter(s) Oral every 4 hours PRN Dyspepsia  melatonin 3 milliGRAM(s) Oral at bedtime PRN Insomnia  ondansetron Injectable 4 milliGRAM(s) IV Push every 8 hours PRN Nausea and/or Vomiting      PHYSICAL EXAM:  Vital Signs Last 24 Hrs  T(C): 36.9 (12 Mar 2024 05:01), Max: 37.1 (11 Mar 2024 20:05)  T(F): 98.4 (12 Mar 2024 05:01), Max: 98.7 (11 Mar 2024 20:05)  HR: 64 (12 Mar 2024 05:01) (64 - 68)  BP: 145/84 (12 Mar 2024 05:01) (139/72 - 148/78)  BP(mean): --  RR: 16 (12 Mar 2024 05:01) (16 - 17)  SpO2: 94% (12 Mar 2024 05:01) (92% - 94%)    Parameters below as of 12 Mar 2024 05:01  Patient On (Oxygen Delivery Method): room air      General: alert  oriented x 1-2     HEENT: normal  , lips dry   Lungs: cl dim bases , non labored    CV: normal rate    GI: abd lrg., soft     : normal , incontinent    Musculoskeletal: normal , weakness   Skin: w/d     Neuro: wakes, alert, oriented 2-3   Oral intake ability:  oral feeding w/ set up and assist   Diet: NPO presently for colonoscopy      LABS:                          8.8    7.89  )-----------( 232      ( 12 Mar 2024 05:35 )             28.9     03-12    147<H>  |  123<H>  |  21  ----------------------------<  103<H>  3.3<L>   |  29  |  1.36<H>    Ca    9.6      12 Mar 2024 05:35  Phos  2.5     03-12  Mg     2.3     03-12      Urinalysis Basic - ( 12 Mar 2024 05:35 )    Color: x / Appearance: x / SG: x / pH: x  Gluc: 103 mg/dL / Ketone: x  / Bili: x / Urobili: x   Blood: x / Protein: x / Nitrite: x   Leuk Esterase: x / RBC: x / WBC x   Sq Epi: x / Non Sq Epi: x / Bacteria: x        RADIOLOGY & ADDITIONAL STUDIES: < from: Xray Abdomen 2 Views (03.10.24 @ 11:56) >  ACC: [id number] EXAM:  XR ABDOMEN 2V   ORDERED BY:  KURT KEVIN     PROCEDURE DATE:  03/10/2024          INTERPRETATION:  Clinical history: 89-year-old female, air distended   sigmoid colon    Supine/upright views of the abdomen are compared to 3/8/2024.    FINDINGS: Nonobstructive bowel gas pattern with improvement in bowel   dilatation.    No pneumoperitoneum.    No acute osseous findings, moderate levoscoliosis/degenerative change in   the lumbar spine.    IMPRESSION: Nonobstructive bowel gas pattern with improvement in bowel   dilatation,          ADVANCE DIRECTIVES:  molst Dnr/Dni   Advanced Care Planning discussion total time spent:

## 2024-03-12 NOTE — PROGRESS NOTE ADULT - PROBLEM SELECTOR PLAN 1
No leukocytosis, afebrile.  Abd X-ray showed distal air-filled colon. Small bowel pattern nonspecific. Abd softly more distended on exam today. Stool studies negative.  Rectal tube out since 3/9/2024.    NPO  turn and position while in bed  continue bowel regimen, Miralax, senna, fleet   colorectal consult appreciated  Prep completed   Colonoscopy today for decompression, discussed with daughter yesterday via phone, she is aware this is not a long term solution

## 2024-03-12 NOTE — PROGRESS NOTE ADULT - SUBJECTIVE AND OBJECTIVE BOX
Patient is a 89y old  Female who presents with a chief complaint of diarrhea (09 Mar 2024 10:18)      INTERVAL HPI/OVERNIGHT EVENTS: Patient seen and examined at bedside. No overnight events.      REVIEW OF SYSTEMS:  CONSTITUTIONAL: No fever or chills  HEENT:  No headache, no sore throat  RESPIRATORY: No cough, wheezing, or shortness of breath  CARDIOVASCULAR: No chest pain, palpitations  GASTROINTESTINAL: distension, No abd pain, nausea, vomiting, or diarrhea  GENITOURINARY: No dysuria, frequency, or hematuria  NEUROLOGICAL: no focal weakness or dizziness  MUSCULOSKELETAL: no myalgias       Vital Signs Last 24 Hrs  T(C): 36.9 (12 Mar 2024 05:01), Max: 37.1 (11 Mar 2024 20:05)  T(F): 98.4 (12 Mar 2024 05:01), Max: 98.7 (11 Mar 2024 20:05)  HR: 64 (12 Mar 2024 05:01) (64 - 68)  BP: 145/84 (12 Mar 2024 05:01) (139/72 - 148/78)  BP(mean): --  RR: 16 (12 Mar 2024 05:01) (16 - 17)  SpO2: 94% (12 Mar 2024 05:01) (92% - 94%)    Parameters below as of 12 Mar 2024 05:01  Patient On (Oxygen Delivery Method): room air        PHYSICAL EXAM:  CONSTITUTIONAL: NAD  EYES: EOMI, No conjunctival or scleral injection, non-icteric  ENMT: slightly dry mucous membranes  RESP: CTA b/l, normal respiratory effort  CV: RRR, systolic murmur LSB, no MRG; no peripheral edema  GI: abd distension improved, Soft, NT, no rebound, no guarding; no palpable masses  MSK: No digital clubbing or cyanosis  SKIN: intertrigo of groin, heel stage 1 pressure injury, stage 2 buttock  NEURO: grossly moves all 4 extremities  PSYCH: A+O x 1, baseline dementia, poor insight, pleasant affect      LABS: Personally reviewed  CBC                                             8.8    7.89  )-----------( 232      ( 12 Mar 2024 05:35 )             28.9     PT/INR - ( 12 Mar 2024 05:35 )   PT: 22.5 sec;   INR: 1.96 ratio    PTT - ( 12 Mar 2024 05:35 )  PTT:38.3 sec      MEDICATIONS  (STANDING):  allopurinol 100 milliGRAM(s) Oral daily  atorvastatin 10 milliGRAM(s) Oral at bedtime  cholecalciferol 1000 Unit(s) Oral daily  dextrose 5%. 1000 milliLiter(s) (75 mL/Hr) IV Continuous <Continuous>  levothyroxine 25 MICROGram(s) Oral daily  memantine 5 milliGRAM(s) Oral two times a day  polyethylene glycol 3350 17 Gram(s) Oral every 12 hours  QUEtiapine 50 milliGRAM(s) Oral at bedtime  senna 1 Tablet(s) Oral daily  simethicone 80 milliGRAM(s) Chew daily    MEDICATIONS  (PRN):  acetaminophen     Tablet .. 650 milliGRAM(s) Oral every 6 hours PRN Temp greater or equal to 38C (100.4F), Mild Pain (1 - 3)  aluminum hydroxide/magnesium hydroxide/simethicone Suspension 30 milliLiter(s) Oral every 4 hours PRN Dyspepsia  melatonin 3 milliGRAM(s) Oral at bedtime PRN Insomnia  ondansetron Injectable 4 milliGRAM(s) IV Push every 8 hours PRN Nausea and/or Vomiting

## 2024-03-12 NOTE — PROGRESS NOTE ADULT - SUBJECTIVE AND OBJECTIVE BOX
Colonoscopy done, please see report    Colon was grossly normal to the mid transverse colon.  Some liquid stool throughout.    Appeared that the sigmoid colon was redundant.    Due to looping of the scope was not able to advance past the transverse colon.    During the procedure I aspirated copious amount of colonic air.    Her abdomen was soft at the end.

## 2024-03-12 NOTE — PROGRESS NOTE ADULT - SUBJECTIVE AND OBJECTIVE BOX
INTERVAL HPI/OVERNIGHT EVENTS:  HPI:    88 y/o female seen and examined. Per RN patient completed prep, still has watery brown stool.       MEDICATIONS  (STANDING):  allopurinol 100 milliGRAM(s) Oral daily  atorvastatin 10 milliGRAM(s) Oral at bedtime  cholecalciferol 1000 Unit(s) Oral daily  dextrose 5%. 1000 milliLiter(s) (75 mL/Hr) IV Continuous <Continuous>  levothyroxine 25 MICROGram(s) Oral daily  memantine 5 milliGRAM(s) Oral two times a day  polyethylene glycol 3350 17 Gram(s) Oral every 12 hours  QUEtiapine 50 milliGRAM(s) Oral at bedtime  senna 1 Tablet(s) Oral daily  simethicone 80 milliGRAM(s) Chew daily    MEDICATIONS  (PRN):  acetaminophen     Tablet .. 650 milliGRAM(s) Oral every 6 hours PRN Temp greater or equal to 38C (100.4F), Mild Pain (1 - 3)  aluminum hydroxide/magnesium hydroxide/simethicone Suspension 30 milliLiter(s) Oral every 4 hours PRN Dyspepsia  melatonin 3 milliGRAM(s) Oral at bedtime PRN Insomnia  ondansetron Injectable 4 milliGRAM(s) IV Push every 8 hours PRN Nausea and/or Vomiting      Allergies    No Known Allergies    Intolerances        PAST MEDICAL & SURGICAL HISTORY:  Atrial fibrillation, unspecified type      Benign hypertension      Hypothyroid      HLD (hyperlipidemia)      Dementia      S/P knee replacement      H/O mastectomy      PHYSICAL EXAM:   Vital Signs:  Vital Signs Last 24 Hrs  T(C): 36.9 (12 Mar 2024 05:01), Max: 37.1 (11 Mar 2024 20:05)  T(F): 98.4 (12 Mar 2024 05:01), Max: 98.7 (11 Mar 2024 20:05)  HR: 64 (12 Mar 2024 05:01) (64 - 68)  BP: 145/84 (12 Mar 2024 05:01) (139/72 - 148/78)  BP(mean): --  RR: 16 (12 Mar 2024 05:01) (16 - 17)  SpO2: 94% (12 Mar 2024 05:01) (92% - 94%)    Parameters below as of 12 Mar 2024 05:01  Patient On (Oxygen Delivery Method): room air      Daily     Daily I&O's Summary    11 Mar 2024 07:01  -  12 Mar 2024 07:00  --------------------------------------------------------  IN: 0 mL / OUT: 200 mL / NET: -200 mL        GENERAL:  Appears stated age,   HEENT:  NC/AT,  conjunctivae clear and pink,  CHEST:  Full & symmetric excursion, no increased effort, breath sounds clear  HEART:  Regular rhythm, S1, S2, no murmur  ABDOMEN:  Soft, non-tender, non-distended, normoactive bowel sounds,    EXTEREMITIES:  no edema  SKIN:  No rash/warm/dry  NEURO:  Alert, Confused      LABS:                        8.8    7.89  )-----------( 232      ( 12 Mar 2024 05:35 )             28.9     03-12    147<H>  |  123<H>  |  21  ----------------------------<  103<H>  3.3<L>   |  29  |  1.36<H>    Ca    9.6      12 Mar 2024 05:35  Phos  2.5     03-12  Mg     2.3     03-12      PT/INR - ( 12 Mar 2024 05:35 )   PT: 22.5 sec;   INR: 1.96 ratio         PTT - ( 12 Mar 2024 05:35 )  PTT:38.3 sec  Urinalysis Basic - ( 12 Mar 2024 05:35 )    Color: x / Appearance: x / SG: x / pH: x  Gluc: 103 mg/dL / Ketone: x  / Bili: x / Urobili: x   Blood: x / Protein: x / Nitrite: x   Leuk Esterase: x / RBC: x / WBC x   Sq Epi: x / Non Sq Epi: x / Bacteria: x

## 2024-03-13 LAB
ALBUMIN SERPL ELPH-MCNC: 2.6 G/DL — LOW (ref 3.3–5)
ALP SERPL-CCNC: 145 U/L — HIGH (ref 40–120)
ALT FLD-CCNC: 8 U/L — LOW (ref 10–45)
ANION GAP SERPL CALC-SCNC: 8 MMOL/L — SIGNIFICANT CHANGE UP (ref 5–17)
AST SERPL-CCNC: 14 U/L — SIGNIFICANT CHANGE UP (ref 10–40)
BILIRUB SERPL-MCNC: 0.6 MG/DL — SIGNIFICANT CHANGE UP (ref 0.2–1.2)
BUN SERPL-MCNC: 16 MG/DL — SIGNIFICANT CHANGE UP (ref 7–23)
CALCIUM SERPL-MCNC: 9.4 MG/DL — SIGNIFICANT CHANGE UP (ref 8.4–10.5)
CHLORIDE SERPL-SCNC: 113 MMOL/L — HIGH (ref 96–108)
CO2 SERPL-SCNC: 25 MMOL/L — SIGNIFICANT CHANGE UP (ref 22–31)
CREAT SERPL-MCNC: 1.36 MG/DL — HIGH (ref 0.5–1.3)
EGFR: 37 ML/MIN/1.73M2 — LOW
GLUCOSE SERPL-MCNC: 81 MG/DL — SIGNIFICANT CHANGE UP (ref 70–99)
HCT VFR BLD CALC: 26.6 % — LOW (ref 34.5–45)
HGB BLD-MCNC: 8.3 G/DL — LOW (ref 11.5–15.5)
MAGNESIUM SERPL-MCNC: 2.2 MG/DL — SIGNIFICANT CHANGE UP (ref 1.6–2.6)
MCHC RBC-ENTMCNC: 30 PG — SIGNIFICANT CHANGE UP (ref 27–34)
MCHC RBC-ENTMCNC: 31.2 GM/DL — LOW (ref 32–36)
MCV RBC AUTO: 96 FL — SIGNIFICANT CHANGE UP (ref 80–100)
NRBC # BLD: 0 /100 WBCS — SIGNIFICANT CHANGE UP (ref 0–0)
PHOSPHATE SERPL-MCNC: 2.2 MG/DL — LOW (ref 2.5–4.5)
PLATELET # BLD AUTO: 222 K/UL — SIGNIFICANT CHANGE UP (ref 150–400)
POTASSIUM SERPL-MCNC: 4 MMOL/L — SIGNIFICANT CHANGE UP (ref 3.5–5.3)
POTASSIUM SERPL-SCNC: 4 MMOL/L — SIGNIFICANT CHANGE UP (ref 3.5–5.3)
PROT SERPL-MCNC: 6.6 G/DL — SIGNIFICANT CHANGE UP (ref 6–8.3)
RBC # BLD: 2.77 M/UL — LOW (ref 3.8–5.2)
RBC # FLD: 15.9 % — HIGH (ref 10.3–14.5)
SODIUM SERPL-SCNC: 146 MMOL/L — HIGH (ref 135–145)
WBC # BLD: 6.74 K/UL — SIGNIFICANT CHANGE UP (ref 3.8–10.5)
WBC # FLD AUTO: 6.74 K/UL — SIGNIFICANT CHANGE UP (ref 3.8–10.5)

## 2024-03-13 PROCEDURE — 99233 SBSQ HOSP IP/OBS HIGH 50: CPT | Mod: FS

## 2024-03-13 PROCEDURE — 99231 SBSQ HOSP IP/OBS SF/LOW 25: CPT | Mod: GC

## 2024-03-13 RX ORDER — SODIUM CHLORIDE 9 MG/ML
1000 INJECTION, SOLUTION INTRAVENOUS
Refills: 0 | Status: DISCONTINUED | OUTPATIENT
Start: 2024-03-13 | End: 2024-03-15

## 2024-03-13 RX ADMIN — QUETIAPINE FUMARATE 50 MILLIGRAM(S): 200 TABLET, FILM COATED ORAL at 21:29

## 2024-03-13 RX ADMIN — SENNA PLUS 1 TABLET(S): 8.6 TABLET ORAL at 11:47

## 2024-03-13 RX ADMIN — APIXABAN 5 MILLIGRAM(S): 2.5 TABLET, FILM COATED ORAL at 05:10

## 2024-03-13 RX ADMIN — SIMETHICONE 80 MILLIGRAM(S): 80 TABLET, CHEWABLE ORAL at 11:47

## 2024-03-13 RX ADMIN — Medication 25 MICROGRAM(S): at 05:10

## 2024-03-13 RX ADMIN — Medication 100 MILLIGRAM(S): at 11:47

## 2024-03-13 RX ADMIN — MEMANTINE HYDROCHLORIDE 5 MILLIGRAM(S): 10 TABLET ORAL at 17:38

## 2024-03-13 RX ADMIN — Medication 1000 UNIT(S): at 11:47

## 2024-03-13 RX ADMIN — MEMANTINE HYDROCHLORIDE 5 MILLIGRAM(S): 10 TABLET ORAL at 05:10

## 2024-03-13 RX ADMIN — APIXABAN 5 MILLIGRAM(S): 2.5 TABLET, FILM COATED ORAL at 17:38

## 2024-03-13 RX ADMIN — SODIUM CHLORIDE 75 MILLILITER(S): 9 INJECTION, SOLUTION INTRAVENOUS at 12:04

## 2024-03-13 RX ADMIN — ATORVASTATIN CALCIUM 10 MILLIGRAM(S): 80 TABLET, FILM COATED ORAL at 21:29

## 2024-03-13 NOTE — PROGRESS NOTE ADULT - ATTENDING COMMENTS
s/p decompressive colnoscopy yesterday. No acute evnts overnight    T(C): 36.2 (03-13-24 @ 05:08), Max: 36.7 (03-12-24 @ 12:29)  T(F): 97.2 (03-13-24 @ 05:08), Max: 98.1 (03-12-24 @ 12:29)  HR: 71 (03-13-24 @ 05:08) (66 - 71)  BP: 161/84 (03-13-24 @ 05:08) (116/70 - 165/79)  ABP: --  ABP(mean): --  RR: 18 (03-13-24 @ 05:08) (18 - 18)  SpO2: 95% (03-13-24 @ 05:08) (93% - 95%)    on exam sleeping, no apparant distress.  both lungs clear  s1, s2, regular,   abdomen- soft, no tenderness to palpation, no rebound, no guarding  b/l calves with no tenderness.    labs reviewed                               8.3    6.74  )-----------( 222      ( 13 Mar 2024 05:10 )             26.6           03-13    146<H>  |  113<H>  |  16  ----------------------------<  81  4.0   |  25  |  1.36<H>    Ca    9.4      13 Mar 2024 05:10  Phos  2.2     03-13  Mg     2.2     03-13    TPro  6.6  /  Alb  2.6<L>  /  TBili  0.6  /  DBili  x   /  AST  14  /  ALT  8<L>  /  AlkPhos  145<H>  03-13      xray reviewed by me, continues to show distension, no free air  GI PCR- negative    a/p:  # chronic ileus, no sign/ symptom of obstruction  # Hypernatremia- improved  # anemia- stable  # ckd stage 3  # Underlying history of afib on coumadin, ppm, hypothyroidism. dementia, HLD  - s/p decompressive colnoscopy , no colorectal surgery intervention recommended. stool studies negative. start back on anticoagulation. continue other home meds. monitor renal function, avoid nephrotoxic agents.    - rest as per resident not.     - Dispo:  possible d/c in 24-48 hours    I spent a total of 35 minutes on the date of this encounter coordinating the patient's care. This includes reviewing results/imaging and discussions with specialists, nursing, case management/social work. Further tests, medications, and procedures have been ordered as indicated. Results and the plan of care were communicated to the patient and/or their family member. Supporting documentation was completed and added to the patient's chart. s/p decompressive colnoscopy yesterday. No acute evnts overnight    T(C): 36.2 (03-13-24 @ 05:08), Max: 36.7 (03-12-24 @ 12:29)  T(F): 97.2 (03-13-24 @ 05:08), Max: 98.1 (03-12-24 @ 12:29)  HR: 71 (03-13-24 @ 05:08) (66 - 71)  BP: 161/84 (03-13-24 @ 05:08) (116/70 - 165/79)  ABP: --  ABP(mean): --  RR: 18 (03-13-24 @ 05:08) (18 - 18)  SpO2: 95% (03-13-24 @ 05:08) (93% - 95%)    on exam sleeping, no apparent distress.  both lungs clear  s1, s2, regular,   abdomen- soft, no tenderness to palpation, no rebound, no guarding  b/l calves with no tenderness.    labs reviewed                               8.3    6.74  )-----------( 222      ( 13 Mar 2024 05:10 )             26.6           03-13    146<H>  |  113<H>  |  16  ----------------------------<  81  4.0   |  25  |  1.36<H>    Ca    9.4      13 Mar 2024 05:10  Phos  2.2     03-13  Mg     2.2     03-13    TPro  6.6  /  Alb  2.6<L>  /  TBili  0.6  /  DBili  x   /  AST  14  /  ALT  8<L>  /  AlkPhos  145<H>  03-13      xray reviewed by me, continues to show distension, no free air  GI PCR- negative    a/p:  # chronic ileus, no sign/ symptom of obstruction  # Hypernatremia- monitor, iv fluids as needed  # hypophosphatemia- replace.  # anemia- stable  # ckd stage 3  # Underlying history of afib on coumadin, ppm, hypothyroidism. dementia, HLD  - s/p decompressive colnoscopy , no colorectal surgery intervention recommended. stool studies negative. start back on anticoagulation. continue other home meds. monitor renal function, avoid nephrotoxic agents.    - rest as per resident not.     - Dispo:  possible d/c in 24-48 hours    I spent a total of 35 minutes on the date of this encounter coordinating the patient's care. This includes reviewing results/imaging and discussions with specialists, nursing, case management/social work. Further tests, medications, and procedures have been ordered as indicated. Results and the plan of care were communicated to the patient and/or their family member. Supporting documentation was completed and added to the patient's chart. s/p decompressive colnoscopy yesterday. No acute evnts overnight    T(C): 36.2 (03-13-24 @ 05:08), Max: 36.7 (03-12-24 @ 12:29)  T(F): 97.2 (03-13-24 @ 05:08), Max: 98.1 (03-12-24 @ 12:29)  HR: 71 (03-13-24 @ 05:08) (66 - 71)  BP: 161/84 (03-13-24 @ 05:08) (116/70 - 165/79)  ABP: --  ABP(mean): --  RR: 18 (03-13-24 @ 05:08) (18 - 18)  SpO2: 95% (03-13-24 @ 05:08) (93% - 95%)    on exam sleeping, no apparent distress.  both lungs clear  s1, s2, regular,   abdomen- soft, no tenderness to palpation, no rebound, no guarding  b/l calves with no tenderness.    labs reviewed                               8.3    6.74  )-----------( 222      ( 13 Mar 2024 05:10 )             26.6           03-13    146<H>  |  113<H>  |  16  ----------------------------<  81  4.0   |  25  |  1.36<H>    Ca    9.4      13 Mar 2024 05:10  Phos  2.2     03-13  Mg     2.2     03-13    TPro  6.6  /  Alb  2.6<L>  /  TBili  0.6  /  DBili  x   /  AST  14  /  ALT  8<L>  /  AlkPhos  145<H>  03-13      xray reviewed by me, continues to show distension, no free air  GI PCR- negative    a/p:  # chronic ileus, no sign/ symptom of obstruction  # Hypernatremia- monitor, iv fluids as needed  # hypophosphatemia- replace.  # anemia- stable  # ckd stage 3  # Underlying history of afib on coumadin, ppm, hypothyroidism. dementia, HLD  - s/p decompressive colnoscopy , no colorectal surgery intervention recommended. stool studies negative. on bowel refime. seen by palliative yesterday, daughter not decided about hospice. start back on anticoagulation. continue other home meds. monitor renal function, avoid nephrotoxic agents.    - rest as per resident not.     - Dispo:  possible d/c in 24-48 hours    I spent a total of 35 minutes on the date of this encounter coordinating the patient's care. This includes reviewing results/imaging and discussions with specialists, nursing, case management/social work. Further tests, medications, and procedures have been ordered as indicated. Results and the plan of care were communicated to the patient and/or their family member. Supporting documentation was completed and added to the patient's chart.

## 2024-03-13 NOTE — PROGRESS NOTE ADULT - SUBJECTIVE AND OBJECTIVE BOX
INTERVAL HPI/OVERNIGHT EVENTS:  HPI:    88 y/o female seen and examined, improvement with abdominal exam noted.       MEDICATIONS  (STANDING):  allopurinol 100 milliGRAM(s) Oral daily  apixaban 5 milliGRAM(s) Oral two times a day  atorvastatin 10 milliGRAM(s) Oral at bedtime  cholecalciferol 1000 Unit(s) Oral daily  dextrose 5%. 1000 milliLiter(s) (75 mL/Hr) IV Continuous <Continuous>  levothyroxine 25 MICROGram(s) Oral daily  memantine 5 milliGRAM(s) Oral two times a day  polyethylene glycol 3350 17 Gram(s) Oral every 12 hours  QUEtiapine 50 milliGRAM(s) Oral at bedtime  senna 1 Tablet(s) Oral daily  simethicone 80 milliGRAM(s) Chew daily    MEDICATIONS  (PRN):  acetaminophen     Tablet .. 650 milliGRAM(s) Oral every 6 hours PRN Temp greater or equal to 38C (100.4F), Mild Pain (1 - 3)  aluminum hydroxide/magnesium hydroxide/simethicone Suspension 30 milliLiter(s) Oral every 4 hours PRN Dyspepsia  melatonin 3 milliGRAM(s) Oral at bedtime PRN Insomnia  ondansetron Injectable 4 milliGRAM(s) IV Push every 8 hours PRN Nausea and/or Vomiting      Allergies    No Known Allergies    Intolerances        PAST MEDICAL & SURGICAL HISTORY:  Atrial fibrillation, unspecified type      Benign hypertension      Hypothyroid      HLD (hyperlipidemia)      Dementia      S/P knee replacement      H/O mastectomy        PHYSICAL EXAM:   Vital Signs:  Vital Signs Last 24 Hrs  T(C): 36.2 (13 Mar 2024 05:08), Max: 36.7 (12 Mar 2024 12:29)  T(F): 97.2 (13 Mar 2024 05:08), Max: 98.1 (12 Mar 2024 12:29)  HR: 71 (13 Mar 2024 05:08) (66 - 71)  BP: 161/84 (13 Mar 2024 05:08) (116/70 - 165/79)  BP(mean): --  RR: 18 (13 Mar 2024 05:08) (18 - 18)  SpO2: 95% (13 Mar 2024 05:08) (93% - 95%)    Parameters below as of 13 Mar 2024 05:08  Patient On (Oxygen Delivery Method): room air      Daily     Daily I&O's Summary    12 Mar 2024 07:01  -  13 Mar 2024 07:00  --------------------------------------------------------  IN: 0 mL / OUT: 400 mL / NET: -400 mL        GENERAL:  Appears stated age  HEENT:  NC/AT,  conjunctivae clear and pink  CHEST:  Full & symmetric excursion, no increased effort, breath sounds clear  HEART:  Regular rhythm, S1, S2, no murmur  ABDOMEN:  Soft, non-tender, non-distended, normoactive bowel sounds  EXTEREMITIES:  no edema  SKIN:  No rash/warm/dry  NEURO:  Alert, confused       LABS:                        8.3    6.74  )-----------( 222      ( 13 Mar 2024 05:10 )             26.6     03-13    146<H>  |  113<H>  |  16  ----------------------------<  81  4.0   |  25  |  1.36<H>    Ca    9.4      13 Mar 2024 05:10  Phos  2.2     03-13  Mg     2.2     03-13    TPro  6.6  /  Alb  2.6<L>  /  TBili  0.6  /  DBili  x   /  AST  14  /  ALT  8<L>  /  AlkPhos  145<H>  03-13    PT/INR - ( 12 Mar 2024 05:35 )   PT: 22.5 sec;   INR: 1.96 ratio         PTT - ( 12 Mar 2024 05:35 )  PTT:38.3 sec  Urinalysis Basic - ( 13 Mar 2024 05:10 )    Color: x / Appearance: x / SG: x / pH: x  Gluc: 81 mg/dL / Ketone: x  / Bili: x / Urobili: x   Blood: x / Protein: x / Nitrite: x   Leuk Esterase: x / RBC: x / WBC x   Sq Epi: x / Non Sq Epi: x / Bacteria: x      amylase   lipase  RADIOLOGY & ADDITIONAL TESTS:   GI Follow up    88 y/o female seen and examined. Improved abdominal exam noted. S/P decompressive colonoscopy performed yesterday.      MEDICATIONS  (STANDING):  allopurinol 100 milliGRAM(s) Oral daily  apixaban 5 milliGRAM(s) Oral two times a day  atorvastatin 10 milliGRAM(s) Oral at bedtime  cholecalciferol 1000 Unit(s) Oral daily  dextrose 5%. 1000 milliLiter(s) (75 mL/Hr) IV Continuous <Continuous>  levothyroxine 25 MICROGram(s) Oral daily  memantine 5 milliGRAM(s) Oral two times a day  polyethylene glycol 3350 17 Gram(s) Oral every 12 hours  QUEtiapine 50 milliGRAM(s) Oral at bedtime  senna 1 Tablet(s) Oral daily  simethicone 80 milliGRAM(s) Chew daily    MEDICATIONS  (PRN):  acetaminophen     Tablet .. 650 milliGRAM(s) Oral every 6 hours PRN Temp greater or equal to 38C (100.4F), Mild Pain (1 - 3)  aluminum hydroxide/magnesium hydroxide/simethicone Suspension 30 milliLiter(s) Oral every 4 hours PRN Dyspepsia  melatonin 3 milliGRAM(s) Oral at bedtime PRN Insomnia  ondansetron Injectable 4 milliGRAM(s) IV Push every 8 hours PRN Nausea and/or Vomiting      Allergies    No Known Allergies    Intolerances            PHYSICAL EXAM:   Vital Signs:  Vital Signs Last 24 Hrs  T(C): 36.2 (13 Mar 2024 05:08), Max: 36.7 (12 Mar 2024 12:29)  T(F): 97.2 (13 Mar 2024 05:08), Max: 98.1 (12 Mar 2024 12:29)  HR: 71 (13 Mar 2024 05:08) (66 - 71)  BP: 161/84 (13 Mar 2024 05:08) (116/70 - 165/79)  BP(mean): --  RR: 18 (13 Mar 2024 05:08) (18 - 18)  SpO2: 95% (13 Mar 2024 05:08) (93% - 95%)    Parameters below as of 13 Mar 2024 05:08  Patient On (Oxygen Delivery Method): room air      Daily     Daily I&O's Summary    12 Mar 2024 07:01  -  13 Mar 2024 07:00  --------------------------------------------------------  IN: 0 mL / OUT: 400 mL / NET: -400 mL        GENERAL:  elderly, NAD  HEENT:  NC/AT,  conjunctivae clear, anicteric sclerae  CHEST:  clear  HEART:  Regular rhythm, S1, S2  ABDOMEN:  Soft, non-tender, non-distended, +BS  EXTREMITIES:  no edema  SKIN:  No rash or jaundice  NEURO:  Awake, confused         LABS:                        8.3    6.74  )-----------( 222      ( 13 Mar 2024 05:10 )             26.6     03-13    146<H>  |  113<H>  |  16  ----------------------------<  81  4.0   |  25  |  1.36<H>    Ca    9.4      13 Mar 2024 05:10  Phos  2.2     03-13  Mg     2.2     03-13    TPro  6.6  /  Alb  2.6<L>  /  TBili  0.6  /  DBili  x   /  AST  14  /  ALT  8<L>  /  AlkPhos  145<H>  03-13        Urinalysis Basic - ( 13 Mar 2024 05:10 )    Color: x / Appearance: x / SG: x / pH: x  Gluc: 81 mg/dL / Ketone: x  / Bili: x / Urobili: x   Blood: x / Protein: x / Nitrite: x   Leuk Esterase: x / RBC: x / WBC x   Sq Epi: x / Non Sq Epi: x / Bacteria: x

## 2024-03-13 NOTE — PROGRESS NOTE ADULT - NUTRITIONAL ASSESSMENT
This patient has been assessed with a concern for Malnutrition and has been determined to have a diagnosis/diagnoses of Severe protein-calorie malnutrition.    This patient is being managed with:   Diet Clear Liquid-  Entered: Mar 11 2024  2:53PM

## 2024-03-13 NOTE — PROGRESS NOTE ADULT - PROBLEM SELECTOR PLAN 1
No leukocytosis, afebrile.  Abd X-ray showed distal air-filled colon. Small bowel pattern nonspecific. Abd softly more distended on exam today. Stool studies negative.  Rectal tube out since 3/9/2024.    Advance diet   turn and position while in bed  continue bowel regimen, Miralax, senna, fleet   colorectal consult appreciated  S/P Colonoscopy  decompression from yesterday, see report  Colonic decompression not long term solution, would have palliative readdress with family

## 2024-03-13 NOTE — PROGRESS NOTE ADULT - SUBJECTIVE AND OBJECTIVE BOX
Patient is a 89y old  Female who presents with a chief complaint of diarrhea (09 Mar 2024 10:18)      INTERVAL HPI/OVERNIGHT EVENTS: Patient seen and examined at bedside. No overnight events.      REVIEW OF SYSTEMS:  CONSTITUTIONAL: No fever or chills  HEENT:  No headache, no sore throat  RESPIRATORY: No cough, wheezing, or shortness of breath  CARDIOVASCULAR: No chest pain, palpitations  GASTROINTESTINAL: distension, No abd pain, nausea, vomiting, or diarrhea  GENITOURINARY: No dysuria, frequency, or hematuria  NEUROLOGICAL: no focal weakness or dizziness  MUSCULOSKELETAL: no myalgias       Vital Signs Last 24 Hrs  T(C): 36.9 (12 Mar 2024 05:01), Max: 37.1 (11 Mar 2024 20:05)  T(F): 98.4 (12 Mar 2024 05:01), Max: 98.7 (11 Mar 2024 20:05)  HR: 64 (12 Mar 2024 05:01) (64 - 68)  BP: 145/84 (12 Mar 2024 05:01) (139/72 - 148/78)  BP(mean): --  RR: 16 (12 Mar 2024 05:01) (16 - 17)  SpO2: 94% (12 Mar 2024 05:01) (92% - 94%)    Parameters below as of 12 Mar 2024 05:01  Patient On (Oxygen Delivery Method): room air        PHYSICAL EXAM:  CONSTITUTIONAL: NAD  EYES: EOMI, No conjunctival or scleral injection, non-icteric  ENMT: slightly dry mucous membranes  RESP: CTA b/l, normal respiratory effort  CV: RRR, systolic murmur LSB, no MRG; no peripheral edema  GI: abd distension improved, Soft, NT, no rebound, no guarding; no palpable masses  MSK: No digital clubbing or cyanosis  SKIN: intertrigo of groin, heel stage 1 pressure injury, stage 2 buttock  NEURO: grossly moves all 4 extremities  PSYCH: A+O x 1, baseline dementia, poor insight, pleasant affect      LABS: Personally reviewed  CBC                                             8.8    7.89  )-----------( 232      ( 12 Mar 2024 05:35 )             28.9     PT/INR - ( 12 Mar 2024 05:35 )   PT: 22.5 sec;   INR: 1.96 ratio    PTT - ( 12 Mar 2024 05:35 )  PTT:38.3 sec      MEDICATIONS  (STANDING):  allopurinol 100 milliGRAM(s) Oral daily  atorvastatin 10 milliGRAM(s) Oral at bedtime  cholecalciferol 1000 Unit(s) Oral daily  dextrose 5%. 1000 milliLiter(s) (75 mL/Hr) IV Continuous <Continuous>  levothyroxine 25 MICROGram(s) Oral daily  memantine 5 milliGRAM(s) Oral two times a day  polyethylene glycol 3350 17 Gram(s) Oral every 12 hours  QUEtiapine 50 milliGRAM(s) Oral at bedtime  senna 1 Tablet(s) Oral daily  simethicone 80 milliGRAM(s) Chew daily    MEDICATIONS  (PRN):  acetaminophen     Tablet .. 650 milliGRAM(s) Oral every 6 hours PRN Temp greater or equal to 38C (100.4F), Mild Pain (1 - 3)  aluminum hydroxide/magnesium hydroxide/simethicone Suspension 30 milliLiter(s) Oral every 4 hours PRN Dyspepsia  melatonin 3 milliGRAM(s) Oral at bedtime PRN Insomnia  ondansetron Injectable 4 milliGRAM(s) IV Push every 8 hours PRN Nausea and/or Vomiting               Patient is a 89y old  Female who presents with a chief complaint of diarrhea (09 Mar 2024 10:18)      INTERVAL HPI/OVERNIGHT EVENTS: Patient seen and examined at bedside. No overnight events.      REVIEW OF SYSTEMS:  CONSTITUTIONAL: No fever or chills  HEENT:  No headache, no sore throat  RESPIRATORY: No cough, wheezing, or shortness of breath  CARDIOVASCULAR: No chest pain, palpitations  GASTROINTESTINAL: distension, No abd pain, nausea, vomiting, or diarrhea  GENITOURINARY: No dysuria, frequency, or hematuria  NEUROLOGICAL: no focal weakness or dizziness  MUSCULOSKELETAL: no myalgias       Vital Signs Last 24 Hrs  T(C): 36.4 (13 Mar 2024 13:19), Max: 36.6 (12 Mar 2024 21:43)  T(F): 97.6 (13 Mar 2024 13:19), Max: 97.8 (12 Mar 2024 21:43)  HR: 77 (13 Mar 2024 13:19) (66 - 77)  BP: 154/89 (13 Mar 2024 13:19) (154/89 - 165/79)  BP(mean): --  RR: 19 (13 Mar 2024 13:19) (18 - 19)  SpO2: 96% (13 Mar 2024 13:19) (93% - 96%)    Parameters below as of 13 Mar 2024 13:19  Patient On (Oxygen Delivery Method): room air          PHYSICAL EXAM:  CONSTITUTIONAL: NAD  EYES: EOMI, No conjunctival or scleral injection, non-icteric  ENMT: slightly dry mucous membranes  RESP: CTA b/l, normal respiratory effort  CV: RRR, systolic murmur LSB, no MRG; no peripheral edema  GI: abd distension improved, Soft, NT, no rebound, no guarding; no palpable masses  MSK: No digital clubbing or cyanosis  SKIN: intertrigo of groin, heel stage 1 pressure injury, stage 2 buttock  NEURO: grossly moves all 4 extremities  PSYCH: A+O x 1, baseline dementia, poor insight, pleasant affect      LABS: Personally reviewed  CBC                                    8.3    6.74  )-----------( 222      ( 13 Mar 2024 05:10 )             26.6       03-13    146<H>  |  113<H>  |  16  ----------------------------<  81  4.0   |  25  |  1.36<H>    Ca    9.4      13 Mar 2024 05:10  Phos  2.2     03-13  Mg     2.2     03-13    TPro  6.6  /  Alb  2.6<L>  /  TBili  0.6  /  DBili  x   /  AST  14  /  ALT  8<L>  /  AlkPhos  145<H>  03-13              Urinalysis Basic - ( 13 Mar 2024 05:10 )    Color: x / Appearance: x / SG: x / pH: x  Gluc: 81 mg/dL / Ketone: x  / Bili: x / Urobili: x   Blood: x / Protein: x / Nitrite: x   Leuk Esterase: x / RBC: x / WBC x   Sq Epi: x / Non Sq Epi: x / Bacteria: x        PT/INR - ( 12 Mar 2024 05:35 )   PT: 22.5 sec;   INR: 1.96 ratio         PTT - ( 12 Mar 2024 05:35 )  PTT:38.3 sec          CAPILLARY BLOOD GLUCOSE                       MEDICATIONS  (STANDING):  allopurinol 100 milliGRAM(s) Oral daily  atorvastatin 10 milliGRAM(s) Oral at bedtime  cholecalciferol 1000 Unit(s) Oral daily  dextrose 5%. 1000 milliLiter(s) (75 mL/Hr) IV Continuous <Continuous>  levothyroxine 25 MICROGram(s) Oral daily  memantine 5 milliGRAM(s) Oral two times a day  polyethylene glycol 3350 17 Gram(s) Oral every 12 hours  QUEtiapine 50 milliGRAM(s) Oral at bedtime  senna 1 Tablet(s) Oral daily  simethicone 80 milliGRAM(s) Chew daily    MEDICATIONS  (PRN):  acetaminophen     Tablet .. 650 milliGRAM(s) Oral every 6 hours PRN Temp greater or equal to 38C (100.4F), Mild Pain (1 - 3)  aluminum hydroxide/magnesium hydroxide/simethicone Suspension 30 milliLiter(s) Oral every 4 hours PRN Dyspepsia  melatonin 3 milliGRAM(s) Oral at bedtime PRN Insomnia  ondansetron Injectable 4 milliGRAM(s) IV Push every 8 hours PRN Nausea and/or Vomiting

## 2024-03-13 NOTE — PROGRESS NOTE ADULT - ASSESSMENT
89F h/o dementia, Afib on warfarin, Hyperlipidemia, hypothyroidism, HTN, spinal stenosis, has pacemaker, presented to [facility] from home for diarrhea    #Ileus   -Surgery consulted due to abdominal distension 3/4/24- rectal exam yielded copious amounts of liquid stool and gas  -PPM interrogated   -stool culture/GI PCR negative  -c/w Senna daily, Miralax BID, enemas PRN  -turn frequently in bed   -abd xray 3/8/24: Redundant air distended sigmoid colon, maximal luminal diameter measuring 10.5 cm with a proximal air-filled colon.  -monitor without rectal tube with no improvement  -repeat abdominal xray in the AM: non obstructive bowel gas pattern with improvement in bowel dilation.  -palliative consulted  -Colorectal reccs appreciated, no surgical intervention  -Today decompression colonoscopy, daughter updated by GI team    #Diarrhea, Acute- unclear etiology    -Labs and vitals are stable   -CT abd/pelvis: Fluid in the rectosigmoid colon again noted with increase in degree of air distention of the sigmoid colon since 10/11/2023  -S/P rectal tube with improvement and recurrent when removed    #Hypernatremia - resolved  -Na 143 today    #Hypokalemia  -K 3.3 today  -Start KCl supplementation  -F/up K+ levels    #CKD  - Cr near baseline, improving     #Pressure injury, heel, sacrum  -decubitus prevention protocol    #Afib on warfarin  -at home takes warfarin 1.25mg Monday, Thursday, takes 2.5mg other days  - INR 3.07 on admission,  - INR TODAY-1.9   -will transition to Apixaban 2.5 mg po bid   -Resume apixaban post colonoscopy    #Dementia  - c/w memantine 5mg,  quetiapine 50mg    #HTN  - c/w  home enalapril  and Lasix     #HLD  - atorvastatin 10mg    #Hypothyroidism  - levothyroxine 25mcg    #h/o gout  - c/w  allopurinol 100mg    #VTE ppx  - coumadin daily dosing  -transitioned to warfarin, daily INR    #GOC  -Palliative consult reccs appreciated, Home hospice discussed  -Now DNR/DNI  -Form completed this admission as documented by palliative team  -Daughter has full time aide for patient and wishes for home PT- once medically optimized-          daughter Lisa updated by GI team prior to procedure 89F h/o dementia, Afib on warfarin, Hyperlipidemia, hypothyroidism, HTN, spinal stenosis, has pacemaker, presented to [facility] from home for diarrhea    #Ileus   -Surgery consulted due to abdominal distension 3/4/24- rectal exam yielded copious amounts of liquid stool and gas  -PPM interrogated   -stool culture/GI PCR negative  -c/w Senna daily, Miralax BID, enemas PRN  -turn frequently in bed   -abd xray 3/8/24: Redundant air distended sigmoid colon, maximal luminal diameter measuring 10.5 cm with a proximal air-filled colon.  -monitor without rectal tube with no improvement  -repeat abdominal xray in the AM: non obstructive bowel gas pattern with improvement in bowel dilation.  -palliative consulted  -Colorectal reccs appreciated, no surgical intervention  -S/P decompression colonoscopy: gas removed  -Cont bowel regimen    #Diarrhea, Acute- unclear etiology    -Labs and vitals are stable   -CT abd/pelvis: Fluid in the rectosigmoid colon again noted with increase in degree of air distention of the sigmoid colon since 10/11/2023  -S/P rectal tube with improvement and recurrent when removed    #Hypernatremia - resolved  -Na 143 today    #Hypokalemia  -K 4.0 today  -Start KCl supplementation  -F/up K+ levels    #hypophosphatemia  -PO 2.2  -no indication for repletion, K+ 4.0  -continue monitoring    #CKD  - Cr near baseline, improving     #Pressure injury, heel, sacrum  -decubitus prevention protocol    #Afib on warfarin  -at home takes warfarin 1.25mg Monday, Thursday, takes 2.5mg other days  - INR 3.07 on admission,  - INR TODAY-1.9   -will transition to Apixaban 2.5 mg po bid   -Resume apixaban post colonoscopy    #Dementia  - c/w memantine 5mg,  quetiapine 50mg    #HTN  - c/w  home enalapril  and Lasix     #HLD  - atorvastatin 10mg    #Hypothyroidism  - levothyroxine 25mcg    #h/o gout  - c/w  allopurinol 100mg    #VTE ppx  - coumadin daily dosing  -transitioned to warfarin, daily INR    #GOC  -Palliative consult reccs appreciated, Home hospice discussed  -Now DNR/DNI  -Form completed this admission as documented by palliative team  -Daughter has full time aide for patient and wishes for home PT- once medically optimized-          daughter Lisa updated this morning 11:30am 89F h/o dementia, Afib on warfarin, Hyperlipidemia, hypothyroidism, HTN, spinal stenosis, has pacemaker, presented to [facility] from home for diarrhea    #Ileus   -Surgery consulted due to abdominal distension 3/4/24- rectal exam yielded copious amounts of liquid stool and gas  -PPM interrogated   -stool culture/GI PCR negative  -c/w Senna daily, Miralax BID, enemas PRN  -turn frequently in bed   -abd xray 3/8/24: Redundant air distended sigmoid colon, maximal luminal diameter measuring 10.5 cm with a proximal air-filled colon.  -monitor without rectal tube with no improvement  -repeat abdominal xray in the AM: non obstructive bowel gas pattern with improvement in bowel dilation.  -palliative consulted  -Colorectal reccs appreciated, no surgical intervention  -S/P decompression colonoscopy: gas removed  -Cont bowel regimen    #Diarrhea, Acute- unclear etiology    -Labs and vitals are stable   -CT abd/pelvis: Fluid in the rectosigmoid colon again noted with increase in degree of air distention of the sigmoid colon since 10/11/2023  -S/P rectal tube with improvement and recurrent when removed    #Hypernatremia - resolved  -Na 143 today    #Hypokalemia  -K 4.0 today  -Start KCl supplementation  -F/up K+ levels    #hypophosphatemia  -PO 2.2  -no indication for repletion, K+ 4.0  -continue monitoring    #CKD  - Cr near baseline, improving     #Pressure injury, heel, sacrum  -decubitus prevention protocol    #Afib on warfarin  -at home takes warfarin 1.25mg Monday, Thursday, takes 2.5mg other days  - INR 3.07 on admission,  - INR TODAY-1.9   -will transition to Apixaban 2.5 mg po bid   -Resume apixaban post colonoscopy    #Dementia  - c/w memantine 5mg,  quetiapine 50mg    #HTN  - c/w  home enalapril  and Lasix     #HLD  - atorvastatin 10mg    #Hypothyroidism  - levothyroxine 25mcg    #h/o gout  - c/w  allopurinol 100mg    #VTE ppx  - coumadin daily dosing  -transitioned to warfarin, daily INR    #GOC  -Palliative consult reccs appreciated, Home hospice discussed  -Now DNR/DNI  -Form completed this admission as documented by palliative team  -Daughter has full time aide for patient and wishes for home PT- once medically optimized-          daughter Lisa updated this morning 11:30am

## 2024-03-14 ENCOUNTER — TRANSCRIPTION ENCOUNTER (OUTPATIENT)
Age: 89
End: 2024-03-14

## 2024-03-14 LAB
ALBUMIN SERPL ELPH-MCNC: 2.5 G/DL — LOW (ref 3.3–5)
ALP SERPL-CCNC: 136 U/L — HIGH (ref 40–120)
ALT FLD-CCNC: 7 U/L — LOW (ref 10–45)
ANION GAP SERPL CALC-SCNC: 6 MMOL/L — SIGNIFICANT CHANGE UP (ref 5–17)
AST SERPL-CCNC: 11 U/L — SIGNIFICANT CHANGE UP (ref 10–40)
BILIRUB SERPL-MCNC: 0.7 MG/DL — SIGNIFICANT CHANGE UP (ref 0.2–1.2)
BUN SERPL-MCNC: 14 MG/DL — SIGNIFICANT CHANGE UP (ref 7–23)
CALCIUM SERPL-MCNC: 9.4 MG/DL — SIGNIFICANT CHANGE UP (ref 8.4–10.5)
CHLORIDE SERPL-SCNC: 111 MMOL/L — HIGH (ref 96–108)
CO2 SERPL-SCNC: 27 MMOL/L — SIGNIFICANT CHANGE UP (ref 22–31)
CREAT SERPL-MCNC: 1.21 MG/DL — SIGNIFICANT CHANGE UP (ref 0.5–1.3)
EGFR: 43 ML/MIN/1.73M2 — LOW
GLUCOSE SERPL-MCNC: 96 MG/DL — SIGNIFICANT CHANGE UP (ref 70–99)
HCT VFR BLD CALC: 25.4 % — LOW (ref 34.5–45)
HGB BLD-MCNC: 8.1 G/DL — LOW (ref 11.5–15.5)
MAGNESIUM SERPL-MCNC: 2.1 MG/DL — SIGNIFICANT CHANGE UP (ref 1.6–2.6)
MCHC RBC-ENTMCNC: 30.3 PG — SIGNIFICANT CHANGE UP (ref 27–34)
MCHC RBC-ENTMCNC: 31.9 GM/DL — LOW (ref 32–36)
MCV RBC AUTO: 95.1 FL — SIGNIFICANT CHANGE UP (ref 80–100)
NRBC # BLD: 0 /100 WBCS — SIGNIFICANT CHANGE UP (ref 0–0)
PHOSPHATE SERPL-MCNC: 2.6 MG/DL — SIGNIFICANT CHANGE UP (ref 2.5–4.5)
PLATELET # BLD AUTO: 209 K/UL — SIGNIFICANT CHANGE UP (ref 150–400)
POTASSIUM SERPL-MCNC: 3.5 MMOL/L — SIGNIFICANT CHANGE UP (ref 3.5–5.3)
POTASSIUM SERPL-SCNC: 3.5 MMOL/L — SIGNIFICANT CHANGE UP (ref 3.5–5.3)
PROT SERPL-MCNC: 6.2 G/DL — SIGNIFICANT CHANGE UP (ref 6–8.3)
RBC # BLD: 2.67 M/UL — LOW (ref 3.8–5.2)
RBC # FLD: 16.3 % — HIGH (ref 10.3–14.5)
SODIUM SERPL-SCNC: 144 MMOL/L — SIGNIFICANT CHANGE UP (ref 135–145)
WBC # BLD: 6.28 K/UL — SIGNIFICANT CHANGE UP (ref 3.8–10.5)
WBC # FLD AUTO: 6.28 K/UL — SIGNIFICANT CHANGE UP (ref 3.8–10.5)

## 2024-03-14 PROCEDURE — 99233 SBSQ HOSP IP/OBS HIGH 50: CPT | Mod: FS

## 2024-03-14 PROCEDURE — 99233 SBSQ HOSP IP/OBS HIGH 50: CPT | Mod: GC

## 2024-03-14 RX ORDER — APIXABAN 2.5 MG/1
1 TABLET, FILM COATED ORAL
Qty: 60 | Refills: 0
Start: 2024-03-14 | End: 2024-04-12

## 2024-03-14 RX ADMIN — Medication 25 MICROGRAM(S): at 05:48

## 2024-03-14 RX ADMIN — MEMANTINE HYDROCHLORIDE 5 MILLIGRAM(S): 10 TABLET ORAL at 17:49

## 2024-03-14 RX ADMIN — QUETIAPINE FUMARATE 50 MILLIGRAM(S): 200 TABLET, FILM COATED ORAL at 21:26

## 2024-03-14 RX ADMIN — ATORVASTATIN CALCIUM 10 MILLIGRAM(S): 80 TABLET, FILM COATED ORAL at 21:25

## 2024-03-14 RX ADMIN — APIXABAN 5 MILLIGRAM(S): 2.5 TABLET, FILM COATED ORAL at 05:48

## 2024-03-14 RX ADMIN — SODIUM CHLORIDE 75 MILLILITER(S): 9 INJECTION, SOLUTION INTRAVENOUS at 18:49

## 2024-03-14 RX ADMIN — MEMANTINE HYDROCHLORIDE 5 MILLIGRAM(S): 10 TABLET ORAL at 05:47

## 2024-03-14 RX ADMIN — POLYETHYLENE GLYCOL 3350 17 GRAM(S): 17 POWDER, FOR SOLUTION ORAL at 05:48

## 2024-03-14 RX ADMIN — APIXABAN 5 MILLIGRAM(S): 2.5 TABLET, FILM COATED ORAL at 17:49

## 2024-03-14 NOTE — PROGRESS NOTE ADULT - SUBJECTIVE AND OBJECTIVE BOX
INTERVAL HPI/OVERNIGHT EVENTS:  Patient seen and examined at bed side. As per RN event over night.     MEDICATIONS  (STANDING):  allopurinol 100 milliGRAM(s) Oral daily  apixaban 5 milliGRAM(s) Oral two times a day  atorvastatin 10 milliGRAM(s) Oral at bedtime  cholecalciferol 1000 Unit(s) Oral daily  dextrose 5%. 1000 milliLiter(s) (75 mL/Hr) IV Continuous <Continuous>  levothyroxine 25 MICROGram(s) Oral daily  memantine 5 milliGRAM(s) Oral two times a day  polyethylene glycol 3350 17 Gram(s) Oral every 12 hours  QUEtiapine 50 milliGRAM(s) Oral at bedtime  senna 1 Tablet(s) Oral daily  simethicone 80 milliGRAM(s) Chew daily    MEDICATIONS  (PRN):  acetaminophen     Tablet .. 650 milliGRAM(s) Oral every 6 hours PRN Temp greater or equal to 38C (100.4F), Mild Pain (1 - 3)  aluminum hydroxide/magnesium hydroxide/simethicone Suspension 30 milliLiter(s) Oral every 4 hours PRN Dyspepsia  melatonin 3 milliGRAM(s) Oral at bedtime PRN Insomnia  ondansetron Injectable 4 milliGRAM(s) IV Push every 8 hours PRN Nausea and/or Vomiting      Allergies    No Known Allergies    Intolerances    Vital Signs Last 24 Hrs  T(C): 36.8 (14 Mar 2024 05:01), Max: 36.8 (14 Mar 2024 05:01)  T(F): 98.3 (14 Mar 2024 05:01), Max: 98.3 (14 Mar 2024 05:01)  HR: 64 (14 Mar 2024 05:01) (61 - 77)  BP: 146/77 (14 Mar 2024 05:01) (146/77 - 155/80)  BP(mean): --  RR: 18 (14 Mar 2024 05:01) (18 - 19)  SpO2: 97% (14 Mar 2024 05:01) (96% - 97%)    Parameters below as of 13 Mar 2024 20:16  Patient On (Oxygen Delivery Method): room air        PHYSICAL EXAM:    Constitutional: NAD, well-developed  Neck: No LAD, supple  Respiratory: No SOB No distress   Cardiovascular: S1 and S2  Gastrointestinal:Soft non tender, non distended + BS   Extremities: No peripheral edema, neg clubbing, cyanosis  Neurological: Awake   Skin: No rashes      LABS:                        8.1    6.28  )-----------( 209      ( 14 Mar 2024 06:00 )             25.4     03-14    144  |  111<H>  |  14  ----------------------------<  96  3.5   |  27  |  1.21    Ca    9.4      14 Mar 2024 06:00  Phos  2.6     03-14  Mg     2.1     03-14    TPro  6.2  /  Alb  2.5<L>  /  TBili  0.7  /  DBili  x   /  AST  11  /  ALT  7<L>  /  AlkPhos  136<H>  03-14      Urinalysis Basic - ( 14 Mar 2024 06:00 )    Color: x / Appearance: x / SG: x / pH: x  Gluc: 96 mg/dL / Ketone: x  / Bili: x / Urobili: x   Blood: x / Protein: x / Nitrite: x   Leuk Esterase: x / RBC: x / WBC x   Sq Epi: x / Non Sq Epi: x / Bacteria: x      LIVER FUNCTIONS - ( 14 Mar 2024 06:00 )  Alb: 2.5 g/dL / Pro: 6.2 g/dL / ALK PHOS: 136 U/L / ALT: 7 U/L / AST: 11 U/L / GGT: x             RADIOLOGY & ADDITIONAL TESTS:   GI follow up    Patient seen and examined at bed side. No events o/n.    MEDICATIONS  (STANDING):  allopurinol 100 milliGRAM(s) Oral daily  apixaban 5 milliGRAM(s) Oral two times a day  atorvastatin 10 milliGRAM(s) Oral at bedtime  cholecalciferol 1000 Unit(s) Oral daily  dextrose 5%. 1000 milliLiter(s) (75 mL/Hr) IV Continuous <Continuous>  levothyroxine 25 MICROGram(s) Oral daily  memantine 5 milliGRAM(s) Oral two times a day  polyethylene glycol 3350 17 Gram(s) Oral every 12 hours  QUEtiapine 50 milliGRAM(s) Oral at bedtime  senna 1 Tablet(s) Oral daily  simethicone 80 milliGRAM(s) Chew daily    MEDICATIONS  (PRN):  acetaminophen     Tablet .. 650 milliGRAM(s) Oral every 6 hours PRN Temp greater or equal to 38C (100.4F), Mild Pain (1 - 3)  aluminum hydroxide/magnesium hydroxide/simethicone Suspension 30 milliLiter(s) Oral every 4 hours PRN Dyspepsia  melatonin 3 milliGRAM(s) Oral at bedtime PRN Insomnia  ondansetron Injectable 4 milliGRAM(s) IV Push every 8 hours PRN Nausea and/or Vomiting      Allergies    No Known Allergies    Intolerances    Vital Signs Last 24 Hrs  T(C): 36.8 (14 Mar 2024 05:01), Max: 36.8 (14 Mar 2024 05:01)  T(F): 98.3 (14 Mar 2024 05:01), Max: 98.3 (14 Mar 2024 05:01)  HR: 64 (14 Mar 2024 05:01) (61 - 77)  BP: 146/77 (14 Mar 2024 05:01) (146/77 - 155/80)  BP(mean): --  RR: 18 (14 Mar 2024 05:01) (18 - 19)  SpO2: 97% (14 Mar 2024 05:01) (96% - 97%)    Parameters below as of 13 Mar 2024 20:16  Patient On (Oxygen Delivery Method): room air        PHYSICAL EXAM:    Constitutional: NAD, well-developed  Neck: No LAD, supple  Respiratory: clear   Cardiovascular: S1 and S2  Gastrointestinal: Soft non tender, non distended + BS   Extremities: No peripheral edema, neg clubbing, cyanosis  Neurological: Awake   Skin: No rashes      LABS:                        8.1    6.28  )-----------( 209      ( 14 Mar 2024 06:00 )             25.4     03-14    144  |  111<H>  |  14  ----------------------------<  96  3.5   |  27  |  1.21    Ca    9.4      14 Mar 2024 06:00  Phos  2.6     03-14  Mg     2.1     03-14    TPro  6.2  /  Alb  2.5<L>  /  TBili  0.7  /  DBili  x   /  AST  11  /  ALT  7<L>  /  AlkPhos  136<H>  03-14      Urinalysis Basic - ( 14 Mar 2024 06:00 )    Color: x / Appearance: x / SG: x / pH: x  Gluc: 96 mg/dL / Ketone: x  / Bili: x / Urobili: x   Blood: x / Protein: x / Nitrite: x   Leuk Esterase: x / RBC: x / WBC x   Sq Epi: x / Non Sq Epi: x / Bacteria: x      LIVER FUNCTIONS - ( 14 Mar 2024 06:00 )  Alb: 2.5 g/dL / Pro: 6.2 g/dL / ALK PHOS: 136 U/L / ALT: 7 U/L / AST: 11 U/L / GGT: x

## 2024-03-14 NOTE — PROGRESS NOTE ADULT - PROBLEM SELECTOR PLAN 1
No leukocytosis, afebrile.  Abd X-ray showed distal air-filled colon. Small bowel pattern nonspecific. Abd softly more distended on exam today. Stool studies negative.  Rectal tube out since 3/9/2024.    Advance diet   turn and position while in bed  continue bowel regimen, Miralax, senna, fleet   colorectal consult appreciated  S/P Colonoscopy  decompression from 3/12, see report  Colonic decompression not long term solution, would have palliative readdress with family No leukocytosis, afebrile.  Abd X-ray showed distal air-filled colon. Small bowel pattern nonspecific. Abd softly more distended on exam today. Stool studies negative.  Rectal tube out since 3/9/2024. S/P Colonoscopy  decompression from 3/12, see report    Advance diet   turn and position while in bed  continue bowel regimen, Miralax, senna, fleet     DC plan appreciated

## 2024-03-14 NOTE — DISCHARGE NOTE NURSING/CASE MANAGEMENT/SOCIAL WORK - PATIENT PORTAL LINK FT
You can access the FollowMyHealth Patient Portal offered by [facility] by registering at the following website: [email or web address]. By joining [employer]’s FollowMyHealth portal, you will also be able to view your health information using other applications (apps) compatible with our system.
You can access the FollowMyHealth Patient Portal offered by [facility] by registering at the following website: [email or web address]. By joining [employer]’s FollowMyHealth portal, you will also be able to view your health information using other applications (apps) compatible with our system.

## 2024-03-14 NOTE — PROGRESS NOTE ADULT - SUBJECTIVE AND OBJECTIVE BOX
Patient is a 89y old  Female who presents with a chief complaint of diarrhea (09 Mar 2024 10:18)      INTERVAL HPI/OVERNIGHT EVENTS: Patient seen and examined at bedside. No overnight events.      REVIEW OF SYSTEMS:  CONSTITUTIONAL: No fever or chills  HEENT:  No headache, no sore throat  RESPIRATORY: No cough, wheezing, or shortness of breath  CARDIOVASCULAR: No chest pain, palpitations  GASTROINTESTINAL: distension, No abd pain, nausea, vomiting, or diarrhea  GENITOURINARY: No dysuria, frequency, or hematuria  NEUROLOGICAL: no focal weakness or dizziness  MUSCULOSKELETAL: no myalgias       Vital Signs Last 24 Hrs          PHYSICAL EXAM:  CONSTITUTIONAL: NAD  EYES: EOMI, No conjunctival or scleral injection, non-icteric  ENMT: slightly dry mucous membranes  RESP: CTA b/l, normal respiratory effort  CV: RRR, systolic murmur LSB, no MRG; no peripheral edema  GI: abd distension improved, Soft, NT, no rebound, no guarding; no palpable masses  MSK: No digital clubbing or cyanosis  SKIN: intertrigo of groin, heel stage 1 pressure injury, stage 2 buttock  NEURO: grossly moves all 4 extremities  PSYCH: A+O x 1, baseline dementia, poor insight, pleasant affect      LABS: Personally reviewed  CBC                                    MEDICATIONS  (STANDING):  allopurinol 100 milliGRAM(s) Oral daily  atorvastatin 10 milliGRAM(s) Oral at bedtime  cholecalciferol 1000 Unit(s) Oral daily  dextrose 5%. 1000 milliLiter(s) (75 mL/Hr) IV Continuous <Continuous>  levothyroxine 25 MICROGram(s) Oral daily  memantine 5 milliGRAM(s) Oral two times a day  polyethylene glycol 3350 17 Gram(s) Oral every 12 hours  QUEtiapine 50 milliGRAM(s) Oral at bedtime  senna 1 Tablet(s) Oral daily  simethicone 80 milliGRAM(s) Chew daily    MEDICATIONS  (PRN):  acetaminophen     Tablet .. 650 milliGRAM(s) Oral every 6 hours PRN Temp greater or equal to 38C (100.4F), Mild Pain (1 - 3)  aluminum hydroxide/magnesium hydroxide/simethicone Suspension 30 milliLiter(s) Oral every 4 hours PRN Dyspepsia  melatonin 3 milliGRAM(s) Oral at bedtime PRN Insomnia  ondansetron Injectable 4 milliGRAM(s) IV Push every 8 hours PRN Nausea and/or Vomiting               Patient is a 89y old  Female who presents with a chief complaint of diarrhea (09 Mar 2024 10:18)      INTERVAL HPI/OVERNIGHT EVENTS: Patient seen and examined at bedside. No overnight events.      REVIEW OF SYSTEMS:  CONSTITUTIONAL: No fever or chills  HEENT:  No headache, no sore throat  RESPIRATORY: No cough, wheezing, or shortness of breath  CARDIOVASCULAR: No chest pain, palpitations  GASTROINTESTINAL: distension, No abd pain, nausea, vomiting, or diarrhea  GENITOURINARY: No dysuria, frequency, or hematuria  NEUROLOGICAL: no focal weakness or dizziness  MUSCULOSKELETAL: no myalgias       Vital Signs Last 24 Hrs    Vital Signs Last 24 Hrs  T(C): 36.5 (14 Mar 2024 13:17), Max: 36.8 (14 Mar 2024 05:01)  T(F): 97.7 (14 Mar 2024 13:17), Max: 98.3 (14 Mar 2024 05:01)  HR: 60 (14 Mar 2024 13:17) (60 - 64)  BP: 136/80 (14 Mar 2024 13:17) (136/80 - 155/80)  BP(mean): --  RR: 18 (14 Mar 2024 13:17) (18 - 19)  SpO2: 98% (14 Mar 2024 13:17) (96% - 98%)    Parameters below as of 14 Mar 2024 13:17  Patient On (Oxygen Delivery Method): room air          PHYSICAL EXAM:  CONSTITUTIONAL: NAD  EYES: EOMI, No conjunctival or scleral injection, non-icteric  ENMT: slightly dry mucous membranes  RESP: CTA b/l, normal respiratory effort  CV: RRR, systolic murmur LSB, no MRG; no peripheral edema  GI: abd distension improved, Soft, NT, no rebound, no guarding; no palpable masses  MSK: No digital clubbing or cyanosis  SKIN: intertrigo of groin, heel stage 1 pressure injury, stage 2 buttock  NEURO: grossly moves all 4 extremities  PSYCH: A+O x 1, baseline dementia, poor insight, pleasant affect      LABS: Personally reviewed  CBC                                             8.1    6.28  )-----------( 209      ( 14 Mar 2024 06:00 )             25.4       03-14    144  |  111<H>  |  14  ----------------------------<  96  3.5   |  27  |  1.21    Ca    9.4      14 Mar 2024 06:00  Phos  2.6     03-14  Mg     2.1     03-14    TPro  6.2  /  Alb  2.5<L>  /  TBili  0.7  /  DBili  x   /  AST  11  /  ALT  7<L>  /  AlkPhos  136<H>  03-14              Urinalysis Basic - ( 14 Mar 2024 06:00 )    Color: x / Appearance: x / SG: x / pH: x  Gluc: 96 mg/dL / Ketone: x  / Bili: x / Urobili: x   Blood: x / Protein: x / Nitrite: x   Leuk Esterase: x / RBC: x / WBC x   Sq Epi: x / Non Sq Epi: x / Bacteria: x                  CAPILLARY BLOOD GLUCOSE                           MEDICATIONS  (STANDING):  allopurinol 100 milliGRAM(s) Oral daily  atorvastatin 10 milliGRAM(s) Oral at bedtime  cholecalciferol 1000 Unit(s) Oral daily  dextrose 5%. 1000 milliLiter(s) (75 mL/Hr) IV Continuous <Continuous>  levothyroxine 25 MICROGram(s) Oral daily  memantine 5 milliGRAM(s) Oral two times a day  polyethylene glycol 3350 17 Gram(s) Oral every 12 hours  QUEtiapine 50 milliGRAM(s) Oral at bedtime  senna 1 Tablet(s) Oral daily  simethicone 80 milliGRAM(s) Chew daily    MEDICATIONS  (PRN):  acetaminophen     Tablet .. 650 milliGRAM(s) Oral every 6 hours PRN Temp greater or equal to 38C (100.4F), Mild Pain (1 - 3)  aluminum hydroxide/magnesium hydroxide/simethicone Suspension 30 milliLiter(s) Oral every 4 hours PRN Dyspepsia  melatonin 3 milliGRAM(s) Oral at bedtime PRN Insomnia  ondansetron Injectable 4 milliGRAM(s) IV Push every 8 hours PRN Nausea and/or Vomiting

## 2024-03-14 NOTE — PROGRESS NOTE ADULT - NS ATTEND OPT1 GEN_ALL_CORE
I independently performed the documented:
I attest my time as attending is greater than 50% of the total combined time spent on qualifying patient care activities by the PA/NP and attending.

## 2024-03-14 NOTE — DISCHARGE NOTE PROVIDER - NSDCFUSCHEDAPPT_GEN_ALL_CORE_FT
[facility]  [address]  Scheduled Appointment: 05/28/2024     Tracy Laura  [facility] [address]  Scheduled Appointment: 03/21/2024    [facility]  [address]  Scheduled Appointment: 05/28/2024

## 2024-03-14 NOTE — PROGRESS NOTE ADULT - ATTENDING COMMENTS
PE: A+Ox1, no murmurs, lungs CTA b/l, abd +distension, hyperactive bowel sounds, non-tender, no lower extremity swelling    Assessment:   - Ileus and colonic distension s/p rectal tube s/p sigmoid decompression   - Hypernatremia, hypokalemia, hypophosphatemia (resolved)   - Pressure ulcers (heel and sacrum)   - History of dementia, Afib, HLD, hypothyroidism, HTN, spinal stenosis, has pacemaker     Plan:   - Pt was transitioned to Eliquis   - c/w bowel regimen   - Colonic decompression is not a long term solution per GI   - No surgical intervention per colorectal   - Electrolytes improved   - Palliative recs appreciated - daughter wants to take pt home with home care, can have hospice eval outpt   - Code status: DNR/DNI   - Dispo: d/c tomorrow at 10 am     I spent a total of 45 minutes on the date of this encounter coordinating the patient's care. This includes reviewing results/imaging and discussions with specialists, nursing, case management/social work. Further tests, medications, and procedures have been ordered as indicated. Results and the plan of care were communicated to the patient and/or their family member. Supporting documentation was completed and added to the patient's chart.

## 2024-03-14 NOTE — DISCHARGE NOTE PROVIDER - NSDCCPCAREPLAN_GEN_ALL_CORE_FT
PRINCIPAL DISCHARGE DIAGNOSIS  Diagnosis: Ileus  Assessment and Plan of Treatment: During this admission you were diagnosed with ileus of your colon. This means inability of your bowels to contract and move waste out. You has a procedure done to relieve gerard of the air that this issue accumulated in your intestines. It was notified to your family this is only a temporary measure to assist with symtoms and decrease level of air in your bowel. Your family was advised this issue could potentially reccur. You should continue on bowel regimen after discharge. Follow up with PCP and GI teams for further care.     PRINCIPAL DISCHARGE DIAGNOSIS  Diagnosis: Ileus  Assessment and Plan of Treatment: During this admission you were diagnosed with ileus of your colon. This means inability of your bowels to contract and move waste out. You has a procedure done to relieve gerard of the air that this issue accumulated in your intestines. It was notified to your family this is only a temporary measure to assist with symtoms and decrease level of air in your bowel. Your family was advised this issue could potentially reccur. You should continue on bowel regimen after discharge. Follow up with PCP and GI teams for further care.      SECONDARY DISCHARGE DIAGNOSES  Diagnosis: Hypokalemia  Assessment and Plan of Treatment: Your potassium was low when you initially presented to the hospital. We repleted your potassium and your levels have been normal. Please note that this electrolyte abnormality can occur with diarrhea.

## 2024-03-14 NOTE — DISCHARGE NOTE PROVIDER - NSDCMRMEDTOKEN_GEN_ALL_CORE_FT
allopurinol 100 mg oral tablet: 1 tab(s) orally once a day  atorvastatin 10 mg oral tablet: 1 tab(s) orally once a day (at bedtime)  Coumadin 2.5 mg oral tablet: 1 tab(s) orally 5 times a week Sunday, Tuesday, Wednesday, Friday, Saturday  Coumadin 2.5 mg oral tablet: 0.5 tab(s) orally 2 times a week Monday, Thursday  D3 25 mcg (1000 intl units) oral tablet: 1 tab(s) orally once a day  Eliquis 5 mg oral tablet: 1 tab(s) orally 2 times a day  enalapril 5 mg oral tablet: 1 tab(s) orally once a day  Lasix 40 mg oral tablet: 1 tab(s) orally once a day  levothyroxine 25 mcg (0.025 mg) oral tablet: 1 tab(s) orally once a day  memantine 5 mg oral tablet: 1 tab(s) orally 2 times a day  QUEtiapine 25 mg oral tablet: 2 tab(s) orally once a day (at bedtime)   allopurinol 100 mg oral tablet: 1 tab(s) orally once a day  apixaban 5 mg oral tablet: 1 tab(s) orally 2 times a day  atorvastatin 10 mg oral tablet: 1 tab(s) orally once a day (at bedtime)  D3 25 mcg (1000 intl units) oral tablet: 1 tab(s) orally once a day  enalapril 5 mg oral tablet: 1 tab(s) orally once a day  Lasix 40 mg oral tablet: 1 tab(s) orally once a day  levothyroxine 25 mcg (0.025 mg) oral tablet: 1 tab(s) orally once a day  memantine 5 mg oral tablet: 1 tab(s) orally 2 times a day  QUEtiapine 25 mg oral tablet: 2 tab(s) orally once a day (at bedtime)   allopurinol 100 mg oral tablet: 1 tab(s) orally once a day  apixaban 5 mg oral tablet: 1 tab(s) orally 2 times a day  atorvastatin 10 mg oral tablet: 1 tab(s) orally once a day (at bedtime)  D3 25 mcg (1000 intl units) oral tablet: 1 tab(s) orally once a day  enalapril 5 mg oral tablet: 1 tab(s) orally once a day  Innerclean oral tablet: 1 tab(s) orally once a day  Lasix 40 mg oral tablet: 1 tab(s) orally once a day  levothyroxine 25 mcg (0.025 mg) oral tablet: 1 tab(s) orally once a day  memantine 5 mg oral tablet: 1 tab(s) orally 2 times a day  MiraLax oral powder for reconstitution: 17 gram(s) orally every 12 hours  QUEtiapine 25 mg oral tablet: 2 tab(s) orally once a day (at bedtime)  simethicone 80 mg oral tablet, chewable: 1 tab(s) orally once a day

## 2024-03-14 NOTE — DISCHARGE NOTE PROVIDER - CARE PROVIDER_API CALL
Tracy Laura  [address], [address]  [place] [address]  Phone: [phone]  Fax: [phone]  Follow Up Time:

## 2024-03-14 NOTE — DISCHARGE NOTE PROVIDER - NSDCCAREPROVSEEN_GEN_ALL_CORE_FT
Munira Chan, Pelra Schroeder, Halley Wood, Pasquale Patino, Mary Carpio, Jennifer Levine, Noble Rosario

## 2024-03-14 NOTE — PROGRESS NOTE ADULT - ASSESSMENT
89F h/o dementia, Afib on warfarin, Hyperlipidemia, hypothyroidism, HTN, spinal stenosis, has pacemaker, presented to [facility] from home for diarrhea    #Ileus   -Surgery consulted due to abdominal distension 3/4/24- rectal exam yielded copious amounts of liquid stool and gas  -PPM interrogated   -stool culture/GI PCR negative  -c/w Senna daily, Miralax BID, enemas PRN  -turn frequently in bed   -abd xray 3/8/24: Redundant air distended sigmoid colon, maximal luminal diameter measuring 10.5 cm with a proximal air-filled colon.  -monitor without rectal tube with no improvement  -repeat abdominal xray in the AM: non obstructive bowel gas pattern with improvement in bowel dilation.  -palliative consulted  -Colorectal reccs appreciated, no surgical intervention  -S/P decompression colonoscopy: gas removed  -Cont bowel regimen    #Diarrhea, Acute- unclear etiology    -Labs and vitals are stable   -CT abd/pelvis: Fluid in the rectosigmoid colon again noted with increase in degree of air distention of the sigmoid colon since 10/11/2023  -S/P rectal tube with improvement and recurrent when removed    #Hypernatremia - resolved  -Na 143 today    #Hypokalemia  -K 4.0 today  -Start KCl supplementation  -F/up K+ levels    #hypophosphatemia  -PO 2.2  -no indication for repletion, K+ 4.0  -continue monitoring    #CKD  - Cr near baseline, improving     #Pressure injury, heel, sacrum  -decubitus prevention protocol    #Afib on warfarin  -at home takes warfarin 1.25mg Monday, Thursday, takes 2.5mg other days  - INR 3.07 on admission,  - INR TODAY-1.9   -will transition to Apixaban 2.5 mg po bid   -Resume apixaban post colonoscopy    #Dementia  - c/w memantine 5mg,  quetiapine 50mg    #HTN  - c/w  home enalapril  and Lasix     #HLD  - atorvastatin 10mg    #Hypothyroidism  - levothyroxine 25mcg    #h/o gout  - c/w  allopurinol 100mg    #VTE ppx  - coumadin daily dosing  -transitioned to warfarin, daily INR    #GOC  -Palliative consult reccs appreciated, Home hospice discussed  -Now DNR/DNI  -Form completed this admission as documented by palliative team  -Daughter has full time aide for patient and wishes for home PT- once medically optimized-          daughter Lisa updated this morning 11:30am Patient to UC with left hand pain and swelling x 1 day.    89F h/o dementia, Afib on warfarin, Hyperlipidemia, hypothyroidism, HTN, spinal stenosis, has pacemaker, presented to [facility] from home for diarrhea    #Ileus   -Surgery consulted due to abdominal distension 3/4/24- rectal exam yielded copious amounts of liquid stool and gas  -PPM interrogated   -stool culture/GI PCR negative  -c/w Senna daily, Miralax BID, enemas PRN  -turn frequently in bed   -abd xray 3/8/24: Redundant air distended sigmoid colon, maximal luminal diameter measuring 10.5 cm with a proximal air-filled colon.  -monitor without rectal tube with no improvement  -repeat abdominal xray in the AM: non obstructive bowel gas pattern with improvement in bowel dilation.  -palliative consulted  -Colorectal reccs appreciated, no surgical intervention  -S/P decompression colonoscopy: gas removed  -Cont bowel regimen  -Goals of care discussion with family    #Diarrhea, Acute- unclear etiology    -Labs and vitals are stable   -CT abd/pelvis: Fluid in the rectosigmoid colon again noted with increase in degree of air distention of the sigmoid colon since 10/11/2023  -S/P rectal tube with improvement and recurrent when removed    #Hypernatremia - resolved  -Na 144 today    #Hypokalemia  -K 3.5 today  -Start KCl supplementation  -F/up K+ levels    #hypophosphatemia  -PO 2.6  -continue monitoring    #CKD  - Cr near baseline, improving     #Pressure injury, heel, sacrum  -decubitus prevention protocol    #Afib on warfarin  -at home takes warfarin 1.25mg Monday, Thursday, takes 2.5mg other days  - INR 3.07 on admission,  - INR TODAY-1.9   -will transition to Apixaban 2.5 mg po bid   -Resume apixaban post colonoscopy  -Eliquis 5mg BID confirmed covered by insurance per pharmacy    #Dementia  - c/w memantine 5mg,  quetiapine 50mg    #HTN  - c/w  home enalapril  and Lasix     #HLD  - atorvastatin 10mg    #Hypothyroidism  - levothyroxine 25mcg    #h/o gout  - c/w  allopurinol 100mg    #VTE ppx  - coumadin daily dosing  -transitioned to warfarin, daily INR    #GOC  -Palliative consult reccs appreciated, Home hospice discussed  -Now DNR/DNI  -Form completed this admission as documented by palliative team  -Daughter has full time aide for patient and wishes for home PT- once medically optimized-

## 2024-03-14 NOTE — DISCHARGE NOTE PROVIDER - DETAILS OF MALNUTRITION DIAGNOSIS/DIAGNOSES
This patient has been assessed with a concern for Malnutrition and was treated during this hospitalization for the following Nutrition diagnosis/diagnoses:     -  03/06/2024: Severe protein-calorie malnutrition

## 2024-03-14 NOTE — DISCHARGE NOTE PROVIDER - HOSPITAL COURSE
89F h/o dementia, Afib on coumadin, HLD, hypothyroidism, HTN, spinal stenosis, has pacemaker, presented to [facility] from home for more diarrhea noted by HHA. History provided by patient's daughter. This morning her HHA noted >4 instances of watery diarrhea whenever she changed the patient. No noted abdominal pain, vomiting, fever, sweats. HHA last took care of patient on Monday and had no diarrhea then. Patient had a different aide from Tuesday to Thursday, she was unable to be reached. Limited ROS obtained due to patient's dementia, however she denies headache, abdominal pain, nausea/vomiting.    In ED pt afebrile, HR 64, /80, RR 18, sat 96% RA. Pt hypernatremic and hypokalemic. Given potassium repletion, NS bolus.   89F h/o dementia, Afib on coumadin, HLD, hypothyroidism, HTN, CKD, spinal stenosis, has pacemaker, presented to [facility] from home for increased diarrhea noted by HHA, on presentation day, not associated with fever, chills, N/V or abdominal pain. In ED pt afebrile, HR 64, /80, RR 18, sat 96% RA. Pt hypernatremic and hypokalemic. Given potassium repletion, NS bolus. Admitted for gastroenteritis with electrolyte derrangement. During admission, abdominal distension noted, GI and general surgery consulted. CT A/P with air and fluid in colon. Patient maintained on daily bowel regimen, and no surgical intervention deemed necessary at the time. Rectal tube provided improvement for colonic distension but eventually patient underwent decompression colonoscopy by GI team. Family made aware this measure would provide temporary improvement but high likelihood for recurrence. Colorectal surgery with no indication for surgical management. Goals of care discussion initiated given comorbidities and likelihood for issues to reccur.   Patient medically stabilized for discharge. 89F h/o dementia, Afib on coumadin, HLD, hypothyroidism, HTN, CKD, spinal stenosis, has pacemaker, presented to [facility] from home for increased diarrhea noted by HHA, on presentation day, not associated with fever, chills, N/V or abdominal pain. In ED pt afebrile, HR 64, /80, RR 18, sat 96% RA. Pt hypernatremic and hypokalemic. Given potassium repletion, NS bolus. Admitted for gastroenteritis with electrolyte derrangement. During admission, abdominal distension noted, GI and general surgery consulted. CT A/P with air and fluid in colon. Patient maintained on daily bowel regimen, and no surgical intervention deemed necessary at the time. Rectal tube provided improvement for colonic distension but eventually patient underwent decompression colonoscopy by GI team. Family made aware this measure would provide temporary improvement but high likelihood for recurrence. Colorectal surgery with no indication for surgical management. Goals of care discussion initiated given comorbidities and likelihood for issues to reccur. Palliative care spoke to the pt's family and she is DNR/DNI. Patient medically stabilized for discharge. 89F h/o dementia, Afib on coumadin, HLD, hypothyroidism, HTN, CKD, spinal stenosis, has pacemaker, presented to [facility] from home for increased diarrhea noted by HHA, on presentation day, not associated with fever, chills, N/V or abdominal pain. In ED pt afebrile, HR 64, /80, RR 18, sat 96% RA. Pt hypernatremic and hypokalemic. Given potassium repletion, NS bolus. Admitted for gastroenteritis with electrolyte derrangement. During admission, abdominal distension noted, GI and general surgery consulted. CT A/P with air and fluid in colon. Patient maintained on daily bowel regimen, and no surgical intervention deemed necessary at the time. Rectal tube provided improvement for colonic distension but eventually patient underwent decompression colonoscopy by GI team. Family made aware this measure would provide temporary improvement but high likelihood for recurrence. Colorectal surgery with no indication for surgical management. Goals of care discussion initiated given comorbidities and likelihood for issues to reccur. Palliative care spoke to the pt's family and she is DNR/DNI. Patient medically stabilized for discharge.      PHYSICAL EXAM:  Vital Signs Last 24 Hrs  T(C): 36.6 (15 Mar 2024 05:19), Max: 36.6 (14 Mar 2024 20:05)  T(F): 97.8 (15 Mar 2024 05:19), Max: 97.8 (14 Mar 2024 20:05)  HR: 60 (15 Mar 2024 05:19) (60 - 69)  BP: 148/77 (15 Mar 2024 05:19) (136/80 - 154/87)  BP(mean): --  RR: 17 (15 Mar 2024 05:19) (17 - 18)  SpO2: 96% (15 Mar 2024 05:19) (96% - 98%)    Parameters below as of 15 Mar 2024 05:19  Patient On (Oxygen Delivery Method): room air        PHYSICAL EXAM:  GENERAL: NAD, lying in bed comfortably  HEAD:  Atraumatic, Normocephalic  EYES: EOMI, conjunctiva and sclera clear  ENT: Moist mucous membranes  NECK: Supple, No JVD  RESP: Clear to auscultation bilaterally, good air entry bilaterally; No wheezing, rales, or rhonchi. Unlabored respirations  CARDIAC: Regular rate and rhythm. S1 and S2. No murmurs, rubs, or gallops  GI:  Soft, Nontender, Nondistended. Bowel sounds present x4 quadrants; No hepatomegaly. No splenomegaly.  EXTREMITIES:  2+ Peripheral Pulses. Capillary refill <2 seconds. No clubbing, cyanosis, or edema  NERVOUS SYSTEM:  Alert & Oriented X1, speech clear. No deficits   MSK: FROM all 4 extremities, full and equal strength  SKIN: intertrigo, heel stage 1 pressure injury, stable; stage 2 buttock 89F h/o dementia, Afib on coumadin, HLD, hypothyroidism, HTN, CKD, spinal stenosis, has pacemaker, presented to [facility] from home for increased diarrhea noted by HHA, on presentation day, not associated with fever, chills, N/V or abdominal pain. In ED pt afebrile, HR 64, /80, RR 18, sat 96% RA. Pt hypernatremic and hypokalemic. Given potassium repletion, NS bolus. Admitted for gastroenteritis with electrolyte derrangement. During admission, abdominal distension noted, GI and general surgery consulted. CT A/P with air and fluid in colon. Patient maintained on daily bowel regimen, and no surgical intervention deemed necessary at the time. Rectal tube provided improvement for colonic distension but eventually patient underwent decompression colonoscopy by GI team. Family made aware this measure would provide temporary improvement but high likelihood for recurrence. Colorectal surgery with no indication for surgical management. Goals of care discussion given comorbidities and likelihood for issues to reccur. Palliative care spoke to the pt's family and she is DNR/DNI. Patient medically stabilized for discharge home.      PHYSICAL EXAM:  Vital Signs Last 24 Hrs  T(C): 36.6 (15 Mar 2024 05:19), Max: 36.6 (14 Mar 2024 20:05)  T(F): 97.8 (15 Mar 2024 05:19), Max: 97.8 (14 Mar 2024 20:05)  HR: 60 (15 Mar 2024 05:19) (60 - 69)  BP: 148/77 (15 Mar 2024 05:19) (136/80 - 154/87)  BP(mean): --  RR: 17 (15 Mar 2024 05:19) (17 - 18)  SpO2: 96% (15 Mar 2024 05:19) (96% - 98%)    Parameters below as of 15 Mar 2024 05:19  Patient On (Oxygen Delivery Method): room air        PHYSICAL EXAM:  GENERAL: NAD, lying in bed comfortably  HEAD:  Atraumatic, Normocephalic  EYES: EOMI, conjunctiva and sclera clear  ENT: Moist mucous membranes  NECK: Supple, No JVD  RESP: Clear to auscultation bilaterally, good air entry bilaterally; No wheezing, rales, or rhonchi. Unlabored respirations  CARDIAC: Regular rate and rhythm. S1 and S2. No murmurs, rubs, or gallops  GI:  Soft, Nontender, Nondistended. Bowel sounds present x4 quadrants; No hepatomegaly. No splenomegaly.  EXTREMITIES:  2+ Peripheral Pulses. Capillary refill <2 seconds. No clubbing, cyanosis, or edema  NERVOUS SYSTEM:  Alert & Oriented X1, speech clear. No deficits   MSK: FROM all 4 extremities, full and equal strength  SKIN: intertrigo, heel stage 1 pressure injury, stable; stage 2 buttock

## 2024-03-14 NOTE — PROGRESS NOTE ADULT - NS ATTEND AMEND GEN_ALL_CORE FT
89F w/ PMH significant for afib and dementia who presented with ileus and marked sigmoid redundancy. No evidence of sigmoid volvulus. No acute abdomen. She is afebrile hemodynamically stable.     Yesterday's rectal exam yielded torrential amounts of liquid stool and gas which were released which were under a great amount of pressure. This was followed by an enema today. AXR this am, however, revealed continued markedly distended sigmoid colon. Abdomen however, still soft, distended, tympanitic, but non-tender, no rebound or guarding. Patient subjectively denied any pain, nausea, or vomiting.    At this point, I expressed to Dr. Wood that the patient likely need decompressive scope with rectal tube placement by GI. However, I was informed the consult from yesterday was canceled by the primary team. I notified the GI NP who discussed the case with Dr. Levine who is planning on the above the procedure. At this point, there may be indication for evaluation of potential resection of the redundant sigmoid. However, will defer to colorectal evaluation.    - Daily electrolyte repletion. K goal of 4.0 Mg goal of 2 Phos goal of 4 and normal calcium.  - No acute surgical intervention.    Plan was discussed at length with the surgical PA as well as GI NP, Dr. Levine (PAUL) and the family resident taking care of the patient, Dr. Wood.
Agree
Agree
Agree with the assessment and plan of SELENA Carpio.
Patient seen and examined at the bedside. Agree with the assessment and plan of NP Glynn.  Rectal tube out. Pt resting, denies pain. Abdomen soft, nontender. Agree with observation, medical management of constipation, and repeat AXR tomorrow.
Agree with the assessment and plan of SELENA Maki.
Agree with the assessment and plan of NP Celina.  S/P colonoscopy with decompression, improved today.  Monitor abdominal distension.  Agree with long-term goals of care discussion.
Patient seen and examined at the bedside. Agree with the assessment and plan of SELENA Patrick. Plan for colonoscopic decompression tomorrow.
Agree with the assessment and plan of SELENA Patrick.

## 2024-03-15 ENCOUNTER — LABORATORY RESULT (OUTPATIENT)
Age: 89
End: 2024-03-15

## 2024-03-15 VITALS
SYSTOLIC BLOOD PRESSURE: 148 MMHG | DIASTOLIC BLOOD PRESSURE: 77 MMHG | TEMPERATURE: 98 F | OXYGEN SATURATION: 96 % | RESPIRATION RATE: 17 BRPM | HEART RATE: 60 BPM

## 2024-03-15 LAB
ALBUMIN SERPL ELPH-MCNC: 2.6 G/DL — LOW (ref 3.3–5)
ALP SERPL-CCNC: 146 U/L — HIGH (ref 40–120)
ALT FLD-CCNC: 6 U/L — LOW (ref 10–45)
ANION GAP SERPL CALC-SCNC: 9 MMOL/L — SIGNIFICANT CHANGE UP (ref 5–17)
AST SERPL-CCNC: 11 U/L — SIGNIFICANT CHANGE UP (ref 10–40)
BILIRUB SERPL-MCNC: 0.8 MG/DL — SIGNIFICANT CHANGE UP (ref 0.2–1.2)
BUN SERPL-MCNC: 12 MG/DL — SIGNIFICANT CHANGE UP (ref 7–23)
CALCIUM SERPL-MCNC: 9.2 MG/DL — SIGNIFICANT CHANGE UP (ref 8.4–10.5)
CHLORIDE SERPL-SCNC: 108 MMOL/L — SIGNIFICANT CHANGE UP (ref 96–108)
CO2 SERPL-SCNC: 24 MMOL/L — SIGNIFICANT CHANGE UP (ref 22–31)
CREAT SERPL-MCNC: 1.28 MG/DL — SIGNIFICANT CHANGE UP (ref 0.5–1.3)
EGFR: 40 ML/MIN/1.73M2 — LOW
GLUCOSE SERPL-MCNC: 98 MG/DL — SIGNIFICANT CHANGE UP (ref 70–99)
HCT VFR BLD CALC: 26.4 % — LOW (ref 34.5–45)
HGB BLD-MCNC: 8.5 G/DL — LOW (ref 11.5–15.5)
MAGNESIUM SERPL-MCNC: 2 MG/DL — SIGNIFICANT CHANGE UP (ref 1.6–2.6)
MCHC RBC-ENTMCNC: 30.1 PG — SIGNIFICANT CHANGE UP (ref 27–34)
MCHC RBC-ENTMCNC: 32.2 GM/DL — SIGNIFICANT CHANGE UP (ref 32–36)
MCV RBC AUTO: 93.6 FL — SIGNIFICANT CHANGE UP (ref 80–100)
NRBC # BLD: 0 /100 WBCS — SIGNIFICANT CHANGE UP (ref 0–0)
PHOSPHATE SERPL-MCNC: 2.9 MG/DL — SIGNIFICANT CHANGE UP (ref 2.5–4.5)
PLATELET # BLD AUTO: 216 K/UL — SIGNIFICANT CHANGE UP (ref 150–400)
POTASSIUM SERPL-MCNC: 3.4 MMOL/L — LOW (ref 3.5–5.3)
POTASSIUM SERPL-SCNC: 3.4 MMOL/L — LOW (ref 3.5–5.3)
PROT SERPL-MCNC: 6.6 G/DL — SIGNIFICANT CHANGE UP (ref 6–8.3)
RBC # BLD: 2.82 M/UL — LOW (ref 3.8–5.2)
RBC # FLD: 15.9 % — HIGH (ref 10.3–14.5)
SODIUM SERPL-SCNC: 141 MMOL/L — SIGNIFICANT CHANGE UP (ref 135–145)
WBC # BLD: 6.42 K/UL — SIGNIFICANT CHANGE UP (ref 3.8–10.5)
WBC # FLD AUTO: 6.42 K/UL — SIGNIFICANT CHANGE UP (ref 3.8–10.5)

## 2024-03-15 PROCEDURE — 83690 ASSAY OF LIPASE: CPT

## 2024-03-15 PROCEDURE — 74018 RADEX ABDOMEN 1 VIEW: CPT

## 2024-03-15 PROCEDURE — 87046 STOOL CULTR AEROBIC BACT EA: CPT

## 2024-03-15 PROCEDURE — 74176 CT ABD & PELVIS W/O CONTRAST: CPT | Mod: MC

## 2024-03-15 PROCEDURE — 85610 PROTHROMBIN TIME: CPT

## 2024-03-15 PROCEDURE — 83735 ASSAY OF MAGNESIUM: CPT

## 2024-03-15 PROCEDURE — 85025 COMPLETE CBC W/AUTO DIFF WBC: CPT

## 2024-03-15 PROCEDURE — 87507 IADNA-DNA/RNA PROBE TQ 12-25: CPT

## 2024-03-15 PROCEDURE — 80053 COMPREHEN METABOLIC PANEL: CPT

## 2024-03-15 PROCEDURE — 84132 ASSAY OF SERUM POTASSIUM: CPT

## 2024-03-15 PROCEDURE — 87045 FECES CULTURE AEROBIC BACT: CPT

## 2024-03-15 PROCEDURE — 36415 COLL VENOUS BLD VENIPUNCTURE: CPT

## 2024-03-15 PROCEDURE — 96374 THER/PROPH/DIAG INJ IV PUSH: CPT

## 2024-03-15 PROCEDURE — 80048 BASIC METABOLIC PNL TOTAL CA: CPT

## 2024-03-15 PROCEDURE — 87086 URINE CULTURE/COLONY COUNT: CPT

## 2024-03-15 PROCEDURE — 85027 COMPLETE CBC AUTOMATED: CPT

## 2024-03-15 PROCEDURE — 85730 THROMBOPLASTIN TIME PARTIAL: CPT

## 2024-03-15 PROCEDURE — 87177 OVA AND PARASITES SMEARS: CPT

## 2024-03-15 PROCEDURE — 99239 HOSP IP/OBS DSCHRG MGMT >30: CPT

## 2024-03-15 PROCEDURE — 96361 HYDRATE IV INFUSION ADD-ON: CPT

## 2024-03-15 PROCEDURE — 74019 RADEX ABDOMEN 2 VIEWS: CPT

## 2024-03-15 PROCEDURE — 0225U NFCT DS DNA&RNA 21 SARSCOV2: CPT

## 2024-03-15 PROCEDURE — 84100 ASSAY OF PHOSPHORUS: CPT

## 2024-03-15 PROCEDURE — 71045 X-RAY EXAM CHEST 1 VIEW: CPT

## 2024-03-15 PROCEDURE — 93005 ELECTROCARDIOGRAM TRACING: CPT

## 2024-03-15 PROCEDURE — 97162 PT EVAL MOD COMPLEX 30 MIN: CPT

## 2024-03-15 PROCEDURE — 99285 EMERGENCY DEPT VISIT HI MDM: CPT | Mod: 25

## 2024-03-15 RX ORDER — WARFARIN SODIUM 2.5 MG/1
1 TABLET ORAL
Refills: 0 | DISCHARGE

## 2024-03-15 RX ORDER — WARFARIN SODIUM 2.5 MG/1
0.5 TABLET ORAL
Refills: 0 | DISCHARGE

## 2024-03-15 RX ORDER — APIXABAN 2.5 MG/1
1 TABLET, FILM COATED ORAL
Qty: 0 | Refills: 0 | DISCHARGE
Start: 2024-03-15

## 2024-03-15 RX ORDER — SENNA PLUS 8.6 MG/1
1 TABLET ORAL
Qty: 30 | Refills: 0
Start: 2024-03-15 | End: 2024-04-13

## 2024-03-15 RX ORDER — SIMETHICONE 80 MG/1
1 TABLET, CHEWABLE ORAL
Qty: 30 | Refills: 0
Start: 2024-03-15 | End: 2024-04-13

## 2024-03-15 RX ORDER — POLYETHYLENE GLYCOL 3350 17 G/17G
17 POWDER, FOR SOLUTION ORAL
Qty: 2 | Refills: 0
Start: 2024-03-15 | End: 2024-04-13

## 2024-03-15 RX ADMIN — MEMANTINE HYDROCHLORIDE 5 MILLIGRAM(S): 10 TABLET ORAL at 05:32

## 2024-03-15 RX ADMIN — APIXABAN 5 MILLIGRAM(S): 2.5 TABLET, FILM COATED ORAL at 05:32

## 2024-03-15 RX ADMIN — SODIUM CHLORIDE 75 MILLILITER(S): 9 INJECTION, SOLUTION INTRAVENOUS at 09:06

## 2024-03-15 RX ADMIN — Medication 25 MICROGRAM(S): at 05:32

## 2024-03-15 RX ADMIN — POLYETHYLENE GLYCOL 3350 17 GRAM(S): 17 POWDER, FOR SOLUTION ORAL at 05:33

## 2024-03-15 NOTE — PROGRESS NOTE ADULT - ASSESSMENT
89F h/o dementia, Afib on warfarin, Hyperlipidemia, hypothyroidism, HTN, spinal stenosis, has pacemaker, presented to [facility] from home for diarrhea    #Ileus   -Surgery consulted due to abdominal distension 3/4/24- rectal exam yielded copious amounts of liquid stool and gas  -PPM interrogated   -stool culture/GI PCR negative  -c/w Senna daily, Miralax BID, enemas PRN  -turn frequently in bed   -abd xray 3/8/24: Redundant air distended sigmoid colon, maximal luminal diameter measuring 10.5 cm with a proximal air-filled colon.  -monitor without rectal tube with no improvement  -repeat abdominal xray in the AM: non obstructive bowel gas pattern with improvement in bowel dilation.  -palliative consulted  -Colorectal reccs appreciated, no surgical intervention  -S/P decompression colonoscopy: gas removed  -Cont bowel regimen  -Goals of care discussion with family    #Diarrhea, Acute- unclear etiology    -Labs and vitals are stable   -CT abd/pelvis: Fluid in the rectosigmoid colon again noted with increase in degree of air distention of the sigmoid colon since 10/11/2023  -S/P rectal tube with improvement and recurrent when removed    #Hypernatremia - resolved  -Na 144 today    #Hypokalemia  -K 3.5 today  -Start KCl supplementation  -F/up K+ levels    #hypophosphatemia  -PO 2.6  -continue monitoring    #CKD  - Cr near baseline, improving     #Pressure injury, heel, sacrum  -decubitus prevention protocol    #Afib on warfarin  -at home takes warfarin 1.25mg Monday, Thursday, takes 2.5mg other days  - INR 3.07 on admission,  - INR TODAY-1.9   -will transition to Apixaban 2.5 mg po bid   -Resume apixaban post colonoscopy  -Eliquis 5mg BID confirmed covered by insurance per pharmacy    #Dementia  - c/w memantine 5mg,  quetiapine 50mg    #HTN  - c/w  home enalapril  and Lasix     #HLD  - atorvastatin 10mg    #Hypothyroidism  - levothyroxine 25mcg    #h/o gout  - c/w  allopurinol 100mg    #VTE ppx  - coumadin daily dosing  -transitioned to warfarin, daily INR    #GOC  -Palliative consult reccs appreciated, Home hospice discussed  -Now DNR/DNI  -Form completed this admission as documented by palliative team  -Daughter has full time aide for patient and wishes for home PT- once medically optimized-           89F h/o dementia, Afib on warfarin, Hyperlipidemia, hypothyroidism, HTN, spinal stenosis, has pacemaker, presented to [facility] from home for diarrhea    #Ileus   -Surgery consulted due to abdominal distension 3/4/24- rectal exam yielded copious amounts of liquid stool and gas  -PPM interrogated   -stool culture/GI PCR negative  -c/w Senna daily, Miralax BID, enemas PRN  -turn frequently in bed   -abd xray 3/8/24: Redundant air distended sigmoid colon, maximal luminal diameter measuring 10.5 cm with a proximal air-filled colon.  -monitor without rectal tube with no improvement  -repeat abdominal xray in the AM: non obstructive bowel gas pattern with improvement in bowel dilation.  -palliative consulted  -Colorectal reccs appreciated, no surgical intervention  -S/P decompression colonoscopy: gas removed  -Cont bowel regimen  -Goals of care discussion with family    #Diarrhea, Acute- unclear etiology    -Labs and vitals are stable   -CT abd/pelvis: Fluid in the rectosigmoid colon again noted with increase in degree of air distention of the sigmoid colon since 10/11/2023  -S/P rectal tube with improvement and recurrent when removed    #Hypernatremia - resolved  -Na 144      #Hypokalemia  -K 3.5      #hypophosphatemia  -PO 2.6  -continue monitoring    #CKD  - Cr near baseline, improving     #Pressure injury, heel, sacrum  -decubitus prevention protocol    #Afib on warfarin  -at home takes warfarin 1.25mg Monday, Thursday, takes 2.5mg other days  -INR 3.07 on admission  -Resume apixaban post colonoscopy  -Eliquis 5mg BID confirmed covered by insurance per pharmacy  -Daughter updated of switch to apixaban    #Dementia  - c/w memantine 5mg,  quetiapine 50mg    #HTN  - c/w  home enalapril  and Lasix     #HLD  - atorvastatin 10mg    #Hypothyroidism  - levothyroxine 25mcg    #h/o gout  - c/w  allopurinol 100mg    #VTE ppx  - coumadin daily dosing  -transitioned to warfarin, daily INR    #GOC  -Palliative consult reccs appreciated, Home hospice discussed  -Now DNR/DNI  -Form completed this admission as documented by palliative team  -Daughter has full time aide for patient and wishes for home PT- once medically optimized-

## 2024-03-15 NOTE — PROGRESS NOTE ADULT - ATTENDING COMMENTS
PE: A+Ox1, no murmurs, lungs CTA b/l, abd +distension, hyperactive bowel sounds, non-tender, no lower extremity swelling    Assessment:   - Ileus and colonic distension s/p rectal tube s/p sigmoid decompression   - Hypernatremia, hypokalemia, hypophosphatemia (resolved)   - Pressure ulcers (heel and sacrum)   - History of dementia, Afib, HLD, hypothyroidism, HTN, spinal stenosis, has pacemaker     Plan:   - Pt was transitioned to Eliquis   - c/w bowel regimen   - Colonic decompression is not a long term solution per GI   - No surgical intervention per colorectal   - Electrolytes improved   - Palliative recs appreciated - daughter wants to take pt home with home care, can have hospice eval outpt   - Code status: DNR/DNI   - Dispo: d/c today at 10 am     I spent a total of 45 minutes on the date of this encounter coordinating the patient's care. This includes reviewing results/imaging and discussions with specialists, nursing, case management/social work. Further tests, medications, and procedures have been ordered as indicated. Results and the plan of care were communicated to the patient and/or their family member. Supporting documentation was completed and added to the patient's chart.

## 2024-03-15 NOTE — PROGRESS NOTE ADULT - NUTRITIONAL ASSESSMENT
This patient has been assessed with a concern for Malnutrition and has been determined to have a diagnosis/diagnoses of Severe protein-calorie malnutrition.    This patient is being managed with:   Diet Regular-  Entered: Mar 14 2024 10:32AM

## 2024-03-15 NOTE — PROGRESS NOTE ADULT - REASON FOR ADMISSION
diarrhea

## 2024-03-15 NOTE — PROGRESS NOTE ADULT - SUBJECTIVE AND OBJECTIVE BOX
Patient is a 89y old  Female who presents with a chief complaint of diarrhea (09 Mar 2024 10:18)      INTERVAL HPI/OVERNIGHT EVENTS: Patient seen and examined at bedside. No overnight events.      REVIEW OF SYSTEMS:  CONSTITUTIONAL: No fever or chills  HEENT:  No headache, no sore throat  RESPIRATORY: No cough, wheezing, or shortness of breath  CARDIOVASCULAR: No chest pain, palpitations  GASTROINTESTINAL: distension, No abd pain, nausea, vomiting, or diarrhea  GENITOURINARY: No dysuria, frequency, or hematuria  NEUROLOGICAL: no focal weakness or dizziness  MUSCULOSKELETAL: no myalgias       Vital Signs Last 24 Hrs          PHYSICAL EXAM:  CONSTITUTIONAL: NAD  EYES: EOMI, No conjunctival or scleral injection, non-icteric  ENMT: slightly dry mucous membranes  RESP: CTA b/l, normal respiratory effort  CV: RRR, systolic murmur LSB, no MRG; no peripheral edema  GI: abd distension improved, Soft, NT, no rebound, no guarding; no palpable masses  MSK: No digital clubbing or cyanosis  SKIN: intertrigo of groin, heel stage 1 pressure injury, stage 2 buttock  NEURO: grossly moves all 4 extremities  PSYCH: A+O x 1, baseline dementia, poor insight, pleasant affect      LABS: Personally reviewed  CBC                                                   MEDICATIONS  (STANDING):  allopurinol 100 milliGRAM(s) Oral daily  atorvastatin 10 milliGRAM(s) Oral at bedtime  cholecalciferol 1000 Unit(s) Oral daily  dextrose 5%. 1000 milliLiter(s) (75 mL/Hr) IV Continuous <Continuous>  levothyroxine 25 MICROGram(s) Oral daily  memantine 5 milliGRAM(s) Oral two times a day  polyethylene glycol 3350 17 Gram(s) Oral every 12 hours  QUEtiapine 50 milliGRAM(s) Oral at bedtime  senna 1 Tablet(s) Oral daily  simethicone 80 milliGRAM(s) Chew daily    MEDICATIONS  (PRN):  acetaminophen     Tablet .. 650 milliGRAM(s) Oral every 6 hours PRN Temp greater or equal to 38C (100.4F), Mild Pain (1 - 3)  aluminum hydroxide/magnesium hydroxide/simethicone Suspension 30 milliLiter(s) Oral every 4 hours PRN Dyspepsia  melatonin 3 milliGRAM(s) Oral at bedtime PRN Insomnia  ondansetron Injectable 4 milliGRAM(s) IV Push every 8 hours PRN Nausea and/or Vomiting               Patient is a 89y old  Female who presents with a chief complaint of diarrhea (09 Mar 2024 10:18)      INTERVAL HPI/OVERNIGHT EVENTS: Patient seen and examined at bedside. No overnight events.      REVIEW OF SYSTEMS:  CONSTITUTIONAL: No fever or chills  HEENT:  No headache, no sore throat  RESPIRATORY: No cough, wheezing, or shortness of breath  CARDIOVASCULAR: No chest pain, palpitations  GASTROINTESTINAL: distension, No abd pain, nausea, vomiting, or diarrhea  GENITOURINARY: No dysuria, frequency, or hematuria  NEUROLOGICAL: no focal weakness or dizziness  MUSCULOSKELETAL: no myalgias       Vital Signs Last 24 Hrs  T(C): 36.6 (15 Mar 2024 05:19), Max: 36.6 (14 Mar 2024 20:05)  T(F): 97.8 (15 Mar 2024 05:19), Max: 97.8 (14 Mar 2024 20:05)  HR: 60 (15 Mar 2024 05:19) (60 - 69)  BP: 148/77 (15 Mar 2024 05:19) (136/80 - 154/87)  BP(mean): --  RR: 17 (15 Mar 2024 05:19) (17 - 18)  SpO2: 96% (15 Mar 2024 05:19) (96% - 98%)    Parameters below as of 15 Mar 2024 05:19  Patient On (Oxygen Delivery Method): room air            PHYSICAL EXAM:  CONSTITUTIONAL: NAD  EYES: EOMI, No conjunctival or scleral injection, non-icteric  ENMT: slightly dry mucous membranes  RESP: CTA b/l, normal respiratory effort  CV: RRR, systolic murmur LSB, no MRG; no peripheral edema  GI: abd distension improved, Soft, NT, no rebound, no guarding; no palpable masses  MSK: No digital clubbing or cyanosis  SKIN: intertrigo of groin, heel stage 1 pressure injury, stage 2 buttock  NEURO: grossly moves all 4 extremities  PSYCH: A+O x 1, baseline dementia, poor insight, pleasant affect      LABS: Personally reviewed  CBC                                          MEDICATIONS  (STANDING):  allopurinol 100 milliGRAM(s) Oral daily  atorvastatin 10 milliGRAM(s) Oral at bedtime  cholecalciferol 1000 Unit(s) Oral daily  dextrose 5%. 1000 milliLiter(s) (75 mL/Hr) IV Continuous <Continuous>  levothyroxine 25 MICROGram(s) Oral daily  memantine 5 milliGRAM(s) Oral two times a day  polyethylene glycol 3350 17 Gram(s) Oral every 12 hours  QUEtiapine 50 milliGRAM(s) Oral at bedtime  senna 1 Tablet(s) Oral daily  simethicone 80 milliGRAM(s) Chew daily    MEDICATIONS  (PRN):  acetaminophen     Tablet .. 650 milliGRAM(s) Oral every 6 hours PRN Temp greater or equal to 38C (100.4F), Mild Pain (1 - 3)  aluminum hydroxide/magnesium hydroxide/simethicone Suspension 30 milliLiter(s) Oral every 4 hours PRN Dyspepsia  melatonin 3 milliGRAM(s) Oral at bedtime PRN Insomnia  ondansetron Injectable 4 milliGRAM(s) IV Push every 8 hours PRN Nausea and/or Vomiting

## 2024-03-15 NOTE — PROGRESS NOTE ADULT - PROVIDER SPECIALTY LIST ADULT
Family Medicine
Gastroenterology
Family Medicine
Gastroenterology
Surgery
Family Medicine
Family Medicine
Gastroenterology
Family Medicine
Gastroenterology
Palliative Care
Family Medicine
Family Medicine
Gastroenterology

## 2024-03-19 ENCOUNTER — LABORATORY RESULT (OUTPATIENT)
Age: 89
End: 2024-03-19

## 2024-03-21 ENCOUNTER — APPOINTMENT (OUTPATIENT)
Dept: FAMILY MEDICINE | Facility: CLINIC | Age: 89
End: 2024-03-21

## 2024-04-09 ENCOUNTER — APPOINTMENT (OUTPATIENT)
Dept: HOME HEALTH SERVICES | Facility: HOME HEALTH | Age: 89
End: 2024-04-09
Payer: MEDICARE

## 2024-04-09 VITALS
SYSTOLIC BLOOD PRESSURE: 130 MMHG | DIASTOLIC BLOOD PRESSURE: 64 MMHG | OXYGEN SATURATION: 94 % | HEART RATE: 60 BPM | TEMPERATURE: 98 F

## 2024-04-09 DIAGNOSIS — Z87.898 PERSONAL HISTORY OF OTHER SPECIFIED CONDITIONS: ICD-10-CM

## 2024-04-09 DIAGNOSIS — Z71.89 OTHER SPECIFIED COUNSELING: ICD-10-CM

## 2024-04-09 DIAGNOSIS — F03.90 UNSPECIFIED DEMENTIA W/OUT BEHAVIORAL DISTURBANCE: ICD-10-CM

## 2024-04-09 DIAGNOSIS — I48.91 UNSPECIFIED ATRIAL FIBRILLATION: ICD-10-CM

## 2024-04-09 DIAGNOSIS — E66.9 OBESITY, UNSPECIFIED: ICD-10-CM

## 2024-04-09 DIAGNOSIS — Z85.3 PERSONAL HISTORY OF MALIGNANT NEOPLASM OF BREAST: ICD-10-CM

## 2024-04-09 DIAGNOSIS — Z90.10 ACQUIRED ABSENCE OF UNSPECIFIED BREAST AND NIPPLE: ICD-10-CM

## 2024-04-09 PROCEDURE — 99497 ADVNCD CARE PLAN 30 MIN: CPT

## 2024-04-09 PROCEDURE — 99344 HOME/RES VST NEW MOD MDM 60: CPT | Mod: 25

## 2024-04-09 PROCEDURE — G0506: CPT

## 2024-04-09 NOTE — COUNSELING
[Obese -  ( BMI  >29.9 )] : obese - ( BMI  >29.9 ) [] : abdominal aortic ultrasound [Date: ___] : colonoscopy completed on [unfilled] [DNR] : Code Status: DNR [Limited] : Treatment Guidelines: Limited [DNI] : Intubation: DNI [[facility]: _____[facility] [[facility]] [_____] : HCP: [unfilled] [ - New patient with 2 or more chronic conditions; CCM discussed and patient-centered care plan established] : New patient with 2 or more chronic conditions; CCM discussed and patient-centered care plan established

## 2024-04-11 NOTE — PHYSICAL EXAM
[Normal Sclera/Conjunctiva] : normal sclera/conjunctiva [PERRL] : pupils equal, round and reactive to light [Normal Outer Ear/Nose] : the ears and nose were normal in appearance [Normal Oropharynx] : the oropharynx was normal [No JVD] : no jugular venous distention [No Respiratory Distress] : no respiratory distress [Breast Exam Declined] : patient declined to have breast exam done [Patient Refused] : rectal exam was refused by the patient [No CVA Tenderness] : no ~M costovertebral angle tenderness [Kyphosis] :  kyphosis present [Normal Bowel Sounds] : normal bowel sounds [Non Tender] : non-tender [Soft] : abdomen soft [No Skin Lesions] : no skin lesions [No Motor Deficits] : the motor exam was normal [Normal Affect] : the affect was normal [de-identified] : obese  in bed  cooperative  [de-identified] : poor effort   [de-identified] :  irregular  [de-identified] : obese and softly distended abdomen [de-identified] : antalgic  unsteady  decreased  strength and tone Non ambulatory  remove clutter   always call for assistance with  transfers [de-identified] :   weak

## 2024-04-11 NOTE — DATA REVIEWED
[FreeTextEntry1] : all blood work  urine culture  and radiology reviewed  discussed with patient /caretakers   at length and in detail

## 2024-04-11 NOTE — CHRONIC CARE ASSESSMENT
[Patient Non-adherent to care plan] : patient non-adherent to care plan [Can not Exercise (Disability)] : Exercise: The patient can not exercise due to disability [Low Salt Diet] : low salt [General Adherence] : and is generally adherent [PPS Score: ____] : Palliative Performance Scale (PPS) Score: [unfilled] [FAST Score: ____] : Functional Assessment Scale (FAST) Score: [unfilled]

## 2024-04-11 NOTE — REVIEW OF SYSTEMS
[Fatigue] : fatigue [Recent Change In Weight] : ~T recent weight change [Vision Problems] : vision problems [Hearing Loss] : hearing loss [Diarrhea] : diarrhea [Muscle Weakness] : muscle weakness [Memory Loss] : memory loss [Unsteady Walking] : ataxia [Depression] : depression [Negative] : Psychiatric [FreeTextEntry3] :  had cataract [FreeTextEntry7] : gas

## 2024-04-11 NOTE — REASON FOR VISIT
[Initial Eval - Existing Diagnosis] : an initial evaluation of an existing diagnosis [Family Member] : family member [Pre-Visit Preparation] : pre-visit preparation was done [Intercurrent Specialty/Sub-specialty Visits] : the patient has intercurrent specialty/sub-specialty visits

## 2024-04-11 NOTE — HISTORY OF PRESENT ILLNESS
[Family Member] : family member [FreeTextEntry1] :  dementia  [FreeTextEntry2] : patient is seen today for initial visit and enrollment into  a house call  program along with care manager patient is homebound due  dementia  89F h/o dementia, Afib , HLD, hypothyroidism, HTN, CKD, spinal stenosis, has pacemaker,   most of the history obtained from family member  daughter camilo  Past medical history include   pacemaker  CAD  afib   OA bedbound    patient  still follows with   specialists  last hospitalization  3/2024  ileus  diet regular  appetite   poor   has to be fed  dysphagia  none  Weight decreased  same   constipation  none  incontinence  dually  pressure/bed sores skin breakdown  Ambulates completely bedbound  falls  none Behavior  depressed  agitated  Mood  depressed   memory  poor    sleep   poor now on seroquel  sensory deficits   pain Medication refills done and reconciliation  done  Goals of care discussed and completed

## 2024-04-21 NOTE — DISCHARGE NOTE NURSING/CASE MANAGEMENT/SOCIAL WORK - NSDCPEFALRISK_GEN_ALL_CORE
Infectious Diseases Consultation    Inpatient consult to Infectious Diseases  Consult performed by: Jany Russ FNP  Consult ordered by: Fito Haskins PA-C      HPI:   77 y/o female who is known to our service. She has a Hx of HTN, breast CA, DM Type II, hepatic cirrhosis, pancreatitis and Staphylococcus bacteremia with retained mediport on chronic suppression with Doxycycline who presented to ER on 4/18 with c/o fever, myalgia, headache, neck pain and difficulty urinating. On admit she was febrile without leukocytosis, ESR 20 and CRP 49.9. Blood cultures revealing Staph Epi 2/2 sets. Influenza A/B Ag (-), RSV PCR (-) and SARS-CoV-2 PCR (-). CXR unremarkable. CTA chest with contrast showed no PE, new 11 mm solid nodule RLL.  She is currently on Vancomycin and Zosyn    Past Medical and Surgical History  Allergies :   Hydromorphone and Opium    Medical :   She has a past medical history of Acute URI, Anxiety and depression, Back pain, Breast cancer, Cholelithiasis, Contaminated small bowel syndrome, DVT (deep venous thrombosis), Edema, lower extremity, Gallstone pancreatitis, HTN (hypertension), Hypercholesterolemia, Insomnia, Irregular heart beat, Ischemic disease of gut, Laryngitis, Malabsorption, Meningitis, unspecified, OA (osteoarthritis), PVD (peripheral vascular disease), Rheumatoid arthritis, unspecified, Staph infection, Tremor, Unspecified cataract, Unspecified cirrhosis of liver (06/20/2023), and Venous insufficiency.    Surgical :   She has a past surgical history that includes Phacoemulsification of cataract (Left, 12/2016); Phacoemulsification of cataract (Right, 11/2016); Appendectomy; GI Biopsy (02/15/2022); GI Biopsy (12/2018); Bowel resection; Mediport insertion, single (06/2021); Mediport insertion, single (07/2020); Mediport insertion, single (12/2018); Removal of catheter (12/2020); Cholecystectomy (05/2015); Colonoscopy (02/2022); Cyst Removal (Right); Cystoscopy w/ ureteral stent 
placement (12/2020); Cystoscopy w/ ureteral stent removal (04/2021); Esophagogastroduodenoscopy (06/20/2023); ERCP w/ sphincterotomy and stone removal (04/2015); LAPAROTOMY, EXPLORATORY (06/2015); Ventral hernia repair (04/2016); Hysterectomy; Lithotripsy (Left, 04/2021); Lithotripsy (Left, 03/2021); Breast lumpectomy (Right); Mediport removal (07/2020); PVD surgery; PICC line Removal (Right, 06/2021); Polypectomy (02/2022); Shoulder surgery (Right); Sphincterotomy of urethra; Cystoscopy w/ stone manipulation; cystoureteroscopy, with holmium laser lithotripsy of ureteral calculus and stent insertion (Left, 05/02/2023); and egd, with closed biopsy (N/A, 6/20/2023).     Family History  Her family history includes Aneurysm in her father; Breast cancer in her mother and sister; Coronary artery disease in her brother; Depression in her mother; Diabetes in her father; Heart attack in her father; Hyperlipidemia in her brother, brother, father, and sister; Hypertension in her brother, brother, father, and sister; Kidney cancer in her sister; Osteoarthritis in her mother and sister; Pneumonia in her mother; Uterine cancer in her mother.    Social History  She reports that she has never smoked. She has never used smokeless tobacco. She reports that she does not currently use alcohol. She reports that she does not use drugs.     ROS  Constitutional:  Positive for malaise/fatigue.   HENT: Negative.     Eyes: Negative.    Respiratory: Negative.     Cardiovascular: Negative.    Gastrointestinal: Negative.    Genitourinary: Negative.    Musculoskeletal: Negative.    Skin: Negative.    Neurological: Negative.    All other systems reviewed and are negative.    Objective   Physical Exam  Vitals reviewed.   Constitutional:       Appearance: She is obese.   HENT:      Head: Normocephalic.   Cardiovascular:      Rate and Rhythm: Normal rate and regular rhythm.   Pulmonary:      Effort: Pulmonary effort is normal. No respiratory 
"distress.      Breath sounds: Normal breath sounds. No wheezing.   Abdominal:      General: Bowel sounds are normal. There is no distension.      Palpations: Abdomen is soft.      Tenderness: There is no abdominal tenderness.   Musculoskeletal:         General: Normal range of motion.      Cervical back: Normal range of motion.   Skin:     Findings: No rash.      Comments: R CW mediport, site benign. R breast mass   Neurological:      Mental Status: She is alert and oriented to person, place, and time.   Psychiatric:         Mood and Affect: Mood normal.         Behavior: Behavior normal.        VITAL SIGNS: 24 HR MIN & MAX LAST    Temp  Min: 97.6 °F (36.4 °C)  Max: 98.6 °F (37 °C)  98.4 °F (36.9 °C)        BP  Min: 120/56  Max: 159/77  125/66     Pulse  Min: 64  Max: 72  70     Resp  Min: 19  Max: 19  19    SpO2  Min: 94 %  Max: 98 %  95 %      HT: 5' 4" (162.6 cm)  WT: 73 kg (160 lb 15 oz)  BMI: 27.6     Recent Results (from the past 24 hour(s))   VANCOMYCIN, TROUGH    Collection Time: 04/20/24  7:24 PM   Result Value Ref Range    Vancomycin Trough 5.7 (L) 15.0 - 20.0 ug/ml   Protime-INR    Collection Time: 04/21/24  7:22 AM   Result Value Ref Range    PT 27.3 (H) 12.5 - 14.5 seconds    INR 2.6 (H) <=1.3   Comprehensive Metabolic Panel    Collection Time: 04/21/24  7:22 AM   Result Value Ref Range    Sodium Level 140 136 - 145 mmol/L    Potassium Level 4.6 3.5 - 5.1 mmol/L    Chloride 110 (H) 98 - 107 mmol/L    Carbon Dioxide 21 (L) 23 - 31 mmol/L    Glucose Level 104 82 - 115 mg/dL    Blood Urea Nitrogen 16.3 9.8 - 20.1 mg/dL    Creatinine 0.59 0.55 - 1.02 mg/dL    Calcium Level Total 8.5 8.4 - 10.2 mg/dL    Protein Total 6.4 5.8 - 7.6 gm/dL    Albumin Level 2.9 (L) 3.4 - 4.8 g/dL    Globulin 3.5 2.4 - 3.5 gm/dL    Albumin/Globulin Ratio 0.8 (L) 1.1 - 2.0 ratio    Bilirubin Total 0.4 <=1.5 mg/dL    Alkaline Phosphatase 77 40 - 150 unit/L    Alanine Aminotransferase 20 0 - 55 unit/L    Aspartate Aminotransferase "
25 5 - 34 unit/L    eGFR >60 mls/min/1.73/m2       Imaging  4/18/24 CTA chest with contrast  Impression:   No pulmonary embolism identified.   New 11 mm solid nodule in the right lower lobe, likely infectious or inflammatory.  Three month follow-up chest CT recommended to ensure resolution.    4/18/24 CXR   Impression:     No active pulmonary disease     Impression  Recurrent Staphylococcus bacteremia  Infected mediport  RLL pulmonary nodule  DM Type II  RA  Hx of short gut syndrome  Hepatic cirrhosis  Anemia  Thrombocytosis  Elevated CRP  Hx of R breast CA     Recommendations  I agree with the management of Mrs Use. This is a 79 y/o female who presented to ER with fever, myalgias, headache, neck pain and difficulty urinating. On admit she was febrile without leukocytosis. Blood cultures with Staph Epi 2/2 sets. CXR with no acute findings. CTA chest with RLL pulmonary nodule. Of note she has a history of Staph Epi bacteremia with retained mediport on chronic suppression with Doxycycline. In addition she was seen by her PCP on 2/14 and was noted to have fever, blood cultures were obtained and revealed Staph Hominis. We will continue Vancomycin and D/C Zosyn. Recommend mediport removal. Discussed with Dr Brice as well as patient, friend and nursing. Thank you for this consultation, we will continue to follow Mrs Use with you.                 
For information on Fall & Injury Prevention, visit: [email or web address] OR  [email or web address] OR  https://www.cdc.gov/steadi/patient.html
For information on Fall & Injury Prevention, visit: [email or web address] OR  [email or web address] OR  https://www.cdc.gov/steadi/patient.html

## 2024-05-07 ENCOUNTER — NON-APPOINTMENT (OUTPATIENT)
Age: 89
End: 2024-05-07

## 2024-05-07 ENCOUNTER — TRANSCRIPTION ENCOUNTER (OUTPATIENT)
Age: 89
End: 2024-05-07

## 2024-05-08 ENCOUNTER — APPOINTMENT (OUTPATIENT)
Dept: HOME HEALTH SERVICES | Facility: HOME HEALTH | Age: 89
End: 2024-05-08

## 2024-05-08 ENCOUNTER — NON-APPOINTMENT (OUTPATIENT)
Age: 89
End: 2024-05-08

## 2024-05-08 ENCOUNTER — TRANSCRIPTION ENCOUNTER (OUTPATIENT)
Age: 89
End: 2024-05-08

## 2024-05-08 DIAGNOSIS — R19.7 DIARRHEA, UNSPECIFIED: ICD-10-CM

## 2024-05-09 ENCOUNTER — NON-APPOINTMENT (OUTPATIENT)
Age: 89
End: 2024-05-09

## 2024-05-10 ENCOUNTER — LABORATORY RESULT (OUTPATIENT)
Age: 89
End: 2024-05-10

## 2024-05-10 ENCOUNTER — NON-APPOINTMENT (OUTPATIENT)
Age: 89
End: 2024-05-10

## 2024-05-10 LAB
ALBUMIN SERPL ELPH-MCNC: 3.8 G/DL
ALP BLD-CCNC: 120 U/L
ALT SERPL-CCNC: 13 U/L
ANION GAP SERPL CALC-SCNC: 11 MMOL/L
AST SERPL-CCNC: 17 U/L
BILIRUB SERPL-MCNC: 0.5 MG/DL
BUN SERPL-MCNC: 40 MG/DL
CALCIUM SERPL-MCNC: 10.5 MG/DL
CHLORIDE SERPL-SCNC: 101 MMOL/L
CO2 SERPL-SCNC: 28 MMOL/L
CREAT SERPL-MCNC: 1.5 MG/DL
EGFR: 33 ML/MIN/1.73M2
GLUCOSE SERPL-MCNC: 110 MG/DL
HCT VFR BLD CALC: 31.3 %
HGB BLD-MCNC: 9.5 G/DL
MCHC RBC-ENTMCNC: 29.1 PG
MCHC RBC-ENTMCNC: 30.4 GM/DL
MCV RBC AUTO: 96 FL
PLATELET # BLD AUTO: 201 K/UL
POTASSIUM SERPL-SCNC: 3.9 MMOL/L
PROT SERPL-MCNC: 6.8 G/DL
RBC # BLD: 3.26 M/UL
RBC # FLD: 17.2 %
SODIUM SERPL-SCNC: 140 MMOL/L
WBC # FLD AUTO: 6.09 K/UL

## 2024-05-13 ENCOUNTER — APPOINTMENT (OUTPATIENT)
Dept: HOME HEALTH SERVICES | Facility: HOME HEALTH | Age: 89
End: 2024-05-13

## 2024-05-13 RX ORDER — CLOTRIMAZOLE AND BETAMETHASONE DIPROPIONATE 10; .5 MG/G; MG/G
1-0.05 CREAM TOPICAL TWICE DAILY
Qty: 1 | Refills: 2 | Status: ACTIVE | COMMUNITY
Start: 2022-09-17

## 2024-05-13 RX ORDER — UBIDECARENONE/VIT E ACET 100MG-5
25 MCG CAPSULE ORAL
Refills: 0 | Status: ACTIVE | COMMUNITY
Start: 2023-05-11

## 2024-05-13 RX ORDER — APIXABAN 5 MG/1
5 TABLET, FILM COATED ORAL
Qty: 180 | Refills: 2 | Status: ACTIVE | COMMUNITY
Start: 2024-04-09

## 2024-05-15 ENCOUNTER — RX RENEWAL (OUTPATIENT)
Age: 89
End: 2024-05-15

## 2024-05-22 RX ORDER — MEMANTINE HYDROCHLORIDE 10 MG/1
10 TABLET, FILM COATED ORAL
Qty: 180 | Refills: 1 | Status: ACTIVE | COMMUNITY
Start: 2019-08-08 | End: 1900-01-01

## 2024-05-30 ENCOUNTER — APPOINTMENT (OUTPATIENT)
Dept: ELECTROPHYSIOLOGY | Facility: CLINIC | Age: 89
End: 2024-05-30
Payer: MEDICARE

## 2024-05-30 ENCOUNTER — NON-APPOINTMENT (OUTPATIENT)
Age: 89
End: 2024-05-30

## 2024-05-30 PROCEDURE — 93294 REM INTERROG EVL PM/LDLS PM: CPT

## 2024-05-30 PROCEDURE — 93296 REM INTERROG EVL PM/IDS: CPT

## 2024-05-30 NOTE — DISCHARGE NOTE NURSING/CASE MANAGEMENT/SOCIAL WORK - NSPROEXTENSIONSOFSELF_GEN_A_NUR
[facility]  /A  PROGRESS NOTE   Patient: Meka Vela  Today's Date: 5/30/2024    YOB: 1940  Admission Date: 5/29/2024    MRN: [id number]  Inpatient LOS: 1    Attending: Venu Johnson MD  Hospital Day: Hospital Day: 2    Subjective   HISTORY AND SUBJECTIVE COMPLAINTS     Chief Complaint:   Shortness of breaths    Interval History / Subjective:   Shortness of breath improved    Hospital Course:  Meka Vela is a 84 year old female who presented on 5/29/2024 with complaints of Shortness of Breath  .    ROS:  Pertinent systems negative except as above.    Objective   PHYSICAL EXAMINATION     Vital 24 Hour Range Most Recent Value   Temperature Temp  Min: 98.1 °F (36.7 °C)  Max: 98.9 °F (37.2 °C) 98.4 °F (36.9 °C)   Pulse Pulse  Min: 76  Max: 110 (!) 104   Respiratory Resp  Min: 16  Max: 20 18   Blood Pressure BP  Min: 140/75  Max: 185/81 (!) 140/75   Pulse Oximetry SpO2  Min: 92 %  Max: 98 % 95 %   Arterial BP No data recorded     O2 No data recorded       Recorded Intake and Output:  Intake/Output Summary (Last 24 hours) at 5/30/2024 1531  Last data filed at 5/30/2024 0500  Gross per 24 hour   Intake 40 ml   Output --   Net 40 ml      Recorded Last Stool Occurrence:       Vital Most Recent Value First Value   Weight 72.1 kg (158 lb 15.2 oz) Weight: 81.5 kg (179 lb 10.8 oz)   Height 5' 4\" (162.6 cm) Height: 5' 3\" (160 cm)   BMI 27.28 N/A     General:  No distress  Head:  Conjunctivae are clear, mucous membranes moist  Neck:  Supple, no thyroid enlargement  Lungs:  Clear to auscultation bilaterally unlabored respirations  Cardiac:  S1-S2 irregular,  pedal pulses well felt  Abdomen:  Soft, nontender, not distended, bowel sounds present  Extremities/Musculoskeletal:  No deformities  Lymphatics:  No cervical or axillary lymphadenopathy  Skin:  No rash   Neurologic:  Alert, moving all extremities, no neurologic deficit  Psychiatric:  Pleasant, cooperative    TEST RESULTS     Labs:  The Laboratory values listed below have been reviewed and pertinent findings discussed in the Assessment and Plan.    Laboratory values:   Recent Labs   Lab 05/29/24  1138   WBC 7.9   HGB 12.1   HCT 37.1            Recent Labs   Lab 05/30/24  0356 05/29/24  1138   SODIUM 143 143   POTASSIUM 4.2 4.4   CHLORIDE 112* 114*   CO2 22 24   CALCIUM 9.1 9.5   GLUCOSE 99 93   BUN 24* 26*   CREATININE 1.14* 1.17*   MG 2.2  --         Recent Labs   Lab 05/29/24  1138   ALBUMIN 4.0   AST 17   GPT 21   BILIRUBIN 0.6         Radiology: Imaging studies have been reviewed and pertinent findings discussed in the Assessment and Plan.  Results for orders placed or performed during the hospital encounter of 05/29/24 (from the past 48 hour(s))   XR CHEST PA AND LATERAL 2 VIEWS    Impression    IMPRESSION: No acute cardiopulmonary disease.    Electronically Signed by: Pablito Alvares MD  Signed on: 5/29/2024 12:35 PM  Created on Workstation ID: [id number]  Signed on Workstation ID: [id number]   XR CHEST AP OR PA    Impression    IMPRESSION:    1.  Mild increased interstitial markings diffusely, which are likely  compatible with scarring or mild congestive changes.      I, Attending Radiologist Messi Gonzalez MD, have reviewed the images and  report and concur with these findings interpreted by Resident Radiologist,  Simi Fernandez MD.    Electronically Signed by: Messi Gonzalez MD  Signed on: 5/30/2024 3:47 AM  Created on Workstation ID: [id number]  Signed on Workstation ID: [id number]        ANCILLARY ORDERS     Diet:  Cardiac Diet  One Time Diet Cardiac; Please Deliver a Tray to Ed Rm G18, Thank You!  Telemetry: On  Consults:    IP CONSULT TO CARDIOLOGY  Therapy Orders:   No orders of the defined types were placed in this encounter.      ADVANCED DIRECTIVES     Code Status: Full Resuscitation       ASSESSMENT AND PLAN       New Onset Atrial fibrillation with rapid ventricular rate   Dyspnea  Acute hypoxic respiratory failure onset  clothing, shoes/none heart failure stenosis  Carotid artery stenosis   Peripheral vascular disease  Aortic ulcer status post 2021\  Hypertension   Hyperlipidemia      Plan   Continue telemetry monitoring   Continue beta-blockers for rate control , monitor renal function   cardiology consulted, echocardiogram ordered  Continue IV Lasix continue aspirin, Plavix statin  Continue amlodipine, losartan for hypertension  Patient will be started on Eliquis as Cardiology recommendations       Smoking status: Former Tobacco User    Nutrition status: Does Not Meet Criteria for Malnutrition   Body mass index is 27.28 kg/m². - Patient is overweight with BMI 24-30  DVT Prophylaxis: Eliquis       DISCHARGE PLANNING     The patient's treatment plans were discussed with patient.    Expected date of discharge:  06/02/2024  Discharge barriers:  Not medically ready for discharge  Discharge disposition:  Assisted living    Venu Johnson MD  Hospitalist  5/30/2024

## 2024-06-05 RX ORDER — QUETIAPINE FUMARATE 25 MG/1
25 TABLET ORAL
Qty: 60 | Refills: 4 | Status: ACTIVE | COMMUNITY
Start: 2023-03-22 | End: 1900-01-01

## 2024-06-26 DIAGNOSIS — N18.30 CHRONIC KIDNEY DISEASE, STAGE 3 UNSPECIFIED: ICD-10-CM

## 2024-06-26 DIAGNOSIS — I77.9 DISORDER OF ARTERIES AND ARTERIOLES, UNSPECIFIED: ICD-10-CM

## 2024-06-26 DIAGNOSIS — I72.3 ANEURYSM OF ILIAC ARTERY: ICD-10-CM

## 2024-07-01 ENCOUNTER — TRANSCRIPTION ENCOUNTER (OUTPATIENT)
Age: 89
End: 2024-07-01

## 2024-07-01 ENCOUNTER — INPATIENT (INPATIENT)
Facility: HOSPITAL | Age: 89
LOS: 9 days | Discharge: HOME | DRG: 684 | End: 2024-07-11
Attending: FAMILY MEDICINE | Admitting: INTERNAL MEDICINE
Payer: MEDICARE

## 2024-07-01 ENCOUNTER — APPOINTMENT (OUTPATIENT)
Dept: HOME HEALTH SERVICES | Facility: HOME HEALTH | Age: 89
End: 2024-07-01
Payer: MEDICARE

## 2024-07-01 ENCOUNTER — NON-APPOINTMENT (OUTPATIENT)
Age: 89
End: 2024-07-01

## 2024-07-01 VITALS
DIASTOLIC BLOOD PRESSURE: 89 MMHG | HEIGHT: 61 IN | TEMPERATURE: 98 F | RESPIRATION RATE: 17 BRPM | HEART RATE: 50 BPM | OXYGEN SATURATION: 96 % | WEIGHT: 199.96 LBS | SYSTOLIC BLOOD PRESSURE: 136 MMHG

## 2024-07-01 DIAGNOSIS — Z90.10 ACQUIRED ABSENCE OF UNSPECIFIED BREAST AND NIPPLE: Chronic | ICD-10-CM

## 2024-07-01 DIAGNOSIS — Z96.659 PRESENCE OF UNSPECIFIED ARTIFICIAL KNEE JOINT: Chronic | ICD-10-CM

## 2024-07-01 DIAGNOSIS — N17.9 ACUTE KIDNEY FAILURE, UNSPECIFIED: ICD-10-CM

## 2024-07-01 DIAGNOSIS — R41.82 ALTERED MENTAL STATUS, UNSPECIFIED: ICD-10-CM

## 2024-07-01 LAB
ALBUMIN SERPL ELPH-MCNC: 3.6 G/DL — SIGNIFICANT CHANGE UP (ref 3.3–5)
ALP SERPL-CCNC: 85 U/L — SIGNIFICANT CHANGE UP (ref 40–120)
ALT FLD-CCNC: 11 U/L — SIGNIFICANT CHANGE UP (ref 10–45)
ANION GAP SERPL CALC-SCNC: 16 MMOL/L — SIGNIFICANT CHANGE UP (ref 5–17)
APPEARANCE UR: CLEAR — SIGNIFICANT CHANGE UP
APTT BLD: 39.7 SEC — HIGH (ref 24.5–35.6)
AST SERPL-CCNC: 13 U/L — SIGNIFICANT CHANGE UP (ref 10–40)
BACTERIA # UR AUTO: ABNORMAL /HPF
BASOPHILS # BLD AUTO: 0.02 K/UL — SIGNIFICANT CHANGE UP (ref 0–0.2)
BASOPHILS NFR BLD AUTO: 0.3 % — SIGNIFICANT CHANGE UP (ref 0–2)
BILIRUB SERPL-MCNC: 0.4 MG/DL — SIGNIFICANT CHANGE UP (ref 0.2–1.2)
BILIRUB UR-MCNC: NEGATIVE — SIGNIFICANT CHANGE UP
BUN SERPL-MCNC: 207 MG/DL — HIGH (ref 7–23)
CALCIUM SERPL-MCNC: 10.1 MG/DL — SIGNIFICANT CHANGE UP (ref 8.4–10.5)
CHLORIDE SERPL-SCNC: 97 MMOL/L — SIGNIFICANT CHANGE UP (ref 96–108)
CO2 SERPL-SCNC: 20 MMOL/L — LOW (ref 22–31)
COLOR SPEC: YELLOW — SIGNIFICANT CHANGE UP
CREAT SERPL-MCNC: 6.02 MG/DL — HIGH (ref 0.5–1.3)
DIFF PNL FLD: NEGATIVE — SIGNIFICANT CHANGE UP
EGFR: 6 ML/MIN/1.73M2 — LOW
EOSINOPHIL # BLD AUTO: 0.22 K/UL — SIGNIFICANT CHANGE UP (ref 0–0.5)
EOSINOPHIL NFR BLD AUTO: 3.2 % — SIGNIFICANT CHANGE UP (ref 0–6)
EPI CELLS # UR: 4 — SIGNIFICANT CHANGE UP
GLUCOSE SERPL-MCNC: 113 MG/DL — HIGH (ref 70–99)
GLUCOSE UR QL: NEGATIVE MG/DL — SIGNIFICANT CHANGE UP
HCT VFR BLD CALC: 25.2 % — LOW (ref 34.5–45)
HGB BLD-MCNC: 8.4 G/DL — LOW (ref 11.5–15.5)
IMM GRANULOCYTES NFR BLD AUTO: 0.3 % — SIGNIFICANT CHANGE UP (ref 0–0.9)
INR BLD: 2.69 RATIO — HIGH (ref 0.85–1.18)
KETONES UR-MCNC: NEGATIVE MG/DL — SIGNIFICANT CHANGE UP
LEUKOCYTE ESTERASE UR-ACNC: ABNORMAL
LYMPHOCYTES # BLD AUTO: 0.95 K/UL — LOW (ref 1–3.3)
LYMPHOCYTES # BLD AUTO: 13.6 % — SIGNIFICANT CHANGE UP (ref 13–44)
MCHC RBC-ENTMCNC: 29.9 PG — SIGNIFICANT CHANGE UP (ref 27–34)
MCHC RBC-ENTMCNC: 33.3 GM/DL — SIGNIFICANT CHANGE UP (ref 32–36)
MCV RBC AUTO: 89.7 FL — SIGNIFICANT CHANGE UP (ref 80–100)
MONOCYTES # BLD AUTO: 0.5 K/UL — SIGNIFICANT CHANGE UP (ref 0–0.9)
MONOCYTES NFR BLD AUTO: 7.2 % — SIGNIFICANT CHANGE UP (ref 2–14)
NEUTROPHILS # BLD AUTO: 5.27 K/UL — SIGNIFICANT CHANGE UP (ref 1.8–7.4)
NEUTROPHILS NFR BLD AUTO: 75.4 % — SIGNIFICANT CHANGE UP (ref 43–77)
NITRITE UR-MCNC: NEGATIVE — SIGNIFICANT CHANGE UP
NRBC # BLD: 0 /100 WBCS — SIGNIFICANT CHANGE UP (ref 0–0)
PH UR: 5 — SIGNIFICANT CHANGE UP (ref 5–8)
PLATELET # BLD AUTO: 188 K/UL — SIGNIFICANT CHANGE UP (ref 150–400)
POTASSIUM SERPL-MCNC: 4.1 MMOL/L — SIGNIFICANT CHANGE UP (ref 3.5–5.3)
POTASSIUM SERPL-SCNC: 4.1 MMOL/L — SIGNIFICANT CHANGE UP (ref 3.5–5.3)
PROT SERPL-MCNC: 7.9 G/DL — SIGNIFICANT CHANGE UP (ref 6–8.3)
PROT UR-MCNC: NEGATIVE MG/DL — SIGNIFICANT CHANGE UP
PROTHROM AB SERPL-ACNC: 29.8 SEC — HIGH (ref 9.5–13)
RBC # BLD: 2.81 M/UL — LOW (ref 3.8–5.2)
RBC # FLD: 16.7 % — HIGH (ref 10.3–14.5)
RBC CASTS # UR COMP ASSIST: 0 /HPF — SIGNIFICANT CHANGE UP (ref 0–4)
SODIUM SERPL-SCNC: 133 MMOL/L — LOW (ref 135–145)
SP GR SPEC: 1.01 — SIGNIFICANT CHANGE UP (ref 1–1.03)
TROPONIN I, HIGH SENSITIVITY RESULT: 43.6 NG/L — SIGNIFICANT CHANGE UP
UROBILINOGEN FLD QL: 0.2 MG/DL — SIGNIFICANT CHANGE UP (ref 0.2–1)
WBC # BLD: 6.98 K/UL — SIGNIFICANT CHANGE UP (ref 3.8–10.5)
WBC # FLD AUTO: 6.98 K/UL — SIGNIFICANT CHANGE UP (ref 3.8–10.5)
WBC UR QL: 3 /HPF — SIGNIFICANT CHANGE UP (ref 0–5)

## 2024-07-01 PROCEDURE — 99348 HOME/RES VST EST LOW MDM 30: CPT

## 2024-07-01 PROCEDURE — 99223 1ST HOSP IP/OBS HIGH 75: CPT

## 2024-07-01 PROCEDURE — 73590 X-RAY EXAM OF LOWER LEG: CPT | Mod: 26,LT

## 2024-07-01 PROCEDURE — 99291 CRITICAL CARE FIRST HOUR: CPT

## 2024-07-01 PROCEDURE — 71045 X-RAY EXAM CHEST 1 VIEW: CPT | Mod: 26

## 2024-07-01 PROCEDURE — 93010 ELECTROCARDIOGRAM REPORT: CPT

## 2024-07-01 RX ORDER — ATORVASTATIN CALCIUM 20 MG/1
10 TABLET, FILM COATED ORAL AT BEDTIME
Refills: 0 | Status: DISCONTINUED | OUTPATIENT
Start: 2024-07-01 | End: 2024-07-11

## 2024-07-01 RX ORDER — MEMANTINE HYDROCHLORIDE 7 MG/1
5 CAPSULE, EXTENDED RELEASE ORAL
Refills: 0 | Status: DISCONTINUED | OUTPATIENT
Start: 2024-07-01 | End: 2024-07-11

## 2024-07-01 RX ORDER — LEVOTHYROXINE SODIUM 25 MCG
25 TABLET ORAL DAILY
Refills: 0 | Status: DISCONTINUED | OUTPATIENT
Start: 2024-07-01 | End: 2024-07-11

## 2024-07-01 RX ORDER — SODIUM CHLORIDE 0.9 % (FLUSH) 0.9 %
500 SYRINGE (ML) INJECTION ONCE
Refills: 0 | Status: COMPLETED | OUTPATIENT
Start: 2024-07-01 | End: 2024-07-01

## 2024-07-01 RX ORDER — ACETAMINOPHEN 325 MG
650 TABLET ORAL EVERY 6 HOURS
Refills: 0 | Status: DISCONTINUED | OUTPATIENT
Start: 2024-07-01 | End: 2024-07-11

## 2024-07-01 RX ADMIN — Medication 500 MILLILITER(S): at 21:00

## 2024-07-01 RX ADMIN — Medication 1000 MILLILITER(S): at 20:00

## 2024-07-01 NOTE — H&P ADULT - NSHPLABSRESULTS_GEN_ALL_CORE
8.4    6.98  )-----------( 188      ( 2024 20:00 )             25.2       07-    133<L>  |  97  |  207<H>  ----------------------------<  113<H>  4.1   |  20<L>  |  6.02<H>    Ca    10.1      2024 20:00    TPro  7.9  /  Alb  3.6  /  TBili  0.4  /  DBili  x   /  AST  13  /  ALT  11  /  AlkPhos  85  07         LIVER FUNCTIONS - ( 2024 20:00 )  Alb: 3.6 g/dL / Pro: 7.9 g/dL / ALK PHOS: 85 U/L / ALT: 11 U/L / AST: 13 U/L / GGT: x               PT/INR - ( 2024 20:00 )   PT: 29.8 sec;   INR: 2.69 ratio         PTT - ( 2024 20:00 )  PTT:39.7 sec          Urinalysis Basic - ( 2024 22:36 )    Color: Yellow / Appearance: Clear / S.013 / pH: x  Gluc: x / Ketone: Negative mg/dL  / Bili: Negative / Urobili: 0.2 mg/dL   Blood: x / Protein: Negative mg/dL / Nitrite: Negative   Leuk Esterase: Small / RBC: 0 /HPF / WBC 3 /HPF   Sq Epi: x / Non Sq Epi: x / Bacteria: Few /HPF        Urinalysis with Rflx Culture (collected 24 @ 22:36)      CAPILLARY BLOOD GLUCOSE            EKG:       CXR: wet read 8.4    6.98  )-----------( 188      ( 2024 20:00 )             25.2       07-    133<L>  |  97  |  207<H>  ----------------------------<  113<H>  4.1   |  20<L>  |  6.02<H>    Ca    10.1      2024 20:00    TPro  7.9  /  Alb  3.6  /  TBili  0.4  /  DBili  x   /  AST  13  /  ALT  11  /  AlkPhos  85           LIVER FUNCTIONS - ( 2024 20:00 )  Alb: 3.6 g/dL / Pro: 7.9 g/dL / ALK PHOS: 85 U/L / ALT: 11 U/L / AST: 13 U/L / GGT: x               PT/INR - ( 2024 20:00 )   PT: 29.8 sec;   INR: 2.69 ratio         PTT - ( 2024 20:00 )  PTT:39.7 sec          Urinalysis Basic - ( 2024 22:36 )    Color: Yellow / Appearance: Clear / S.013 / pH: x  Gluc: x / Ketone: Negative mg/dL  / Bili: Negative / Urobili: 0.2 mg/dL   Blood: x / Protein: Negative mg/dL / Nitrite: Negative   Leuk Esterase: Small / RBC: 0 /HPF / WBC 3 /HPF   Sq Epi: x / Non Sq Epi: x / Bacteria: Few /HPF        Urinalysis with Rflx Culture (collected 24 @ 22:36)      CAPILLARY BLOOD GLUCOSE            EKG: personally rev NSR at 71bpm, LBBB, +PVC qII, III, avF poor r wave prog.       CXR: wet read 8.4    6.98  )-----------( 188      ( 2024 20:00 )             25.2       07-    133<L>  |  97  |  207<H>  ----------------------------<  113<H>  4.1   |  20<L>  |  6.02<H>    Ca    10.1      2024 20:00    TPro  7.9  /  Alb  3.6  /  TBili  0.4  /  DBili  x   /  AST  13  /  ALT  11  /  AlkPhos  85           LIVER FUNCTIONS - ( 2024 20:00 )  Alb: 3.6 g/dL / Pro: 7.9 g/dL / ALK PHOS: 85 U/L / ALT: 11 U/L / AST: 13 U/L / GGT: x               PT/INR - ( 2024 20:00 )   PT: 29.8 sec;   INR: 2.69 ratio         PTT - ( 2024 20:00 )  PTT:39.7 sec          Urinalysis Basic - ( 2024 22:36 )    Color: Yellow / Appearance: Clear / S.013 / pH: x  Gluc: x / Ketone: Negative mg/dL  / Bili: Negative / Urobili: 0.2 mg/dL   Blood: x / Protein: Negative mg/dL / Nitrite: Negative   Leuk Esterase: Small / RBC: 0 /HPF / WBC 3 /HPF   Sq Epi: x / Non Sq Epi: x / Bacteria: Few /HPF        Urinalysis with Rflx Culture (collected 24 @ 22:36)      CAPILLARY BLOOD GLUCOSE            EKG: personally rev NSR at 71bpm, LBBB, +PVC qII, III, avF poor r wave prog.       CXR: wet read. cardiomegaly. increased markings at R base.

## 2024-07-01 NOTE — ED PROVIDER NOTE - CLINICAL SUMMARY MEDICAL DECISION MAKING FREE TEXT BOX
89-year-old female presenting with 2 complaints the first is an altered mental status and not taking p.o.  He did consider the differential of a infection as well as anemia electrolyte abnormalities.  Will get laboratory studies give some IV fluids and reassess from that standpoint.  With regards to the leg it is really focal and there is bruising.  The patches anticoagulation and were concerned about a hematoma in that location.  It is more likely than an abscess and/or cellulitis at this point in time.  However I will do a needle aspiration to confirm the contents of this area.  Will get x-rays to look for subcutaneous air or occult fracture.  Depending on results we will decide whether or not patient needs to be admitted to the hospital.

## 2024-07-01 NOTE — ED PROVIDER NOTE - PROGRESS NOTE DETAILS
Patient found to have elevated creatinine.  The aspiration demonstrated blood slides a hematoma.  The patient is going to be admitted this time after I spoke to the house CARES physician.  Currently potassium is currently stable.

## 2024-07-01 NOTE — ED ADULT NURSE NOTE - COMFORT/ACCEPTABLE PAIN LEVEL (0-10)
"VACCINE CONSENT FORM      Patient Name:  Renae Grigsby    Patient :  2010      I/We have read, or have been explained, the information about the diseases and the vaccines listed below.  There was an opportunity to ask questions and any questions were answered satisfactorily.  I/We believe that I/we understand the benefits and risks of the vaccines(s) cited, and ask the vaccine(s) listed below be given to me/us or the person named above (for which i have authorized to make the request).      Vaccine(s) give:    Orders Placed This Encounter   Procedures   • Tdap Vaccine Greater Than or Equal To 8yo IM   • Meningococcal Conjugate Vaccine 4-Valent IM   • HPV Vaccine         Medicare patients:    The only vaccine covered under your medical benefit is flu/pneumonia and hepatitis B.  All other may be covered under your \"Part D\" prescription plan and requires you to go to a pharmacy with a Physician orders for administration.  If you still prefer to have it administered at our office, you will receive a bill for the vaccine and administration cost.               Patient Initials                     Patient or Parent/Guardian Signature                    Date        A copy of the appropriate Centers for Disease Control and Prevention Vaccine Information Statements has been provided.   "
0
Methotrexate Pregnancy And Lactation Text: This medication is Pregnancy Category X and is known to cause fetal harm. This medication is excreted in breast milk.

## 2024-07-01 NOTE — ED PROVIDER NOTE - OBJECTIVE STATEMENT
89-year-old female past medical history of dementia as well as hyperlipidemia and atrial fibrillation on Eliquis presenting to the emerged from today with left lower leg swelling.  Per the family they noticed some bruising redness and 1 area of swelling on the anterior left shin.  There is no reported trauma.  They also state that the patient has not been eating all day and seems more altered than usual.  The home calls program came to the house and she was sent to the hospital for further evaluation.  Patient able provide history secondary to her dementia baseline mental status

## 2024-07-01 NOTE — ED PROVIDER NOTE - HIV OFFER
Preventive Health Recommendations  Male Ages 50 - 64    Yearly exam:             See your health care provider every year in order to  o   Review health changes.   o   Discuss preventive care.    o   Review your medicines if your doctor has prescribed any.     Have a cholesterol test every 5 years, or more frequently if you are at risk for high cholesterol/heart disease.     Have a diabetes test (fasting glucose) every three years. If you are at risk for diabetes, you should have this test more often.     Have a colonoscopy at age 50, or have a yearly FIT test (stool test). These exams will check for colon cancer.      Talk with your health care provider about whether or not a prostate cancer screening test (PSA) is right for you.    You should be tested each year for STDs (sexually transmitted diseases), if you re at risk.     Shots: Get a flu shot each year. Get a tetanus shot every 10 years.     Nutrition:    Eat at least 5 servings of fruits and vegetables daily.     Eat whole-grain bread, whole-wheat pasta and brown rice instead of white grains and rice.     Get adequate Calcium and Vitamin D.     Lifestyle    Exercise for at least 150 minutes a week (30 minutes a day, 5 days a week). This will help you control your weight and prevent disease.     Limit alcohol to one drink per day.     No smoking.     Wear sunscreen to prevent skin cancer.     See your dentist every six months for an exam and cleaning.     See your eye doctor every 1 to 2 years.    
Previously Declined (within the last year)
Attending Attestation (For Attendings USE Only)...

## 2024-07-01 NOTE — H&P ADULT - HISTORY OF PRESENT ILLNESS
89 F hx of dementia, PPM, afib on Eliquis, mitral/tricuspid insufficiency, HTN, HLD, hypothyroid, gout pw L leg hematoma.   Pt unable to provide history. Pt alert, uncooperative and reiterates that she does not want to be touched. Per ED, pt was sent in for some bruising noted in the L anterior shin with no reported trauma. The area was aspirated by Dr Angeles ED and noted to be a hematoma. On routine labs noted BUN/cr: 207/6.02 AP; 85 AST/ALT: 13/11 K: 4.1 CO2: 20  LM with daughter Lisa [phone] with my spectra link.  89 F hx of dementia, PPM, afib on Eliquis, mitral/tricuspid insufficiency, HTN, HLD, hypothyroid, gout pw L leg hematoma.   Pt unable to provide history. Pt alert, uncooperative and reiterates that she does not want to be touched. Per ED, pt was sent in for some bruising noted in the L anterior shin with no reported trauma. The area was aspirated by Dr Angeles ED and noted to be a hematoma. On routine labs noted BUN/cr: 207/6.02 AP; 85 AST/ALT: 13/11 K: 4.1 CO2: 20  LM with daughter Lisa [phone] with my spectra link- spoke with daughter, noted by HHA yesterday to have a large bruise in the L anterior shin and when she went to see pt, noted pt had not eaten today and was significantly altered and more confused compared to baseline and complaining of sig pain in the L leg.   Noted sig decline since last admission to  and now pt has been bed bound.

## 2024-07-01 NOTE — H&P ADULT - NSHPPHYSICALEXAM_GEN_ALL_CORE
Called patient at his cell # . Cell # does not work. Called spouses cell . Got VM. LMOM with message as per Kennis Blizzard. Left office # as well. Vital Signs Last 24 Hrs  T(C): 36.7 (01 Jul 2024 18:58), Max: 36.7 (01 Jul 2024 18:58)  T(F): 98 (01 Jul 2024 18:58), Max: 98 (01 Jul 2024 18:58)  HR: 50 (01 Jul 2024 18:58) (50 - 50)  BP: 136/89 (01 Jul 2024 18:58) (136/89 - 136/89)  BP(mean): --  RR: 17 (01 Jul 2024 18:58) (17 - 17)  SpO2: 96% (01 Jul 2024 18:58) (96% - 96%)    Parameters below as of 01 Jul 2024 18:58  Patient On (Oxygen Delivery Method): room air      Daily Height in cm: 154.94 (01 Jul 2024 18:58)    Daily   CAPILLARY BLOOD GLUCOSE        I&O's Summary      GENERAL: NAD  HEAD:  Normocephalic  EYES: EOMI, PERRLA, conjunctiva and sclera clear  ENMT: No tonsillar erythema, exudates, or enlargement; Moist mucous membranes but with some blood in the oral mucosa. She would not open mouth for further examination. No lesions  NECK: Supple, No JVD, no bruit, normal thyroid  NERVOUS SYSTEM:  Alert & Oriented X1, grossly moves all fours including L leg   CHEST/LUNG: Clear to auscultation bilaterally; No rales, rhonchi, wheezing, or rubs  HEART: Regular rate and rhythm; No murmurs, rubs, or gallops  ABDOMEN: Soft, Nontender, Nondistended; Bowel sounds present  EXTREMITIES:  2+ Peripheral Pulses, No clubbing, cyanosis, or edema. L Leg bandaged with ACE wrap s/p aspiration by ED. Pt would not allow examination.  LYMPH: No lymphadenopathy noted  SKIN: No rashes or lesions. but noted some bruising over lower exteremities.

## 2024-07-01 NOTE — ED CLERICAL - NS ED CLERK NOTE PRE-ARRIVAL INFORMATION; ADDITIONAL PRE-ARRIVAL INFORMATION

## 2024-07-01 NOTE — ED PROVIDER NOTE - CRITICAL CARE ATTENDING CONTRIBUTION TO CARE
Upon my evaluation, this patient had a high probability of imminent or life-threatening deterioration due to NYDIA AMS, which required my direct attention, intervention, and personal management.  The patient has a  medical condition that impairs one or more vital organ systems.  Frequent personal assessment and adjustment of medical interventions was performed.      I have personally provided 35 minutes of critical care time exclusive of time spent on separately billable procedures. Time includes review of laboratory data, radiology results, discussion with consultants, patient and family; monitoring for potential decompensation, as well as time spent retrieving data and reviewing the chart and documenting the visit. Interventions were performed as documented above.

## 2024-07-01 NOTE — ED ADULT NURSE NOTE - NSFALLHARMRISKINTERV_ED_ALL_ED
Assistance OOB with selected safe patient handling equipment if applicable/Assistance with ambulation/Communicate risk of Fall with Harm to all staff, patient, and family/Monitor gait and stability/Provide visual cue: red socks, yellow wristband, yellow gown, etc/Reinforce activity limits and safety measures with patient and family/Bed in lowest position, wheels locked, appropriate side rails in place/Call bell, personal items and telephone in reach/Instruct patient to call for assistance before getting out of bed/chair/stretcher/Non-slip footwear applied when patient is off stretcher/[place] to call system/Physically safe environment - no spills, clutter or unnecessary equipment/Purposeful Proactive Rounding/Room/bathroom lighting operational, light cord in reach

## 2024-07-01 NOTE — ED PROVIDER NOTE - WR ORDER NAME 1
DATE OF VISIT:  07/28/2020    EMPLOYER:  She works for [employer].    DATE OF INJURY:  07/28    CHIEF COMPLAINT:  Scratched by a dog close to the left eye.    HISTORY OF PRESENT ILLNESS:  A 26-year-old female who works as a , the patient states that she was assisting the  to drain an anal abscess of a dog.  The dog got scared and apparently kicked her in the left eye resulting in scratches to the left periorbital area of the face.  The patient thinks that her left eye could have been hit.  However, she denies any eye pain, photophobia, blurry vision.  No drainage or discharge noted.  Tetanus is up to date.  No loss of consciousness.    REVIEW OF SYSTEMS:  Except as marked positive, all other systems reviewed and found to be negative.     Past medical history, family history, social history, medications, allergies were all reviewed in the chart for review.    ALLERGIES:  THE PATIENT IS ALLERGIC TO AMOXICILLIN.    PHYSICAL EXAMINATION:  VITALS:  Blood pressure 115/76, pulse of 69, respirations of 16, temperature 98.7, pulse ox of 98 percent on room air.   HEENT:  Head is atraumatic, normocephalic.  Pupils equal, round, reactive to light.  Extraocular movements intact.  Normal tympanic membranes.  Normal nasal mucosa.  Oropharynx is noninjected, nonbulgy.   NECK:  Supple.  No lymphadenopathy.   LUNGS:  Clear to auscultation bilaterally.  HEART:  Regular rate and rhythm.     ABDOMEN:  Soft, nondistended, nontender.  EXTREMITIES:  No edema.   SKIN:  Focused exam of the left periorbital area reveals 2 scratches, the lateral abrasion appears to be more deeper but does not appear to be a laceration.  There is another scratch in the left supraorbital area.  The left eye itself appears to be within normal limits.  No conjunctival injection noted.  The cornea appears to be clear.  No foreign body noted.  No foreign body noted in the bulbar or palpebral conjunctiva upon eversion  of the eyelids.  Fluorescein eye stain was negative for corneal abrasion.    IMPRESSION:    1. Dog scratch to the face.    2. Left periorbital abrasions.    TREATMENT AND PLAN:  Fluorescein eye stain was negative.  Symptomatic treatment recommended.  Apply Neosporin ointment to the area.  Will cover prophylactically with doxycycline twice a day for the next 5 days to avoid any secondary cellulitis.  The patient is updated on her tetanus.  Return to the immediate care for recheck in 2 days.  No restrictions otherwise.        ______________________________  Candido Owens MD  [id number]      D:  07/28/2020 13:03:28  T:  07/29/2020 01:41:32   RIDGE/ariane   Job:  [id number]            Xray Tibia + Fibula 2 Views, Left

## 2024-07-01 NOTE — ED ADULT NURSE NOTE - IN ACCORDANCE WITH [place] STATE LAW, WE OFFER EVERY PATIENT A HEPATITIS C TEST. WOULD YOU LIKE TO BE TESTED TODAY?
Chronic Disease Management Services  Heart Failure Clinic Progress Note    Ruben Fernandez is a 74 year old year old Black/ male presenting for a follow-up visit for HFrEF. Patient’s cardiologist is not yest established, last seen n/a. Patient’s primary care provider is India Cortez DO, last seen 03/21/19.    Cardiac History:   1. HFrEF  2. HTN  3. Pulmonary embolism  4. H/o LV thrombus?  5. COPD     Visit History:  03/21/19 (PCP) - Referred to HF clinic    ER/Hospitalization History:   Last hospitalization per patients in early 2018    Clinic Weights:  Wt Readings from Last 6 Encounters:   03/25/19 77.5 kg   03/21/19 76.8 kg       Feels well overall and is w/o complaints. Previous care at [facility]. Limited records available, though pt appears to have HFrEF with last documented EF 10% in early 2018 along with LV thrombus      HF Symptom Assessment:   • Dizziness/lightheadedness: rare, while changing positions  • Shortness of breath: dyspnea on exertion  • Functional capacity: stable, 1-2 blocks  • Cough: denies  • Chest pain: denies  • Orthopnea: stable, 2 pillow  • PND: denies, denies  • LE edema: denies, denies, does not use compression stockings  • GI: denies nausea, denies abdominal distention, denies early satiety, denies lack of appetite and denies bloating, ascites is not present    The patient has no AICD present for N/A prevention. The patient has not experienced ICD firing since the last visit. The patient does not have GUIDO. The patient is adherent to CPAP therapy: None of the time    Home Monitoring/Diet/Exercise:  • Daily weights: nonadherent, does not check  • Blood pressure monitoring: nonadherent, readings are does not check  • Sodium restriction: adherent  • Fluid restriction: adherent, 64 ounces  • Exercise: light exercise    Patient's medications, allergies, past medical, surgical, social and family histories were reviewed and updated as appropriate.     Current Outpatient  Medications   Medication Sig Dispense Refill   • warfarin (COUMADIN) 2.5 MG tablet Take 2 tablets by mouth daily. 60 tablet 1   • spironolactone (ALDACTONE) 25 MG tablet Take 1 tablet by mouth daily. 90 tablet 1   • metoPROLOL tartrate (LOPRESSOR) 50 MG tablet Take 1 tablet by mouth 2 times daily. 180 tablet 1   • furosemide (LASIX) 80 MG tablet Take 1 tablet by mouth daily. 90 tablet 1   • enalapril (VASOTEC) 10 MG tablet Take 1 tablet by mouth daily. 90 tablet 1   • budesonide/formoterol (SYMBICORT) 80-4.5 MCG/ACT inhaler Inhale 2 puffs into the lungs 2 times daily. 2 puff by mouth  Twice a day . 10.2 g 3   • atorvastatin (LIPITOR) 80 MG tablet Take 1 tablet by mouth at bedtime. 90 tablet 1   • aspirin 81 MG chewable tablet Chew 1 tablet by mouth daily. 90 tablet 1   • albuterol 108 (90 Base) MCG/ACT inhaler Inhale 2 puffs into the lungs every 4 hours as needed for Shortness of Breath or Wheezing. 1 Inhaler 3     No current facility-administered medications for this visit.        Medication adherence:   Medications reconciled per medication bottles. The patient did not take his medications today.      VITALS:   BP Readings from Last 3 Encounters:   03/25/19 140/60   03/21/19 128/56     Pulse Readings from Last 3 Encounters:   03/25/19 60   03/21/19 72     Wt Readings from Last 3 Encounters:   03/25/19 77.5 kg   03/21/19 76.8 kg       LABS:  No results found for: NTPROBNP    Lab Results   Component Value Date    SODIUM 139 03/25/2019    CHLORIDE 102 03/25/2019    CO2 29 03/25/2019    POTASSIUM 4.8 03/25/2019    BUN 11 03/25/2019    CREATININE 1.18 (H) 03/25/2019    GLUCOSE 82 03/25/2019       Lab Results   Component Value Date    WBC 3.8 (L) 03/25/2019    HCT 47.0 03/25/2019    HGB 14.6 03/25/2019     03/25/2019       PERTINENT EXTERNAL LABS:  Labs from [facility] 11/19/18  Na 137; K 4.6; BUN 16; Scr 1.2; Hgb 14.7    PERTINENT CARDIODIAGNOSTICS:  EF 10% in early  2018?    ASSESSMENT/PLAN:    HFrEF  Stage C, NYHA FC II   Compensated  and euvolemic  Etiology unknown  - Last TTE unavailable though outside notes mention EF 10% in 2018  - Current HF regimen: metoprolol tartrate 50 mg BID, enalapril 10 mg daily, spironolactone 25 mg daily, furosemide 80 mg daily   - Will continue current HF regimen for now  - Pt to repeat echocardiogram prior to next clinic visit  - Further recommendations once diagnostic tests return    Hypertension  Goal BP <140/90 due to age and comorbidities  - BP today is at goal  - Current HTN regimen includes, HF medications listed above.  - Recommend same as HF plan     Pulmonary embolism  - H/o PE per available records  - On OAC with warfarin; INR subtherapeutic at 1.5 today  - Warfarin dose-adjusted to goal INR 2-3  - RTC next week for INR check     H/o LV thrombus  - Noted on available records  - Repeating TTE as above  - Anticoagulation plan as above     Counseling/education provided at today’s visit:   how to contact HF clinic  low sodium diet  perform daily weights  call clinic if any new symptoms of shortness of breath or leg swelling  call clinic if weight gain of 3 lbs in 7 days or less  call clinic if any new sypmtoms of dizziness or light headedness  fluid restriction 64 ounces    Labs/diagnostics due:   Repeat BMP every 3 months (next due 06/2019)    FOLLOW-UP  The patient will return to HF clinic on 04/22/19 for INR check    TIME SPENT ON VISIT  30 minutes were spent in face-to-face discussion related to the medical management of the patient.       Jarrett Lee (Dan), PharmD, BCPS  Clinical Pharmacy Specialist - Heart Failure, Cardiology, and Anticoagulation   [facility] [address] [address]  [email or web address]  (P) [phone]     Opt out

## 2024-07-01 NOTE — ED ADULT TRIAGE NOTE - CHIEF COMPLAINT QUOTE
Sent in by PMD for left lower leg bruising, r/o cellulitis. Pt takes eliquis. Pt is confused at baseline.   MOLST DNR/DNI

## 2024-07-01 NOTE — ED PROVIDER NOTE - PRO INTERPRETER NEED 2
FOB briefly at the bedside inquiring on infant status. FOB updated on infant status and reviewed plan of care that occurred during the shift. All questions & concerns addressed. FOB present at bedside approx. 5 minutes as he stated that he had to go to work.   English

## 2024-07-01 NOTE — ED PROVIDER NOTE - PHYSICAL EXAMINATION
Vitals: I have reviewed the patients vital signs  General: nontoxic appearing  HEENT: Atraumatic, normocephalic, airway patent  Eyes: EOMI, tracking appropriately  Neck: no tracheal deviation  Chest/Lungs: no trauma, symmetric chest rise, speaking in complete sentences,  no resp distress  Heart: skin and extremities well perfused, regular rate and rhythm  Neuro: A+Ox3, appears non focal  MSK: no deformities left lower leg closer to the foot there is a well-circumscribed area of bruising no real cysts induration but there is some central fluctuance.  There is some surrounding erythema that is not warm.  There is no pain with active or passive motion of the foot there is good coloration and there is strong pedal pulses that are symmetric bilaterally  Skin: no cyanosis, no jaundice   Psych:  Normal mood and affect

## 2024-07-01 NOTE — H&P ADULT - ASSESSMENT
89 F hx of dementia, PPM, afib on Eliquis, mitral/tricuspid insufficiency, HTN, HLD, hypothyroid, gout pw L leg hematoma with acute renal failure.     #Acute renal failure  ?related to dehydration/poor po intake  hold lasix/ACE  check bladder scans  Check CTAP.   gentle IV hydration  renal consult. sig elev BUN/cr 207/6.02 K; 4.1 CO2: 20.   Daughter is uncertain regarding HD at this point given pt's comorbidities.   Palliative consult.     #Elevated INR/PTT  on Eliquis  hold Eliquis for now.   noted bleeding in the mouth, and hematoma on L leg with sig AMS  Check CT head, CTAP and CT of L leg to be complete    #Hypothyroid  cont synthroid.    #GOC  Pt has advanced directives.   pt confirmed DNR/DNI.   palliative care consult.     CAPRINI SCORE [CLOT]    AGE RELATED RISK FACTORS                                                       MOBILITY RELATED FACTORS  [ ] Age 41-60 years                                            (1 Point)                  [ ] Bed rest                                                        (1 Point)  [ ] Age: 61-74 years                                           (2 Points)                 [ ] Plaster cast                                                   (2 Points)  [3 ] Age= 75 years                                              (3 Points)                 [2 ] Bed bound for more than 72 hours                 (2 Points)    DISEASE RELATED RISK FACTORS                                               GENDER SPECIFIC FACTORS  [ ] Edema in the lower extremities                       (1 Point)                  [ ] Pregnancy                                                     (1 Point)  [ ] Varicose veins                                               (1 Point)                  [ ] Post-partum < 6 weeks                                   (1 Point)             [ ] BMI > 25 Kg/m2                                            (1 Point)                  [ ] Hormonal therapy  or oral contraception          (1 Point)                 [ ] Sepsis (in the previous month)                        (1 Point)                  [ ] History of pregnancy complications                 (1 point)  [ ] Pneumonia or serious lung disease                                               [ ] Unexplained or recurrent                     (1 Point)           (in the previous month)                               (1 Point)  [ ] Abnormal pulmonary function test                     (1 Point)                 SURGERY RELATED RISK FACTORS  [ ] Acute myocardial infarction                              (1 Point)                 [ ]  Section                                             (1 Point)  [ ] Congestive heart failure (in the previous month)  (1 Point)               [ ] Minor surgery                                                  (1 Point)   [ ] Inflammatory bowel disease                             (1 Point)                 [ ] Arthroscopic surgery                                        (2 Points)  [ ] Central venous access                                      (2 Points)                [ ] General surgery lasting more than 45 minutes   (2 Points)       [ ] Stroke (in the previous month)                          (5 Points)               [ ] Elective arthroplasty                                         (5 Points)                                                                                                                                               HEMATOLOGY RELATED FACTORS                                                 TRAUMA RELATED RISK FACTORS  [ ] Prior episodes of VTE                                     (3 Points)                [ ] Fracture of the hip, pelvis, or leg                       (5 Points)  [ ] Positive family history for VTE                         (3 Points)                 [ ] Acute spinal cord injury (in the previous month)  (5 Points)  [ ] Prothrombin 58372 A                                     (3 Points)                 [ ] Paralysis  (less than 1 month)                             (5 Points)  [ ] Factor V Leiden                                             (3 Points)                  [ ] Multiple Trauma within 1 month                        (5 Points)  [ ] Lupus anticoagulants                                     (3 Points)                                                           [ ] Anticardiolipin antibodies                               (3 Points)                                                       [ ] High homocysteine in the blood                      (3 Points)                                             [ ] Other congenital or acquired thrombophilia      (3 Points)                                                [ ] Heparin induced thrombocytopenia                  (3 Points)                                          Total Score [  5        ]    Caprini Score 0 - 2:  Low Risk, No VTE Prophylaxis required for most patients, encourage ambulation  Caprini Score 3 - 6:  At Risk, pharmacologic VTE prophylaxis is indicated for most patients (in the absence of a contraindication)  Caprini Score Greater than or = 7:  High Risk, pharmacologic VTE prophylaxis is indicated for most patients (in the absence of a contraindication)

## 2024-07-02 ENCOUNTER — NON-APPOINTMENT (OUTPATIENT)
Age: 89
End: 2024-07-02

## 2024-07-02 PROBLEM — R41.82 ALTERED MENTAL STATUS: Status: ACTIVE | Noted: 2024-07-02

## 2024-07-02 LAB
ALBUMIN SERPL ELPH-MCNC: 3.4 G/DL — SIGNIFICANT CHANGE UP (ref 3.3–5)
ALP SERPL-CCNC: 79 U/L — SIGNIFICANT CHANGE UP (ref 40–120)
ALT FLD-CCNC: 8 U/L — LOW (ref 10–45)
ANION GAP SERPL CALC-SCNC: 19 MMOL/L — HIGH (ref 5–17)
AST SERPL-CCNC: 12 U/L — SIGNIFICANT CHANGE UP (ref 10–40)
BASOPHILS # BLD AUTO: 0.02 K/UL — SIGNIFICANT CHANGE UP (ref 0–0.2)
BASOPHILS NFR BLD AUTO: 0.3 % — SIGNIFICANT CHANGE UP (ref 0–2)
BILIRUB SERPL-MCNC: 0.4 MG/DL — SIGNIFICANT CHANGE UP (ref 0.2–1.2)
BUN SERPL-MCNC: 205 MG/DL — HIGH (ref 7–23)
CALCIUM SERPL-MCNC: 9.9 MG/DL — SIGNIFICANT CHANGE UP (ref 8.4–10.5)
CHLORIDE SERPL-SCNC: 101 MMOL/L — SIGNIFICANT CHANGE UP (ref 96–108)
CK SERPL-CCNC: 25 U/L — SIGNIFICANT CHANGE UP (ref 25–170)
CO2 SERPL-SCNC: 16 MMOL/L — LOW (ref 22–31)
CREAT SERPL-MCNC: 5.37 MG/DL — HIGH (ref 0.5–1.3)
EGFR: 7 ML/MIN/1.73M2 — LOW
EOSINOPHIL # BLD AUTO: 0.26 K/UL — SIGNIFICANT CHANGE UP (ref 0–0.5)
EOSINOPHIL NFR BLD AUTO: 3.5 % — SIGNIFICANT CHANGE UP (ref 0–6)
GLUCOSE SERPL-MCNC: 89 MG/DL — SIGNIFICANT CHANGE UP (ref 70–99)
HCT VFR BLD CALC: 24 % — LOW (ref 34.5–45)
HGB BLD-MCNC: 7.8 G/DL — LOW (ref 11.5–15.5)
IMM GRANULOCYTES NFR BLD AUTO: 0.3 % — SIGNIFICANT CHANGE UP (ref 0–0.9)
LYMPHOCYTES # BLD AUTO: 0.84 K/UL — LOW (ref 1–3.3)
LYMPHOCYTES # BLD AUTO: 11.3 % — LOW (ref 13–44)
MAGNESIUM SERPL-MCNC: 3.1 MG/DL — HIGH (ref 1.6–2.6)
MCHC RBC-ENTMCNC: 30.5 PG — SIGNIFICANT CHANGE UP (ref 27–34)
MCHC RBC-ENTMCNC: 32.5 GM/DL — SIGNIFICANT CHANGE UP (ref 32–36)
MCV RBC AUTO: 93.8 FL — SIGNIFICANT CHANGE UP (ref 80–100)
MONOCYTES # BLD AUTO: 0.47 K/UL — SIGNIFICANT CHANGE UP (ref 0–0.9)
MONOCYTES NFR BLD AUTO: 6.3 % — SIGNIFICANT CHANGE UP (ref 2–14)
NEUTROPHILS # BLD AUTO: 5.82 K/UL — SIGNIFICANT CHANGE UP (ref 1.8–7.4)
NEUTROPHILS NFR BLD AUTO: 78.3 % — HIGH (ref 43–77)
NRBC # BLD: 0 /100 WBCS — SIGNIFICANT CHANGE UP (ref 0–0)
PHOSPHATE SERPL-MCNC: 7.9 MG/DL — HIGH (ref 2.5–4.5)
PLATELET # BLD AUTO: 187 K/UL — SIGNIFICANT CHANGE UP (ref 150–400)
POTASSIUM SERPL-MCNC: 3.5 MMOL/L — SIGNIFICANT CHANGE UP (ref 3.5–5.3)
POTASSIUM SERPL-SCNC: 3.5 MMOL/L — SIGNIFICANT CHANGE UP (ref 3.5–5.3)
PROT SERPL-MCNC: 7.3 G/DL — SIGNIFICANT CHANGE UP (ref 6–8.3)
RBC # BLD: 2.56 M/UL — LOW (ref 3.8–5.2)
RBC # FLD: 16.8 % — HIGH (ref 10.3–14.5)
SODIUM SERPL-SCNC: 136 MMOL/L — SIGNIFICANT CHANGE UP (ref 135–145)
TROPONIN I, HIGH SENSITIVITY RESULT: 52.9 NG/L — HIGH
WBC # BLD: 7.43 K/UL — SIGNIFICANT CHANGE UP (ref 3.8–10.5)
WBC # FLD AUTO: 7.43 K/UL — SIGNIFICANT CHANGE UP (ref 3.8–10.5)

## 2024-07-02 PROCEDURE — 70450 CT HEAD/BRAIN W/O DYE: CPT | Mod: 26

## 2024-07-02 PROCEDURE — 74176 CT ABD & PELVIS W/O CONTRAST: CPT | Mod: 26

## 2024-07-02 PROCEDURE — 99497 ADVNCD CARE PLAN 30 MIN: CPT | Mod: 25

## 2024-07-02 PROCEDURE — 73700 CT LOWER EXTREMITY W/O DYE: CPT | Mod: 26,LT

## 2024-07-02 PROCEDURE — 99233 SBSQ HOSP IP/OBS HIGH 50: CPT

## 2024-07-02 PROCEDURE — 99223 1ST HOSP IP/OBS HIGH 75: CPT

## 2024-07-02 RX ORDER — SODIUM CHLORIDE 0.9 % (FLUSH) 0.9 %
1000 SYRINGE (ML) INJECTION
Refills: 0 | Status: DISCONTINUED | OUTPATIENT
Start: 2024-07-02 | End: 2024-07-02

## 2024-07-02 RX ORDER — DEXTROSE MONOHYDRATE AND SODIUM CHLORIDE 5; .3 G/100ML; G/100ML
1000 INJECTION, SOLUTION INTRAVENOUS
Refills: 0 | Status: DISCONTINUED | OUTPATIENT
Start: 2024-07-02 | End: 2024-07-03

## 2024-07-02 RX ADMIN — Medication 75 MILLILITER(S): at 17:04

## 2024-07-02 RX ADMIN — DEXTROSE MONOHYDRATE AND SODIUM CHLORIDE 75 MILLILITER(S): 5; .3 INJECTION, SOLUTION INTRAVENOUS at 23:01

## 2024-07-02 RX ADMIN — MEMANTINE HYDROCHLORIDE 5 MILLIGRAM(S): 7 CAPSULE, EXTENDED RELEASE ORAL at 17:04

## 2024-07-02 RX ADMIN — Medication 75 MILLILITER(S): at 15:16

## 2024-07-02 RX ADMIN — Medication 25 MICROGRAM(S): at 06:36

## 2024-07-02 RX ADMIN — Medication 70 MILLILITER(S): at 03:13

## 2024-07-02 RX ADMIN — ATORVASTATIN CALCIUM 10 MILLIGRAM(S): 20 TABLET, FILM COATED ORAL at 21:14

## 2024-07-02 RX ADMIN — Medication 25 MILLIGRAM(S): at 21:14

## 2024-07-02 RX ADMIN — MEMANTINE HYDROCHLORIDE 5 MILLIGRAM(S): 7 CAPSULE, EXTENDED RELEASE ORAL at 06:35

## 2024-07-02 NOTE — CONSULT NOTE ADULT - SUBJECTIVE AND OBJECTIVE BOX
NEPHROLOGY CONSULTATION    CHIEF COMPLAINT: LLE hematoma     HPI:  Pt is 89 yo F w/hx of dementia, PPM, afib on Eliquis, mitral/tricuspid insufficiency, HTN, HLD, hypothyroid, gout p/w LLE hematoma. Pt unable to provide history or ros. Noted to have NYDIA. Was on ACE, Lasix as op.    ROS:  as above    Allergies:  No Known Allergies    PAST MEDICAL & SURGICAL HISTORY:  Atrial fibrillation, unspecified type  Benign hypertension  Hypothyroid  HLD (hyperlipidemia)  Dementia  S/P knee replacement  H/O mastectomy    SOCIAL HISTORY:  negative    FAMILY HISTORY:  myocardial infarction  father  CVA (cerebrovascular accident)  Mother  pacemaker  mother    MEDICATIONS  (STANDING):  atorvastatin 10 milliGRAM(s) Oral at bedtime  levothyroxine 25 MICROGram(s) Oral daily  memantine 5 milliGRAM(s) Oral two times a day  QUEtiapine 25 milliGRAM(s) Oral at bedtime  sodium chloride 0.9%. 1000 milliLiter(s) (70 mL/Hr) IV Continuous <Continuous>  sodium chloride 0.9%. 1000 milliLiter(s) (70 mL/Hr) IV Continuous <Continuous>    Home Medications:  allopurinol 100 mg oral tablet: 1 tab(s) orally once a day (01 Mar 2024 21:59)  apixaban 5 mg oral tablet: 1 tab(s) orally 2 times a day (15 Mar 2024 06:37)  atorvastatin 10 mg oral tablet: 1 tab(s) orally once a day (at bedtime) (01 Mar 2024 21:59)  D3 25 mcg (1000 intl units) oral tablet: 1 tab(s) orally once a day (01 Mar 2024 21:59)  enalapril 5 mg oral tablet: 1 tab(s) orally once a day (01 Mar 2024 21:59)  Lasix 40 mg oral tablet: 1 tab(s) orally once a day (01 Mar 2024 21:59)  levothyroxine 25 mcg (0.025 mg) oral tablet: 1 tab(s) orally once a day (01 Mar 2024 21:59)  memantine 5 mg oral tablet: 1 tab(s) orally 2 times a day (01 Mar 2024 21:56)  QUEtiapine 25 mg oral tablet: 2 tab(s) orally once a day (at bedtime) (01 Mar 2024 21:57)    Vital Signs Last 24 Hrs  T(C): 36.4 (07-02-24 @ 13:05), Max: 36.7 (07-01-24 @ 18:58)  T(F): 97.6 (07-02-24 @ 13:05), Max: 98 (07-01-24 @ 18:58)  HR: 60 (07-02-24 @ 13:05) (50 - 62)  BP: 95/51 (07-02-24 @ 13:05) (95/51 - 136/89)  RR: 15 (07-02-24 @ 13:05) (14 - 17)  SpO2: 99% (07-02-24 @ 13:05) (96% - 100%)    I&O's Detail    02 Jul 2024 07:01  -  02 Jul 2024 14:17  --------------------------------------------------------  IN:    sodium chloride 0.9%: 420 mL  Total IN: 420 mL    OUT:    Voided (mL): 300 mL  Total OUT: 300 mL    LABS:                        7.8    7.43  )-----------( 187      ( 02 Jul 2024 06:05 )             24.0     07-02    136  |  101  |  205<H>  ----------------------------<  89  3.5   |  16<L>  |  5.37<H>    Ca    9.9      02 Jul 2024 06:05  Phos  7.9     07-02  Mg     3.1     07-02    TPro  7.3  /  Alb  3.4  /  TBili  0.4  /  DBili  x   /  AST  12  /  ALT  8<L>  /  AlkPhos  79  07-02    LIVER FUNCTIONS - ( 02 Jul 2024 06:05 )  Alb: 3.4 g/dL / Pro: 7.3 g/dL / ALK PHOS: 79 U/L / ALT: 8 U/L / AST: 12 U/L / GGT: x           Urinalysis with Rflx Culture (collected 01 Jul 2024 22:36)    PT/INR - ( 01 Jul 2024 20:00 )   PT: 29.8 sec;   INR: 2.69 ratio      PTT - ( 01 Jul 2024 20:00 )  PTT:39.7 sec    A/P:    full consult to follow    [phone]   NEPHROLOGY CONSULTATION    CHIEF COMPLAINT: LLE hematoma     HPI:  Pt is 89 yo F w/hx of dementia, PPM, afib on Eliquis, mitral/tricuspid insufficiency, HTN, HLD, hypothyroid, gout p/w LLE hematoma. Pt unable to provide reliable history or ros. No reported CP, SOB, N/V/D/C/F/C. Noted to have severe NYDIA. Was on ACE, Lasix as op. Po intake is poor. No reported trauma.     ROS:  as above    Allergies:  No Known Allergies    PAST MEDICAL & SURGICAL HISTORY:  Atrial fibrillation, unspecified type  Benign hypertension  Hypothyroid  HLD (hyperlipidemia)  Dementia  S/P knee replacement  H/O mastectomy    SOCIAL HISTORY:  negative    FAMILY HISTORY:  myocardial infarction  father  CVA (cerebrovascular accident)  Mother  pacemaker  mother    MEDICATIONS  (STANDING):  atorvastatin 10 milliGRAM(s) Oral at bedtime  levothyroxine 25 MICROGram(s) Oral daily  memantine 5 milliGRAM(s) Oral two times a day  QUEtiapine 25 milliGRAM(s) Oral at bedtime  sodium chloride 0.9%. 1000 milliLiter(s) (70 mL/Hr) IV Continuous <Continuous>  sodium chloride 0.9%. 1000 milliLiter(s) (70 mL/Hr) IV Continuous <Continuous>    Home Medications:  allopurinol 100 mg oral tablet: 1 tab(s) orally once a day (01 Mar 2024 21:59)  apixaban 5 mg oral tablet: 1 tab(s) orally 2 times a day (15 Mar 2024 06:37)  atorvastatin 10 mg oral tablet: 1 tab(s) orally once a day (at bedtime) (01 Mar 2024 21:59)  D3 25 mcg (1000 intl units) oral tablet: 1 tab(s) orally once a day (01 Mar 2024 21:59)  enalapril 5 mg oral tablet: 1 tab(s) orally once a day (01 Mar 2024 21:59)  Lasix 40 mg oral tablet: 1 tab(s) orally once a day (01 Mar 2024 21:59)  levothyroxine 25 mcg (0.025 mg) oral tablet: 1 tab(s) orally once a day (01 Mar 2024 21:59)  memantine 5 mg oral tablet: 1 tab(s) orally 2 times a day (01 Mar 2024 21:56)  QUEtiapine 25 mg oral tablet: 2 tab(s) orally once a day (at bedtime) (01 Mar 2024 21:57)    Vital Signs Last 24 Hrs  T(C): 36.4 (07-02-24 @ 13:05), Max: 36.7 (07-01-24 @ 18:58)  T(F): 97.6 (07-02-24 @ 13:05), Max: 98 (07-01-24 @ 18:58)  HR: 60 (07-02-24 @ 13:05) (50 - 62)  BP: 95/51 (07-02-24 @ 13:05) (95/51 - 136/89)  RR: 15 (07-02-24 @ 13:05) (14 - 17)  SpO2: 99% (07-02-24 @ 13:05) (96% - 100%)    I&O's Detail    02 Jul 2024 07:01  -  02 Jul 2024 14:17  --------------------------------------------------------  IN:    sodium chloride 0.9%: 420 mL  Total IN: 420 mL    OUT:    Voided (mL): 300 mL  Total OUT: 300 mL    s1s2  b/l air entry  soft, ND  no edema RLE, swelling, bruising LLE    LABS:                        7.8    7.43  )-----------( 187      ( 02 Jul 2024 06:05 )             24.0     07-02    136  |  101  |  205<H>  ----------------------------<  89  3.5   |  16<L>  |  5.37<H>    Ca    9.9      02 Jul 2024 06:05  Phos  7.9     07-02  Mg     3.1     07-02    TPro  7.3  /  Alb  3.4  /  TBili  0.4  /  DBili  x   /  AST  12  /  ALT  8<L>  /  AlkPhos  79  07-02    LIVER FUNCTIONS - ( 02 Jul 2024 06:05 )  Alb: 3.4 g/dL / Pro: 7.3 g/dL / ALK PHOS: 79 U/L / ALT: 8 U/L / AST: 12 U/L / GGT: x           Urinalysis with Rflx Culture (collected 01 Jul 2024 22:36)    PT/INR - ( 01 Jul 2024 20:00 )   PT: 29.8 sec;   INR: 2.69 ratio      PTT - ( 01 Jul 2024 20:00 )  PTT:39.7 sec    A/P:    Suspect severe pre-renal ATN  UA is negative  Imaging w/o hydro   Met acidosis iso NYDIA  Avoid nephrotoxins  IVF w/Bicarb as ordered  Pt is not a great candidate for RRT given poor MS at baseline, poor ability to cooperate, MMP  Hoping to avoid the need   Goc d/w family are on-going  Will f/u CBC, BMP, UO, anemia studies   D/w medicine     [phone]

## 2024-07-02 NOTE — CONSULT NOTE ADULT - CONVERSATION DETAILS
Introduced palliative care team to pt's daughter on the phone who has seen pt in past. We discussed the pt's current clinical status and acute renal failure. She is awaiting to hear back from the nephrologist to speak with her brother in regards to HD. I discussed that the pt may not be a candidate for HD due to her advanced age and comorbidities which Lisa understood.  Lisa shared pt has been with poor PO intake at home lately but has 24/7 HHA in place. We discussed the progressive pathology of dementia and that many pts eventually have difficulty swallowing, leading to discussions regarding artifical nutrition and feeding tubes. Lisa stated she would not want that for her mother but would have to discuss this with her brother.  We also discussed hospice care and what that would look like for [facility] hospice care. Educated family on the philosophy of hospice care and explained the various settings and criteria in which hospice can be delivered (home vs. nursing home vs. inpatient hospice). Discussed the services provided under hospice services emphasizing the goal of elevating patient's quality of life and optimizing symptom management and avoiding unnecessary future hospitalizations. Lisa stated she would have to talk about this with her brother as well. Supportive care given.  MOLST in chart; pt remains DNR/DNI with continuation of medical management.    This visit was provided via telehealth using audit only  technology. The patient's daughter was located at home in [place]  , at the time of the visit.  The provider was located at [place], [place] at the time of the visit. The patient's daughter participated in the telehealth encounter.  Verbal consent for telehealth services was given.

## 2024-07-02 NOTE — PROGRESS NOTE ADULT - SUBJECTIVE AND OBJECTIVE BOX
Patient is a 89y old  Female who presents with a chief complaint of Acute renal failure (01 Jul 2024 23:02)          Patient seen and examined at bedside.    ALLERGIES:  No Known Allergies        Vital Signs Last 24 Hrs  T(F): 96.8 (02 Jul 2024 06:25), Max: 98 (01 Jul 2024 18:58)  HR: 61 (02 Jul 2024 06:25) (50 - 62)  BP: 96/41 (02 Jul 2024 06:25) (96/41 - 136/89)  RR: 14 (02 Jul 2024 06:25) (14 - 17)  SpO2: 98% (02 Jul 2024 06:25) (96% - 100%)  I&O's Summary    MEDICATIONS:  acetaminophen     Tablet .. 650 milliGRAM(s) Oral every 6 hours PRN  atorvastatin 10 milliGRAM(s) Oral at bedtime  levothyroxine 25 MICROGram(s) Oral daily  memantine 5 milliGRAM(s) Oral two times a day  QUEtiapine 25 milliGRAM(s) Oral at bedtime  sodium chloride 0.9%. 1000 milliLiter(s) IV Continuous <Continuous>  sodium chloride 0.9%. 1000 milliLiter(s) IV Continuous <Continuous>      PHYSICAL EXAM:  General: NAD, A/O x 3  ENT: MMM  Neck: Supple, No JVD  Lungs: Clear to auscultation bilaterally  Cardio: RRR, S1/S2, No murmurs  Abdomen: Soft, Nontender, Nondistended; Bowel sounds present  Extremities: No cyanosis, No edema    LABS:                        7.8    7.43  )-----------( 187      ( 02 Jul 2024 06:05 )             24.0     07-02    136  |  101  |  205  ----------------------------<  89  3.5   |  16  |  5.37    Ca    9.9      02 Jul 2024 06:05  Phos  7.9     07-02  Mg     3.1     07-02    TPro  7.3  /  Alb  3.4  /  TBili  0.4  /  DBili  x   /  AST  12  /  ALT  8   /  AlkPhos  79  07-02      PT/INR - ( 01 Jul 2024 20:00 )   PT: 29.8 sec;   INR: 2.69 ratio         PTT - ( 01 Jul 2024 20:00 )  PTT:39.7 sec    CARDIAC MARKERS ( 02 Jul 2024 06:05 )  x     / 52.9 ng/L / 25 U/L / x     / x      CARDIAC MARKERS ( 01 Jul 2024 20:00 )  x     / 43.6 ng/L / x     / x     / x                            Urinalysis Basic - ( 02 Jul 2024 06:05 )    Color: x / Appearance: x / SG: x / pH: x  Gluc: 89 mg/dL / Ketone: x  / Bili: x / Urobili: x   Blood: x / Protein: x / Nitrite: x   Leuk Esterase: x / RBC: x / WBC x   Sq Epi: x / Non Sq Epi: x / Bacteria: x            RADIOLOGY & ADDITIONAL TESTS:    Care Discussed with Consultants/Other Providers:    Patient is a 89y old  Female who presents with a chief complaint of Acute renal failure (01 Jul 2024 23:02)    Patient seen and examined at bedside.  no acute events overnight    ALLERGIES:  No Known Allergies        Vital Signs Last 24 Hrs  T(F): 96.8 (02 Jul 2024 06:25), Max: 98 (01 Jul 2024 18:58)  HR: 61 (02 Jul 2024 06:25) (50 - 62)  BP: 96/41 (02 Jul 2024 06:25) (96/41 - 136/89)  RR: 14 (02 Jul 2024 06:25) (14 - 17)  SpO2: 98% (02 Jul 2024 06:25) (96% - 100%)  I&O's Summary    MEDICATIONS:  acetaminophen     Tablet .. 650 milliGRAM(s) Oral every 6 hours PRN  atorvastatin 10 milliGRAM(s) Oral at bedtime  levothyroxine 25 MICROGram(s) Oral daily  memantine 5 milliGRAM(s) Oral two times a day  QUEtiapine 25 milliGRAM(s) Oral at bedtime  sodium chloride 0.9%. 1000 milliLiter(s) IV Continuous <Continuous>  sodium chloride 0.9%. 1000 milliLiter(s) IV Continuous <Continuous>      PHYSICAL EXAM:  General: NAD, confused, arousable  ENT: MMM, no thrush  Neck: Supple, No JVD  Lungs: Clear to auscultation bilaterally, non labored, good air entry  Cardio: RRR, S1/S2, No murmurs  Abdomen: Soft, Nontender, Nondistended; Bowel sounds present  Extremities: No cyanosis, No edema, LT leg bandage with ACE    LABS:                        7.8    7.43  )-----------( 187      ( 02 Jul 2024 06:05 )             24.0     07-02    136  |  101  |  205  ----------------------------<  89  3.5   |  16  |  5.37    Ca    9.9      02 Jul 2024 06:05  Phos  7.9     07-02  Mg     3.1     07-02    TPro  7.3  /  Alb  3.4  /  TBili  0.4  /  DBili  x   /  AST  12  /  ALT  8   /  AlkPhos  79  07-02      PT/INR - ( 01 Jul 2024 20:00 )   PT: 29.8 sec;   INR: 2.69 ratio         PTT - ( 01 Jul 2024 20:00 )  PTT:39.7 sec    CARDIAC MARKERS ( 02 Jul 2024 06:05 )  x     / 52.9 ng/L / 25 U/L / x     / x      CARDIAC MARKERS ( 01 Jul 2024 20:00 )  x     / 43.6 ng/L / x     / x     / x                            Urinalysis Basic - ( 02 Jul 2024 06:05 )    Color: x / Appearance: x / SG: x / pH: x  Gluc: 89 mg/dL / Ketone: x  / Bili: x / Urobili: x   Blood: x / Protein: x / Nitrite: x   Leuk Esterase: x / RBC: x / WBC x   Sq Epi: x / Non Sq Epi: x / Bacteria: x            RADIOLOGY & ADDITIONAL TESTS:    Care Discussed with Consultants/Other Providers:

## 2024-07-02 NOTE — CONSULT NOTE ADULT - ASSESSMENT
89 F hx of dementia, PPM, afib on Eliquis, mitral/tricuspid insufficiency, HTN, HLD, hypothyroid, gout pw L leg hematoma with acute renal failure.     Debility  - PPS 40-50%    Dementia  - A&Ox1    Acute Renal Failure  - nephro consulted; IVF per primary medical team    Advanced care planning  - HCP: daughter Lisa Rae  - see GOC note above, Pt is DNR/DNI    Encounter for Palliative Care  - Introduced the palliative care team to pt at bedside and daughter on the phone, see GOC note above.   - Spoke with hospitalist Dr. Solares regarding pt medical care.   - Will continue to follow for ongoing GOC conversations and supportive care.  89 F hx of dementia, PPM, afib on Eliquis, mitral/tricuspid insufficiency, HTN, HLD, hypothyroid, gout pw L leg hematoma with acute renal failure.     Debility  - PPS 40-50%    Dementia  - A&Ox1  - has 24/7 HHA at home    Acute Renal Failure; Dehydration  Failure to Thrive   - nephro consulted; IVF per primary medical team  - pt with poor PO intake in last few days per daughter     Advanced care planning  - HCP: daughter Lisa Rae  - see GOC note above, Pt is DNR/DNI    Encounter for Palliative Care  - Introduced the palliative care team to pt at bedside and daughter on the phone, see GOC note above. Daughter to wait until her mother is seen by nephro and then discuss pt's medical care with her brother.   - Spoke with hospitalist Dr. Solares regarding pt medical care.   - Will continue to follow for ongoing GOC conversations and supportive care.

## 2024-07-02 NOTE — CONSULT NOTE ADULT - SUBJECTIVE AND OBJECTIVE BOX
HPI:  89 F hx of dementia, PPM, afib on Eliquis, mitral/tricuspid insufficiency, HTN, HLD, hypothyroid, gout pw L leg hematoma.   Pt unable to provide history. Pt alert, uncooperative and reiterates that she does not want to be touched. Per ED, pt was sent in for some bruising noted in the L anterior shin with no reported trauma. The area was aspirated by Dr Angeles ED and noted to be a hematoma. On routine labs noted BUN/cr: 207/6.02 AP; 85 AST/ALT: 13/11 K: 4.1 CO2: 20  LM with daughter Lisa [phone] with my spectra link- spoke with daughter, noted by HHA yesterday to have a large bruise in the L anterior shin and when she went to see pt, noted pt had not eaten today and was significantly altered and more confused compared to baseline and complaining of sig pain in the L leg.   Noted sig decline since last admission to  and now pt has been bed bound.   (01 Jul 2024 23:02)    Palliative care c/s for GOC. Pt seen and examined at bedside this morning. Pt with dementia but stated no to pain.    PERTINENT PM/SXH:   Atrial fibrillation, unspecified type    Benign hypertension    Hypothyroid    HLD (hyperlipidemia)    Dementia      S/P knee replacement    H/O mastectomy      FAMILY HISTORY:  FH: myocardial infarction  father    FH: CVA (cerebrovascular accident)  Mother    Family history of pacemaker  mother      Family Hx substance abuse [ ]yes [ ]no  ITEMS NOT CHECKED ARE NOT PRESENT    SOCIAL HISTORY:   Significant other/partner[ ]  Children[X ]  Voodoo/Spirituality: Orthodoxy  Substance hx:  n/a   Tobacco hx:  n/a  Alcohol hx: n/a   Living Situation: Home with daughter    ADVANCE DIRECTIVES:    DNR/MOLST - yes- DNR/DNI  DECISION MAKER(s):   Health Care Proxy(s)      - daughter     Name(s): Phone Number(s): Lisa Rae [phone]    BASELINE (I)ADL(s) (prior to admission):  [place]: Dependent    Allergies  No Known Allergies    Intolerances    MEDICATIONS  (STANDING):  atorvastatin 10 milliGRAM(s) Oral at bedtime  levothyroxine 25 MICROGram(s) Oral daily  memantine 5 milliGRAM(s) Oral two times a day  QUEtiapine 25 milliGRAM(s) Oral at bedtime  sodium chloride 0.9%. 1000 milliLiter(s) (70 mL/Hr) IV Continuous <Continuous>  sodium chloride 0.9%. 1000 milliLiter(s) (70 mL/Hr) IV Continuous <Continuous>    MEDICATIONS  (PRN):  acetaminophen     Tablet .. 650 milliGRAM(s) Oral every 6 hours PRN Mild Pain (1 - 3)    PRESENT SYMPTOMS:      Pain: [ ]yes [ X]no  QOL impact -   Location -                    Aggravating factors -  Quality -  Radiation -  Timing-  Severity (0-10 scale):  Minimal acceptable level (0-10 scale):       Dyspnea:                          none  Anxiety:                          unable to assess  Depressed:                   unable to asses  Fatigue:                             Mild   Nausea:                            none  Loss of appetite:             does not report  Constipation:                    none      Other Symptoms:  [ ]All other review of systems negative - pt with dementia/unable to report       Chaplaincy Referral:   Deferred   Palliative Performance Status Version 2:     50    %    [email or web address]    PHYSICAL EXAM:  Vital Signs Last 24 Hrs  T(C): 36 (02 Jul 2024 06:25), Max: 36.7 (01 Jul 2024 18:58)  T(F): 96.8 (02 Jul 2024 06:25), Max: 98 (01 Jul 2024 18:58)  HR: 61 (02 Jul 2024 06:25) (50 - 62)  BP: 96/41 (02 Jul 2024 06:25) (96/41 - 136/89)  BP(mean): --  RR: 14 (02 Jul 2024 06:25) (14 - 17)  SpO2: 98% (02 Jul 2024 06:25) (96% - 100%)    Parameters below as of 02 Jul 2024 06:25  Patient On (Oxygen Delivery Method): room air     I&O's Summary    GENERAL: elderly female laying in bed in NAD  HEENT: NC/AT, dried blood to mouth; dry mucous membranes  PULMONARY: CTAB no wheezing  CARDIOVASCULAR:  RRR, no murmur  GASTROINTESTINAL: soft nontender  Last BM: 7/2  MUSCULOSKELETAL: +Weakness  +Bed bound   NEUROLOGIC: +Cognitive impairment ; no edema to BLE        LABS:                        7.8    7.43  )-----------( 187      ( 02 Jul 2024 06:05 )             24.0   07-02    136  |  101  |  205<H>  ----------------------------<  89  3.5   |  16<L>  |  5.37<H>    Ca    9.9      02 Jul 2024 06:05  Phos  7.9     07-02  Mg     3.1     07-02    TPro  7.3  /  Alb  3.4  /  TBili  0.4  /  DBili  x   /  AST  12  /  ALT  8<L>  /  AlkPhos  79  07-02  PT/INR - ( 01 Jul 2024 20:00 )   PT: 29.8 sec;   INR: 2.69 ratio         PTT - ( 01 Jul 2024 20:00 )  PTT:39.7 sec    Urinalysis Basic - ( 02 Jul 2024 06:05 )    Color: x / Appearance: x / SG: x / pH: x  Gluc: 89 mg/dL / Ketone: x  / Bili: x / Urobili: x   Blood: x / Protein: x / Nitrite: x   Leuk Esterase: x / RBC: x / WBC x   Sq Epi: x / Non Sq Epi: x / Bacteria: x      RADIOLOGY & ADDITIONAL STUDIES:    PROCEDURE DATE:  07/02/2024          INTERPRETATION:  CLINICAL INFORMATION: 89 years  Female with acute renal   failure  abd pain.    COMPARISON: 3/1/2024    CONTRAST/COMPLICATIONS:  IV Contrast: NONE  Oral Contrast: NONE  Complications: None reported at time of study completion    PROCEDURE:  CT of the Abdomen and Pelvis was performed.  Sagittal and coronal reformats were performed.    LIMITATIONS: Evaluation of the solid organs, vascular structures and GI   tract is limited without oral and IV contrast. Motion artifact. Streak   artifact from patient's arms.    FINDINGS:  LOWER CHEST: Cardiomegaly. Lead was pacemaker in the left ventricle.   Aortic and coronary atherosclerosis. Low attenuation cardiac blood pool   secondary to anemia.    LIVER: Within normal limits.  BILE DUCTS: Normal caliber.  GALLBLADDER: Cholelithiasis. No pericholecystic edema.  SPLEEN: Within normal limits.  PANCREAS: Within normal limits.  ADRENALS: Within normal limits.  KIDNEYS/URETERS: Exophytic left upper pole hypodensity too small to   characterize. Indeterminant subcentimeter left mid renal hyperdensity.   Right upper pole cyst. No hydronephrosis. Bilateral renal atrophy.    BLADDER: Within normal limits.  REPRODUCTIVE ORGANS: Calcified myomata. Prominent endometrium for patient   age measuring 7 mm. 6 mm endometrial polypoid lesion versus submucosal   myoma (602:85).    BOWEL: No bowel obstruction. Appendix is not visualized and cannot be   assessed. Redemonstrated fluid in the rectosigmoid colon and air   distention of the sigmoid colon measuring up to 5.5 cm, previously 8.0   cm. Narrowing of the distal sigmoid colon on the right, slightly more   pronounced than prior.  PERITONEUM/RETROPERITONEUM: Within normal limits.  VESSELS: Atherosclerotic changes. 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  LYMPH NODES: No lymphadenopathy.  ABDOMINAL WALL: Within normal limits.  BONES: Moderate degenerative changes. Dextroscoliosis.    IMPRESSION:  Atrophic kidneys. No hydronephrosis.    Redemonstrated sigmoid dilatation. Narrowing of the distal sigmoid colon   slightly more pronounced than prior. Recommend follow-up colonoscopy to   exclude partial obstruction.    Abnormal endometrium with possible polyp or submucosal myoma. Recommend   pelvic ultrasound.        --- End of Report ---    PROTEIN CALORIE MALNUTRITION PRESENT: [ ]mild [ ]moderate [ ]severe [ ]underweight [ ]morbid obesity  https://www.andeal.org/vault/2440/web/files/ONC/Table_Clinical%20Characteristics%20to%20Document%20Malnutrition-White%20JV%20et%20al%542768.pdf    Height (cm): 154.9 (07-01-24 @ 18:58), 167.6 (03-12-24 @ 10:50), 154.9 (08-03-23 @ 17:31)  Weight (kg): 90.7 (07-01-24 @ 18:58), 72.6 (03-12-24 @ 10:50), 113.398 (10-11-23 @ 12:42)  BMI (kg/m2): 37.8 (07-01-24 @ 18:58), 25.8 (03-12-24 @ 10:50), 47.3 (10-11-23 @ 12:42)    [ ]PPSV2 < or = to 30% [ ]significant weight loss  [ ]poor nutritional intake  [ ]anasarca[ ]Artificial Nutrition      Other REFERRALS:  [ ]Hospice  [ ]Child Life  [ ]Social Work  [ ]Case management [ ]Holistic Therapy  HPI:  89 F hx of dementia, PPM, afib on Eliquis, mitral/tricuspid insufficiency, HTN, HLD, hypothyroid, gout pw L leg hematoma.   Pt unable to provide history. Pt alert, uncooperative and reiterates that she does not want to be touched. Per ED, pt was sent in for some bruising noted in the L anterior shin with no reported trauma. The area was aspirated by Dr Angeles ED and noted to be a hematoma. On routine labs noted BUN/cr: 207/6.02 AP; 85 AST/ALT: 13/11 K: 4.1 CO2: 20  LM with daughter Lisa [phone] with my spectra link- spoke with daughter, noted by HHA yesterday to have a large bruise in the L anterior shin and when she went to see pt, noted pt had not eaten today and was significantly altered and more confused compared to baseline and complaining of sig pain in the L leg.   Noted sig decline since last admission to  and now pt has been bed bound.   (01 Jul 2024 23:02)    Palliative care c/s for GOC. Pt seen and examined at bedside this morning. Pt with dementia but stated no to pain.    PERTINENT PM/SXH:   Atrial fibrillation, unspecified type    Benign hypertension    Hypothyroid    HLD (hyperlipidemia)    Dementia      S/P knee replacement    H/O mastectomy      FAMILY HISTORY:  FH: myocardial infarction  father    FH: CVA (cerebrovascular accident)  Mother    Family history of pacemaker  mother      Family Hx substance abuse [ ]yes [ ]no  ITEMS NOT CHECKED ARE NOT PRESENT    SOCIAL HISTORY:   Significant other/partner[ ]  Children[X ]  Quaker/Spirituality: Scientology  Substance hx:  n/a   Tobacco hx:  n/a  Alcohol hx: n/a   Living Situation: Home with daughter    ADVANCE DIRECTIVES:    DNR/MOLST - yes- DNR/DNI  DECISION MAKER(s):   Health Care Proxy(s)      - daughter     Name(s): Phone Number(s): Lisa Rae [phone]    BASELINE (I)ADL(s) (prior to admission):  [place]: Dependent    Allergies  No Known Allergies    Intolerances    MEDICATIONS  (STANDING):  atorvastatin 10 milliGRAM(s) Oral at bedtime  levothyroxine 25 MICROGram(s) Oral daily  memantine 5 milliGRAM(s) Oral two times a day  QUEtiapine 25 milliGRAM(s) Oral at bedtime  sodium chloride 0.9%. 1000 milliLiter(s) (70 mL/Hr) IV Continuous <Continuous>  sodium chloride 0.9%. 1000 milliLiter(s) (70 mL/Hr) IV Continuous <Continuous>    MEDICATIONS  (PRN):  acetaminophen     Tablet .. 650 milliGRAM(s) Oral every 6 hours PRN Mild Pain (1 - 3)    PRESENT SYMPTOMS:      Pain: [ ]yes [ X]no  QOL impact -   Location -                    Aggravating factors -  Quality -  Radiation -  Timing-  Severity (0-10 scale):  Minimal acceptable level (0-10 scale):       Dyspnea:                          none  Anxiety:                          unable to assess  Depressed:                   unable to asses  Fatigue:                             Mild   Nausea:                            none  Loss of appetite:             does not report  Constipation:                    none      Other Symptoms:  [ ]All other review of systems negative - pt with dementia/unable to report       Chaplaincy Referral:   Deferred   Palliative Performance Status Version 2:     50    %    [email or web address]    PHYSICAL EXAM:  Vital Signs Last 24 Hrs  T(C): 36 (02 Jul 2024 06:25), Max: 36.7 (01 Jul 2024 18:58)  T(F): 96.8 (02 Jul 2024 06:25), Max: 98 (01 Jul 2024 18:58)  HR: 61 (02 Jul 2024 06:25) (50 - 62)  BP: 96/41 (02 Jul 2024 06:25) (96/41 - 136/89)  BP(mean): --  RR: 14 (02 Jul 2024 06:25) (14 - 17)  SpO2: 98% (02 Jul 2024 06:25) (96% - 100%)    Parameters below as of 02 Jul 2024 06:25  Patient On (Oxygen Delivery Method): room air     I&O's Summary    GENERAL: elderly female laying in bed in NAD  HEENT: NC/AT, dried blood to mouth; dry mucous membranes  PULMONARY: CTAB no wheezing  CARDIOVASCULAR:  RRR, no murmur  GASTROINTESTINAL: soft nontender  Last BM: 7/2  MUSCULOSKELETAL: +Weakness  +Bed bound   NEUROLOGIC: +Cognitive impairment ; no edema to BLE        LABS:                        7.8    7.43  )-----------( 187      ( 02 Jul 2024 06:05 )             24.0   07-02    136  |  101  |  205<H>  ----------------------------<  89  3.5   |  16<L>  |  5.37<H>    Ca    9.9      02 Jul 2024 06:05  Phos  7.9     07-02  Mg     3.1     07-02    TPro  7.3  /  Alb  3.4  /  TBili  0.4  /  DBili  x   /  AST  12  /  ALT  8<L>  /  AlkPhos  79  07-02  PT/INR - ( 01 Jul 2024 20:00 )   PT: 29.8 sec;   INR: 2.69 ratio         PTT - ( 01 Jul 2024 20:00 )  PTT:39.7 sec    Urinalysis Basic - ( 02 Jul 2024 06:05 )    Color: x / Appearance: x / SG: x / pH: x  Gluc: 89 mg/dL / Ketone: x  / Bili: x / Urobili: x   Blood: x / Protein: x / Nitrite: x   Leuk Esterase: x / RBC: x / WBC x   Sq Epi: x / Non Sq Epi: x / Bacteria: x      RADIOLOGY & ADDITIONAL STUDIES:    PROCEDURE DATE:  07/02/2024          INTERPRETATION:  CLINICAL INFORMATION: 89 years  Female with acute renal   failure  abd pain.    COMPARISON: 3/1/2024    CONTRAST/COMPLICATIONS:  IV Contrast: NONE  Oral Contrast: NONE  Complications: None reported at time of study completion    PROCEDURE:  CT of the Abdomen and Pelvis was performed.  Sagittal and coronal reformats were performed.    LIMITATIONS: Evaluation of the solid organs, vascular structures and GI   tract is limited without oral and IV contrast. Motion artifact. Streak   artifact from patient's arms.    FINDINGS:  LOWER CHEST: Cardiomegaly. Lead was pacemaker in the left ventricle.   Aortic and coronary atherosclerosis. Low attenuation cardiac blood pool   secondary to anemia.    LIVER: Within normal limits.  BILE DUCTS: Normal caliber.  GALLBLADDER: Cholelithiasis. No pericholecystic edema.  SPLEEN: Within normal limits.  PANCREAS: Within normal limits.  ADRENALS: Within normal limits.  KIDNEYS/URETERS: Exophytic left upper pole hypodensity too small to   characterize. Indeterminant subcentimeter left mid renal hyperdensity.   Right upper pole cyst. No hydronephrosis. Bilateral renal atrophy.    BLADDER: Within normal limits.  REPRODUCTIVE ORGANS: Calcified myomata. Prominent endometrium for patient   age measuring 7 mm. 6 mm endometrial polypoid lesion versus submucosal   myoma (602:85).    BOWEL: No bowel obstruction. Appendix is not visualized and cannot be   assessed. Redemonstrated fluid in the rectosigmoid colon and air   distention of the sigmoid colon measuring up to 5.5 cm, previously 8.0   cm. Narrowing of the distal sigmoid colon on the right, slightly more   pronounced than prior.  PERITONEUM/RETROPERITONEUM: Within normal limits.  VESSELS: Atherosclerotic changes. 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  LYMPH NODES: No lymphadenopathy.  ABDOMINAL WALL: Within normal limits.  BONES: Moderate degenerative changes. Dextroscoliosis.    IMPRESSION:  Atrophic kidneys. No hydronephrosis.    Redemonstrated sigmoid dilatation. Narrowing of the distal sigmoid colon   slightly more pronounced than prior. Recommend follow-up colonoscopy to   exclude partial obstruction.    Abnormal endometrium with possible polyp or submucosal myoma. Recommend   pelvic ultrasound.        --- End of Report ---    PROTEIN CALORIE MALNUTRITION PRESENT: [ ]mild [ ]moderate [ ]severe [ ]underweight [ ]morbid obesity  https://www.andeal.org/vault/2440/web/files/ONC/Table_Clinical%20Characteristics%20to%20Document%20Malnutrition-White%20JV%20et%20al%827289.pdf    Height (cm): 154.9 (07-01-24 @ 18:58), 167.6 (03-12-24 @ 10:50), 154.9 (08-03-23 @ 17:31)  Weight (kg): 90.7 (07-01-24 @ 18:58), 72.6 (03-12-24 @ 10:50), 113.398 (10-11-23 @ 12:42)  BMI (kg/m2): 37.8 (07-01-24 @ 18:58), 25.8 (03-12-24 @ 10:50), 47.3 (10-11-23 @ 12:42)    [ ]PPSV2 < or = to 30% [ ]significant weight loss  [X ]poor nutritional intake  [ ]anasarca[ ]Artificial Nutrition      Other REFERRALS:  [ ]Hospice  [ ]Child Life  [ ]Social Work  [ ]Case management [ ]Holistic Therapy

## 2024-07-02 NOTE — PROGRESS NOTE ADULT - ASSESSMENT
89 F hx of dementia, PPM, afib on Eliquis, mitral/tricuspid insufficiency, HTN, HLD, hypothyroid, gout pw L leg hematoma with acute renal failure.     Acute Renal Failure  Follow up Renal consult, continue to hold ACE/AB, Lasix, Avoid nephrotoxic meds  CT AP revealed sigmoid dilatation, consider colonoscopy to rule out partial obstruction, abnormal endometrium ?polyp vs myoma  Continue IVF, daughter uncertain about HD at this point given pt comorbidities  Follow up Palliative consult    LT Leg Hematoma with Cellulitis, with possible acute blood loss  Elevated INR/PTT  CT LT Leg showed hematoma  will start Ceftriaxone for surrounding erythema, inflammation   Holding Eliquis, noted bleeding in mouth, CTH without bleed  trend CBC    Hypothyroid  chronic condition  continue synthroid    Prophylactic Measure  holding eliquis    Goals of Care  pt has advanced directives  confirmed DNR/I  follow up palliative recommendations    spoke with daughter Lisa Rae today [phone] and provided an update      89 F hx of dementia, PPM, afib on Eliquis, mitral/tricuspid insufficiency, HTN, HLD, hypothyroid, gout pw L leg hematoma with acute renal failure.     Acute Renal Failure  Follow up Renal consult, continue to hold ACE/AB, Lasix, Avoid nephrotoxic meds  CT AP revealed sigmoid dilatation, consider colonoscopy to rule out partial obstruction, abnormal endometrium ?polyp vs myoma  Continue IVF, daughter uncertain about HD at this point given pt comorbidities  Follow up Palliative consult  Nephrology consult ordered    LT Leg Hematoma with Cellulitis, with possible acute blood loss  Elevated INR/PTT  CT LT Leg showed hematoma  will start Ceftriaxone for surrounding erythema, inflammation   Holding Eliquis, noted bleeding in mouth, CTH without bleed  trend CBC    Hypothyroid  chronic condition  continue synthroid    Prophylactic Measure  holding eliquis    Goals of Care  pt has advanced directives  confirmed DNR/I  follow up palliative recommendations    spoke with daughter Lisa Rae today [phone] and provided an update

## 2024-07-03 LAB
ALBUMIN SERPL ELPH-MCNC: 3.5 G/DL — SIGNIFICANT CHANGE UP (ref 3.3–5)
ALP SERPL-CCNC: 83 U/L — SIGNIFICANT CHANGE UP (ref 40–120)
ALT FLD-CCNC: 9 U/L — LOW (ref 10–45)
ANION GAP SERPL CALC-SCNC: 17 MMOL/L — SIGNIFICANT CHANGE UP (ref 5–17)
AST SERPL-CCNC: 10 U/L — SIGNIFICANT CHANGE UP (ref 10–40)
BASOPHILS # BLD AUTO: 0.01 K/UL — SIGNIFICANT CHANGE UP (ref 0–0.2)
BASOPHILS NFR BLD AUTO: 0.1 % — SIGNIFICANT CHANGE UP (ref 0–2)
BILIRUB SERPL-MCNC: 0.4 MG/DL — SIGNIFICANT CHANGE UP (ref 0.2–1.2)
BUN SERPL-MCNC: 184 MG/DL — HIGH (ref 7–23)
CALCIUM SERPL-MCNC: 9.9 MG/DL — SIGNIFICANT CHANGE UP (ref 8.4–10.5)
CHLORIDE SERPL-SCNC: 107 MMOL/L — SIGNIFICANT CHANGE UP (ref 96–108)
CO2 SERPL-SCNC: 18 MMOL/L — LOW (ref 22–31)
CREAT SERPL-MCNC: 4.28 MG/DL — HIGH (ref 0.5–1.3)
CULTURE RESULTS: SIGNIFICANT CHANGE UP
EGFR: 9 ML/MIN/1.73M2 — LOW
EOSINOPHIL # BLD AUTO: 0.14 K/UL — SIGNIFICANT CHANGE UP (ref 0–0.5)
EOSINOPHIL NFR BLD AUTO: 2 % — SIGNIFICANT CHANGE UP (ref 0–6)
FERRITIN SERPL-MCNC: 198 NG/ML — SIGNIFICANT CHANGE UP (ref 13–330)
FOLATE SERPL-MCNC: 15.1 NG/ML — SIGNIFICANT CHANGE UP
GLUCOSE SERPL-MCNC: 101 MG/DL — HIGH (ref 70–99)
HCT VFR BLD CALC: 25 % — LOW (ref 34.5–45)
HGB BLD-MCNC: 8.3 G/DL — LOW (ref 11.5–15.5)
IMM GRANULOCYTES NFR BLD AUTO: 0.6 % — SIGNIFICANT CHANGE UP (ref 0–0.9)
IRON SATN MFR SERPL: 12 % — LOW (ref 14–50)
IRON SATN MFR SERPL: 33 UG/DL — SIGNIFICANT CHANGE UP (ref 30–160)
LYMPHOCYTES # BLD AUTO: 0.56 K/UL — LOW (ref 1–3.3)
LYMPHOCYTES # BLD AUTO: 8.2 % — LOW (ref 13–44)
MCHC RBC-ENTMCNC: 30.2 PG — SIGNIFICANT CHANGE UP (ref 27–34)
MCHC RBC-ENTMCNC: 33.2 GM/DL — SIGNIFICANT CHANGE UP (ref 32–36)
MCV RBC AUTO: 90.9 FL — SIGNIFICANT CHANGE UP (ref 80–100)
MONOCYTES # BLD AUTO: 0.41 K/UL — SIGNIFICANT CHANGE UP (ref 0–0.9)
MONOCYTES NFR BLD AUTO: 6 % — SIGNIFICANT CHANGE UP (ref 2–14)
NEUTROPHILS # BLD AUTO: 5.7 K/UL — SIGNIFICANT CHANGE UP (ref 1.8–7.4)
NEUTROPHILS NFR BLD AUTO: 83.1 % — HIGH (ref 43–77)
NRBC # BLD: 0 /100 WBCS — SIGNIFICANT CHANGE UP (ref 0–0)
PLATELET # BLD AUTO: 191 K/UL — SIGNIFICANT CHANGE UP (ref 150–400)
POTASSIUM SERPL-MCNC: 3.3 MMOL/L — LOW (ref 3.5–5.3)
POTASSIUM SERPL-SCNC: 3.3 MMOL/L — LOW (ref 3.5–5.3)
PROT SERPL-MCNC: 7.8 G/DL — SIGNIFICANT CHANGE UP (ref 6–8.3)
RBC # BLD: 2.75 M/UL — LOW (ref 3.8–5.2)
RBC # FLD: 16.8 % — HIGH (ref 10.3–14.5)
SODIUM SERPL-SCNC: 142 MMOL/L — SIGNIFICANT CHANGE UP (ref 135–145)
SPECIMEN SOURCE: SIGNIFICANT CHANGE UP
TIBC SERPL-MCNC: 274 UG/DL — SIGNIFICANT CHANGE UP (ref 220–430)
UIBC SERPL-MCNC: 241 UG/DL — SIGNIFICANT CHANGE UP (ref 110–370)
VIT B12 SERPL-MCNC: 954 PG/ML — SIGNIFICANT CHANGE UP (ref 232–1245)
WBC # BLD: 6.86 K/UL — SIGNIFICANT CHANGE UP (ref 3.8–10.5)
WBC # FLD AUTO: 6.86 K/UL — SIGNIFICANT CHANGE UP (ref 3.8–10.5)

## 2024-07-03 PROCEDURE — 99233 SBSQ HOSP IP/OBS HIGH 50: CPT

## 2024-07-03 RX ORDER — DEXTROSE MONOHYDRATE AND SODIUM CHLORIDE 5; .3 G/100ML; G/100ML
1000 INJECTION, SOLUTION INTRAVENOUS
Refills: 0 | Status: DISCONTINUED | OUTPATIENT
Start: 2024-07-03 | End: 2024-07-04

## 2024-07-03 RX ORDER — POTASSIUM CHLORIDE 600 MG/1
40 TABLET, FILM COATED, EXTENDED RELEASE ORAL ONCE
Refills: 0 | Status: COMPLETED | OUTPATIENT
Start: 2024-07-03 | End: 2024-07-03

## 2024-07-03 RX ADMIN — ATORVASTATIN CALCIUM 10 MILLIGRAM(S): 20 TABLET, FILM COATED ORAL at 22:02

## 2024-07-03 RX ADMIN — MEMANTINE HYDROCHLORIDE 5 MILLIGRAM(S): 7 CAPSULE, EXTENDED RELEASE ORAL at 06:32

## 2024-07-03 RX ADMIN — POTASSIUM CHLORIDE 40 MILLIEQUIVALENT(S): 600 TABLET, FILM COATED, EXTENDED RELEASE ORAL at 14:00

## 2024-07-03 RX ADMIN — MEMANTINE HYDROCHLORIDE 5 MILLIGRAM(S): 7 CAPSULE, EXTENDED RELEASE ORAL at 18:16

## 2024-07-03 RX ADMIN — DEXTROSE MONOHYDRATE AND SODIUM CHLORIDE 75 MILLILITER(S): 5; .3 INJECTION, SOLUTION INTRAVENOUS at 22:03

## 2024-07-03 RX ADMIN — Medication 25 MICROGRAM(S): at 06:31

## 2024-07-03 RX ADMIN — Medication 25 MILLIGRAM(S): at 22:03

## 2024-07-03 NOTE — PROGRESS NOTE ADULT - SUBJECTIVE AND OBJECTIVE BOX
Patient is a 89y old  Female who presents with a chief complaint of Acute renal failure (02 Jul 2024 14:15)      SUBJECTIVE / OVERNIGHT EVENTS:  Pt seen and examined at bedside. No acute events overnight.    Allergies    No Known Allergies    Intolerances        MEDICATIONS  (STANDING):  atorvastatin 10 milliGRAM(s) Oral at bedtime  dextrose 5% 1000 milliLiter(s) (75 mL/Hr) IV Continuous <Continuous>  levothyroxine 25 MICROGram(s) Oral daily  memantine 5 milliGRAM(s) Oral two times a day  potassium chloride   Powder 40 milliEquivalent(s) Oral once  QUEtiapine 25 milliGRAM(s) Oral at bedtime    MEDICATIONS  (PRN):  acetaminophen     Tablet .. 650 milliGRAM(s) Oral every 6 hours PRN Mild Pain (1 - 3)      Vital Signs Last 24 Hrs  T(C): 36.7 (03 Jul 2024 06:51), Max: 36.7 (03 Jul 2024 06:51)  T(F): 98 (03 Jul 2024 06:51), Max: 98 (03 Jul 2024 06:51)  HR: 68 (03 Jul 2024 06:51) (61 - 68)  BP: 122/62 (03 Jul 2024 06:51) (102/55 - 122/62)  BP(mean): --  RR: 15 (02 Jul 2024 18:16) (15 - 15)  SpO2: 100% (03 Jul 2024 06:51) (100% - 100%)    Parameters below as of 03 Jul 2024 06:51  Patient On (Oxygen Delivery Method): room air      CAPILLARY BLOOD GLUCOSE        I&O's Summary    02 Jul 2024 07:01  -  03 Jul 2024 07:00  --------------------------------------------------------  IN: 1120 mL / OUT: 1200 mL / NET: -80 mL    03 Jul 2024 07:01  -  03 Jul 2024 13:08  --------------------------------------------------------  IN: 120 mL / OUT: 0 mL / NET: 120 mL        PHYSICAL EXAM:  GENERAL: NAD, ill appearing elderly female  HEAD:  Atraumatic, Normocephalic  NECK: Supple, No JVD  CHEST/LUNG: Clear to auscultation bilaterally; No wheeze, nonlabored breathing  HEART: Regular rate and rhythm; No murmurs, rubs, or gallops  ABDOMEN: Soft, Nontender, Nondistended; Bowel sounds present  EXTREMITIES: No clubbing, cyanosis, or edema LLE + ace wrapped     LABS:                        8.3    6.86  )-----------( 191      ( 03 Jul 2024 06:59 )             25.0     07-03    142  |  107  |  184<H>  ----------------------------<  101<H>  3.3<L>   |  18<L>  |  4.28<H>    Ca    9.9      03 Jul 2024 06:59  Phos  7.9     07-02  Mg     3.1     07-02    TPro  7.8  /  Alb  3.5  /  TBili  0.4  /  DBili  x   /  AST  10  /  ALT  9<L>  /  AlkPhos  83  07-03    PT/INR - ( 01 Jul 2024 20:00 )   PT: 29.8 sec;   INR: 2.69 ratio         PTT - ( 01 Jul 2024 20:00 )  PTT:39.7 sec  CARDIAC MARKERS ( 02 Jul 2024 06:05 )  x     / x     / 25 U/L / x     / x          Urinalysis Basic - ( 03 Jul 2024 06:59 )    Color: x / Appearance: x / SG: x / pH: x  Gluc: 101 mg/dL / Ketone: x  / Bili: x / Urobili: x   Blood: x / Protein: x / Nitrite: x   Leuk Esterase: x / RBC: x / WBC x   Sq Epi: x / Non Sq Epi: x / Bacteria: x        RADIOLOGY & ADDITIONAL TESTS:  Results Reviewed:   Imaging Personally Reviewed:  Electrocardiogram Personally Reviewed:    COORDINATION OF CARE:  Care Discussed with Consultants/Other Providers [Y/N]:  Prior or Outpatient Records Reviewed [Y/N]:

## 2024-07-03 NOTE — PROGRESS NOTE ADULT - SUBJECTIVE AND OBJECTIVE BOX
Awake, no distress    Vital Signs Last 24 Hrs  T(C): 36 (07-03-24 @ 19:33), Max: 36.7 (07-03-24 @ 06:51)  T(F): 96.8 (07-03-24 @ 19:33), Max: 98 (07-03-24 @ 06:51)  HR: 66 (07-03-24 @ 19:33) (64 - 68)  BP: 118/56 (07-03-24 @ 19:33) (106/64 - 122/62)  RR: 15 (07-03-24 @ 19:33) (15 - 17)  SpO2: 99% (07-03-24 @ 19:33) (98% - 100%)    I&O's Detail    02 Jul 2024 07:01  -  03 Jul 2024 07:00  --------------------------------------------------------  IN:    dextrose 5% w/ Additives: 525 mL    Oral Fluid: 30 mL    sodium chloride 0.9%: 490 mL    sodium chloride 0.9%: 75 mL  Total IN: 1120 mL    OUT:    Voided (mL): 1200 mL  Total OUT: 1200 mL    03 Jul 2024 07:01  -  03 Jul 2024 20:07  --------------------------------------------------------  OUT:    Voided (mL): 800 mL  Total OUT: 800 mL    s1s2  b/l air entry  soft, ND  no edema RLE, LLE wrapped                         8.3    6.86  )-----------( 191      ( 03 Jul 2024 06:59 )             25.0     03 Jul 2024 06:59    142    |  107    |  184    ----------------------------<  101    3.3     |  18     |  4.28     Ca    9.9        03 Jul 2024 06:59  Phos  7.9       02 Jul 2024 06:05  Mg     3.1       02 Jul 2024 06:05    TPro  7.8    /  Alb  3.5    /  TBili  0.4    /  DBili  x      /  AST  10     /  ALT  9      /  AlkPhos  83     03 Jul 2024 06:59    LIVER FUNCTIONS - ( 03 Jul 2024 06:59 )  Alb: 3.5 g/dL / Pro: 7.8 g/dL / ALK PHOS: 83 U/L / ALT: 9 U/L / AST: 10 U/L / GGT: x           CARDIAC MARKERS ( 02 Jul 2024 06:05 )  x     / x     / 25 U/L / x     / x        Culture - Urine (collected 01 Jul 2024 22:36)  Source: Clean Catch None  Final Report (03 Jul 2024 07:12):    <10,000 CFU/mL Normal Urogenital Chary    acetaminophen     Tablet .. 650 milliGRAM(s) Oral every 6 hours PRN  atorvastatin 10 milliGRAM(s) Oral at bedtime  dextrose 5% 1000 milliLiter(s) IV Continuous <Continuous>  levothyroxine 25 MICROGram(s) Oral daily  memantine 5 milliGRAM(s) Oral two times a day  QUEtiapine 25 milliGRAM(s) Oral at bedtime    A/P:    Suspect severe pre-renal ATN on adm   UA is negative  Imaging w/o hydro   Met acidosis iso NYDIA  Avoid nephrotoxins  IVF w/Bicarb as ordered  Renvela for high Phos   Pt is not a great candidate for RRT given poor MS at baseline, poor ability to cooperate, MMP  Cr is downtrending, good UO  Will f/u CBC, BMP, UO, anemia studies   [facility] d/w family are on-going  D/w medicine     [phone]

## 2024-07-03 NOTE — PROGRESS NOTE ADULT - ASSESSMENT
89 F hx of dementia, PPM, afib on Eliquis, mitral/tricuspid insufficiency, HTN, HLD, hypothyroid, gout pw L leg hematoma with acute renal failure.     Acute Renal Failure  Follow up Renal consult, continue to hold ACE/AB, Lasix, Avoid nephrotoxic meds  CT AP revealed sigmoid dilatation, consider colonoscopy to rule out partial obstruction, abnormal endometrium ?polyp vs myoma  Continue IVF, daughter uncertain about HD at this point given pt comorbidities  Palliative on board   Nephrology on board     LT Leg Hematoma with Cellulitis, with possible acute blood loss  Elevated INR/PTT  CT LT Leg showed hematoma  Holding Eliquis, noted bleeding in mouth, CTH without bleed  trend CBC    Hypothyroid  chronic condition  continue synthroid    Prophylactic Measure  holding eliquis    Goals of Care  pt has advanced directives  confirmed DNR/DNI    spoke with daughter Lisa Rae today [phone] and provided an update

## 2024-07-04 LAB
ALBUMIN SERPL ELPH-MCNC: 3.3 G/DL — SIGNIFICANT CHANGE UP (ref 3.3–5)
ALP SERPL-CCNC: 76 U/L — SIGNIFICANT CHANGE UP (ref 40–120)
ALT FLD-CCNC: 7 U/L — LOW (ref 10–45)
ANION GAP SERPL CALC-SCNC: 13 MMOL/L — SIGNIFICANT CHANGE UP (ref 5–17)
AST SERPL-CCNC: 13 U/L — SIGNIFICANT CHANGE UP (ref 10–40)
BILIRUB SERPL-MCNC: 0.5 MG/DL — SIGNIFICANT CHANGE UP (ref 0.2–1.2)
BUN SERPL-MCNC: 152 MG/DL — HIGH (ref 7–23)
CALCIUM SERPL-MCNC: 9.7 MG/DL — SIGNIFICANT CHANGE UP (ref 8.4–10.5)
CHLORIDE SERPL-SCNC: 109 MMOL/L — HIGH (ref 96–108)
CO2 SERPL-SCNC: 23 MMOL/L — SIGNIFICANT CHANGE UP (ref 22–31)
CREAT SERPL-MCNC: 3.32 MG/DL — HIGH (ref 0.5–1.3)
EGFR: 13 ML/MIN/1.73M2 — LOW
GLUCOSE SERPL-MCNC: 121 MG/DL — HIGH (ref 70–99)
HCT VFR BLD CALC: 27.1 % — LOW (ref 34.5–45)
HGB BLD-MCNC: 8.9 G/DL — LOW (ref 11.5–15.5)
MAGNESIUM SERPL-MCNC: 2.5 MG/DL — SIGNIFICANT CHANGE UP (ref 1.6–2.6)
MCHC RBC-ENTMCNC: 30.3 PG — SIGNIFICANT CHANGE UP (ref 27–34)
MCHC RBC-ENTMCNC: 32.8 GM/DL — SIGNIFICANT CHANGE UP (ref 32–36)
MCV RBC AUTO: 92.2 FL — SIGNIFICANT CHANGE UP (ref 80–100)
NRBC # BLD: 0 /100 WBCS — SIGNIFICANT CHANGE UP (ref 0–0)
PHOSPHATE SERPL-MCNC: 4 MG/DL — SIGNIFICANT CHANGE UP (ref 2.5–4.5)
PLATELET # BLD AUTO: 172 K/UL — SIGNIFICANT CHANGE UP (ref 150–400)
POTASSIUM SERPL-MCNC: 3.1 MMOL/L — LOW (ref 3.5–5.3)
POTASSIUM SERPL-SCNC: 3.1 MMOL/L — LOW (ref 3.5–5.3)
PROT SERPL-MCNC: 7 G/DL — SIGNIFICANT CHANGE UP (ref 6–8.3)
PROT SERPL-MCNC: 7.6 G/DL — SIGNIFICANT CHANGE UP (ref 6–8.3)
RBC # BLD: 2.94 M/UL — LOW (ref 3.8–5.2)
RBC # FLD: 17.1 % — HIGH (ref 10.3–14.5)
SODIUM SERPL-SCNC: 145 MMOL/L — SIGNIFICANT CHANGE UP (ref 135–145)
WBC # BLD: 5.57 K/UL — SIGNIFICANT CHANGE UP (ref 3.8–10.5)
WBC # FLD AUTO: 5.57 K/UL — SIGNIFICANT CHANGE UP (ref 3.8–10.5)

## 2024-07-04 PROCEDURE — 99233 SBSQ HOSP IP/OBS HIGH 50: CPT

## 2024-07-04 RX ORDER — DEXTROSE MONOHYDRATE, SODIUM CHLORIDE, AND POTASSIUM CHLORIDE 50; 4.5; 2.24 G/1000ML; G/1000ML; G/1000ML
1000 INJECTION, SOLUTION INTRAVENOUS
Refills: 0 | Status: DISCONTINUED | OUTPATIENT
Start: 2024-07-04 | End: 2024-07-08

## 2024-07-04 RX ORDER — POTASSIUM CHLORIDE 600 MG/1
10 TABLET, FILM COATED, EXTENDED RELEASE ORAL ONCE
Refills: 0 | Status: COMPLETED | OUTPATIENT
Start: 2024-07-04 | End: 2024-07-04

## 2024-07-04 RX ORDER — POTASSIUM CHLORIDE 600 MG/1
20 TABLET, FILM COATED, EXTENDED RELEASE ORAL ONCE
Refills: 0 | Status: DISCONTINUED | OUTPATIENT
Start: 2024-07-04 | End: 2024-07-04

## 2024-07-04 RX ORDER — POTASSIUM CHLORIDE 600 MG/1
40 TABLET, FILM COATED, EXTENDED RELEASE ORAL ONCE
Refills: 0 | Status: DISCONTINUED | OUTPATIENT
Start: 2024-07-04 | End: 2024-07-04

## 2024-07-04 RX ADMIN — Medication 25 MICROGRAM(S): at 05:54

## 2024-07-04 RX ADMIN — MEMANTINE HYDROCHLORIDE 5 MILLIGRAM(S): 7 CAPSULE, EXTENDED RELEASE ORAL at 05:54

## 2024-07-04 RX ADMIN — POTASSIUM CHLORIDE 100 MILLIEQUIVALENT(S): 600 TABLET, FILM COATED, EXTENDED RELEASE ORAL at 11:39

## 2024-07-04 RX ADMIN — DEXTROSE MONOHYDRATE, SODIUM CHLORIDE, AND POTASSIUM CHLORIDE 65 MILLILITER(S): 50; 4.5; 2.24 INJECTION, SOLUTION INTRAVENOUS at 15:09

## 2024-07-04 RX ADMIN — DEXTROSE MONOHYDRATE AND SODIUM CHLORIDE 75 MILLILITER(S): 5; .3 INJECTION, SOLUTION INTRAVENOUS at 05:55

## 2024-07-04 NOTE — DIETITIAN INITIAL EVALUATION ADULT - PERTINENT LABORATORY DATA
07-04    145  |  109<H>  |  152<H>  ----------------------------<  121<H>  3.1<L>   |  23  |  3.32<H>    Ca    9.7      04 Jul 2024 07:34  Phos  4.0     07-04  Mg     2.5     07-04    TPro  7.6  /  Alb  3.3  /  TBili  0.5  /  DBili  x   /  AST  13  /  ALT  7<L>  /  AlkPhos  76  07-04

## 2024-07-04 NOTE — PROGRESS NOTE ADULT - SUBJECTIVE AND OBJECTIVE BOX
Subjective: awake, non-verbal.       MEDICATIONS  (STANDING):  atorvastatin 10 milliGRAM(s) Oral at bedtime  dextrose 5% 1000 milliLiter(s) (75 mL/Hr) IV Continuous <Continuous>  levothyroxine 25 MICROGram(s) Oral daily  memantine 5 milliGRAM(s) Oral two times a day  potassium chloride  10 mEq/100 mL IVPB 10 milliEquivalent(s) IV Intermittent once  QUEtiapine 25 milliGRAM(s) Oral at bedtime    MEDICATIONS  (PRN):  acetaminophen     Tablet .. 650 milliGRAM(s) Oral every 6 hours PRN Mild Pain (1 - 3)          T(C): 36.4 (07-04-24 @ 05:14), Max: 36.4 (07-03-24 @ 14:42)  HR: 94 (07-04-24 @ 05:14) (64 - 94)  BP: 134/73 (07-04-24 @ 05:14) (106/64 - 134/73)  RR: 16 (07-04-24 @ 05:14) (15 - 17)  SpO2: 92% (07-04-24 @ 05:14) (92% - 99%)  Wt(kg): --        I&O's Detail    03 Jul 2024 07:01  -  04 Jul 2024 07:00  --------------------------------------------------------  IN:    dextrose 5% w/ Additives: 150 mL    dextrose 5% w/ Additives: 600 mL    Oral Fluid: 120 mL  Total IN: 870 mL    OUT:    Voided (mL): 800 mL  Total OUT: 800 mL    Total NET: 70 mL      04 Jul 2024 07:01  -  04 Jul 2024 11:32  --------------------------------------------------------  IN:    Oral Fluid: 200 mL  Total IN: 200 mL    OUT:  Total OUT: 0 mL    Total NET: 200 mL               PHYSICAL EXAM:    GENERAL: protective mittens are on  NECK: Supple, no inc in JVP  CHEST/LUNG: Clear  HEART: S1S2  ABDOMEN: Soft, Nontender, Nondistended; Bowel sounds present  EXTREMITIES:  trace edema.         LABS:  CBC Full  -  ( 04 Jul 2024 07:34 )  WBC Count : 5.57 K/uL  RBC Count : 2.94 M/uL  Hemoglobin : 8.9 g/dL  Hematocrit : 27.1 %  Platelet Count - Automated : 172 K/uL  Mean Cell Volume : 92.2 fl  Mean Cell Hemoglobin : 30.3 pg  Mean Cell Hemoglobin Concentration : 32.8 gm/dL  Auto Neutrophil # : x  Auto Lymphocyte # : x  Auto Monocyte # : x  Auto Eosinophil # : x  Auto Basophil # : x  Auto Neutrophil % : x  Auto Lymphocyte % : x  Auto Monocyte % : x  Auto Eosinophil % : x  Auto Basophil % : x    07-04    145  |  109<H>  |  152<H>  ----------------------------<  121<H>  3.1<L>   |  23  |  3.32<H>    Ca    9.7      04 Jul 2024 07:34  Phos  4.0     07-04  Mg     2.5     07-04    TPro  7.6  /  Alb  3.3  /  TBili  0.5  /  DBili  x   /  AST  13  /  ALT  7<L>  /  AlkPhos  76  07-04      Impression:  NYDIA -- suspected severe pre-renal azotemia vs ATN on adm   UA is negative  Imaging w/o hydro   Met acidosis due to severe NYDIA. Improved.   Avoid nephrotoxins  IVF w/Bicarb as ordered  Renvela for high Phos   Pt is not a great candidate for RRT given poor MS at baseline, poor ability to cooperate, MMP  Cr is downtrending, good UO    Recommendations:   Agree with bicarb dc  Would cont gentle hydration with 0.45% saline + KCL  BMP in 24h if in line with GOC

## 2024-07-04 NOTE — DIETITIAN INITIAL EVALUATION ADULT - ETIOLOGY
Dementia, lack of appetite Worsening Alzheimer's Dementia, lack of appetite Worsening Alzheimer's Dementia

## 2024-07-04 NOTE — DIETITIAN INITIAL EVALUATION ADULT - OTHER INFO
H&P: 89 F hx of dementia, PPM, afib on Eliquis, mitral/tricuspid insufficiency, HTN, HLD, hypothyroid, gout pw L leg hematoma with acute renal failure.      H&P: 89 F hx of dementia, PPM, afib on Eliquis, mitral/tricuspid insufficiency, HTN, HLD, hypothyroid, gout pw L leg hematoma with acute renal failure.      Pt's daughter at the bed side reports pt has poor appetite. Pt requires total feeding assistance. PO intake 0-25%, as per nursing flowsheet. On IVF NaCl 0.45% and KCl @ 65mL/hr 1000mL. Pt has difficulties for chewing/swallowing difficulties, unable to swallow solid foods. NKFA. No GI distress reported. Last BM: 7/03. 07/04 labs: Na 145, K 3.1 (Cl 109 (H),  (H), Cr 3.32 (H), Glu 121 (H), eGFR 13 (L). UBW: daughter does not remember, but has noted progressive wt loss x 1 year, current weight 166 Lbs. No edema. Skin w/ wound bundle on sacral area MAD x 2. NFPE shows mild/mod muscle/fat loss.

## 2024-07-04 NOTE — PROGRESS NOTE ADULT - SUBJECTIVE AND OBJECTIVE BOX
Patient is a 89y old  Female who presents with a chief complaint of Acute renal failure (04 Jul 2024 07:50)      Patient seen and examined at bedside. No overnight events reported.     ALLERGIES:  No Known Allergies    MEDICATIONS  (STANDING):  atorvastatin 10 milliGRAM(s) Oral at bedtime  dextrose 5% 1000 milliLiter(s) (75 mL/Hr) IV Continuous <Continuous>  levothyroxine 25 MICROGram(s) Oral daily  memantine 5 milliGRAM(s) Oral two times a day  potassium chloride   Powder 40 milliEquivalent(s) Oral once  QUEtiapine 25 milliGRAM(s) Oral at bedtime    MEDICATIONS  (PRN):  acetaminophen     Tablet .. 650 milliGRAM(s) Oral every 6 hours PRN Mild Pain (1 - 3)    Vital Signs Last 24 Hrs  T(F): 97.6 (04 Jul 2024 05:14), Max: 97.6 (04 Jul 2024 05:14)  HR: 94 (04 Jul 2024 05:14) (64 - 94)  BP: 134/73 (04 Jul 2024 05:14) (106/64 - 134/73)  RR: 16 (04 Jul 2024 05:14) (15 - 17)  SpO2: 92% (04 Jul 2024 05:14) (92% - 99%)  I&O's Summary    03 Jul 2024 07:01  -  04 Jul 2024 07:00  --------------------------------------------------------  IN: 870 mL / OUT: 800 mL / NET: 70 mL      PHYSICAL EXAM:  General: NAD, elderly appearing   ENT: No gross hearing impairment, Moist mucous membranes, no thrush  Neck: Supple, No JVD  Lungs: Clear to auscultation bilaterally, good air entry, non-labored breathing  Cardio: RRR, S1/S2, No murmur  Abdomen: Soft, Nontender, Nondistended; Bowel sounds present  Extremities: No calf tenderness, No cyanosis, No pitting edema, Left leg hematoma wrapped in ACE wrap, dressing is clean dry and intact no evidence of infectious process   Psych: Appropriate, flat     LABS:                        8.9    5.57  )-----------( 172      ( 04 Jul 2024 07:34 )             27.1     07-04    145  |  109  |  152  ----------------------------<  121  3.1   |  23  |  3.32    Ca    9.7      04 Jul 2024 07:34  Phos  4.0     07-04  Mg     2.5     07-04    TPro  7.6  /  Alb  3.3  /  TBili  0.5  /  DBili  x   /  AST  13  /  ALT  7   /  AlkPhos  76  07-04          PT/INR - ( 01 Jul 2024 20:00 )   PT: 29.8 sec;   INR: 2.69 ratio         PTT - ( 01 Jul 2024 20:00 )  PTT:39.7 sec      CARDIAC MARKERS ( 02 Jul 2024 06:05 )  x     / 52.9 ng/L / 25 U/L / x     / x      CARDIAC MARKERS ( 01 Jul 2024 20:00 )  x     / 43.6 ng/L / x     / x     / x                            Urinalysis Basic - ( 04 Jul 2024 07:34 )    Color: x / Appearance: x / SG: x / pH: x  Gluc: 121 mg/dL / Ketone: x  / Bili: x / Urobili: x   Blood: x / Protein: x / Nitrite: x   Leuk Esterase: x / RBC: x / WBC x   Sq Epi: x / Non Sq Epi: x / Bacteria: x        Culture - Urine (collected 01 Jul 2024 22:36)  Source: Clean Catch None  Final Report (03 Jul 2024 07:12):    <10,000 CFU/mL Normal Urogenital Chary        RADIOLOGY & ADDITIONAL TESTS:    Care Discussed with Consultants/Other Providers:

## 2024-07-04 NOTE — DIETITIAN INITIAL EVALUATION ADULT - PERTINENT MEDS FT
MEDICATIONS  (STANDING):  atorvastatin 10 milliGRAM(s) Oral at bedtime  dextrose 5% 1000 milliLiter(s) (75 mL/Hr) IV Continuous <Continuous>  levothyroxine 25 MICROGram(s) Oral daily  memantine 5 milliGRAM(s) Oral two times a day  potassium chloride   Powder 40 milliEquivalent(s) Oral once  QUEtiapine 25 milliGRAM(s) Oral at bedtime    MEDICATIONS  (PRN):  acetaminophen     Tablet .. 650 milliGRAM(s) Oral every 6 hours PRN Mild Pain (1 - 3)

## 2024-07-04 NOTE — DIETITIAN INITIAL EVALUATION ADULT - ORAL INTAKE PTA/DIET HISTORY
Pt's daughter at the bed side reported PTA consumed a regular diet. Pt has poor appetite x ~ 6 weeks. NKFA. Pt takes supplements (Ensure Plus 8oz QD).

## 2024-07-04 NOTE — PROVIDER CONTACT NOTE (OTHER) - SITUATION
Patient restless and confused, pulling at IV lines and scratching face- nose and mouth area, small amounts of blood noted

## 2024-07-04 NOTE — PROGRESS NOTE ADULT - ASSESSMENT
89 F hx of dementia, PPM, afib on Eliquis, mitral/tricuspid insufficiency, HTN, HLD, hypothyroid, and gout p/w L leg hematoma with acute renal failure.     Acute Renal Failure  - Cr. 6 on admission, now downtrending  - CT AP revealed sigmoid dilatation, consider colonoscopy to rule out partial obstruction, abnormal endometrium ?polyp vs myoma  - continue to hold ACE/AB, Lasix, Avoid nephrotoxic meds  - Continue IVF with bicarb, daughter uncertain about HD at this point given pt comorbidities  - Palliative on board - will continue GOC talks   - Nephrology following - Pt is not a great candidate for RRT given poor MS at baseline, Suspect severe pre-renal ATN    LT Leg Hematoma, with possible acute blood loss  Elevated INR/PTT  - CT LT Leg showed hematoma  - Holding Eliquis, noted bleeding in mouth, CTH without bleed  - CBC stable   - follow up coags     Hypothyroid  HLD  - chronic condition  - continue statin   - continue synthroid    Dementia  - continue namenda    DVTppx  - holding eliquis due to oral bleeding, elevated INR     Goals of Care  pt has advanced directives  confirmed DNR/DNI    Dispo: pending GOC/palliative follow up     7/4 spoke with daughter Lisa Rae today [phone] and provided an update      89 F hx of dementia, PPM, afib on Eliquis, mitral/tricuspid insufficiency, HTN, HLD, hypothyroid, and gout p/w L leg hematoma with acute renal failure.     Acute Renal Failure  - Cr. 6 on admission, now downtrending  - CT AP revealed sigmoid dilatation, consider colonoscopy to rule out partial obstruction, abnormal endometrium ?polyp vs myoma  - continue to hold ACE/AB, Lasix, Avoid nephrotoxic meds  - Continue IVF with bicarb, daughter uncertain about HD at this point given pt comorbidities  - Palliative on board - will continue GOC talks   - Nephrology following - Pt is not a great candidate for RRT given poor MS at baseline, Suspect severe pre-renal ATN    LT Leg Hematoma, with possible acute blood loss  Elevated INR/PTT  - CT LT Leg showed hematoma  - Holding Eliquis, noted bleeding in mouth, CTH without bleed  - CBC stable   - follow up coags     Hypothyroid  HLD  - chronic condition  - continue statin   - continue synthroid    Dementia  - continue namenda    DVTppx  - holding eliquis due to oral bleeding, elevated INR     Goals of Care  pt has advanced directives  confirmed DNR/DNI    Dispo: pending GOC/palliative follow up     7/4 spoke with daughter Lisa Rae today ([phone]) at the bedside with other family members present, would like to continue care as is for now.      89 F hx of dementia, PPM, afib on Eliquis, mitral/tricuspid insufficiency, HTN, HLD, hypothyroid, and gout p/w L leg hematoma with acute renal failure.     Acute Renal Failure  - Cr. 6 on admission, now downtrending  - CT AP revealed sigmoid dilatation, consider colonoscopy to rule out partial obstruction, abnormal endometrium ?polyp vs myoma  - continue to hold ACE/AB, Lasix, Avoid nephrotoxic meds  - IVF with bicarb transitioned to .45NS + kcl   - Palliative on board - will continue GOC talks   - Nephrology following - Pt is not a great candidate for RRT given poor MS at baseline, Suspect severe pre-renal ATN    LT Leg Hematoma, with possible acute blood loss  Elevated INR/PTT  - CT LT Leg showed hematoma  - Holding Eliquis, noted bleeding in mouth, CTH without bleed  - CBC stable   - follow up coags     Hypothyroid  HLD  - chronic condition  - continue statin   - continue synthroid    Dementia  - continue namenda    DVTppx  - holding eliquis due to oral bleeding, elevated INR     Goals of Care  pt has advanced directives  confirmed DNR/DNI    Dispo: pending GOC/palliative follow up     7/4 spoke with daughter Lisa Rae today ([phone]) at the bedside with other family members present, would like to continue care as is for now.

## 2024-07-04 NOTE — DIETITIAN INITIAL EVALUATION ADULT - SIGNS/SYMPTOMS
PO intake 0-25% pt is unable to swallow solid foods PO intake <50 x >1 month, NFPE show mild to moderate muscle and fat loss.

## 2024-07-05 LAB
ANION GAP SERPL CALC-SCNC: 14 MMOL/L — SIGNIFICANT CHANGE UP (ref 5–17)
BUN SERPL-MCNC: 125 MG/DL — HIGH (ref 7–23)
CALCIUM SERPL-MCNC: 9.9 MG/DL — SIGNIFICANT CHANGE UP (ref 8.4–10.5)
CHLORIDE SERPL-SCNC: 112 MMOL/L — HIGH (ref 96–108)
CO2 SERPL-SCNC: 20 MMOL/L — LOW (ref 22–31)
CREAT SERPL-MCNC: 2.58 MG/DL — HIGH (ref 0.5–1.3)
EGFR: 17 ML/MIN/1.73M2 — LOW
GLUCOSE SERPL-MCNC: 107 MG/DL — HIGH (ref 70–99)
POTASSIUM SERPL-MCNC: 3.4 MMOL/L — LOW (ref 3.5–5.3)
POTASSIUM SERPL-SCNC: 3.4 MMOL/L — LOW (ref 3.5–5.3)
SODIUM SERPL-SCNC: 146 MMOL/L — HIGH (ref 135–145)

## 2024-07-05 PROCEDURE — 99233 SBSQ HOSP IP/OBS HIGH 50: CPT

## 2024-07-05 PROCEDURE — 99232 SBSQ HOSP IP/OBS MODERATE 35: CPT

## 2024-07-05 RX ORDER — POTASSIUM CHLORIDE 600 MG/1
20 TABLET, FILM COATED, EXTENDED RELEASE ORAL ONCE
Refills: 0 | Status: COMPLETED | OUTPATIENT
Start: 2024-07-05 | End: 2024-07-05

## 2024-07-05 RX ORDER — POTASSIUM CHLORIDE 600 MG/1
40 TABLET, FILM COATED, EXTENDED RELEASE ORAL ONCE
Refills: 0 | Status: DISCONTINUED | OUTPATIENT
Start: 2024-07-05 | End: 2024-07-05

## 2024-07-05 RX ADMIN — Medication 25 MILLIGRAM(S): at 21:11

## 2024-07-05 RX ADMIN — MEMANTINE HYDROCHLORIDE 5 MILLIGRAM(S): 7 CAPSULE, EXTENDED RELEASE ORAL at 17:00

## 2024-07-05 RX ADMIN — DEXTROSE MONOHYDRATE, SODIUM CHLORIDE, AND POTASSIUM CHLORIDE 65 MILLILITER(S): 50; 4.5; 2.24 INJECTION, SOLUTION INTRAVENOUS at 22:58

## 2024-07-05 RX ADMIN — DEXTROSE MONOHYDRATE, SODIUM CHLORIDE, AND POTASSIUM CHLORIDE 65 MILLILITER(S): 50; 4.5; 2.24 INJECTION, SOLUTION INTRAVENOUS at 07:45

## 2024-07-05 RX ADMIN — ATORVASTATIN CALCIUM 10 MILLIGRAM(S): 20 TABLET, FILM COATED ORAL at 21:11

## 2024-07-05 RX ADMIN — POTASSIUM CHLORIDE 20 MILLIEQUIVALENT(S): 600 TABLET, FILM COATED, EXTENDED RELEASE ORAL at 17:00

## 2024-07-05 NOTE — PROGRESS NOTE ADULT - ASSESSMENT
89 F hx of dementia, PPM, afib on Eliquis, mitral/tricuspid insufficiency, HTN, HLD, hypothyroid, gout pw L leg hematoma with acute renal failure.     Debility  - PPS 40-50%    Dementia  - A&Ox1  - has 24/7 HHA at home    Acute Renal Failure, improving  Dehydration; Failure to Thrive   - nephro following; IVF per primary medical team  - Spoke with pt's daughter about HD again. Daughter asked if HD would be done if requested by family if renal function were worse in the future; discussed risks/benefits and that HD would not be done if it would cause harm to a patient    Advanced care planning  - HCP: daughter Lisa Rae  - Pt is DNR/DNI    Encounter for Palliative Care  - Spoke with pt's daughter regarding her current clinical status. Discussed likelihood pt will return to the hospital for reoccurrence of dehydration episodes as pt will have poor PO intake due to her underlying dementia. I explained the pt will likely bounce between home and the hospital and asked her to discuss with her brother if this is an acceptable quality of life for Sylvie. Lisa expressed understanding of this and asked for symptoms/signs to look out for. She does want her mother to go home eventually after hospitalization but wants to continue medical management. Discussed that HD would not be done if it would cause harm to pts as well. Family goals do not align with the philosophy of hospice care currently.  - Will continue to follow for ongoing GOC conversations and supportive care.

## 2024-07-05 NOTE — PROGRESS NOTE ADULT - SUBJECTIVE AND OBJECTIVE BOX
Patient is a 89y old  Female who presents with a chief complaint of Acute renal failure (04 Jul 2024 11:31)      Patient seen and examined at bedside.    ALLERGIES:  No Known Allergies    MEDICATIONS  (STANDING):  atorvastatin 10 milliGRAM(s) Oral at bedtime  levothyroxine 25 MICROGram(s) Oral daily  memantine 5 milliGRAM(s) Oral two times a day  QUEtiapine 25 milliGRAM(s) Oral at bedtime  sodium chloride 0.45% with potassium chloride 20 mEq/L 1000 milliLiter(s) (65 mL/Hr) IV Continuous <Continuous>    MEDICATIONS  (PRN):  acetaminophen     Tablet .. 650 milliGRAM(s) Oral every 6 hours PRN Mild Pain (1 - 3)    Vital Signs Last 24 Hrs  T(F): 97.4 (05 Jul 2024 05:30), Max: 97.8 (04 Jul 2024 12:32)  HR: 93 (05 Jul 2024 05:30) (65 - 93)  BP: 117/79 (05 Jul 2024 05:30) (93/35 - 117/79)  RR: 16 (05 Jul 2024 05:30) (15 - 16)  SpO2: 93% (05 Jul 2024 05:30) (93% - 97%)  I&O's Summary    04 Jul 2024 07:01  -  05 Jul 2024 07:00  --------------------------------------------------------  IN: 200 mL / OUT: 900 mL / NET: -700 mL      PHYSICAL EXAM:  General: NAD, A/O x 3  ENT: MMM  Neck: Supple, No JVD  Lungs: Clear to auscultation bilaterally, Non labored breathing   Cardio: RRR, S1/S2, No murmurs  Abdomen: Soft, Nontender, Nondistended; Bowel sounds present  Extremities: No calf tenderness, No pitting edema    LABS:                        8.9    5.57  )-----------( 172      ( 04 Jul 2024 07:34 )             27.1     07-04    145  |  109  |  152  ----------------------------<  121  3.1   |  23  |  3.32    Ca    9.7      04 Jul 2024 07:34  Phos  4.0     07-04  Mg     2.5     07-04    TPro  7.6  /  Alb  3.3  /  TBili  0.5  /  DBili  x   /  AST  13  /  ALT  7   /  AlkPhos  76  07-04                                Urinalysis Basic - ( 04 Jul 2024 07:34 )    Color: x / Appearance: x / SG: x / pH: x  Gluc: 121 mg/dL / Ketone: x  / Bili: x / Urobili: x   Blood: x / Protein: x / Nitrite: x   Leuk Esterase: x / RBC: x / WBC x   Sq Epi: x / Non Sq Epi: x / Bacteria: x        Culture - Urine (collected 01 Jul 2024 22:36)  Source: Clean Catch None  Final Report (03 Jul 2024 07:12):    <10,000 CFU/mL Normal Urogenital Chary        RADIOLOGY & ADDITIONAL TESTS:    Care Discussed with Consultants/Other Providers:    Patient is a 89y old  Female who presents with a chief complaint of Acute renal failure       Patient seen and examined at bedside. Pt awake but confused. Just would like to rest .  No events overnight.     ALLERGIES:  No Known Allergies    MEDICATIONS  (STANDING):  atorvastatin 10 milliGRAM(s) Oral at bedtime  levothyroxine 25 MICROGram(s) Oral daily  memantine 5 milliGRAM(s) Oral two times a day  QUEtiapine 25 milliGRAM(s) Oral at bedtime  sodium chloride 0.45% with potassium chloride 20 mEq/L 1000 milliLiter(s) (65 mL/Hr) IV Continuous <Continuous>    MEDICATIONS  (PRN):  acetaminophen     Tablet .. 650 milliGRAM(s) Oral every 6 hours PRN Mild Pain (1 - 3)    Vital Signs Last 24 Hrs  T(F): 97.4 (05 Jul 2024 05:30), Max: 97.8 (04 Jul 2024 12:32)  HR: 93 (05 Jul 2024 05:30) (65 - 93)  BP: 117/79 (05 Jul 2024 05:30) (93/35 - 117/79)  RR: 16 (05 Jul 2024 05:30) (15 - 16)  SpO2: 93% (05 Jul 2024 05:30) (93% - 97%)  I&O's Summary    04 Jul 2024 07:01  -  05 Jul 2024 07:00  --------------------------------------------------------  IN: 200 mL / OUT: 900 mL / NET: -700 mL      PHYSICAL EXAM:  General: 89 y/o female in NAD, A/O x 1- confused   ENT: MMM  Neck: Supple, No JVD  Lungs: Clear to auscultation bilaterally, Non labored breathing   Cardio: RRR, S1/S2, No murmurs  Abdomen: Soft, Nontender, Nondistended; Bowel sounds present  Extremities: No calf tenderness, No pitting edema    LABS:                        8.9    5.57  )-----------( 172      ( 04 Jul 2024 07:34 )             27.1     07-04    145  |  109  |  152  ----------------------------<  121  3.1   |  23  |  3.32    Ca    9.7      04 Jul 2024 07:34  Phos  4.0     07-04  Mg     2.5     07-04    TPro  7.6  /  Alb  3.3  /  TBili  0.5  /  DBili  x   /  AST  13  /  ALT  7   /  AlkPhos  76  07-04                                Urinalysis Basic - ( 04 Jul 2024 07:34 )    Color: x / Appearance: x / SG: x / pH: x  Gluc: 121 mg/dL / Ketone: x  / Bili: x / Urobili: x   Blood: x / Protein: x / Nitrite: x   Leuk Esterase: x / RBC: x / WBC x   Sq Epi: x / Non Sq Epi: x / Bacteria: x        Culture - Urine (collected 01 Jul 2024 22:36)  Source: Clean Catch None  Final Report (03 Jul 2024 07:12):    <10,000 CFU/mL Normal Urogenital Chary        RADIOLOGY & ADDITIONAL TESTS:    Care Discussed with Consultants/Other Providers:    Patient is a 89y old  Female who presents with a chief complaint of Acute renal failure       Patient seen and examined at bedside. Pt awake but confused. Just would like to rest .  No events overnight.     ALLERGIES:  No Known Allergies    MEDICATIONS  (STANDING):  atorvastatin 10 milliGRAM(s) Oral at bedtime  levothyroxine 25 MICROGram(s) Oral daily  memantine 5 milliGRAM(s) Oral two times a day  QUEtiapine 25 milliGRAM(s) Oral at bedtime  sodium chloride 0.45% with potassium chloride 20 mEq/L 1000 milliLiter(s) (65 mL/Hr) IV Continuous <Continuous>    MEDICATIONS  (PRN):  acetaminophen     Tablet .. 650 milliGRAM(s) Oral every 6 hours PRN Mild Pain (1 - 3)    Vital Signs Last 24 Hrs  T(F): 97.4 (05 Jul 2024 05:30), Max: 97.8 (04 Jul 2024 12:32)  HR: 93 (05 Jul 2024 05:30) (65 - 93)  BP: 117/79 (05 Jul 2024 05:30) (93/35 - 117/79)  RR: 16 (05 Jul 2024 05:30) (15 - 16)  SpO2: 93% (05 Jul 2024 05:30) (93% - 97%)  I&O's Summary    04 Jul 2024 07:01  -  05 Jul 2024 07:00  --------------------------------------------------------  IN: 200 mL / OUT: 900 mL / NET: -700 mL      PHYSICAL EXAM:  General: 88 y/o female in NAD, A/O x 1- confused   ENT: MMM  Neck: Supple, No JVD  Lungs: Clear to auscultation bilaterally, Non labored breathing   Cardio: RRR, S1/S2, No murmurs  Abdomen: Soft, Nontender, Nondistended; Bowel sounds present  Extremities: No calf tenderness, No pitting edema    LABS:                        8.9    5.57  )-----------( 172      ( 04 Jul 2024 07:34 )             27.1     07-04    145  |  109  |  152  ----------------------------<  121  3.1   |  23  |  3.32    Ca    9.7      04 Jul 2024 07:34  Phos  4.0     07-04  Mg     2.5     07-04    TPro  7.6  /  Alb  3.3  /  TBili  0.5  /  DBili  x   /  AST  13  /  ALT  7   /  AlkPhos  76  07-04      Urinalysis Basic - ( 04 Jul 2024 07:34 )    Color: x / Appearance: x / SG: x / pH: x  Gluc: 121 mg/dL / Ketone: x  / Bili: x / Urobili: x   Blood: x / Protein: x / Nitrite: x   Leuk Esterase: x / RBC: x / WBC x   Sq Epi: x / Non Sq Epi: x / Bacteria: x        Culture - Urine (collected 01 Jul 2024 22:36)  Source: Clean Catch None  Final Report (03 Jul 2024 07:12):    <10,000 CFU/mL Normal Urogenital Chary        RADIOLOGY & ADDITIONAL TESTS:    Care Discussed with Consultants/Other Providers:

## 2024-07-05 NOTE — PROGRESS NOTE ADULT - SUBJECTIVE AND OBJECTIVE BOX
NEPHROLOGY PROGRESS NOTE    CHIEF COMPLAINT:  NYDIA    HPI:  Renal function improving on IVF.   mL.    EXAM:  Vital Signs Last 24 Hrs  T(C): 36.3 (05 Jul 2024 05:30), Max: 36.6 (04 Jul 2024 12:32)  T(F): 97.4 (05 Jul 2024 05:30), Max: 97.8 (04 Jul 2024 12:32)  HR: 93 (05 Jul 2024 05:30) (65 - 93)  BP: 117/79 (05 Jul 2024 05:30) (93/35 - 117/79)  BP(mean): --  RR: 16 (05 Jul 2024 05:30) (15 - 16)  SpO2: 93% (05 Jul 2024 05:30) (93% - 97%)    Parameters below as of 05 Jul 2024 05:30  Patient On (Oxygen Delivery Method): room air      I&O's Summary    04 Jul 2024 07:01  -  05 Jul 2024 07:00  --------------------------------------------------------  IN: 200 mL / OUT: 900 mL / NET: -700 mL      Somnolent  Normal respiratory effort, lungs clear bilaterally  Heart RRR with no murmur, no peripheral edema    LABS                        8.9    5.57  )-----------( 172      ( 04 Jul 2024 07:34 )             27.1     07-04    145  |  109<H>  |  152<H>  ----------------------------<  121<H>  3.1<L>   |  23  |  3.32<H>    Ca    9.7      04 Jul 2024 07:34  Phos  4.0     07-04  Mg     2.5     07-04    TPro  7.6  /  Alb  3.3  /  TBili  0.5  /  DBili  x   /  AST  13  /  ALT  7<L>  /  AlkPhos  76  07-04      Impression:  NYDIA -- suspected severe pre-renal azotemia, improving  Metabolic acidosis, resolved    Recommendations:   Continue 1/2 NS + KCl  BMP in AM

## 2024-07-05 NOTE — PROGRESS NOTE ADULT - ASSESSMENT
89 F hx of dementia, PPM, afib on Eliquis, mitral/tricuspid insufficiency, HTN, HLD, hypothyroid, and gout p/w L leg hematoma with acute renal failure.     Acute Renal Failure  - Cr. 6 on admission, now downtrending  - CT AP revealed sigmoid dilatation, consider colonoscopy to rule out partial obstruction, abnormal endometrium ?polyp vs myoma  - continue to hold ACE/AB, Lasix, Avoid nephrotoxic meds  - IVF with bicarb transitioned to .45NS + kcl   - Palliative on board - will continue GOC talks   - Nephrology following - Pt is not a great candidate for RRT given poor MS at baseline, Suspect severe pre-renal ATN    LT Leg Hematoma, with possible acute blood loss  Elevated INR/PTT  - CT LT Leg showed hematoma  - Holding Eliquis, noted bleeding in mouth, CTH without bleed  - CBC stable   - follow up coags     Hypothyroid  HLD  - chronic condition  - continue statin   - continue synthroid    Dementia  - continue namenda    DVTppx  - holding eliquis due to oral bleeding, elevated INR     Goals of Care  pt has advanced directives  confirmed DNR/DNI    Dispo: pending GOC/palliative follow up     7/4 spoke with daughter Lisa Rae today ([phone]) at the bedside with other family members present, would like to continue care as is for now.      89 F hx of dementia, PPM, afib on Eliquis, mitral/tricuspid insufficiency, HTN, HLD, hypothyroid, and gout p/w L leg hematoma with acute renal failure.     Acute Renal Failure  - Cr. 6 on admission, now downtrending 3.32today  - CT AP revealed sigmoid dilatation, consider colonoscopy to rule out partial obstruction, abnormal endometrium ?polyp vs myoma  - continue to hold ACE/AB, Lasix, Avoid nephrotoxic meds  - IVF  .45NS + kcl as per renal   - Palliative on board - ongoing discussion with family   - Nephrology following - Pt is not a great candidate for RRT given poor MS at baseline, Suspect severe pre-renal ATN    LT Leg Hematoma, with possible acute blood loss  Elevated INR/PTT  - CT LT Leg showed hematoma  - Holding Eliquis, noted bleeding in mouth, CTH without bleed  - CBC stable       Hypothyroid  HLD  - chronic condition  - continue statin   - continue synthroid    Dementia  - continue namenda    DVTppx  - holding eliquis due to oral bleeding, elevated INR     Goals of Care  pt has advanced directives  confirmed DNR/DNI    Dispo: pending GOC/palliative following     7/5 spoke with daughter Lisa Rae today ([phone]) at the bedside with other family members present, would like to continue care as is for now.

## 2024-07-05 NOTE — PROGRESS NOTE ADULT - SUBJECTIVE AND OBJECTIVE BOX
Indication for Geriatrics and Palliative Care Services/INTERVAL HPI: GOC    OVERNIGHT EVENTS: no acute events noted  SUBJECTIVE AND OBJECTIVE: Pt seen and examined at bedside this morning. She was very sleepy but comfortable in bed.    Allergies  No Known Allergies    Intolerances    MEDICATIONS  (STANDING):  atorvastatin 10 milliGRAM(s) Oral at bedtime  levothyroxine 25 MICROGram(s) Oral daily  memantine 5 milliGRAM(s) Oral two times a day  QUEtiapine 25 milliGRAM(s) Oral at bedtime  sodium chloride 0.45% with potassium chloride 20 mEq/L 1000 milliLiter(s) (65 mL/Hr) IV Continuous <Continuous>    MEDICATIONS  (PRN):  acetaminophen     Tablet .. 650 milliGRAM(s) Oral every 6 hours PRN Mild Pain (1 - 3)      ITEMS UNCHECKED ARE NOT PRESENT    PRESENT SYMPTOMS:      Pain: [ ]yes [ X]no  QOL impact -   Location -                    Aggravating factors -  Quality -  Radiation -  Timing-  Severity (0-10 scale):  Minimal acceptable level (0-10 scale):       Dyspnea:                          none  Anxiety:                          unable to assess  Depressed:                   unable to asses  Fatigue:                             Mild-moderate   Nausea:                            none  Loss of appetite:             does not report  Constipation:                    none      Other Symptoms:  [ ]All other review of systems negative - pt with dementia/unable to report       Chaplaincy Referral:   Deferred   Palliative Performance Status Version 2:     40    %    http://npcrc.org/files/news/palliative_performance_scale_ppsv2.pdf    PHYSICAL EXAM:  Vital Signs Last 24 Hrs  T(C): 36.3 (05 Jul 2024 05:30), Max: 36.3 (05 Jul 2024 05:30)  T(F): 97.4 (05 Jul 2024 05:30), Max: 97.4 (05 Jul 2024 05:30)  HR: 93 (05 Jul 2024 05:30) (65 - 93)  BP: 117/79 (05 Jul 2024 05:30) (96/61 - 117/79)  BP(mean): --  RR: 16 (05 Jul 2024 05:30) (16 - 16)  SpO2: 93% (05 Jul 2024 05:30) (93% - 97%)    Parameters below as of 05 Jul 2024 05:30  Patient On (Oxygen Delivery Method): room air     I&O's Summary    04 Jul 2024 07:01  -  05 Jul 2024 07:00  --------------------------------------------------------  IN: 200 mL / OUT: 900 mL / NET: -700 mL       GENERAL: elderly female laying in bed in NAD  HEENT: NC/AT,   dry mucous membranes  PULMONARY: nonlabored breathing, no resp distress  CARDIOVASCULAR:  RRR, no murmur  GASTROINTESTINAL: soft nontender  Last BM: 7/3  MUSCULOSKELETAL: +Weakness  +Bed bound   NEUROLOGIC: +Cognitive impairment ; no edema to BLE      LABS:                        8.9    5.57  )-----------( 172      ( 04 Jul 2024 07:34 )             27.1   07-05    146<H>  |  112<H>  |  125<H>  ----------------------------<  107<H>  3.4<L>   |  20<L>  |  2.58<H>    Ca    9.9      05 Jul 2024 13:02  Phos  4.0     07-04  Mg     2.5     07-04    TPro  7.6  /  Alb  3.3  /  TBili  0.5  /  DBili  x   /  AST  13  /  ALT  7<L>  /  AlkPhos  76  07-04      Urinalysis Basic - ( 05 Jul 2024 13:02 )    Color: x / Appearance: x / SG: x / pH: x  Gluc: 107 mg/dL / Ketone: x  / Bili: x / Urobili: x   Blood: x / Protein: x / Nitrite: x   Leuk Esterase: x / RBC: x / WBC x   Sq Epi: x / Non Sq Epi: x / Bacteria: x      RADIOLOGY & ADDITIONAL STUDIES:    Protein Calorie Malnutrition Present: [ ]mild [ ]moderate [ ]severe [ ]underweight [ ]morbid obesity  https://www.andeal.org/vault/4851/web/files/ONC/Table_Clinical%20Characteristics%20to%20Document%20Malnutrition-White%20JV%20et%20al%202012.pdf    Height (cm): 157.5 (07-02-24 @ 18:16), 167.6 (03-12-24 @ 10:50), 154.9 (08-03-23 @ 17:31)  Weight (kg): 75.3 (07-02-24 @ 18:16), 72.6 (03-12-24 @ 10:50), 113.398 (10-11-23 @ 12:42)  BMI (kg/m2): 30.4 (07-02-24 @ 18:16), 25.8 (03-12-24 @ 10:50), 47.3 (10-11-23 @ 12:42)    [ ]PPSV2 < or = 30%  [ ]significant weight loss [ ]poor nutritional intake [ ]anasarca[ ]Artificial Nutrition    Other REFERRALS:  [ ]Hospice  [ ]Child Life  [ ]Social Work  [ ]Case management [ ]Holistic Therapy

## 2024-07-06 LAB
ANION GAP SERPL CALC-SCNC: 13 MMOL/L — SIGNIFICANT CHANGE UP (ref 5–17)
BUN SERPL-MCNC: 109 MG/DL — HIGH (ref 7–23)
CALCIUM SERPL-MCNC: 10.1 MG/DL — SIGNIFICANT CHANGE UP (ref 8.4–10.5)
CHLORIDE SERPL-SCNC: 113 MMOL/L — HIGH (ref 96–108)
CO2 SERPL-SCNC: 22 MMOL/L — SIGNIFICANT CHANGE UP (ref 22–31)
CREAT SERPL-MCNC: 2.55 MG/DL — HIGH (ref 0.5–1.3)
EGFR: 17 ML/MIN/1.73M2 — LOW
GLUCOSE SERPL-MCNC: 102 MG/DL — HIGH (ref 70–99)
HCT VFR BLD CALC: 24.2 % — LOW (ref 34.5–45)
HGB BLD-MCNC: 7.6 G/DL — LOW (ref 11.5–15.5)
MCHC RBC-ENTMCNC: 29.2 PG — SIGNIFICANT CHANGE UP (ref 27–34)
MCHC RBC-ENTMCNC: 31.4 GM/DL — LOW (ref 32–36)
MCV RBC AUTO: 93.1 FL — SIGNIFICANT CHANGE UP (ref 80–100)
NRBC # BLD: 0 /100 WBCS — SIGNIFICANT CHANGE UP (ref 0–0)
PLATELET # BLD AUTO: 174 K/UL — SIGNIFICANT CHANGE UP (ref 150–400)
POTASSIUM SERPL-MCNC: 3.4 MMOL/L — LOW (ref 3.5–5.3)
POTASSIUM SERPL-SCNC: 3.4 MMOL/L — LOW (ref 3.5–5.3)
RBC # BLD: 2.6 M/UL — LOW (ref 3.8–5.2)
RBC # FLD: 17.4 % — HIGH (ref 10.3–14.5)
SODIUM SERPL-SCNC: 148 MMOL/L — HIGH (ref 135–145)
WBC # BLD: 7.39 K/UL — SIGNIFICANT CHANGE UP (ref 3.8–10.5)
WBC # FLD AUTO: 7.39 K/UL — SIGNIFICANT CHANGE UP (ref 3.8–10.5)

## 2024-07-06 PROCEDURE — 99232 SBSQ HOSP IP/OBS MODERATE 35: CPT

## 2024-07-06 RX ORDER — POTASSIUM CHLORIDE 600 MG/1
20 TABLET, FILM COATED, EXTENDED RELEASE ORAL ONCE
Refills: 0 | Status: COMPLETED | OUTPATIENT
Start: 2024-07-06 | End: 2024-07-06

## 2024-07-06 RX ADMIN — Medication 25 MILLIGRAM(S): at 21:56

## 2024-07-06 RX ADMIN — Medication 25 MICROGRAM(S): at 06:49

## 2024-07-06 RX ADMIN — POTASSIUM CHLORIDE 20 MILLIEQUIVALENT(S): 600 TABLET, FILM COATED, EXTENDED RELEASE ORAL at 17:01

## 2024-07-06 RX ADMIN — DEXTROSE MONOHYDRATE, SODIUM CHLORIDE, AND POTASSIUM CHLORIDE 65 MILLILITER(S): 50; 4.5; 2.24 INJECTION, SOLUTION INTRAVENOUS at 17:41

## 2024-07-06 RX ADMIN — ATORVASTATIN CALCIUM 10 MILLIGRAM(S): 20 TABLET, FILM COATED ORAL at 21:56

## 2024-07-06 RX ADMIN — MEMANTINE HYDROCHLORIDE 5 MILLIGRAM(S): 7 CAPSULE, EXTENDED RELEASE ORAL at 06:49

## 2024-07-06 RX ADMIN — MEMANTINE HYDROCHLORIDE 5 MILLIGRAM(S): 7 CAPSULE, EXTENDED RELEASE ORAL at 17:01

## 2024-07-06 NOTE — PROGRESS NOTE ADULT - SUBJECTIVE AND OBJECTIVE BOX
Subjective: no change in status. Renal indices about the same       MEDICATIONS  (STANDING):  atorvastatin 10 milliGRAM(s) Oral at bedtime  levothyroxine 25 MICROGram(s) Oral daily  memantine 5 milliGRAM(s) Oral two times a day  potassium chloride    Tablet ER 20 milliEquivalent(s) Oral once  QUEtiapine 25 milliGRAM(s) Oral at bedtime  sodium chloride 0.45% with potassium chloride 20 mEq/L 1000 milliLiter(s) (65 mL/Hr) IV Continuous <Continuous>    MEDICATIONS  (PRN):  acetaminophen     Tablet .. 650 milliGRAM(s) Oral every 6 hours PRN Mild Pain (1 - 3)          T(C): 37.1 (07-06-24 @ 14:49), Max: 37.4 (07-06-24 @ 05:17)  HR: 73 (07-06-24 @ 14:49) (68 - 73)  BP: 119/61 (07-06-24 @ 14:49) (110/55 - 119/61)  RR: 18 (07-06-24 @ 14:49) (16 - 18)  SpO2: 96% (07-06-24 @ 14:49) (96% - 96%)  Wt(kg): --        I&O's Detail    05 Jul 2024 07:01  -  06 Jul 2024 07:00  --------------------------------------------------------  IN:    Oral Fluid: 40 mL    sodium chloride 0.45% w/ Additives: 780 mL  Total IN: 820 mL    OUT:    Voided (mL): 400 mL  Total OUT: 400 mL    Total NET: 420 mL               PHYSICAL EXAM:    GENERAL: protective mittens are on  NECK: Supple, no inc in JVP  CHEST/LUNG: Clear  HEART: S1S2  ABDOMEN: Soft, Nontender, Nondistended; Bowel sounds present  EXTREMITIES:  trace edema.         LABS:  CBC Full  -  ( 06 Jul 2024 06:42 )  WBC Count : 7.39 K/uL  RBC Count : 2.60 M/uL  Hemoglobin : 7.6 g/dL  Hematocrit : 24.2 %  Platelet Count - Automated : 174 K/uL  Mean Cell Volume : 93.1 fl  Mean Cell Hemoglobin : 29.2 pg  Mean Cell Hemoglobin Concentration : 31.4 gm/dL  Auto Neutrophil # : x  Auto Lymphocyte # : x  Auto Monocyte # : x  Auto Eosinophil # : x  Auto Basophil # : x  Auto Neutrophil % : x  Auto Lymphocyte % : x  Auto Monocyte % : x  Auto Eosinophil % : x  Auto Basophil % : x    07-06    148<H>  |  113<H>  |  109<H>  ----------------------------<  102<H>  3.4<L>   |  22  |  2.55<H>    Ca    10.1      06 Jul 2024 06:42          Impression:  NYDIA -- suspected severe pre-renal azotemia vs ATN on adm. Slowly better.   UA is negative  Imaging w/o hydro   Met acidosis due to severe NYDIA. Improved.     Recommendations:   Would cont gentle hydration with 0.45% saline + KCL  Supplement K, check Mg  BMP in 48h if in line with GOC

## 2024-07-06 NOTE — PROGRESS NOTE ADULT - SUBJECTIVE AND OBJECTIVE BOX
Patient is a 89y old  Female who presents with a chief complaint of Acute renal failure (05 Jul 2024 15:09)    Patient seen and examined at bedside.  S: NAD (Hx dementia- not offering complaints on exam)     ALLERGIES:  No Known Allergies    MEDICATIONS:  acetaminophen     Tablet .. 650 milliGRAM(s) Oral every 6 hours PRN  atorvastatin 10 milliGRAM(s) Oral at bedtime  levothyroxine 25 MICROGram(s) Oral daily  memantine 5 milliGRAM(s) Oral two times a day  QUEtiapine 25 milliGRAM(s) Oral at bedtime  sodium chloride 0.45% with potassium chloride 20 mEq/L 1000 milliLiter(s) IV Continuous <Continuous>    Vital Signs Last 24 Hrs  T(F): 99.4 (06 Jul 2024 05:17), Max: 99.4 (06 Jul 2024 05:17)  HR: 70 (06 Jul 2024 05:17) (68 - 70)  BP: 110/55 (06 Jul 2024 05:17) (110/55 - 110/74)  RR: 16 (06 Jul 2024 05:17) (16 - 16)  SpO2: 96% (06 Jul 2024 05:17) (96% - 96%)  I&O's Summary    05 Jul 2024 07:01  -  06 Jul 2024 07:00  --------------------------------------------------------  IN: 820 mL / OUT: 400 mL / NET: 420 mL      PHYSICAL EXAM:  General: NAD, Awake to verbal stimuli- not really participating in exam   ENT: MMM  Lungs: Clear to auscultation bilaterally (Anteriorly)- limited  Cardio: RR, S1/S2, +murmur  Abdomen: Soft, NT/ND, Normal active Bowel Sounds   Extremities: No cyanosis, No edema. LLE hematoma site w. ACE dsg c/d/i- no active bleed  R hand mitten in place- +radial pulse, no discoloration to hand/UE, wwp      LABS:                        7.6    7.39  )-----------( 174      ( 06 Jul 2024 06:42 )             24.2     07-06    148  |  113  |  109  ----------------------------<  102  3.4   |  22  |  2.55    Ca    10.1      06 Jul 2024 06:42  Phos  4.0     07-04  Mg     2.5     07-04    TPro  7.6  /  Alb  3.3  /  TBili  0.5  /  DBili  x   /  AST  13  /  ALT  7   /  AlkPhos  76  07-04      Urinalysis Basic - ( 06 Jul 2024 06:42 )  Color: x / Appearance: x / SG: x / pH: x  Gluc: 102 mg/dL / Ketone: x  / Bili: x / Urobili: x   Blood: x / Protein: x / Nitrite: x   Leuk Esterase: x / RBC: x / WBC x   Sq Epi: x / Non Sq Epi: x / Bacteria: x      Culture - Urine (collected 01 Jul 2024 22:36)  Source: Clean Catch None  Final Report (03 Jul 2024 07:12):    <10,000 CFU/mL Normal Urogenital Chary        RADIOLOGY & ADDITIONAL TESTS:  < from: CT Lower Extremity No Cont, Left (07.02.24 @ 08:51) >  1.  Calf skin irregularity with adjacent soft tissue swelling that   extends into the foot and ankle is nonspecific but can be seen with   cellulitis.  2.  Focal lenticular shaped hyperdensity in the subcutaneous tissues of   the anterior mid calf contacting the overlying skin is nonspecific with   differential considerations including involving infected collection,   malignancy, and phlegmon. Correlate clinically. Limited by lack of IV   contrast  3.  No CT evidence for osteomyelitis.  4.  Degenerative changes.    < from: CT Abdomen and Pelvis No Cont (07.02.24 @ 08:51) >  Atrophic kidneys. No hydronephrosis.  Redemonstrated sigmoid dilatation. Narrowing of the distal sigmoid colon   slightly more pronounced than prior. Recommend follow-up colonoscopy to   exclude partial obstruction.  Abnormal endometrium with possible polyp or submucosal myoma. Recommend   pelvic ultrasound.    < from: CT Head No Cont (07.02.24 @ 08:51) >  IMPRESSION:   Ischemic white matter disease and atrophy typical for age.   Small basal ganglia remote deep infarctions.    < from: Xray Tibia + Fibula 2 Views, Left (07.01.24 @ 19:43) >  IMPRESSION:   No acute radiographic osseous pathology..      < from: Xray Chest 1 View- PORTABLE-Urgent (07.01.24 @ 19:42) >  IMPRESSION:  No gross airspace consolidation. LEFT lower zone obscured by patient's   hand.    Care Discussed with Consultants/Other Providers:   SELENA Hsu discussed case and plan with Dr. Brito

## 2024-07-06 NOTE — PHYSICAL THERAPY INITIAL EVALUATION ADULT - PERTINENT HX OF CURRENT PROBLEM, REHAB EVAL
89yo female w. PMH Dementia, PPM, Afib on Eliquis, mitral/tricuspid insufficiency, HTN, HLD, hypothyroid, and gout p/w L leg hematoma with acute renal failure.     *Acute Renal Failure  - Cr. 6 on admission, now downtrending   - CTAP revealed sigmoid dilatation, consider colonoscopy to rule out partial obstruction, abnormal endometrium r/o-polyp vs myoma

## 2024-07-06 NOTE — PROGRESS NOTE ADULT - ASSESSMENT
88yo female w. PMH Dementia, PPM, Afib on Eliquis, mitral/tricuspid insufficiency, HTN, HLD, hypothyroid, and gout p/w L leg hematoma with acute renal failure.     *Acute Renal Failure  - Cr. 6 on admission, now downtrending   - CTAP revealed sigmoid dilatation, consider colonoscopy to rule out partial obstruction, abnormal endometrium r/o-polyp vs myoma  - Continue to hold ACE/AB, Lasix, Avoid nephrotoxic meds  - IVF 0.45NS + kcl as per renal   - Palliative following- ongoing discussion with family   - Nephrology following - Pt is not a great candidate for RRT given poor MS at baseline, Suspect severe pre-renal ATN  - Trend Cr    *LT Leg Hematoma, with possible acute blood loss  *Elevated INR/PTT  - CT LT Leg showed hematoma  - Holding Eliquis, noted bleeding in mouth, CTH without bleed  - CBC stable     *Hypothyroid  *HLD  - Chronic condition  - Continue statin   - Continue Synthroid    *Dementia  - Continue Namenda    *DVT ppx  - holding Eliquis due to oral bleeding, elevated INR     *GOC/Code Status-pt has advanced directives. Confirmed DNR/DNI    Dispo Pending clinical course as above.     Will udpate Daughter Lisa Rae [phone] regarding pts current status and plan of care.

## 2024-07-07 LAB
ANION GAP SERPL CALC-SCNC: 13 MMOL/L — SIGNIFICANT CHANGE UP (ref 5–17)
BUN SERPL-MCNC: 88 MG/DL — HIGH (ref 7–23)
CALCIUM SERPL-MCNC: 10.1 MG/DL — SIGNIFICANT CHANGE UP (ref 8.4–10.5)
CHLORIDE SERPL-SCNC: 116 MMOL/L — HIGH (ref 96–108)
CO2 SERPL-SCNC: 20 MMOL/L — LOW (ref 22–31)
CREAT SERPL-MCNC: 2.28 MG/DL — HIGH (ref 0.5–1.3)
EGFR: 20 ML/MIN/1.73M2 — LOW
GLUCOSE SERPL-MCNC: 104 MG/DL — HIGH (ref 70–99)
HCT VFR BLD CALC: 23.9 % — LOW (ref 34.5–45)
HGB BLD-MCNC: 7.6 G/DL — LOW (ref 11.5–15.5)
MAGNESIUM SERPL-MCNC: 1.8 MG/DL — SIGNIFICANT CHANGE UP (ref 1.6–2.6)
MCHC RBC-ENTMCNC: 30.2 PG — SIGNIFICANT CHANGE UP (ref 27–34)
MCHC RBC-ENTMCNC: 31.8 GM/DL — LOW (ref 32–36)
MCV RBC AUTO: 94.8 FL — SIGNIFICANT CHANGE UP (ref 80–100)
NRBC # BLD: 0 /100 WBCS — SIGNIFICANT CHANGE UP (ref 0–0)
PHOSPHATE SERPL-MCNC: 2.6 MG/DL — SIGNIFICANT CHANGE UP (ref 2.5–4.5)
PLATELET # BLD AUTO: 176 K/UL — SIGNIFICANT CHANGE UP (ref 150–400)
POTASSIUM SERPL-MCNC: 3.6 MMOL/L — SIGNIFICANT CHANGE UP (ref 3.5–5.3)
POTASSIUM SERPL-SCNC: 3.6 MMOL/L — SIGNIFICANT CHANGE UP (ref 3.5–5.3)
RBC # BLD: 2.52 M/UL — LOW (ref 3.8–5.2)
RBC # FLD: 17.7 % — HIGH (ref 10.3–14.5)
SODIUM SERPL-SCNC: 149 MMOL/L — HIGH (ref 135–145)
WBC # BLD: 6.74 K/UL — SIGNIFICANT CHANGE UP (ref 3.8–10.5)
WBC # FLD AUTO: 6.74 K/UL — SIGNIFICANT CHANGE UP (ref 3.8–10.5)

## 2024-07-07 PROCEDURE — 99232 SBSQ HOSP IP/OBS MODERATE 35: CPT

## 2024-07-07 RX ORDER — APIXABAN 5 MG/1
2.5 TABLET, FILM COATED ORAL
Refills: 0 | Status: DISCONTINUED | OUTPATIENT
Start: 2024-07-07 | End: 2024-07-10

## 2024-07-07 RX ADMIN — APIXABAN 2.5 MILLIGRAM(S): 5 TABLET, FILM COATED ORAL at 17:45

## 2024-07-07 RX ADMIN — ATORVASTATIN CALCIUM 10 MILLIGRAM(S): 20 TABLET, FILM COATED ORAL at 21:19

## 2024-07-07 RX ADMIN — Medication 25 MILLIGRAM(S): at 21:19

## 2024-07-07 RX ADMIN — MEMANTINE HYDROCHLORIDE 5 MILLIGRAM(S): 7 CAPSULE, EXTENDED RELEASE ORAL at 17:45

## 2024-07-07 RX ADMIN — Medication 25 MICROGRAM(S): at 06:42

## 2024-07-07 RX ADMIN — MEMANTINE HYDROCHLORIDE 5 MILLIGRAM(S): 7 CAPSULE, EXTENDED RELEASE ORAL at 06:42

## 2024-07-07 RX ADMIN — DEXTROSE MONOHYDRATE, SODIUM CHLORIDE, AND POTASSIUM CHLORIDE 65 MILLILITER(S): 50; 4.5; 2.24 INJECTION, SOLUTION INTRAVENOUS at 19:53

## 2024-07-07 NOTE — PROGRESS NOTE ADULT - SUBJECTIVE AND OBJECTIVE BOX
Subjective: confused, restless.       MEDICATIONS  (STANDING):  apixaban 2.5 milliGRAM(s) Oral two times a day  atorvastatin 10 milliGRAM(s) Oral at bedtime  levothyroxine 25 MICROGram(s) Oral daily  memantine 5 milliGRAM(s) Oral two times a day  QUEtiapine 25 milliGRAM(s) Oral at bedtime  sodium chloride 0.45% with potassium chloride 20 mEq/L 1000 milliLiter(s) (65 mL/Hr) IV Continuous <Continuous>    MEDICATIONS  (PRN):  acetaminophen     Tablet .. 650 milliGRAM(s) Oral every 6 hours PRN Mild Pain (1 - 3)          T(C): 36.9 (07-07-24 @ 05:58), Max: 37.2 (07-06-24 @ 21:10)  HR: 60 (07-07-24 @ 05:58) (60 - 73)  BP: 143/83 (07-06-24 @ 21:10) (119/61 - 143/83)  RR: 18 (07-07-24 @ 05:58) (17 - 18)  SpO2: 100% (07-07-24 @ 05:58) (96% - 100%)  Wt(kg): --        I&O's Detail    06 Jul 2024 07:01  -  07 Jul 2024 07:00  --------------------------------------------------------  IN:    sodium chloride 0.45% w/ Additives: 780 mL  Total IN: 780 mL    OUT:    Voided (mL): 700 mL  Total OUT: 700 mL    Total NET: 80 mL      07 Jul 2024 07:01  -  07 Jul 2024 13:32  --------------------------------------------------------  IN:    Oral Fluid: 30 mL  Total IN: 30 mL    OUT:  Total OUT: 0 mL    Total NET: 30 mL               PHYSICAL EXAM:    GENERAL: anxious, restless.   NECK: Supple, no inc in JVP  CHEST/LUNG: Clear  HEART: S1S2  ABDOMEN: Soft, Nontender, Nondistended; Bowel sounds present  EXTREMITIES:  trace edema, dressing  NEURO: no asterixis      LABS:  CBC Full  -  ( 07 Jul 2024 07:17 )  WBC Count : 6.74 K/uL  RBC Count : 2.52 M/uL  Hemoglobin : 7.6 g/dL  Hematocrit : 23.9 %  Platelet Count - Automated : 176 K/uL  Mean Cell Volume : 94.8 fl  Mean Cell Hemoglobin : 30.2 pg  Mean Cell Hemoglobin Concentration : 31.8 gm/dL  Auto Neutrophil # : x  Auto Lymphocyte # : x  Auto Monocyte # : x  Auto Eosinophil # : x  Auto Basophil # : x  Auto Neutrophil % : x  Auto Lymphocyte % : x  Auto Monocyte % : x  Auto Eosinophil % : x  Auto Basophil % : x    07-07    149<H>  |  116<H>  |  88<H>  ----------------------------<  104<H>  3.6   |  20<L>  |  2.28<H>    Ca    10.1      07 Jul 2024 07:17  Phos  2.6     07-07  Mg     1.8     07-07        Impression:  NYDIA -- suspected severe pre-renal azotemia vs ATN on adm. Slowly better.   UA is negative  Imaging w/o hydro   Met acidosis due to severe NYDIA. Improved.     Recommendations:   Cont gentle hydration with 0.45% saline + KCL  BMP in 48h if in line with GOC

## 2024-07-07 NOTE — PROGRESS NOTE ADULT - ASSESSMENT
88yo female w. PMH Dementia, PPM, Afib on Eliquis, mitral/tricuspid insufficiency, HTN, HLD, hypothyroid, and gout p/w L leg hematoma with acute renal failure.     *Acute Renal Failure  - Cr. 6 on admission, now downtrending   - CTAP revealed sigmoid dilatation, consider colonoscopy to rule out partial obstruction, abnormal endometrium r/o-polyp vs myoma  - Continue to hold ACE/AB, Lasix, Avoid nephrotoxic meds  - IVF 0.45NS + kcl as per renal   - Palliative following- ongoing discussion with family   - Nephrology following - Pt is not a great candidate for RRT given poor MS at baseline, Suspect severe pre-renal ATN  - Trend Cr- f/up renal clearance for d/c    *LT Leg Hematoma, with possible acute blood loss  *Elevated INR/PTT  - CT LT Leg showed hematoma  - Noted bleeding in mouth, CTH without bleed  - Holding Eliquis, will resume today- renally dosed   - H/H 7.6/23.9 today- stable from prior, close monitoring     *Hypothyroid  - Continue Synthroid    *HLD  - Chronic condition  - Continue statin     *Dementia  - Continue Namenda    *DVT ppx  - Resume Eliquis today     *GOC/Code Status-pt has advanced directives. Confirmed DNR/DNI    Dispo Pending clinical course as above. PT recs for [facility], however pt has 24H help at home, will plan on d/c home with help as prior to admit when medically cleared.   Eliquis resumed today- monitor for bleeding. Cr conts to downtrend- pending renal clearance     Will udpate Daughter Lisa Rae [phone] regarding pts current status and plan of care.        88yo female w. PMH Dementia, PPM, Afib on Eliquis, mitral/tricuspid insufficiency, HTN, HLD, hypothyroid, and gout p/w L leg hematoma with acute renal failure.     *Acute Renal Failure  - Cr. 6 on admission, now downtrending   - CTAP revealed sigmoid dilatation, consider colonoscopy to rule out partial obstruction, abnormal endometrium r/o-polyp vs myoma  - Continue to hold ACE/AB, Lasix, Avoid nephrotoxic meds  - IVF 0.45NS + kcl as per renal   - Palliative following- ongoing discussion with family   - Nephrology following - Pt is not a great candidate for RRT given poor MS at baseline, Suspect severe pre-renal ATN  - Trend Cr- f/up renal clearance for d/c    *LT Leg Hematoma, with possible acute blood loss  *Elevated INR/PTT  - CT LT Leg showed hematoma  - Noted bleeding in mouth, CTH without bleed  - Holding Eliquis, will resume today- renally dosed   - H/H 7.6/23.9 today- stable from prior, close monitoring     *Hypothyroid  - Continue Synthroid    *HLD  - Chronic condition  - Continue statin     *Dementia  - Continue Namenda    *DVT ppx  - Resume Eliquis today     *GOC/Code Status-pt has advanced directives. Confirmed DNR/DNI    Dispo Pending clinical course as above. PT recs for [facility], however pt has 24H help at home, will plan on d/c home with help as prior to admit when medically cleared.   Eliquis resumed today- monitor for bleeding. Cr conts to downtrend- pending renal clearance     7.7 Updated Daughter Lisa Rae [phone] regarding pts current status and plan of care. Aware of discharge planning in process if H/H and Cr stable-renal clearance.

## 2024-07-07 NOTE — PROGRESS NOTE ADULT - SUBJECTIVE AND OBJECTIVE BOX
Patient is a 89y old  Female who presents with a chief complaint of Acute renal failure (06 Jul 2024 15:10)    Patient seen and examined at bedside.  S: Baseline Dementia- verbal, but not coherent- NAD on eval     ALLERGIES:  No Known Allergies    MEDICATIONS:  acetaminophen     Tablet .. 650 milliGRAM(s) Oral every 6 hours PRN  apixaban 2.5 milliGRAM(s) Oral two times a day  atorvastatin 10 milliGRAM(s) Oral at bedtime  levothyroxine 25 MICROGram(s) Oral daily  memantine 5 milliGRAM(s) Oral two times a day  QUEtiapine 25 milliGRAM(s) Oral at bedtime  sodium chloride 0.45% with potassium chloride 20 mEq/L 1000 milliLiter(s) IV Continuous <Continuous>    Vital Signs Last 24 Hrs  T(F): 98.4 (07 Jul 2024 05:58), Max: 99 (06 Jul 2024 21:10)  HR: 60 (07 Jul 2024 05:58) (60 - 73)  BP: 143/83 (06 Jul 2024 21:10) (119/61 - 143/83)  RR: 18 (07 Jul 2024 05:58) (17 - 18)  SpO2: 100% (07 Jul 2024 05:58) (96% - 100%)  I&O's Summary    06 Jul 2024 07:01  -  07 Jul 2024 07:00  --------------------------------------------------------  IN: 780 mL / OUT: 700 mL / NET: 80 mL    07 Jul 2024 07:01  -  07 Jul 2024 11:00  --------------------------------------------------------  IN: 30 mL / OUT: 0 mL / NET: 30 mL      PHYSICAL EXAM:  General: Awake, NAD, Verbal somewhat, but not coherent overall   ENT: MMM  Lungs: Clear to auscultation bilaterally (Anteriorly)- limited  Cardio: RR, S1/S2, +murmur  Abdomen: Soft, NT/ND, Normal active Bowel Sounds   Extremities: No cyanosis, No edema. LLE hematoma site w. ACE dsg c/d/i- no active bleed, multiple areas of yellow/bruised areas to b/l LEs  R hand mitten in place- +radial pulse, no discoloration to hand/UE, [facility]        LABS:                        7.6    6.74  )-----------( 176      ( 07 Jul 2024 07:17 )             23.9     07-07    149  |  116  |  88  ----------------------------<  104  3.6   |  20  |  2.28    Ca    10.1      07 Jul 2024 07:17  Phos  2.6     07-07  Mg     1.8     07-07        Urinalysis Basic - ( 07 Jul 2024 07:17 )  Color: x / Appearance: x / SG: x / pH: x  Gluc: 104 mg/dL / Ketone: x  / Bili: x / Urobili: x   Blood: x / Protein: x / Nitrite: x   Leuk Esterase: x / RBC: x / WBC x   Sq Epi: x / Non Sq Epi: x / Bacteria: x      Culture - Urine (collected 01 Jul 2024 22:36)  Source: Clean Catch None  Final Report (03 Jul 2024 07:12):    <10,000 CFU/mL Normal Urogenital Chary        RADIOLOGY & ADDITIONAL TESTS:  < from: CT Lower Extremity No Cont, Left (07.02.24 @ 08:51) >  1.  Calf skin irregularity with adjacent soft tissue swelling that   extends into the foot and ankle is nonspecific but can be seen with   cellulitis.  2.  Focal lenticular shaped hyperdensity in the subcutaneous tissues of   the anterior mid calf contacting the overlying skin is nonspecific with   differential considerations including involving infected collection,   malignancy, and phlegmon. Correlate clinically. Limited by lack of IV   contrast  3.  No CT evidence for osteomyelitis.  4.  Degenerative changes.    < from: CT Abdomen and Pelvis No Cont (07.02.24 @ 08:51) >  Atrophic kidneys. No hydronephrosis.  Redemonstrated sigmoid dilatation. Narrowing of the distal sigmoid colon   slightly more pronounced than prior. Recommend follow-up colonoscopy to   exclude partial obstruction.  Abnormal endometrium with possible polyp or submucosal myoma. Recommend   pelvic ultrasound.    < from: CT Head No Cont (07.02.24 @ 08:51) >  IMPRESSION:   Ischemic white matter disease and atrophy typical for age.   Small basal ganglia remote deep infarctions.    < from: Xray Tibia + Fibula 2 Views, Left (07.01.24 @ 19:43) >  IMPRESSION:   No acute radiographic osseous pathology..      < from: Xray Chest 1 View- PORTABLE-Urgent (07.01.24 @ 19:42) >  IMPRESSION:  No gross airspace consolidation. LEFT lower zone obscured by patient's   hand.    Care Discussed with Consultants/Other Providers:   SELENA Hsu discussed case and plan with Dr. Brito

## 2024-07-08 LAB
ANION GAP SERPL CALC-SCNC: 14 MMOL/L — SIGNIFICANT CHANGE UP (ref 5–17)
BUN SERPL-MCNC: 78 MG/DL — HIGH (ref 7–23)
CALCIUM SERPL-MCNC: 9.8 MG/DL — SIGNIFICANT CHANGE UP (ref 8.4–10.5)
CHLORIDE SERPL-SCNC: 118 MMOL/L — HIGH (ref 96–108)
CO2 SERPL-SCNC: 19 MMOL/L — LOW (ref 22–31)
CREAT SERPL-MCNC: 2.13 MG/DL — HIGH (ref 0.5–1.3)
EGFR: 22 ML/MIN/1.73M2 — LOW
GLUCOSE SERPL-MCNC: 98 MG/DL — SIGNIFICANT CHANGE UP (ref 70–99)
HCT VFR BLD CALC: 24.2 % — LOW (ref 34.5–45)
HGB BLD-MCNC: 7.6 G/DL — LOW (ref 11.5–15.5)
MAGNESIUM SERPL-MCNC: 1.6 MG/DL — SIGNIFICANT CHANGE UP (ref 1.6–2.6)
MCHC RBC-ENTMCNC: 30.3 PG — SIGNIFICANT CHANGE UP (ref 27–34)
MCHC RBC-ENTMCNC: 31.4 GM/DL — LOW (ref 32–36)
MCV RBC AUTO: 96.4 FL — SIGNIFICANT CHANGE UP (ref 80–100)
NRBC # BLD: 0 /100 WBCS — SIGNIFICANT CHANGE UP (ref 0–0)
PHOSPHATE SERPL-MCNC: 2.9 MG/DL — SIGNIFICANT CHANGE UP (ref 2.5–4.5)
PLATELET # BLD AUTO: 167 K/UL — SIGNIFICANT CHANGE UP (ref 150–400)
POTASSIUM SERPL-MCNC: 3.4 MMOL/L — LOW (ref 3.5–5.3)
POTASSIUM SERPL-SCNC: 3.4 MMOL/L — LOW (ref 3.5–5.3)
RBC # BLD: 2.51 M/UL — LOW (ref 3.8–5.2)
RBC # FLD: 17.4 % — HIGH (ref 10.3–14.5)
SODIUM SERPL-SCNC: 151 MMOL/L — HIGH (ref 135–145)
WBC # BLD: 7.39 K/UL — SIGNIFICANT CHANGE UP (ref 3.8–10.5)
WBC # FLD AUTO: 7.39 K/UL — SIGNIFICANT CHANGE UP (ref 3.8–10.5)

## 2024-07-08 PROCEDURE — 99233 SBSQ HOSP IP/OBS HIGH 50: CPT

## 2024-07-08 PROCEDURE — 99232 SBSQ HOSP IP/OBS MODERATE 35: CPT

## 2024-07-08 PROCEDURE — 74176 CT ABD & PELVIS W/O CONTRAST: CPT | Mod: 26

## 2024-07-08 RX ORDER — DEXTROSE MONOHYDRATE AND SODIUM CHLORIDE 5; .3 G/100ML; G/100ML
1000 INJECTION, SOLUTION INTRAVENOUS
Refills: 0 | Status: DISCONTINUED | OUTPATIENT
Start: 2024-07-08 | End: 2024-07-09

## 2024-07-08 RX ORDER — POTASSIUM CHLORIDE 600 MG/1
10 TABLET, FILM COATED, EXTENDED RELEASE ORAL
Refills: 0 | Status: COMPLETED | OUTPATIENT
Start: 2024-07-08 | End: 2024-07-08

## 2024-07-08 RX ORDER — DEXTROSE MONOHYDRATE AND SODIUM CHLORIDE 5; .3 G/100ML; G/100ML
1000 INJECTION, SOLUTION INTRAVENOUS
Refills: 0 | Status: DISCONTINUED | OUTPATIENT
Start: 2024-07-08 | End: 2024-07-08

## 2024-07-08 RX ORDER — POLYETHYLENE GLYCOL 3350 1 G/G
17 POWDER ORAL
Refills: 0 | Status: DISCONTINUED | OUTPATIENT
Start: 2024-07-08 | End: 2024-07-11

## 2024-07-08 RX ORDER — BISACODYL 5 MG
10 TABLET, DELAYED RELEASE (ENTERIC COATED) ORAL DAILY
Refills: 0 | Status: DISCONTINUED | OUTPATIENT
Start: 2024-07-08 | End: 2024-07-11

## 2024-07-08 RX ORDER — POLYETHYLENE GLYCOL 3350 1 G/G
17 POWDER ORAL DAILY
Refills: 0 | Status: DISCONTINUED | OUTPATIENT
Start: 2024-07-08 | End: 2024-07-08

## 2024-07-08 RX ORDER — MAGNESIUM SULFATE 100 %
2 POWDER (GRAM) MISCELLANEOUS ONCE
Refills: 0 | Status: COMPLETED | OUTPATIENT
Start: 2024-07-08 | End: 2024-07-08

## 2024-07-08 RX ADMIN — POTASSIUM CHLORIDE 100 MILLIEQUIVALENT(S): 600 TABLET, FILM COATED, EXTENDED RELEASE ORAL at 20:23

## 2024-07-08 RX ADMIN — Medication 25 MICROGRAM(S): at 05:34

## 2024-07-08 RX ADMIN — MEMANTINE HYDROCHLORIDE 5 MILLIGRAM(S): 7 CAPSULE, EXTENDED RELEASE ORAL at 05:34

## 2024-07-08 RX ADMIN — APIXABAN 2.5 MILLIGRAM(S): 5 TABLET, FILM COATED ORAL at 05:34

## 2024-07-08 RX ADMIN — Medication 25 GRAM(S): at 17:38

## 2024-07-08 RX ADMIN — DEXTROSE MONOHYDRATE AND SODIUM CHLORIDE 60 MILLILITER(S): 5; .3 INJECTION, SOLUTION INTRAVENOUS at 17:38

## 2024-07-08 RX ADMIN — POTASSIUM CHLORIDE 100 MILLIEQUIVALENT(S): 600 TABLET, FILM COATED, EXTENDED RELEASE ORAL at 22:30

## 2024-07-08 RX ADMIN — POTASSIUM CHLORIDE 100 MILLIEQUIVALENT(S): 600 TABLET, FILM COATED, EXTENDED RELEASE ORAL at 21:33

## 2024-07-08 NOTE — PROGRESS NOTE ADULT - ASSESSMENT
89 F hx of dementia, PPM, afib on Eliquis, mitral/tricuspid insufficiency, HTN, HLD, hypothyroid, gout pw L leg hematoma with acute renal failure.     Debility  - PPS 30-40%    Dementia; Failure to Thrive  - A&Ox1 at baseline; pt mentation declining currently  - has 24/7 HHA at home  - pt now with poor PO intake; discussion had with daughter/HCP regarding artifical nutrition     Acute Renal Failure, improved  - nephro following; IVF per primary medical team    Advanced care planning  - HCP: daughter Lisa Rae  - Pt is DNR/DNI    Encounter for Palliative Care  - Spoke with pt's daughter regarding pt's declining clinical status, see GOC note above. Discussed PEG vs hospice services and focusing on the pt's comfort. Recommended against PEG tube and recommended hospice care; daughter plans to discuss with her brother ASAP. Discussed case with medical team TRES Pena. Spoke with nurse at bedside pt's cognitive decline and poor PO intake as well.  - Will continue to follow for ongoing GOC conversations and supportive care.

## 2024-07-08 NOTE — PROGRESS NOTE ADULT - SUBJECTIVE AND OBJECTIVE BOX
Patient is a 89y old Female who presents with a chief complaint of Acute renal failure (07 Jul 2024 13:32)      Patient seen and examined at bedside. No overnight events reported.     ALLERGIES:  No Known Allergies    MEDICATIONS  (STANDING):  apixaban 2.5 milliGRAM(s) Oral two times a day  atorvastatin 10 milliGRAM(s) Oral at bedtime  levothyroxine 25 MICROGram(s) Oral daily  memantine 5 milliGRAM(s) Oral two times a day  QUEtiapine 25 milliGRAM(s) Oral at bedtime    MEDICATIONS  (PRN):  acetaminophen     Tablet .. 650 milliGRAM(s) Oral every 6 hours PRN Mild Pain (1 - 3)    Vital Signs Last 24 Hrs  T(F): 98.2 (08 Jul 2024 04:50), Max: 98.2 (07 Jul 2024 14:24)  HR: 63 (08 Jul 2024 04:50) (60 - 77)  BP: 108/72 (08 Jul 2024 04:50) (108/72 - 150/91)  RR: 16 (08 Jul 2024 04:50) (16 - 17)  SpO2: 99% (08 Jul 2024 04:50) (95% - 99%)  I&O's Summary    07 Jul 2024 07:01  -  08 Jul 2024 07:00  --------------------------------------------------------  IN: 30 mL / OUT: 1150 mL / NET: -1120 mL    08 Jul 2024 07:01  -  08 Jul 2024 12:21  --------------------------------------------------------  IN: 0 mL / OUT: 0 mL / NET: 0 mL      PHYSICAL EXAM:  General: NAD, awakens to verbal stimuli  Neck: Supple, No JVD  Lungs: Clear to auscultation bilaterally (Anteriorly), non-labored breathing  Cardio: RRR, S1/S2, + murmur  Abdomen: Soft, Nontender, Nondistended; Bowel sounds present  Extremities: LLE wrapped, No calf tenderness, No cyanosis, No pitting edema    LABS:                        7.6    7.39  )-----------( 167      ( 08 Jul 2024 08:40 )             24.2     07-08    151  |  118  |  78  ----------------------------<  98  3.4   |  19  |  2.13    Ca    9.8      08 Jul 2024 08:40  Phos  2.9     07-08  Mg     1.6     07-08                                        Urinalysis Basic - ( 08 Jul 2024 08:40 )    Color: x / Appearance: x / SG: x / pH: x  Gluc: 98 mg/dL / Ketone: x  / Bili: x / Urobili: x   Blood: x / Protein: x / Nitrite: x   Leuk Esterase: x / RBC: x / WBC x   Sq Epi: x / Non Sq Epi: x / Bacteria: x        Culture - Urine (collected 01 Jul 2024 22:36)  Source: Clean Catch None  Final Report (03 Jul 2024 07:12):    <10,000 CFU/mL Normal Urogenital Chary        RADIOLOGY & ADDITIONAL TESTS:    Care Discussed with Consultants/Other Providers:    Patient is a 89y old Female who presents with a chief complaint of Acute renal failure (07 Jul 2024 13:32)      Patient seen and examined at bedside. No overnight events reported.     ALLERGIES:  No Known Allergies    MEDICATIONS  (STANDING):  apixaban 2.5 milliGRAM(s) Oral two times a day  atorvastatin 10 milliGRAM(s) Oral at bedtime  levothyroxine 25 MICROGram(s) Oral daily  memantine 5 milliGRAM(s) Oral two times a day  QUEtiapine 25 milliGRAM(s) Oral at bedtime    MEDICATIONS  (PRN):  acetaminophen     Tablet .. 650 milliGRAM(s) Oral every 6 hours PRN Mild Pain (1 - 3)    Vital Signs Last 24 Hrs  T(F): 98.2 (08 Jul 2024 04:50), Max: 98.2 (07 Jul 2024 14:24)  HR: 63 (08 Jul 2024 04:50) (60 - 77)  BP: 108/72 (08 Jul 2024 04:50) (108/72 - 150/91)  RR: 16 (08 Jul 2024 04:50) (16 - 17)  SpO2: 99% (08 Jul 2024 04:50) (95% - 99%)  I&O's Summary    07 Jul 2024 07:01  -  08 Jul 2024 07:00  --------------------------------------------------------  IN: 30 mL / OUT: 1150 mL / NET: -1120 mL    08 Jul 2024 07:01  -  08 Jul 2024 12:21  --------------------------------------------------------  IN: 0 mL / OUT: 0 mL / NET: 0 mL      PHYSICAL EXAM:  General: NAD, awakens to verbal stimuli  Neck: Supple, No JVD  Lungs: Clear to auscultation bilaterally (Anteriorly), non-labored breathing  Cardio: RRR, S1/S2, + murmur  Abdomen: Protuberant, Nontender, distended; Bowel sounds present  Extremities: LLE wrapped, No calf tenderness, No cyanosis, No pitting edema    LABS:                        7.6    7.39  )-----------( 167      ( 08 Jul 2024 08:40 )             24.2     07-08    151  |  118  |  78  ----------------------------<  98  3.4   |  19  |  2.13    Ca    9.8      08 Jul 2024 08:40  Phos  2.9     07-08  Mg     1.6     07-08                                        Urinalysis Basic - ( 08 Jul 2024 08:40 )    Color: x / Appearance: x / SG: x / pH: x  Gluc: 98 mg/dL / Ketone: x  / Bili: x / Urobili: x   Blood: x / Protein: x / Nitrite: x   Leuk Esterase: x / RBC: x / WBC x   Sq Epi: x / Non Sq Epi: x / Bacteria: x        Culture - Urine (collected 01 Jul 2024 22:36)  Source: Clean Catch None  Final Report (03 Jul 2024 07:12):    <10,000 CFU/mL Normal Urogenital Chary        RADIOLOGY & ADDITIONAL TESTS:    Care Discussed with Consultants/Other Providers:

## 2024-07-08 NOTE — PROGRESS NOTE ADULT - PAIN ASSESSMENT ADVANCED DEMENTIA: BREATHING
Normal
Hide La Roche-Posay Products: Yes
Action 2: Continue
Other Instructions: Retnoid hand out given
Hide Cerave Products: No
Continue Regimen: Doxycycline, arazlo 2-3 nights a week as skin tolerates , onexton in am
Detail Level: Zone
Discontinue Regimen: All other acne medications.
Treatment 3: doxycycline
Sig For Treatment 1 (If Needed): once daily
Sig For Treatment 2 (If Needed): once daily at bedtime
Action 3: Start
Treatment 1: Duac
Treatment 2: Tazorac
Sig For Treatment 3 (If Needed): 100mg daily

## 2024-07-08 NOTE — PROGRESS NOTE ADULT - SUBJECTIVE AND OBJECTIVE BOX
Indication for Geriatrics and Palliative Care Services/INTERVAL HPI: GOC    OVERNIGHT EVENTS: no acute events overnight noted  SUBJECTIVE AND OBJECTIVE: Pt seen and examined at bedside this afternoon. Pt was minimally responsive/verbal in room. She would go back to sleep after opening her eyes slightly.    Allergies  No Known Allergies    Intolerances    MEDICATIONS  (STANDING):  apixaban 2.5 milliGRAM(s) Oral two times a day  atorvastatin 10 milliGRAM(s) Oral at bedtime  dextrose 5%. 1000 milliLiter(s) (75 mL/Hr) IV Continuous <Continuous>  levothyroxine 25 MICROGram(s) Oral daily  memantine 5 milliGRAM(s) Oral two times a day  QUEtiapine 25 milliGRAM(s) Oral at bedtime    MEDICATIONS  (PRN):  acetaminophen     Tablet .. 650 milliGRAM(s) Oral every 6 hours PRN Mild Pain (1 - 3)      ITEMS UNCHECKED ARE NOT PRESENT    PRESENT SYMPTOMS: [X ]Unable to self-report   Source if other than patient:  [ X]Family   [ X]Team     Pain:  [ ]yes [ ]no  QOL impact -   Location -                    Aggravating factors -  Quality -  Radiation -  Timing-  Severity (0-10 scale):  Minimal acceptable level (0-10 scale):     Dyspnea:                        none  Anxiety:                             unable to assess  Fatigue:                          Severe  Nausea:                        none  Depressed:                     unable to assess  Loss of appetite:              severe  Constipation:                   none    Other Symptoms:  [ ]All other review of systems negative - pt somnolent/unable to report     Chaplaincy Referral:  Deferred   Palliative Performance Status Version 2:       30  %      [email or web address]    PHYSICAL EXAM:  Vital Signs Last 24 Hrs  T(C): 36.4 (08 Jul 2024 13:01), Max: 36.8 (08 Jul 2024 04:50)  T(F): 97.5 (08 Jul 2024 13:01), Max: 98.2 (08 Jul 2024 04:50)  HR: 78 (08 Jul 2024 13:01) (63 - 78)  BP: 132/63 (08 Jul 2024 13:01) (108/72 - 150/91)  BP(mean): 84 (08 Jul 2024 04:50) (84 - 84)  RR: 16 (08 Jul 2024 13:01) (16 - 16)  SpO2: 97% (08 Jul 2024 13:01) (95% - 99%)    Parameters below as of 08 Jul 2024 13:01  Patient On (Oxygen Delivery Method): room air     I&O's Summary    07 Jul 2024 07:01  -  08 Jul 2024 07:00  --------------------------------------------------------  IN: 30 mL / OUT: 1150 mL / NET: -1120 mL    08 Jul 2024 07:01  -  08 Jul 2024 14:33  --------------------------------------------------------  IN: 0 mL / OUT: 0 mL / NET: 0 mL       GENERAL: pt laying in bed in NAD, somnolent  HEENT: NC/AT, dry mucous membranes  PULMONARY: nonlabored breathing, no resp distress  CARDIOVASCULAR:   RRR, no murmur  GASTROINTESTINAL: soft nontender   Last BM:  7/6  MUSCULOSKELETAL:  +weakness; bedbound  NEUROLOGIC: + Cognitive impairment  ; minimally responsive to questions; somnolent        LABS:                        7.6    7.39  )-----------( 167      ( 08 Jul 2024 08:40 )             24.2   07-08    151<H>  |  118<H>  |  78<H>  ----------------------------<  98  3.4<L>   |  19<L>  |  2.13<H>    Ca    9.8      08 Jul 2024 08:40  Phos  2.9     07-08  Mg     1.6     07-08        Urinalysis Basic - ( 08 Jul 2024 08:40 )    Color: x / Appearance: x / SG: x / pH: x  Gluc: 98 mg/dL / Ketone: x  / Bili: x / Urobili: x   Blood: x / Protein: x / Nitrite: x   Leuk Esterase: x / RBC: x / WBC x   Sq Epi: x / Non Sq Epi: x / Bacteria: x      RADIOLOGY & ADDITIONAL STUDIES:    Protein Calorie Malnutrition Present: [ ]mild [ ]moderate [ ]severe [ ]underweight [ ]morbid obesity  https://www.andeal.org/vault/2370/web/files/ONC/Table_Clinical%20Characteristics%20to%20Document%20Malnutrition-White%20JV%20et%20al%202012.pdf    Height (cm): 157.5 (07-02-24 @ 18:16), 167.6 (03-12-24 @ 10:50), 154.9 (08-03-23 @ 17:31)  Weight (kg): 75.3 (07-02-24 @ 18:16), 72.6 (03-12-24 @ 10:50), 113.398 (10-11-23 @ 12:42)  BMI (kg/m2): 30.4 (07-02-24 @ 18:16), 25.8 (03-12-24 @ 10:50), 47.3 (10-11-23 @ 12:42)    [ X]PPSV2 < or = 30%  [ ]significant weight loss [ X]poor nutritional intake [ ]anasarca[ ]Artificial Nutrition    Other REFERRALS:  [ ]Hospice  [ ]Child Life  [ ]Social Work  [ ]Case management [ ]Holistic Therapy  Indication for Geriatrics and Palliative Care Services/INTERVAL HPI: GOC    OVERNIGHT EVENTS: no acute events overnight noted  SUBJECTIVE AND OBJECTIVE: Pt seen and examined at bedside this afternoon. Pt was minimally responsive/verbal in room. She would go back to sleep after opening her eyes slightly.    Allergies  No Known Allergies    Intolerances    MEDICATIONS  (STANDING):  apixaban 2.5 milliGRAM(s) Oral two times a day  atorvastatin 10 milliGRAM(s) Oral at bedtime  dextrose 5%. 1000 milliLiter(s) (75 mL/Hr) IV Continuous <Continuous>  levothyroxine 25 MICROGram(s) Oral daily  memantine 5 milliGRAM(s) Oral two times a day  QUEtiapine 25 milliGRAM(s) Oral at bedtime    MEDICATIONS  (PRN):  acetaminophen     Tablet .. 650 milliGRAM(s) Oral every 6 hours PRN Mild Pain (1 - 3)      ITEMS UNCHECKED ARE NOT PRESENT    PRESENT SYMPTOMS: [X ]Unable to self-report   Source if other than patient:  [ X]Family   [ X]Team     Pain:  [ ]yes [ ]no  QOL impact -   Location -                    Aggravating factors -  Quality -  Radiation -  Timing-  Severity (0-10 scale):  Minimal acceptable level (0-10 scale):     Dyspnea:                        none  Anxiety:                             unable to assess  Fatigue:                          Severe  Nausea:                        none  Depressed:                     unable to assess  Loss of appetite:              severe  Constipation:                   none    Other Symptoms:  [ ]All other review of systems negative - pt somnolent/unable to report     Chaplaincy Referral:  Deferred   Palliative Performance Status Version 2:       30  %      [email or web address]    PHYSICAL EXAM:  Vital Signs Last 24 Hrs  T(C): 36.4 (08 Jul 2024 13:01), Max: 36.8 (08 Jul 2024 04:50)  T(F): 97.5 (08 Jul 2024 13:01), Max: 98.2 (08 Jul 2024 04:50)  HR: 78 (08 Jul 2024 13:01) (63 - 78)  BP: 132/63 (08 Jul 2024 13:01) (108/72 - 150/91)  BP(mean): 84 (08 Jul 2024 04:50) (84 - 84)  RR: 16 (08 Jul 2024 13:01) (16 - 16)  SpO2: 97% (08 Jul 2024 13:01) (95% - 99%)    Parameters below as of 08 Jul 2024 13:01  Patient On (Oxygen Delivery Method): room air     I&O's Summary    07 Jul 2024 07:01  -  08 Jul 2024 07:00  --------------------------------------------------------  IN: 30 mL / OUT: 1150 mL / NET: -1120 mL    08 Jul 2024 07:01  -  08 Jul 2024 14:33  --------------------------------------------------------  IN: 0 mL / OUT: 0 mL / NET: 0 mL       GENERAL: pt laying in bed in NAD, somnolent  HEENT: NC/AT, dry mucous membranes  PULMONARY: nonlabored breathing, no resp distress  CARDIOVASCULAR:   RRR, no murmur  GASTROINTESTINAL: soft, appears more distended   Last BM:  7/6  MUSCULOSKELETAL:  +weakness; bedbound  NEUROLOGIC: + Cognitive impairment  ; minimally responsive to questions; somnolent        LABS:                        7.6    7.39  )-----------( 167      ( 08 Jul 2024 08:40 )             24.2   07-08    151<H>  |  118<H>  |  78<H>  ----------------------------<  98  3.4<L>   |  19<L>  |  2.13<H>    Ca    9.8      08 Jul 2024 08:40  Phos  2.9     07-08  Mg     1.6     07-08        Urinalysis Basic - ( 08 Jul 2024 08:40 )    Color: x / Appearance: x / SG: x / pH: x  Gluc: 98 mg/dL / Ketone: x  / Bili: x / Urobili: x   Blood: x / Protein: x / Nitrite: x   Leuk Esterase: x / RBC: x / WBC x   Sq Epi: x / Non Sq Epi: x / Bacteria: x      RADIOLOGY & ADDITIONAL STUDIES:    Protein Calorie Malnutrition Present: [ ]mild [ ]moderate [ ]severe [ ]underweight [ ]morbid obesity  https://www.andeal.org/vault/2010/web/files/ONC/Table_Clinical%20Characteristics%20to%20Document%20Malnutrition-White%20JV%20et%20al%153344.pdf    Height (cm): 157.5 (07-02-24 @ 18:16), 167.6 (03-12-24 @ 10:50), 154.9 (08-03-23 @ 17:31)  Weight (kg): 75.3 (07-02-24 @ 18:16), 72.6 (03-12-24 @ 10:50), 113.398 (10-11-23 @ 12:42)  BMI (kg/m2): 30.4 (07-02-24 @ 18:16), 25.8 (03-12-24 @ 10:50), 47.3 (10-11-23 @ 12:42)    [ X]PPSV2 < or = 30%  [ ]significant weight loss [ X]poor nutritional intake [ ]anasarca[ ]Artificial Nutrition    Other REFERRALS:  [ ]Hospice  [ ]Child Life  [ ]Social Work  [ ]Case management [ ]Holistic Therapy

## 2024-07-08 NOTE — PROGRESS NOTE ADULT - ASSESSMENT
89yo female w. PMH Dementia, PPM, Afib on Eliquis, mitral/tricuspid insufficiency, HTN, HLD, hypothyroid, and gout p/w L leg hematoma with acute renal failure.     *Acute Renal Failure  - Cr. 6 on admission, now downtrending   - CTAP revealed sigmoid dilatation, consider colonoscopy to rule out partial obstruction, abnormal endometrium r/o-polyp vs myoma  - Continue to hold ACE/AB, Lasix, Avoid nephrotoxic meds  - IVF 0.45NS + kcl as per renal, now discontinued this morning  - Palliative following- ongoing discussion with family   - Nephrology following - Pt is not a great candidate for RRT given poor MS at baseline, Suspect severe pre-renal ATN  - Trend Cr- f/up renal clearance for d/c    *LT Leg Hematoma, with possible acute blood loss  *Elevated INR/PTT  - CT LT Leg showed hematoma  - Previously noted bleeding in mouth, CTH without bleed  - Restarted Eliquis yesterday 7/7 renally dosed  - H/H 7.6/24.2 today- stable from prior, close monitoring     *Hypothyroid  - Continue Synthroid    *HLD  - Chronic condition  - Continue statin     *Dementia  - Continue Namenda    *DVT ppx  - Resumed Eliquis     *GOC/Code Status-pt has advanced directives. Confirmed DNR/DNI    Dispo Pending clinical course as above. PT recs for [facility], however pt has 24H help at home, will plan on d/c home with help as prior to admit when medically cleared.   Eliquis resumed, h/h stable. Cr conts to downtrend- pending renal clearance     7.8 Updated Daughter Lisa Rae [phone] regarding pts current status and plan of care. Aware of discharge planning in process if H/H and Cr stable-renal clearance.        89yo female w. PMH Dementia, PPM, Afib on Eliquis, mitral/tricuspid insufficiency, HTN, HLD, hypothyroid, and gout p/w L leg hematoma with acute renal failure.     *Acute Renal Failure  - Cr. 6 on admission, now downtrending   - CTAP revealed sigmoid dilatation, consider colonoscopy to rule out partial obstruction, abnormal endometrium r/o-polyp vs myoma  - Continue to hold ACE/AB, Lasix, Avoid nephrotoxic meds  - IVF 0.45NS + kcl as per renal, now transitioned to D5w as Na 151 this am  - Palliative following- ongoing discussion with family, PEG vs possible hospice  - Nephrology following - Pt is not a great candidate for RRT given poor MS at baseline, Suspect severe pre-renal ATN  - Monitor BMP, patient will likely need PEG as with poor po intake patient will continue to get dehydrated.    *LT Leg Hematoma, with possible acute blood loss  *Elevated INR/PTT  - CT LT Leg showed hematoma  - Previously noted bleeding in mouth, CTH without bleed  - Restarted Eliquis yesterday 7/7 renally dosed  - H/H 7.6/24.2 today- stable from prior, close monitoring     *Hypothyroid  - Continue Synthroid    *HLD  - Chronic condition  - Continue statin     *Dementia  - Continue Namenda    *DVT ppx  - Resumed Eliquis     *GOC/Code Status-pt has advanced directives. Confirmed DNR/DNI    Dispo Pending clinical course as above. PT recs for [facility], however pt has 24H help at home. Pending ongoing GOC discussions with family of possible hospice vs PEG   Eliquis resumed, h/h stable. Cr conts to downtrend- pending renal clearance     7.8 Updated Daughter Lisa Rae [phone] regarding pts current status and plan of care.       88yo female w. PMH Dementia, PPM, Afib on Eliquis, mitral/tricuspid insufficiency, HTN, HLD, hypothyroid, and gout p/w L leg hematoma with acute renal failure.     *Acute Renal Failure  - Cr. 6 on admission, now downtrending   - CTAP revealed sigmoid dilatation, consider colonoscopy to rule out partial obstruction, abnormal endometrium r/o-polyp vs myoma  - Continue to hold ACE/AB, Lasix, Avoid nephrotoxic meds  - IVF 0.45NS + kcl as per renal, now transitioned to D5w as Na 151 this am  - Palliative following- ongoing discussion with family, PEG vs possible hospice  - Nephrology following - Pt is not a great candidate for RRT given poor MS at baseline, Suspect severe pre-renal ATN  - Monitor BMP, patient will likely need PEG as with poor po intake patient will continue to get dehydrated.    *LT Leg Hematoma, with possible acute blood loss  *Elevated INR/PTT  - CT LT Leg showed hematoma  - Previously noted bleeding in mouth, CTH without bleed  - Restarted Eliquis yesterday 7/7 renally dosed  - H/H 7.6/24.2 today- stable from prior, close monitoring     *Abdominal Xray  - F/U Abdominal xray     *Hypothyroid  - Continue Synthroid    *HLD  - Chronic condition  - Continue statin     *Dementia  - Continue Namenda    *DVT ppx  - Resumed Eliquis     *GOC/Code Status-pt has advanced directives. Confirmed DNR/DNI    Dispo Pending clinical course as above. PT recs for [facility], however pt has 24H help at home. Pending ongoing GOC discussions with family of possible hospice vs PEG   Eliquis resumed, h/h stable. Cr conts to downtrend- pending renal clearance     7.8 Updated Daughter Lisa Rae [phone] regarding pts current status and plan of care.       88yo female w. PMH Dementia, PPM, Afib on Eliquis, mitral/tricuspid insufficiency, HTN, HLD, hypothyroid, and gout p/w L leg hematoma with acute renal failure.     *Acute Renal Failure  - Cr. 6 on admission, now downtrending   - CTAP revealed sigmoid dilatation, consider colonoscopy to rule out partial obstruction, abnormal endometrium r/o-polyp vs myoma  - Continue to hold ACE/AB, Lasix, Avoid nephrotoxic meds  - IVF 0.45NS + kcl as per renal, now transitioned to D5w as Na 151 this am  - Palliative following- ongoing discussion with family, PEG vs possible hospice  - Nephrology following - Pt is not a great candidate for RRT given poor MS at baseline, Suspect severe pre-renal ATN  - Monitor BMP, patient will likely need PEG as with poor po intake patient will continue to get dehydrated.    *LT Leg Hematoma, with possible acute blood loss  *Elevated INR/PTT  - CT LT Leg showed hematoma  - Previously noted bleeding in mouth, CTH without bleed  - Restarted Eliquis yesterday 7/7 renally dosed  - H/H 7.6/24.2 today- stable from prior, close monitoring     *Abdominal Distention  - F/U CTAP no contrast    *Hypothyroid  - Continue Synthroid    *HLD  - Chronic condition  - Continue statin     *Dementia  - Continue Namenda    *DVT ppx  - Resumed Eliquis     *GOC/Code Status-pt has advanced directives. Confirmed DNR/DNI    Dispo Pending clinical course as above. PT recs for [facility], however pt has 24H help at home. Pending ongoing GOC discussions with family of possible hospice vs PEG   Eliquis resumed, h/h stable. Cr conts to downtrend- pending renal clearance     7.8 Updated Daughter Lisa Rae [phone] regarding pts current status and plan of care.

## 2024-07-08 NOTE — PROGRESS NOTE ADULT - SUBJECTIVE AND OBJECTIVE BOX
Resting, on RA    Vital Signs Last 24 Hrs  T(C): 36.7 (07-08-24 @ 19:12), Max: 36.8 (07-08-24 @ 04:50)  T(F): 98.1 (07-08-24 @ 19:12), Max: 98.2 (07-08-24 @ 04:50)  HR: 74 (07-08-24 @ 19:12) (63 - 78)  BP: 131/72 (07-08-24 @ 19:12) (108/72 - 132/63)  BP(mean): 84 (07-08-24 @ 04:50) (84 - 84)  RR: 16 (07-08-24 @ 19:12) (16 - 16)  SpO2: 95% (07-08-24 @ 19:12) (95% - 99%)    I&O's Detail    07 Jul 2024 07:01  -  08 Jul 2024 07:00  --------------------------------------------------------  OUT:    Voided (mL): 1150 mL  Total OUT: 1150 mL    08 Jul 2024 07:01  -  08 Jul 2024 23:49  --------------------------------------------------------  OUT:    Voided (mL): 300 mL  Total OUT: 300 mL    s1s2  b/l air entry  soft, ND  no edema                               7.6    7.39  )-----------( 167      ( 08 Jul 2024 08:40 )             24.2     08 Jul 2024 08:40    151    |  118    |  78     ----------------------------<  98     3.4     |  19     |  2.13     Ca    9.8        08 Jul 2024 08:40  Phos  2.9       08 Jul 2024 08:40  Mg     1.6       08 Jul 2024 08:40    acetaminophen     Tablet .. 650 milliGRAM(s) Oral every 6 hours PRN  apixaban 2.5 milliGRAM(s) Oral two times a day  atorvastatin 10 milliGRAM(s) Oral at bedtime  bisacodyl Suppository 10 milliGRAM(s) Rectal daily PRN  dextrose 5%. 1000 milliLiter(s) IV Continuous <Continuous>  lactated ringers. 1000 milliLiter(s) IV Continuous <Continuous>  levothyroxine 25 MICROGram(s) Oral daily  memantine 5 milliGRAM(s) Oral two times a day  polyethylene glycol 3350 17 Gram(s) Oral two times a day  QUEtiapine 25 milliGRAM(s) Oral at bedtime    A/P:    Adm w/severe pre-renal ATN    UA is negative  Imaging w/o hydro   Met acidosis iso NYDIA  Avoid nephrotoxins  Cr is improving w/IVF  No need for RRT  Cr is downtrending, good UO  Hypernatremia due to pt not being able to maintain adequate hydration  D5W at 100cc/hr  BMP in am  Family to decide on PEG vs comfort care   D/w medicine     [phone]

## 2024-07-09 ENCOUNTER — TRANSCRIPTION ENCOUNTER (OUTPATIENT)
Age: 89
End: 2024-07-09

## 2024-07-09 VITALS
SYSTOLIC BLOOD PRESSURE: 117 MMHG | RESPIRATION RATE: 17 BRPM | HEART RATE: 70 BPM | TEMPERATURE: 98 F | DIASTOLIC BLOOD PRESSURE: 63 MMHG

## 2024-07-09 LAB
ANION GAP SERPL CALC-SCNC: 10 MMOL/L — SIGNIFICANT CHANGE UP (ref 5–17)
BUN SERPL-MCNC: 69 MG/DL — HIGH (ref 7–23)
CALCIUM SERPL-MCNC: 9.8 MG/DL — SIGNIFICANT CHANGE UP (ref 8.4–10.5)
CHLORIDE SERPL-SCNC: 116 MMOL/L — HIGH (ref 96–108)
CO2 SERPL-SCNC: 21 MMOL/L — LOW (ref 22–31)
CREAT SERPL-MCNC: 2.19 MG/DL — HIGH (ref 0.5–1.3)
EGFR: 21 ML/MIN/1.73M2 — LOW
GLUCOSE SERPL-MCNC: 157 MG/DL — HIGH (ref 70–99)
HCT VFR BLD CALC: 22.7 % — LOW (ref 34.5–45)
HGB BLD-MCNC: 7.4 G/DL — LOW (ref 11.5–15.5)
MAGNESIUM SERPL-MCNC: 2.2 MG/DL — SIGNIFICANT CHANGE UP (ref 1.6–2.6)
MCHC RBC-ENTMCNC: 30.8 PG — SIGNIFICANT CHANGE UP (ref 27–34)
MCHC RBC-ENTMCNC: 32.6 GM/DL — SIGNIFICANT CHANGE UP (ref 32–36)
MCV RBC AUTO: 94.6 FL — SIGNIFICANT CHANGE UP (ref 80–100)
NRBC # BLD: 0 /100 WBCS — SIGNIFICANT CHANGE UP (ref 0–0)
PLATELET # BLD AUTO: 171 K/UL — SIGNIFICANT CHANGE UP (ref 150–400)
POTASSIUM SERPL-MCNC: 3.8 MMOL/L — SIGNIFICANT CHANGE UP (ref 3.5–5.3)
POTASSIUM SERPL-SCNC: 3.8 MMOL/L — SIGNIFICANT CHANGE UP (ref 3.5–5.3)
RBC # BLD: 2.4 M/UL — LOW (ref 3.8–5.2)
RBC # FLD: 17.4 % — HIGH (ref 10.3–14.5)
SODIUM SERPL-SCNC: 147 MMOL/L — HIGH (ref 135–145)
WBC # BLD: 9.18 K/UL — SIGNIFICANT CHANGE UP (ref 3.8–10.5)
WBC # FLD AUTO: 9.18 K/UL — SIGNIFICANT CHANGE UP (ref 3.8–10.5)

## 2024-07-09 PROCEDURE — 99232 SBSQ HOSP IP/OBS MODERATE 35: CPT

## 2024-07-09 PROCEDURE — 99497 ADVNCD CARE PLAN 30 MIN: CPT | Mod: 25

## 2024-07-09 RX ORDER — DEXTROSE MONOHYDRATE AND SODIUM CHLORIDE 5; .3 G/100ML; G/100ML
1000 INJECTION, SOLUTION INTRAVENOUS
Refills: 0 | Status: DISCONTINUED | OUTPATIENT
Start: 2024-07-09 | End: 2024-07-10

## 2024-07-09 RX ADMIN — Medication 25 MICROGRAM(S): at 07:09

## 2024-07-09 RX ADMIN — Medication 25 MILLIGRAM(S): at 23:08

## 2024-07-09 RX ADMIN — ATORVASTATIN CALCIUM 10 MILLIGRAM(S): 20 TABLET, FILM COATED ORAL at 23:08

## 2024-07-09 RX ADMIN — APIXABAN 2.5 MILLIGRAM(S): 5 TABLET, FILM COATED ORAL at 07:09

## 2024-07-09 RX ADMIN — MEMANTINE HYDROCHLORIDE 5 MILLIGRAM(S): 7 CAPSULE, EXTENDED RELEASE ORAL at 07:09

## 2024-07-09 RX ADMIN — DEXTROSE MONOHYDRATE AND SODIUM CHLORIDE 80 MILLILITER(S): 5; .3 INJECTION, SOLUTION INTRAVENOUS at 23:24

## 2024-07-09 RX ADMIN — DEXTROSE MONOHYDRATE AND SODIUM CHLORIDE 100 MILLILITER(S): 5; .3 INJECTION, SOLUTION INTRAVENOUS at 02:00

## 2024-07-09 NOTE — PROGRESS NOTE ADULT - ASSESSMENT
89 F hx of dementia, PPM, afib on Eliquis, mitral/tricuspid insufficiency, HTN, HLD, hypothyroid, gout pw L leg hematoma with acute renal failure.     Functional quadriplegia  - PPS 30%    Dementia; Failure to Thrive  - A&Ox1 at baseline; pt mentation declining currently  - has 24/7 HHA at home  - pt now with poor PO intake; discussion had with daughter/HCP regarding artifical nutrition - family opt for home hospice, no feeding tube    Acute Renal Failure, improved  - nephro following; IVF per primary medical team    Advanced care planning  - HCP: daughter Lisa Rae  - Pt is DNR/DNI  - Hospice referral placed, SW contacted    Encounter for Palliative Care  - Spoke with pt's daughter regarding pt's poor PO intake and ileus, see GO note above. Family opt for no artifical nutrition; hospice referral placed as family amendable. Supportive care given.   - Discussed case with hospitalist Dr. Brito and TRES Pena.  - No further blooddraws, + pleasure feeds.  - Will continue to follow for symptom management and supportive care.  89 F hx of dementia, PPM, afib on Eliquis, mitral/tricuspid insufficiency, HTN, HLD, hypothyroid, gout pw L leg hematoma with acute renal failure.     Functional quadriplegia  - PPS 30%    Dementia; Failure to Thrive  - A&Ox1 at baseline; pt mentation declining currently  - has 24/7 HHA at home  - pt now with poor PO intake; discussion had with daughter/HCP regarding artifical nutrition - family opt for home hospice, no feeding tube    Acute Renal Failure, improved  - nephro following; IVF per primary medical team    Advanced care planning  - HCP: daughter Lisa Rae  - New MOLST filled out with DNR/DNI, comfort measures, no artifical nutrition. Copy made and placed in chart.  - Hospice referral placed, SW contacted    Encounter for Palliative Care  - Spoke with pt's daughter regarding pt's poor PO intake and ileus, see [facility] note above. Family opt for no artifical nutrition; hospice referral placed as family amendable. Supportive care given.   - New MOLST filled out with DNR/DNI, comfort measures, no artifical nutrition. Copy made and placed in chart.  - Discussed case with hospitalist Dr. Brito and TRES Pena.  - No further blooddraws, + pleasure feeds.  - Will continue to follow for symptom management and supportive care.

## 2024-07-09 NOTE — DISCHARGE NOTE PROVIDER - NSDCCPCAREPLAN_GEN_ALL_CORE_FT
PRINCIPAL DISCHARGE DIAGNOSIS  Diagnosis: NYDIA (acute kidney injury)  Assessment and Plan of Treatment: You were admitted for dehydration which caused injury to your kidneys  You were evaluated by nephrology   You were treated with IV fluids     PRINCIPAL DISCHARGE DIAGNOSIS  Diagnosis: NYDIA (acute kidney injury)  Assessment and Plan of Treatment: You were admitted for dehydration which caused injury to your kidneys  You were evaluated by nephrology   You were treated with IV fluids      SECONDARY DISCHARGE DIAGNOSES  Diagnosis: Hematoma of left lower leg  Assessment and Plan of Treatment: No surgical intervention  Pain management as needed

## 2024-07-09 NOTE — CONSULT NOTE ADULT - SUBJECTIVE AND OBJECTIVE BOX
COLORECTAL SURGERY CONSULT NOTE    Patient is a 89y old  Female who presents with a chief complaint of Acute renal failure (08 Jul 2024 23:48)    HPI:  89 F hx of dementia, PPM, afib on Eliquis, mitral/tricuspid insufficiency, HTN, HLD, hypothyroid, gout pw L leg hematoma.   Pt unable to provide history. Pt alert, uncooperative and reiterates that she does not want to be touched. Per ED, pt was sent in for some bruising noted in the L anterior shin with no reported trauma. The area was aspirated by Dr nAgeles ED and noted to be a hematoma. On routine labs noted BUN/cr: 207/6.02 AP; 85 AST/ALT: 13/11 K: 4.1 CO2: 20  LM with daughter Lisa [phone] with my spectra link- spoke with daughter, noted by HHA yesterday to have a large bruise in the L anterior shin and when she went to see pt, noted pt had not eaten today and was significantly altered and more confused compared to baseline and complaining of sig pain in the L leg.   Noted sig decline since last admission to  and now pt has been bed bound.   (01 Jul 2024 23:02)    Colorectal surgery consulted due tot CT findings of nonobstructive dilatation of rectosigmoid colon, likely ileus. Pt confused and unable to provide further history. Per RN pt had large BM today, brown non bloody. Denies pt complaining of abdominal pain, N/V.       PAST MEDICAL & SURGICAL HISTORY:  Atrial fibrillation, unspecified type      Benign hypertension      Hypothyroid      HLD (hyperlipidemia)      Dementia      S/P knee replacement      H/O mastectomy          Review of Systems:  Contained within HPI.    MEDICATIONS  (STANDING):  apixaban 2.5 milliGRAM(s) Oral two times a day  atorvastatin 10 milliGRAM(s) Oral at bedtime  dextrose 5%. 1000 milliLiter(s) (100 mL/Hr) IV Continuous <Continuous>  levothyroxine 25 MICROGram(s) Oral daily  memantine 5 milliGRAM(s) Oral two times a day  polyethylene glycol 3350 17 Gram(s) Oral two times a day  QUEtiapine 25 milliGRAM(s) Oral at bedtime    MEDICATIONS  (PRN):  acetaminophen     Tablet .. 650 milliGRAM(s) Oral every 6 hours PRN Mild Pain (1 - 3)  bisacodyl Suppository 10 milliGRAM(s) Rectal daily PRN Constipation      Allergies    No Known Allergies    Intolerances        SOCIAL HISTORY       FAMILY HISTORY:  FH: myocardial infarction  father    FH: CVA (cerebrovascular accident)  Mother    Family history of pacemaker  mother        Vital Signs Last 24 Hrs  T(C): 36.5 (09 Jul 2024 05:45), Max: 36.7 (08 Jul 2024 19:12)  T(F): 97.7 (09 Jul 2024 05:45), Max: 98.1 (08 Jul 2024 19:12)  HR: 70 (09 Jul 2024 05:45) (70 - 78)  BP: 117/63 (09 Jul 2024 05:45) (117/63 - 132/63)  BP(mean): --  RR: 17 (09 Jul 2024 05:45) (16 - 17)  SpO2: 95% (08 Jul 2024 19:12) (95% - 97%)    Parameters below as of 09 Jul 2024 05:45  Patient On (Oxygen Delivery Method): room air    Physical Exam:    General:  Appears stated age, NAD, confused resting in bed  Eyes : MICHELLE  HENT:  WNL, no JVD  Chest:  unlabored breathing  Cardiovascular:  Regular rate & rhythm  Abdomen: soft, distended, NT to deep palpation, no guarding  Extremities: bilateral LE soft, NT   Skin:  No rash  Musculoskeletal:  normal strength  Neuro/Psych:  AAOx0      LABS:                        7.6    7.39  )-----------( 167      ( 08 Jul 2024 08:40 )             24.2     07-08    151<H>  |  118<H>  |  78<H>  ----------------------------<  98  3.4<L>   |  19<L>  |  2.13<H>    Ca    9.8      08 Jul 2024 08:40  Phos  2.9     07-08  Mg     1.6     07-08        Urinalysis Basic - ( 08 Jul 2024 08:40 )    Color: x / Appearance: x / SG: x / pH: x  Gluc: 98 mg/dL / Ketone: x  / Bili: x / Urobili: x   Blood: x / Protein: x / Nitrite: x   Leuk Esterase: x / RBC: x / WBC x   Sq Epi: x / Non Sq Epi: x / Bacteria: x      RADIOLOGY & ADDITIONAL STUDIES:    < from: CT Abdomen and Pelvis No Cont (07.08.24 @ 14:43) >  ACC: [id number] EXAM:  CT ABDOMEN AND PELVIS   ORDERED BY:  BAY MARINA     PROCEDURE DATE:  07/08/2024          INTERPRETATION:  CLINICAL INFORMATION: Increasing abdominal distention.    COMPARISON: CT abdomen pelvis dated 7/2/2024.    CONTRAST/COMPLICATIONS:  IV Contrast: NONE  Oral Contrast: NONE  Complications: None reported at time of study completion    PROCEDURE:  CT of the Abdomen and Pelvis was performed.  Sagittal and coronal reformats were performed.    FINDINGS:  LOWER CHEST:Micra pacemaker. Coronary artery calcifications.    LIVER: Within normal limits.  BILE DUCTS: Normal caliber.  GALLBLADDER: Small gallstones.  SPLEEN: Within normal limits.  PANCREAS: Within normal limits.  ADRENALS: Within normal limits.  KIDNEYS/URETERS: 3 cm cortical cyst right kidney. No hydronephrosis.    BLADDER: Within normal limits.  REPRODUCTIVE ORGANS: Calcified uterine fibroids.    BOWEL: Nonobstructive air and fluid-filled dilatation of rectosigmoid   colon, progressed since prior exam. Appendix not visualized.  PERITONEUM/RETROPERITONEUM: No ascites.  VESSELS: Atheromatous calcifications.  LYMPH NODES: No lymphadenopathy.  ABDOMINAL WALL: Within normal limits.  BONES: Degenerative changes.    IMPRESSION:  Progressive nonobstructive dilatation of rectosigmoid colon, likely ileus.        --- End of Report ---            VERONICA HUSSEIN MD; Attending Radiologist  This document has been electronically signed. Jul 8 2024  3:57PM    < end of copied text >

## 2024-07-09 NOTE — DISCHARGE NOTE PROVIDER - HOSPITAL COURSE
Hospital Course  HPI:  89 F hx of dementia, PPM, afib on Eliquis, mitral/tricuspid insufficiency, HTN, HLD, hypothyroid, gout pw L leg hematoma.   Pt unable to provide history. Pt alert, uncooperative and reiterates that she does not want to be touched. Per ED, pt was sent in for some bruising noted in the L anterior shin with no reported trauma. The area was aspirated by Dr Angeles ED and noted to be a hematoma. On routine labs noted BUN/cr: 207/6.02 AP; 85 AST/ALT: 13/11 K: 4.1 CO2: 20  LM with daughter Lisa [phone] with my spectra link- spoke with daughter, noted by HHA yesterday to have a large bruise in the L anterior shin and when she went to see pt, noted pt had not eaten today and was significantly altered and more confused compared to baseline and complaining of sig pain in the L leg.   Noted sig decline since last admission to  and now pt has been bed bound.   (01 Jul 2024 23:02)    Upon admission, patient with acute renal failure. Nephrology consulted. Lasix/ACE held. CTAP performed which showed sigmoid dilation. Nephrology consulted. Patient treated with IVF. Patient with further abdominal distention, repeat CTAP	 with results shown below. Colorectal surgery consulted. Bowel regimen started. Electrolytes repleted. Patient becoming increasingly lethargic. Palliative care consulted. Patient with poor PO intake. Cr improving with IVF. Per [facility] discussions, patient decided against PEG placement and wants to pursue hospice. Hospice referral sent....    < from: CT Abdomen and Pelvis No Cont (07.08.24 @ 14:43) >    IMPRESSION:  Progressive nonobstructive dilatation of rectosigmoid colon, likely ileus.    < end of copied text >          You were admitted for   You were diagnosed with   You were treated with   You were prescribed the following new medications:    You will need to follow up with your primary care physician.    Source of Infection:  Antibiotic / Last Day:    Palliative Care / Advanced Care Planning  Code Status:  Patient/Family agreeable to Hospice/Palliative (Y/N)?  Summary of Goals of Care Conversation:    Discharging Provider:    Contact Info: [phone] - Please call with any questions or concerns.    Outpatient Provider:     SNF Provider: Hospital Course  HPI:  89 F hx of dementia, PPM, afib on Eliquis, mitral/tricuspid insufficiency, HTN, HLD, hypothyroid, gout pw L leg hematoma.   Pt unable to provide history. Pt alert, uncooperative and reiterates that she does not want to be touched. Per ED, pt was sent in for some bruising noted in the L anterior shin with no reported trauma. The area was aspirated by Dr Angeles ED and noted to be a hematoma. On routine labs noted BUN/cr: 207/6.02 AP; 85 AST/ALT: 13/11 K: 4.1 CO2: 20  Pt. also not eating well and is more confused compared to baseline and complaining of sig pain in the L leg.  Noted sig decline since last admission to  and now pt has been bed bound.   (01 Jul 2024 23:02)    Upon admission, patient with acute renal failure. Nephrology consulted. Lasix/ACE held. CTAP performed which showed sigmoid dilation. Nephrology consulted. Patient treated with IVF. Patient with further abdominal distention, repeat CTAP	 with results shown below. Colorectal surgery consulted. Bowel regimen started. Electrolytes repleted. Patient becoming increasingly lethargic. Palliative care consulted. Patient with poor PO intake. Cr improving with IVF. Per GOC discussions, patient decided against PEG placement and wants to pursue hospice. Hospice referral sent and [facility].    < from: CT Abdomen and Pelvis No Cont (07.08.24 @ 14:43) >    IMPRESSION:  Progressive nonobstructive dilatation of rectosigmoid colon, likely ileus.    < end of copied text >      Palliative Care / Advanced Care Planning  Code Status:  DNR/DNI  Patient/Family agreeable to Hospice/Palliative- D/C HOME ON HOSPICE    Discharging Provider:  TRES Epperson  Contact Info: [phone] - Please call with any questions or concerns.

## 2024-07-09 NOTE — DISCHARGE NOTE PROVIDER - ATTENDING DISCHARGE PHYSICAL EXAMINATION:
General: NAD, A/O x 3  Neck: Supple, No JVD  Lungs: Clear to auscultation bilaterally non-labored breathing  Cardio: RRR, S1/S2, No murmur  Abdomen: Soft, Nontender, Nondistended; Bowel sounds present  Extremities: No calf tenderness, No cyanosis

## 2024-07-09 NOTE — PROGRESS NOTE ADULT - SUBJECTIVE AND OBJECTIVE BOX
Indication for Geriatrics and Palliative Care Services/INTERVAL HPI: GOC    OVERNIGHT EVENTS: no acute events overnight noted  SUBJECTIVE AND OBJECTIVE: Pt seen and examined at bedside this morning.  Pt was minimally responsive/verbal in room. She would go back to sleep after opening her eyes slightly.    Allergies  No Known Allergies    Intolerances    MEDICATIONS  (STANDING):  apixaban 2.5 milliGRAM(s) Oral two times a day  atorvastatin 10 milliGRAM(s) Oral at bedtime  dextrose 5%. 1000 milliLiter(s) (80 mL/Hr) IV Continuous <Continuous>  levothyroxine 25 MICROGram(s) Oral daily  memantine 5 milliGRAM(s) Oral two times a day  polyethylene glycol 3350 17 Gram(s) Oral two times a day  QUEtiapine 25 milliGRAM(s) Oral at bedtime    MEDICATIONS  (PRN):  acetaminophen     Tablet .. 650 milliGRAM(s) Oral every 6 hours PRN Mild Pain (1 - 3)  bisacodyl Suppository 10 milliGRAM(s) Rectal daily PRN Constipation      ITEMS UNCHECKED ARE NOT PRESENT    PRESENT SYMPTOMS: [X ]Unable to self-report   Source if other than patient:  [ ]Family   [X ]Team     Pain:  [ ]yes [ ]no  QOL impact -   Location -                    Aggravating factors -  Quality -  Radiation -  Timing-  Severity (0-10 scale):  Minimal acceptable level (0-10 scale):     Dyspnea:                        none  Anxiety:                             unable to assess  Fatigue:                          Severe  Nausea:                        none  Depressed:                     unable to assess  Loss of appetite:              severe  Constipation:                   none    Other Symptoms:  [ ]All other review of systems negative - pt somnolent/unable to report     Chaplaincy Referral:  Deferred   Palliative Performance Status Version 2:       30  %   http://npcrc.org/files/news/palliative_performance_scale_ppsv2.pdf    PHYSICAL EXAM:  Vital Signs Last 24 Hrs  T(C): 36.5 (09 Jul 2024 05:45), Max: 36.7 (08 Jul 2024 19:12)  T(F): 97.7 (09 Jul 2024 05:45), Max: 98.1 (08 Jul 2024 19:12)  HR: 70 (09 Jul 2024 05:45) (70 - 78)  BP: 117/63 (09 Jul 2024 05:45) (117/63 - 132/63)  BP(mean): --  RR: 17 (09 Jul 2024 05:45) (16 - 17)  SpO2: 95% (08 Jul 2024 19:12) (95% - 97%)    Parameters below as of 09 Jul 2024 05:45  Patient On (Oxygen Delivery Method): room air     I&O's Summary    08 Jul 2024 07:01  -  09 Jul 2024 07:00  --------------------------------------------------------  IN: 960 mL / OUT: 300 mL / NET: 660 mL       GENERAL: pt laying in bed in NAD, somnolent  HEENT: NC/AT, dry mucous membranes  PULMONARY: nonlabored breathing, no resp distress  CARDIOVASCULAR:   RRR, no murmur  GASTROINTESTINAL: soft, appears more distended   Last BM:  7/9  MUSCULOSKELETAL:  +weakness; bedbound  NEUROLOGIC: + Cognitive impairment  ; minimally responsive to questions; somnolent      LABS:                        7.4    9.18  )-----------( 171      ( 09 Jul 2024 06:27 )             22.7   07-09    147<H>  |  116<H>  |  69<H>  ----------------------------<  157<H>  3.8   |  21<L>  |  2.19<H>    Ca    9.8      09 Jul 2024 06:27  Phos  2.9     07-08  Mg     2.2     07-09        Urinalysis Basic - ( 09 Jul 2024 06:27 )    Color: x / Appearance: x / SG: x / pH: x  Gluc: 157 mg/dL / Ketone: x  / Bili: x / Urobili: x   Blood: x / Protein: x / Nitrite: x   Leuk Esterase: x / RBC: x / WBC x   Sq Epi: x / Non Sq Epi: x / Bacteria: x      RADIOLOGY & ADDITIONAL STUDIES:    Protein Calorie Malnutrition Present: [ ]mild [ ]moderate [ ]severe [ ]underweight [ ]morbid obesity  https://www.andeal.org/vault/3126/web/files/ONC/Table_Clinical%20Characteristics%20to%20Document%20Malnutrition-White%20JV%20et%20al%617179.pdf    Height (cm): 157.5 (07-02-24 @ 18:16), 167.6 (03-12-24 @ 10:50), 154.9 (08-03-23 @ 17:31)  Weight (kg): 75.3 (07-02-24 @ 18:16), 72.6 (03-12-24 @ 10:50), 113.398 (10-11-23 @ 12:42)  BMI (kg/m2): 30.4 (07-02-24 @ 18:16), 25.8 (03-12-24 @ 10:50), 47.3 (10-11-23 @ 12:42)    [X ]PPSV2 < or = 30%  [ ]significant weight loss [X ]poor nutritional intake [ ]anasarca[ ]Artificial Nutrition    Other REFERRALS:  [ ]Hospice  [ ]Child Life  [ ]Social Work  [ ]Case management [ ]Holistic Therapy

## 2024-07-09 NOTE — PROGRESS NOTE ADULT - ASSESSMENT
88yo female w. PMH Dementia, PPM, Afib on Eliquis, mitral/tricuspid insufficiency, HTN, HLD, hypothyroid, and gout p/w L leg hematoma with acute renal failure.     PATIENT IS NOW ON COMFORT MEASURES     *Acute Renal Failure  - Cr. 6 on admission, now downtrending   - CTAP revealed sigmoid dilatation, consider colonoscopy to rule out partial obstruction, abnormal endometrium r/o-polyp vs myoma  - Continue to hold ACE/AB, Lasix, Avoid nephrotoxic meds  - IVF 0.45NS + kcl as per renal, now transitioned to D5w as Na was 151  - Palliative following- family in agreement with home hospice. Hospice referral placed   - Nephrology following - Pt is not a great candidate for RRT given poor MS at baseline, Suspect severe pre-renal ATN  - No further blood draws per comfort measures     *LT Leg Hematoma, with possible acute blood loss  *Elevated INR/PTT  - CT LT Leg showed hematoma  - Previously noted bleeding in mouth, CTH without bleed  - Restarted Eliquis 7/7 renally dosed  - H/H stable, no further blood draws per comfort measures      *Abdominal Distention  -CTAP reviewed  -Appreciate colorectal consult     *Hypothyroid  - Continue Synthroid    *HLD  - Chronic condition  - Continue statin     *Dementia  - Continue Namenda    *DVT ppx  - Resumed Eliquis     *GOC/Code Status-pt has advanced directives. Confirmed DNR/DNI    Dispo Pending home hospice referral      7.9 Updated Daughter Lisa Rae [phone] regarding pts current status and plan of care.

## 2024-07-09 NOTE — CONSULT NOTE ADULT - ASSESSMENT
Assessment: 88yo female w. PMH Dementia, PPM, Afib on Eliquis, mitral/tricuspid insufficiency, HTN, HLD, hypothyroid, and gout p/w L leg hematoma with acute renal failure. Primary team obtained CTAP due to abdominal distention.  Colorectal surgery consulted for CT finding - nonobstructive dilation of rectosigmoid colon, likely ileus. After CT pt had reported bowel movement. RN reports no N/V. Abd soft, NT. Pt HD stable. Wbc wnl, hgb 7.4.     Plan:  - No surgical intervention indicated, additionally pt not a surgical candidate due to debility/frailty  - Recommend GI involvement if further episodes of [place]'s syndrome in future  - Repletion of electrolytes per medicine team  - Suppositories prn if constipated  - GOC discussion w/ family per medicine team  - Medical management per primary/renal team    Discussed w/ Dr. Ordonez  Colorectal Surgery Assessment: 88yo female w. PMH Dementia, PPM, Afib on Eliquis, mitral/tricuspid insufficiency, HTN, HLD, hypothyroid, and gout p/w L leg hematoma with acute renal failure. Primary team obtained CTAP due to abdominal distention.  Colorectal surgery consulted for CT finding - nonobstructive dilation of rectosigmoid colon, likely ileus. After CT pt had reported bowel movement. RN reports no N/V. Abd soft, NT. Pt HD stable. Wbc wnl, hgb 7.4.     Plan:  - No surgical intervention indicated, additionally pt not a surgical candidate due to debility/frailty  - Recommend GI involvement if further episodes of [place]'s syndrome in future  - Repletion of electrolytes per medicine team. Maintain K@4 and Mg @2  - Suppositories prn if constipated, may also place rectal tube if stops having regular bowel function  - GOC discussion w/ family per medicine team  - Medical management per primary/renal team    Discussed w/ Dr. Ordonez  Colorectal Surgery

## 2024-07-09 NOTE — DISCHARGE NOTE PROVIDER - NSDCMRMEDTOKEN_GEN_ALL_CORE_FT
allopurinol 100 mg oral tablet: 1 tab(s) orally once a day  apixaban 5 mg oral tablet: 1 tab(s) orally 2 times a day  atorvastatin 10 mg oral tablet: 1 tab(s) orally once a day (at bedtime)  D3 25 mcg (1000 intl units) oral tablet: 1 tab(s) orally once a day  enalapril 5 mg oral tablet: 1 tab(s) orally once a day  Innerclean oral tablet: 1 tab(s) orally once a day  Lasix 40 mg oral tablet: 1 tab(s) orally once a day  levothyroxine 25 mcg (0.025 mg) oral tablet: 1 tab(s) orally once a day  memantine 5 mg oral tablet: 1 tab(s) orally 2 times a day  MiraLax oral powder for reconstitution: 17 gram(s) orally every 12 hours  QUEtiapine 25 mg oral tablet: 2 tab(s) orally once a day (at bedtime)  simethicone 80 mg oral tablet, chewable: 1 tab(s) orally once a day   bisacodyl 10 mg rectal suppository: 1 suppository(ies) rectal once a day As needed Constipation  levothyroxine 25 mcg (0.025 mg) oral tablet: 1 tab(s) orally once a day  memantine 5 mg oral tablet: 1 tab(s) orally 2 times a day  MiraLax oral powder for reconstitution: 17 gram(s) orally every 12 hours  QUEtiapine: 12.5 milligram(s) orally once a day (at bedtime)

## 2024-07-09 NOTE — PROGRESS NOTE ADULT - CONVERSATION DETAILS
Spoke with Lisa on phone regarding pt's clinical status and ileus as well as colorectal team input. We discussed conservative management and then artifical nutrition. Lisa stated she spoke with her brother and they decided against feeding tube. We again talked about home hospice and she was agreeable to hospice referral. SW notified and consult placed. I briefly discussed home hospice services again with Lisa on the phone and encouraged pt to continue 24/7 HHA; pt already has hospital bed at home. Supportive care given.  No further blooddraws, + pleasure feeds.    This visit was provided via telehealth using audit only  technology. The patient's daughter was located at home in [place]  , at the time of the visit.  The provider was located at [place], [place] at the time of the visit. The patient's daughter participated in the telehealth encounter.  Verbal consent for telehealth services was given. Spoke with Lisa on phone regarding pt's clinical status and ileus as well as colorectal team input. We discussed conservative management and then artifical nutrition. Lisa stated she spoke with her brother and they decided against feeding tube. We again talked about home hospice and she was agreeable to hospice referral. SW notified and consult placed. I briefly discussed home hospice services again with Lisa on the phone and encouraged pt to continue 24/7 HHA; pt already has hospital bed at home. Supportive care given.  No further blooddraws, + pleasure feeds.  New MOLST filled out with DNR/DNI, comfort measures, no artifical nutrition. Copy made and placed in chart.    This visit was provided via telehealth using audit only  technology. The patient's daughter was located at home in [place]  , at the time of the visit.  The provider was located at [place], [place] at the time of the visit. The patient's daughter participated in the telehealth encounter.  Verbal consent for telehealth services was given.

## 2024-07-09 NOTE — PROGRESS NOTE ADULT - SUBJECTIVE AND OBJECTIVE BOX
Resting, on RA    Vital Signs Last 24 Hrs  T(C): 36.5 (07-09-24 @ 05:45), Max: 36.5 (07-09-24 @ 05:45)  T(F): 97.7 (07-09-24 @ 05:45), Max: 97.7 (07-09-24 @ 05:45)  HR: 70 (07-09-24 @ 05:45) (70 - 70)  BP: 117/63 (07-09-24 @ 05:45) (117/63 - 117/63)  RR: 17 (07-09-24 @ 05:45) (17 - 17)    I&O's Detail    08 Jul 2024 07:01  -  09 Jul 2024 07:00  --------------------------------------------------------  IN:    dextrose 5%: 600 mL    Lactated Ringers: 360 mL  Total IN: 960 mL    OUT:    Voided (mL): 300 mL  Total OUT: 300 mL    09 Jul 2024 07:01  -  09 Jul 2024 23:26  --------------------------------------------------------  IN:    Oral Fluid: 180 mL  Total IN: 180 mL    OUT:    Voided (mL): 350 mL  Total OUT: 350 mL    s1s2  b/l air entry  soft, ND  no edema                                       7.4    9.18  )-----------( 171      ( 09 Jul 2024 06:27 )             22.7     09 Jul 2024 06:27    147    |  116    |  69     ----------------------------<  157    3.8     |  21     |  2.19     Ca    9.8        09 Jul 2024 06:27  Phos  2.9       08 Jul 2024 08:40  Mg     2.2       09 Jul 2024 06:27    acetaminophen     Tablet .. 650 milliGRAM(s) Oral every 6 hours PRN  apixaban 2.5 milliGRAM(s) Oral two times a day  atorvastatin 10 milliGRAM(s) Oral at bedtime  bisacodyl Suppository 10 milliGRAM(s) Rectal daily PRN  dextrose 5%. 1000 milliLiter(s) IV Continuous <Continuous>  levothyroxine 25 MICROGram(s) Oral daily  memantine 5 milliGRAM(s) Oral two times a day  polyethylene glycol 3350 17 Gram(s) Oral two times a day  QUEtiapine 25 milliGRAM(s) Oral at bedtime    A/P:    Adm w/severe pre-renal ATN    UA is negative  Imaging w/o hydro   Avoid nephrotoxins  Cr is improving w/IVF  No need for RRT  Cr is downtrending slowly, good UO  Hypernatremia due to pt not being able to maintain adequate hydration  D5W at 80cc/hr  Pt will not be able to achieve adequate hydration or nutrition  Family is against feeding tube and leaning towards comfort directed care   F/u BMP if c/w [facility]     [phone]

## 2024-07-09 NOTE — DISCHARGE NOTE PROVIDER - NSDCFUSCHEDAPPT_GEN_ALL_CORE_FT
[facility]  ELECTROPH [address]  Scheduled Appointment: 08/29/2024

## 2024-07-09 NOTE — PROGRESS NOTE ADULT - SUBJECTIVE AND OBJECTIVE BOX
Patient is a 89y old Female who presents with a chief complaint of Acute renal failure (09 Jul 2024 11:31)      Patient seen and examined at bedside. No overnight events reported.     ALLERGIES:  No Known Allergies    MEDICATIONS  (STANDING):  apixaban 2.5 milliGRAM(s) Oral two times a day  atorvastatin 10 milliGRAM(s) Oral at bedtime  dextrose 5%. 1000 milliLiter(s) (80 mL/Hr) IV Continuous <Continuous>  levothyroxine 25 MICROGram(s) Oral daily  memantine 5 milliGRAM(s) Oral two times a day  polyethylene glycol 3350 17 Gram(s) Oral two times a day  QUEtiapine 25 milliGRAM(s) Oral at bedtime    MEDICATIONS  (PRN):  acetaminophen     Tablet .. 650 milliGRAM(s) Oral every 6 hours PRN Mild Pain (1 - 3)  bisacodyl Suppository 10 milliGRAM(s) Rectal daily PRN Constipation    Vital Signs Last 24 Hrs  T(F): 97.7 (09 Jul 2024 05:45), Max: 98.1 (08 Jul 2024 19:12)  HR: 70 (09 Jul 2024 05:45) (70 - 78)  BP: 117/63 (09 Jul 2024 05:45) (117/63 - 132/63)  RR: 17 (09 Jul 2024 05:45) (16 - 17)  SpO2: 95% (08 Jul 2024 19:12) (95% - 97%)  I&O's Summary    08 Jul 2024 07:01  -  09 Jul 2024 07:00  --------------------------------------------------------  IN: 960 mL / OUT: 300 mL / NET: 660 mL      PHYSICAL EXAM:  General: NAD, opens eyes slightly with verbal stimuli  ENT: No gross hearing impairment, dry mucous membranes  Neck: Supple, No JVD  Lungs: Clear to auscultation bilaterally anteriorly, nonlabored breathing  Cardio: RRR, S1/S2, No murmur  Abdomen: Soft, Nontender, minimally distended; Bowel sounds present  Extremities: No calf tenderness, No cyanosis, No pitting edema    LABS:                        7.4    9.18  )-----------( 171      ( 09 Jul 2024 06:27 )             22.7     07-09    147  |  116  |  69  ----------------------------<  157  3.8   |  21  |  2.19    Ca    9.8      09 Jul 2024 06:27  Phos  2.9     07-08  Mg     2.2     07-09                                        Urinalysis Basic - ( 09 Jul 2024 06:27 )    Color: x / Appearance: x / SG: x / pH: x  Gluc: 157 mg/dL / Ketone: x  / Bili: x / Urobili: x   Blood: x / Protein: x / Nitrite: x   Leuk Esterase: x / RBC: x / WBC x   Sq Epi: x / Non Sq Epi: x / Bacteria: x          RADIOLOGY & ADDITIONAL TESTS:    Care Discussed with Consultants/Other Providers:

## 2024-07-09 NOTE — CHART NOTE - NSCHARTNOTEFT_GEN_A_CORE
Reviewed AXR results. Consulted surgery, NPO except medications. Start bowel regimen. Replete electrolytes. Continue IVF. Consulted colorectal surgery as well. Updated patient's daughter Lisa.     < from: CT Abdomen and Pelvis No Cont (07.08.24 @ 14:43) >    IMPRESSION:  Progressive nonobstructive dilatation of rectosigmoid colon, likely ileus.      Discussed with Dr. Brito
NUTRITION Follow Up Note    SOURCE: Patient [X]  Medical Record [X]  Nursing Staff [X]  Family Member []    DIET: Diet, Clear Liquid (24 @ 12:53) [Active]    Patient with MOLST. Comfort measures only per GOC. Pleasure feedings as able.      PERTINENT MEDICATIONS: MEDICATIONS  (STANDING):  apixaban 2.5 milliGRAM(s) Oral two times a day  atorvastatin 10 milliGRAM(s) Oral at bedtime  dextrose 5%. 1000 milliLiter(s) (80 mL/Hr) IV Continuous <Continuous>  levothyroxine 25 MICROGram(s) Oral daily  memantine 5 milliGRAM(s) Oral two times a day  polyethylene glycol 3350 17 Gram(s) Oral two times a day  QUEtiapine 25 milliGRAM(s) Oral at bedtime    MEDICATIONS  (PRN):  acetaminophen     Tablet .. 650 milliGRAM(s) Oral every 6 hours PRN Mild Pain (1 - 3)  bisacodyl Suppository 10 milliGRAM(s) Rectal daily PRN Constipation      PERTINENT LABS:   Na147 mmol/L<H> Glu 157 mg/dL<H> K+ 3.8 mmol/L Cr  2.19 mg/dL<H> BUN 69 mg/dL<H>  Phos 2.9 mg/dL  Alb 3.3 g/dL    EDEMA: no edema noted  LAST BM: 2024  SKIN: no pressure injuries noted    WEIGHT TRENDS:  Weight in k.8 (2024 04:50)    ESTIMATED NEEDS:   [X] No Change Since Previous Assessment    PREVIOUS NUTRITION DIAGNOSIS:  1. Moderate Protein Calorie Malnutrition    NUTRITION DIAGNOSIS IS [X] Ongoing    NEW NUTRITION DIAGNOSIS:   Comfort measures only - no further nutrition intervention at this time.     INTERVENTIONS:   1. Pleasure feedings as able    MONITORING & EVALUATION:   1. Weights   2. PO intakes   3. Skin integrity   4. Tolerance to diet prescription   5. Labs & POCT  6. Follow up (per protocol)    Registered Dietitian/Nutritionist Remains Available.  Jose Beltran RD, CDN    Contact: [phone] or via [place] TEAMS
RN made MD aware that patient is confused, AOx1 and has been scratching at her face that is causing minimal bleeding and attempting to pull IV. Order placed for bilateral unsecured mittens for the night.

## 2024-07-09 NOTE — DISCHARGE NOTE PROVIDER - DETAILS OF MALNUTRITION DIAGNOSIS/DIAGNOSES
This patient has been assessed with a concern for Malnutrition and was treated during this hospitalization for the following Nutrition diagnosis/diagnoses:     -  07/04/2024: Moderate protein-calorie malnutrition

## 2024-07-10 LAB
% ALBUMIN: 51.8 % — SIGNIFICANT CHANGE UP
% ALPHA 1: 4.8 % — SIGNIFICANT CHANGE UP
% ALPHA 2: 10.9 % — SIGNIFICANT CHANGE UP
% BETA: 10.4 % — SIGNIFICANT CHANGE UP
% GAMMA: 22.1 % — SIGNIFICANT CHANGE UP
ALBUMIN SERPL ELPH-MCNC: 3.6 G/DL — SIGNIFICANT CHANGE UP (ref 3.6–5.5)
ALBUMIN/GLOB SERPL ELPH: 1.1 RATIO — SIGNIFICANT CHANGE UP
ALPHA1 GLOB SERPL ELPH-MCNC: 0.3 G/DL — SIGNIFICANT CHANGE UP (ref 0.1–0.4)
ALPHA2 GLOB SERPL ELPH-MCNC: 0.8 G/DL — SIGNIFICANT CHANGE UP (ref 0.5–1)
ANION GAP SERPL CALC-SCNC: 13 MMOL/L — SIGNIFICANT CHANGE UP (ref 5–17)
B-GLOBULIN SERPL ELPH-MCNC: 0.7 G/DL — SIGNIFICANT CHANGE UP (ref 0.5–1)
BUN SERPL-MCNC: 57 MG/DL — HIGH (ref 7–23)
CALCIUM SERPL-MCNC: 9.8 MG/DL — SIGNIFICANT CHANGE UP (ref 8.4–10.5)
CHLORIDE SERPL-SCNC: 119 MMOL/L — HIGH (ref 96–108)
CO2 SERPL-SCNC: 19 MMOL/L — LOW (ref 22–31)
CREAT SERPL-MCNC: 1.96 MG/DL — HIGH (ref 0.5–1.3)
EGFR: 24 ML/MIN/1.73M2 — LOW
GAMMA GLOBULIN: 1.5 G/DL — SIGNIFICANT CHANGE UP (ref 0.6–1.6)
GLUCOSE SERPL-MCNC: 97 MG/DL — SIGNIFICANT CHANGE UP (ref 70–99)
INTERPRETATION SERPL IFE-IMP: SIGNIFICANT CHANGE UP
POTASSIUM SERPL-MCNC: 3.3 MMOL/L — LOW (ref 3.5–5.3)
POTASSIUM SERPL-SCNC: 3.3 MMOL/L — LOW (ref 3.5–5.3)
PROT PATTERN SERPL ELPH-IMP: SIGNIFICANT CHANGE UP
PROT SERPL-MCNC: 7 G/DL — SIGNIFICANT CHANGE UP (ref 6–8.3)
SODIUM SERPL-SCNC: 151 MMOL/L — HIGH (ref 135–145)

## 2024-07-10 PROCEDURE — 99232 SBSQ HOSP IP/OBS MODERATE 35: CPT

## 2024-07-10 PROCEDURE — 99233 SBSQ HOSP IP/OBS HIGH 50: CPT

## 2024-07-10 RX ORDER — POTASSIUM CHLORIDE 600 MG/1
10 TABLET, FILM COATED, EXTENDED RELEASE ORAL
Refills: 0 | Status: COMPLETED | OUTPATIENT
Start: 2024-07-10 | End: 2024-07-10

## 2024-07-10 RX ORDER — DEXTROSE MONOHYDRATE AND SODIUM CHLORIDE 5; .3 G/100ML; G/100ML
1000 INJECTION, SOLUTION INTRAVENOUS
Refills: 0 | Status: DISCONTINUED | OUTPATIENT
Start: 2024-07-10 | End: 2024-07-10

## 2024-07-10 RX ADMIN — MEMANTINE HYDROCHLORIDE 5 MILLIGRAM(S): 7 CAPSULE, EXTENDED RELEASE ORAL at 08:30

## 2024-07-10 RX ADMIN — MEMANTINE HYDROCHLORIDE 5 MILLIGRAM(S): 7 CAPSULE, EXTENDED RELEASE ORAL at 17:41

## 2024-07-10 RX ADMIN — POLYETHYLENE GLYCOL 3350 17 GRAM(S): 1 POWDER ORAL at 17:42

## 2024-07-10 RX ADMIN — ATORVASTATIN CALCIUM 10 MILLIGRAM(S): 20 TABLET, FILM COATED ORAL at 21:05

## 2024-07-10 RX ADMIN — APIXABAN 2.5 MILLIGRAM(S): 5 TABLET, FILM COATED ORAL at 08:30

## 2024-07-10 RX ADMIN — Medication 12.5 MILLIGRAM(S): at 21:05

## 2024-07-10 RX ADMIN — POTASSIUM CHLORIDE 100 MILLIEQUIVALENT(S): 600 TABLET, FILM COATED, EXTENDED RELEASE ORAL at 16:33

## 2024-07-10 RX ADMIN — POTASSIUM CHLORIDE 100 MILLIEQUIVALENT(S): 600 TABLET, FILM COATED, EXTENDED RELEASE ORAL at 21:04

## 2024-07-10 RX ADMIN — Medication 25 MICROGRAM(S): at 08:30

## 2024-07-10 NOTE — PROGRESS NOTE ADULT - SUBJECTIVE AND OBJECTIVE BOX
Patient is a 89y old  Female who presents with a chief complaint of Acute renal failure (09 Jul 2024 23:26)      Patient seen and examined at bedside. No overnight events reported.     ALLERGIES:  No Known Allergies    MEDICATIONS  (STANDING):  apixaban 2.5 milliGRAM(s) Oral two times a day  atorvastatin 10 milliGRAM(s) Oral at bedtime  dextrose 5%. 1000 milliLiter(s) (80 mL/Hr) IV Continuous <Continuous>  levothyroxine 25 MICROGram(s) Oral daily  memantine 5 milliGRAM(s) Oral two times a day  polyethylene glycol 3350 17 Gram(s) Oral two times a day  QUEtiapine 25 milliGRAM(s) Oral at bedtime    MEDICATIONS  (PRN):  acetaminophen     Tablet .. 650 milliGRAM(s) Oral every 6 hours PRN Mild Pain (1 - 3)  bisacodyl Suppository 10 milliGRAM(s) Rectal daily PRN Constipation    Vital Signs Last 24 Hrs  T(F): --  HR: --  BP: --  RR: --  SpO2: --  I&O's Summary    09 Jul 2024 07:01  -  10 Jul 2024 07:00  --------------------------------------------------------  IN: 180 mL / OUT: 350 mL / NET: -170 mL      PHYSICAL EXAM:  General: NAD, A/O x 3  ENT: No gross hearing impairment, Moist mucous membranes, no thrush  Neck: Supple, No JVD  Lungs: Clear to auscultation bilaterally, good air entry, non-labored breathing  Cardio: RRR, S1/S2, No murmur  Abdomen: Soft, Nontender, Nondistended; Bowel sounds present  Extremities: No calf tenderness, No cyanosis, No pitting edema  Psych: Appropriate mood and affect    LABS:                        7.4    9.18  )-----------( 171      ( 09 Jul 2024 06:27 )             22.7     07-09    147  |  116  |  69  ----------------------------<  157  3.8   |  21  |  2.19    Ca    9.8      09 Jul 2024 06:27  Phos  2.9     07-08  Mg     2.2     07-09                  Urinalysis Basic - ( 09 Jul 2024 06:27 )    Color: x / Appearance: x / SG: x / pH: x  Gluc: 157 mg/dL / Ketone: x  / Bili: x / Urobili: x   Blood: x / Protein: x / Nitrite: x   Leuk Esterase: x / RBC: x / WBC x   Sq Epi: x / Non Sq Epi: x / Bacteria: x          RADIOLOGY & ADDITIONAL TESTS:    Care Discussed with Consultants/Other Providers:    Patient is a 89y old  Female who presents with a chief complaint of Acute renal failure (09 Jul 2024 23:26)      Patient seen and examined at bedside. No overnight events reported.     ALLERGIES:  No Known Allergies    MEDICATIONS  (STANDING):  apixaban 2.5 milliGRAM(s) Oral two times a day  atorvastatin 10 milliGRAM(s) Oral at bedtime  dextrose 5%. 1000 milliLiter(s) (80 mL/Hr) IV Continuous <Continuous>  levothyroxine 25 MICROGram(s) Oral daily  memantine 5 milliGRAM(s) Oral two times a day  polyethylene glycol 3350 17 Gram(s) Oral two times a day  QUEtiapine 25 milliGRAM(s) Oral at bedtime    MEDICATIONS  (PRN):  acetaminophen     Tablet .. 650 milliGRAM(s) Oral every 6 hours PRN Mild Pain (1 - 3)  bisacodyl Suppository 10 milliGRAM(s) Rectal daily PRN Constipation    Vital Signs Last 24 Hrs  T(F): --  HR: --  BP: --  RR: --  SpO2: --  I&O's Summary    09 Jul 2024 07:01  -  10 Jul 2024 07:00  --------------------------------------------------------  IN: 180 mL / OUT: 350 mL / NET: -170 mL      PHYSICAL EXAM:  General: NAD, Alert, confused.   ENT: No gross hearing impairment, Moist mucous membranes, no thrush  Neck: Supple, No JVD  Lungs: Clear to auscultation bilaterally, good air entry, non-labored breathing  Cardio: RRR, S1/S2, No murmur  Abdomen: Soft, Nontender, Nondistended; Bowel sounds present  Extremities: No calf tenderness, No cyanosis, No pitting edema  Neuro: alert, nonfocal    LABS:                        7.4    9.18  )-----------( 171      ( 09 Jul 2024 06:27 )             22.7     07-09    147  |  116  |  69  ----------------------------<  157  3.8   |  21  |  2.19    Ca    9.8      09 Jul 2024 06:27  Phos  2.9     07-08  Mg     2.2     07-09                  Urinalysis Basic - ( 09 Jul 2024 06:27 )    Color: x / Appearance: x / SG: x / pH: x  Gluc: 157 mg/dL / Ketone: x  / Bili: x / Urobili: x   Blood: x / Protein: x / Nitrite: x   Leuk Esterase: x / RBC: x / WBC x   Sq Epi: x / Non Sq Epi: x / Bacteria: x          RADIOLOGY & ADDITIONAL TESTS:    Care Discussed with Consultants/Other Providers:

## 2024-07-10 NOTE — PROGRESS NOTE ADULT - SUBJECTIVE AND OBJECTIVE BOX
Resting, on RA    I&O's Detail    09 Jul 2024 07:01  -  10 Jul 2024 07:00  --------------------------------------------------------  IN:    Oral Fluid: 180 mL  Total IN: 180 mL    OUT:    Voided (mL): 350 mL  Total OUT: 350 mL    10 Jul 2024 07:01  -  10 Jul 2024 23:29  --------------------------------------------------------  IN:    Oral Fluid: 240 mL  Total IN: 240 mL    OUT:    Voided (mL): 250 mL  Total OUT: 250 mL    s1s2  b/l air entry  soft, ND  no edema                                               7.4    9.18  )-----------( 171      ( 09 Jul 2024 06:27 )             22.7     10 Jul 2024 07:16    151    |  119    |  57     ----------------------------<  97     3.3     |  19     |  1.96     Ca    9.8        10 Jul 2024 07:16  Mg     2.2       09 Jul 2024 06:27    acetaminophen     Tablet .. 650 milliGRAM(s) Oral every 6 hours PRN  atorvastatin 10 milliGRAM(s) Oral at bedtime  bisacodyl Suppository 10 milliGRAM(s) Rectal daily PRN  levothyroxine 25 MICROGram(s) Oral daily  memantine 5 milliGRAM(s) Oral two times a day  polyethylene glycol 3350 17 Gram(s) Oral two times a day  QUEtiapine 12.5 milliGRAM(s) Oral at bedtime    A/P:    Adm w/severe pre-renal ATN    UA is negative  Imaging w/o hydro   Avoid nephrotoxins  Cr is improving w/IVF  No need for RRT  Cr is downtrending slowly, good UO  Hypernatremia due to pt not being able to maintain adequate hydration  Pt will not be able to achieve adequate hydration or nutrition w/o PEG  Family is against feeding tube and choosing comfort directed care   No further blood work or IVF  Awaits hospice  Will sign off    [phone]

## 2024-07-10 NOTE — PROGRESS NOTE ADULT - TIME BILLING
Time spent includes direct patient care  (interview and examination of patient), discussion with other providers, support staff and/or patient's family members, review of medical records, ordering diagnostic tests and analyzing results, and documentation, excluding time spent in ACP discussions.
Time spent includes direct patient care (interview and examination of patient), discussion with other providers, support staff and/or patient's family members, review of medical records, ordering diagnostic tests and analyzing results, and documentation.
spouse
Time spent includes direct patient care  (interview and examination of patient), discussion with other providers, support staff and/or patient's family members, review of medical records, ordering diagnostic tests and analyzing results, and documentation, excluding time spent in ACP discussions.

## 2024-07-10 NOTE — PROGRESS NOTE ADULT - SUBJECTIVE AND OBJECTIVE BOX
Indication for Geriatrics and Palliative Care Services/INTERVAL HPI: GOC    OVERNIGHT EVENTS: no acute events noted  SUBJECTIVE AND OBJECTIVE: Pt seen and examined at bedside this morning. She was sleepy but said no to pain.    Allergies  No Known Allergies    Intolerances    MEDICATIONS  (STANDING):  apixaban 2.5 milliGRAM(s) Oral two times a day  atorvastatin 10 milliGRAM(s) Oral at bedtime  levothyroxine 25 MICROGram(s) Oral daily  memantine 5 milliGRAM(s) Oral two times a day  polyethylene glycol 3350 17 Gram(s) Oral two times a day  QUEtiapine 25 milliGRAM(s) Oral at bedtime    MEDICATIONS  (PRN):  acetaminophen     Tablet .. 650 milliGRAM(s) Oral every 6 hours PRN Mild Pain (1 - 3)  bisacodyl Suppository 10 milliGRAM(s) Rectal daily PRN Constipation      ITEMS UNCHECKED ARE NOT PRESENT    PRESENT SYMPTOMS:      Pain:  [ ]yes [ X]no  QOL impact -   Location -                    Aggravating factors -  Quality -  Radiation -  Timing-  Severity (0-10 scale):  Minimal acceptable level (0-10 scale):     Dyspnea:                        none  Anxiety:                             unable to assess  Fatigue:                          Severe  Nausea:                        none  Depressed:                     unable to assess  Loss of appetite:              severe  Constipation:                   none    Other Symptoms:  [ ]All other review of systems negative - pt sleepy/unable to report     Chaplaincy Referral:  Deferred   Palliative Performance Status Version 2:       30  %       http://npcrc.org/files/news/palliative_performance_scale_[email or web address].pdf    PHYSICAL EXAM:  Vital Signs Last 24 Hrs  T(C): --  T(F): --  HR: --  BP: --  BP(mean): --  RR: --  SpO2: --     I&O's Summary    09 Jul 2024 07:01  -  10 Jul 2024 07:00  --------------------------------------------------------  IN: 180 mL / OUT: 350 mL / NET: -170 mL       GENERAL: pt laying in bed in NAD, sleepy  HEENT: NC/AT, dry mucous membranes  PULMONARY: nonlabored breathing, no resp distress  CARDIOVASCULAR:   RRR, no murmur  GASTROINTESTINAL: soft, mild distension, nontender  Last BM:  7/9  MUSCULOSKELETAL:  +weakness; bedbound  NEUROLOGIC: + Cognitive impairment  ; minimally responsive to questions     LABS:                        7.4    9.18  )-----------( 171      ( 09 Jul 2024 06:27 )             22.7   07-10    151<H>  |  119<H>  |  57<H>  ----------------------------<  97  3.3<L>   |  19<L>  |  1.96<H>    Ca    9.8      10 Jul 2024 07:16  Mg     2.2     07-09        Urinalysis Basic - ( 10 Jul 2024 07:16 )    Color: x / Appearance: x / SG: x / pH: x  Gluc: 97 mg/dL / Ketone: x  / Bili: x / Urobili: x   Blood: x / Protein: x / Nitrite: x   Leuk Esterase: x / RBC: x / WBC x   Sq Epi: x / Non Sq Epi: x / Bacteria: x      RADIOLOGY & ADDITIONAL STUDIES:    Protein Calorie Malnutrition Present: [ ]mild [ ]moderate [ ]severe [ ]underweight [ ]morbid obesity  https://www.andeal.org/vault/2440/web/files/ONC/Table_Clinical%20Characteristics%20to%20Document%20Malnutrition-White%20JV%20et%20al%202012.pdf    Height (cm): 157.5 (07-02-24 @ 18:16), 167.6 (03-12-24 @ 10:50), 154.9 (08-03-23 @ 17:31)  Weight (kg): 75.3 (07-02-24 @ 18:16), 72.6 (03-12-24 @ 10:50), 113.398 (10-11-23 @ 12:42)  BMI (kg/m2): 30.4 (07-02-24 @ 18:16), 25.8 (03-12-24 @ 10:50), 47.3 (10-11-23 @ 12:42)    [ X]PPSV2 < or = 30%  [ ]significant weight loss [X ]poor nutritional intake [ ]anasarca[ ]Artificial Nutrition    Other REFERRALS:  [ X]Hospice  [ ]Child Life  [X ]Social Work  [ ]Case management [ ]Holistic Therapy

## 2024-07-10 NOTE — PROGRESS NOTE ADULT - ASSESSMENT
89 F hx of dementia, PPM, afib on Eliquis, mitral/tricuspid insufficiency, HTN, HLD, hypothyroid, gout pw L leg hematoma with acute renal failure.     Functional quadriplegia  - PPS 30%    Dementia; Failure to Thrive  - A&Ox1 at baseline; pt mentation declining currently  - has 24/7 HHA at home  - pt now with poor PO intake; discussion had with daughter/HCP regarding artifical nutrition - family opt for home hospice, no feeding tube    Acute Renal Failure, improved  - s/p IVF hydration     Advanced care planning  - HCP: daughter Lisa Rae  - New MOLST filled out with DNR/DNI, comfort measures, no artifical nutrition. Copy made and placed in chart.  - Hospice referral placed, SW contacted    Encounter for Palliative Care  - Family opt for no artifical nutrition; hospice referral placed as family amendable.   - New MOLST filled out with DNR/DNI, comfort measures, no artifical nutrition. Copy made and placed in chart.  - Discussed case with primary team [facility] [place].   - No further blooddraws, + pleasure feeds.  - Will continue to follow for symptom management and supportive care.   - RECOMMEND cutting Seroquel qhs in half. Caution with c/w Eliquis as pt anemic.

## 2024-07-10 NOTE — PROGRESS NOTE ADULT - ASSESSMENT
89yo female w. PMH Dementia, PPM, Afib on Eliquis, mitral/tricuspid insufficiency, HTN, HLD, hypothyroid, and gout p/w L leg hematoma with acute renal failure.     PATIENT IS NOW ON COMFORT MEASURES     *Acute Renal Failure  - Cr. 6 on admission, now downtrending   - CTAP revealed sigmoid dilatation, consider colonoscopy to rule out partial obstruction, abnormal endometrium r/o-polyp vs myoma  - Continue to hold ACE/AB, Lasix, Avoid nephrotoxic meds  - IVF 0.45NS + kcl as per renal, now transitioned to D5w as Na was 151  - Palliative following- family in agreement with home hospice. Hospice referral placed   - Nephrology following - Pt is not a great candidate for RRT given poor MS at baseline, Suspect severe pre-renal ATN  - No further blood draws per comfort measures     *LT Leg Hematoma, with possible acute blood loss  *Elevated INR/PTT  - CT LT Leg showed hematoma  - Previously noted bleeding in mouth, CTH without bleed  - Restarted Eliquis 7/7 renally dosed  - H/H stable, no further blood draws per comfort measures      *Abdominal Distention  -CTAP reviewed  -Appreciate colorectal consult     *Hypothyroid  - Continue Synthroid    *HLD  - Chronic condition  - Continue statin     *Dementia  - Continue Namenda    *DVT ppx  - Resumed Eliquis     *GOC/Code Status-pt has advanced directives. Confirmed DNR/DNI    Dispo Pending home hospice referral, Daughter Lisa Rae [phone]     88yo female w. PMH Dementia, PPM, Afib on Eliquis, mitral/tricuspid insufficiency, HTN, HLD, hypothyroid, and gout p/w L leg hematoma with acute renal failure.     PATIENT IS NOW ON COMFORT MEASURES     *Acute Renal Failure  - Cr. 6 on admission, now downtrending --2.6 on 7/9  - CTAP revealed sigmoid dilatation, consider colonoscopy to rule out partial obstruction, abnormal endometrium r/o-polyp vs myoma  - Continue to hold ACE/AB, Lasix, Avoid nephrotoxic meds  - stopped IVF today  - Palliative following- family in agreement with home hospice. Hospice referral placed   - Nephrology following - Pt is not a great candidate for RRT given poor MS at baseline, Suspect severe pre-renal ATN  - No further blood draws per comfort measures     *LT Leg Hematoma, with possible acute blood loss  *Elevated INR/PTT  - CT LT Leg showed hematoma  - CTH without bleed  - Restarted Eliquis 7/7 renally dosed  - H/H stable, no further blood draws per comfort measures      *Abdominal Distention  -CTAP reviewed  -Appreciate colorectal consult     *Hypothyroid  - Continue Synthroid    *HLD  - Chronic condition  - Continue statin     *Dementia  - Continue Namenda    *DVT ppx  - Resumed Eliquis     *GOC/Code Status-pt has advanced directives. Confirmed DNR/DNI--pending home hospice referral, Daughter Lisa Rae [phone]

## 2024-07-11 ENCOUNTER — TRANSCRIPTION ENCOUNTER (OUTPATIENT)
Age: 89
End: 2024-07-11

## 2024-07-11 PROCEDURE — 36415 COLL VENOUS BLD VENIPUNCTURE: CPT

## 2024-07-11 PROCEDURE — 82746 ASSAY OF FOLIC ACID SERUM: CPT

## 2024-07-11 PROCEDURE — 99239 HOSP IP/OBS DSCHRG MGMT >30: CPT

## 2024-07-11 PROCEDURE — 80048 BASIC METABOLIC PNL TOTAL CA: CPT

## 2024-07-11 PROCEDURE — 84165 PROTEIN E-PHORESIS SERUM: CPT

## 2024-07-11 PROCEDURE — 82550 ASSAY OF CK (CPK): CPT

## 2024-07-11 PROCEDURE — 83550 IRON BINDING TEST: CPT

## 2024-07-11 PROCEDURE — 82728 ASSAY OF FERRITIN: CPT

## 2024-07-11 PROCEDURE — 93005 ELECTROCARDIOGRAM TRACING: CPT

## 2024-07-11 PROCEDURE — 83735 ASSAY OF MAGNESIUM: CPT

## 2024-07-11 PROCEDURE — 84100 ASSAY OF PHOSPHORUS: CPT

## 2024-07-11 PROCEDURE — 85730 THROMBOPLASTIN TIME PARTIAL: CPT

## 2024-07-11 PROCEDURE — 85027 COMPLETE CBC AUTOMATED: CPT

## 2024-07-11 PROCEDURE — 84484 ASSAY OF TROPONIN QUANT: CPT

## 2024-07-11 PROCEDURE — 74176 CT ABD & PELVIS W/O CONTRAST: CPT | Mod: MC

## 2024-07-11 PROCEDURE — 80053 COMPREHEN METABOLIC PANEL: CPT

## 2024-07-11 PROCEDURE — 99291 CRITICAL CARE FIRST HOUR: CPT | Mod: 25

## 2024-07-11 PROCEDURE — 96360 HYDRATION IV INFUSION INIT: CPT

## 2024-07-11 PROCEDURE — 73590 X-RAY EXAM OF LOWER LEG: CPT

## 2024-07-11 PROCEDURE — 83540 ASSAY OF IRON: CPT

## 2024-07-11 PROCEDURE — 86334 IMMUNOFIX E-PHORESIS SERUM: CPT

## 2024-07-11 PROCEDURE — 82607 VITAMIN B-12: CPT

## 2024-07-11 PROCEDURE — 70450 CT HEAD/BRAIN W/O DYE: CPT | Mod: MC

## 2024-07-11 PROCEDURE — 85610 PROTHROMBIN TIME: CPT

## 2024-07-11 PROCEDURE — 71045 X-RAY EXAM CHEST 1 VIEW: CPT

## 2024-07-11 PROCEDURE — 73700 CT LOWER EXTREMITY W/O DYE: CPT | Mod: MC

## 2024-07-11 PROCEDURE — 85025 COMPLETE CBC W/AUTO DIFF WBC: CPT

## 2024-07-11 PROCEDURE — 81001 URINALYSIS AUTO W/SCOPE: CPT

## 2024-07-11 PROCEDURE — 87086 URINE CULTURE/COLONY COUNT: CPT

## 2024-07-11 RX ORDER — BISACODYL 5 MG
1 TABLET, DELAYED RELEASE (ENTERIC COATED) ORAL
Qty: 0 | Refills: 0 | DISCHARGE
Start: 2024-07-11

## 2024-07-11 RX ADMIN — Medication 25 MICROGRAM(S): at 06:47

## 2024-07-11 RX ADMIN — MEMANTINE HYDROCHLORIDE 5 MILLIGRAM(S): 7 CAPSULE, EXTENDED RELEASE ORAL at 06:47

## 2024-07-11 NOTE — PROGRESS NOTE ADULT - NS ATTEND AMEND GEN_ALL_CORE FT
89 y/o F hx of dementia, PPM, afib on Eliquis, mitral/tricuspid insufficiency, HTN, HLD, hypothyroid, and gout p/w L leg hematoma with acute renal failure. Labs reviewed, renal function improving. Pall, nephro appreciated. Continue to hold ACEI/ARB, lasix, nephrotoxins. continue supportive care - IVF. eliquis held for oral bleeding.
Seen and examined. Chart reviewed.   patient is improving clinically.   Agree with above a/p  Edited where ever is necessary    creatinine trending down. daughter wanna see renal function without IVF. will start Eliquis tonight. repeat h/h tomorrow. DC IVF tomorrow if creatinine is trending down. renal noted.
seen and examined  Chart reviewed  No overnight events  BM+  patient is non-verbal. noncommunicating. abd still distended but soft. moved BM. seen buy colorectal. not a candidate for any surgical intervention. family opted comfort care, hospice. HCN cx pending. no blood draws. pleasure feed. will continue to monitor
Seen and examined. Chart reviewed.   patient is improving clinically.   Agree with above a/p  Edited where ever is necessary      Cr improving. sodium high, K low. replaced K. monitor K and Mg. renal following. on IVF. hospice eval pending. palliative noted
Starting Abx as CT image reports Cellulitis, hold AC for now. Monitor H/H. Follow with nephro
patient did not answer qs. exam same as yesterday. on comfort care. hospice accepted. possible DC home with home hospice. no lab draw. however lab drawn this am showed K 3.2. replaced with Krider 10 meq*2. no further lab draw. sodium high. encourage PO./ D5. DC IVF if consistent with GOC as patient is on comfort care. Discuss with daughter and DC Eliquis as hb is dropping and we are not monitoring labs and patient on comfort/hospice and daughter does not want any life prolonging treatment per palliative.     dispo: anticipate DC home tomorrow with home hospice
Seen and examined. Chart reviewed.   patient is improving clinically.   Agree with above a/p  Edited where ever is necessary      Cr improving. no complaints. exam stable. renal noted. PT eval
agree with above  on comfort measures  home with home hospice  pickup this afternoon  see discharge summary for full plan of care
non- verbal. on exam, abdomen distended. CT showed progressive ileus. NPo, IVF, surgery cx. acute kidney injury, hypernatremia likely due ot dehydration. c/w IVF for now. GOC ongoing about FT, hospice. palliative on board. renal f/u

## 2024-07-11 NOTE — PROGRESS NOTE ADULT - PROVIDER SPECIALTY LIST ADULT
Hospitalist
Nephrology
Palliative Care
Hospitalist
Nephrology
Nephrology
Palliative Care
Hospitalist
Palliative Care
Palliative Care

## 2024-07-11 NOTE — PROGRESS NOTE ADULT - SUBJECTIVE AND OBJECTIVE BOX
Patient is a 89y old  Female who presents with a chief complaint of Acute renal failure (10 Jul 2024 23:29)      Patient seen and examined at bedside. No overnight events reported.     ALLERGIES:  No Known Allergies    MEDICATIONS  (STANDING):  atorvastatin 10 milliGRAM(s) Oral at bedtime  levothyroxine 25 MICROGram(s) Oral daily  memantine 5 milliGRAM(s) Oral two times a day  polyethylene glycol 3350 17 Gram(s) Oral two times a day  QUEtiapine 12.5 milliGRAM(s) Oral at bedtime    MEDICATIONS  (PRN):  acetaminophen     Tablet .. 650 milliGRAM(s) Oral every 6 hours PRN Mild Pain (1 - 3)  bisacodyl Suppository 10 milliGRAM(s) Rectal daily PRN Constipation    Vital Signs Last 24 Hrs  T(F): --  HR: --  BP: --  RR: --  SpO2: --  I&O's Summary    10 Jul 2024 07:01  -  11 Jul 2024 07:00  --------------------------------------------------------  IN: 240 mL / OUT: 250 mL / NET: -10 mL      PHYSICAL EXAM:  General: NAD, Alert x 1, confused.   ENT: No gross hearing impairment, Moist mucous membranes, no thrush  Neck: Supple, No JVD  Lungs: Clear to auscultation bilaterally, good air entry, non-labored breathing  Cardio: RRR, S1/S2, No murmur  Abdomen: Soft, Nontender, Nondistended; Bowel sounds present  Extremities: No calf tenderness, No cyanosis, No pitting edema  Psych: Appropriate mood and affect    LABS:                        7.4    9.18  )-----------( 171      ( 09 Jul 2024 06:27 )             22.7     07-10    151  |  119  |  57  ----------------------------<  97  3.3   |  19  |  1.96    Ca    9.8      10 Jul 2024 07:16  Phos  2.9     07-08  Mg     2.2     07-09          Urinalysis Basic - ( 10 Jul 2024 07:16 )    Color: x / Appearance: x / SG: x / pH: x  Gluc: 97 mg/dL / Ketone: x  / Bili: x / Urobili: x   Blood: x / Protein: x / Nitrite: x   Leuk Esterase: x / RBC: x / WBC x   Sq Epi: x / Non Sq Epi: x / Bacteria: x          RADIOLOGY & ADDITIONAL TESTS:    Care Discussed with Consultants/Other Providers:    Patient is a 89y old  Female who presents with a chief complaint of Acute renal failure (10 Jul 2024 23:29)      Patient seen and examined at bedside. No overnight events reported.     ALLERGIES:  No Known Allergies    MEDICATIONS  (STANDING):  atorvastatin 10 milliGRAM(s) Oral at bedtime  levothyroxine 25 MICROGram(s) Oral daily  memantine 5 milliGRAM(s) Oral two times a day  polyethylene glycol 3350 17 Gram(s) Oral two times a day  QUEtiapine 12.5 milliGRAM(s) Oral at bedtime    MEDICATIONS  (PRN):  acetaminophen     Tablet .. 650 milliGRAM(s) Oral every 6 hours PRN Mild Pain (1 - 3)  bisacodyl Suppository 10 milliGRAM(s) Rectal daily PRN Constipation    Vital Signs Last 24 Hrs  T(F): --  HR: --  BP: --  RR: --  SpO2: --  I&O's Summary    10 Jul 2024 07:01  -  11 Jul 2024 07:00  --------------------------------------------------------  IN: 240 mL / OUT: 250 mL / NET: -10 mL      PHYSICAL EXAM:  General: NAD, Alert x 1, confused.   ENT: No gross hearing impairment, Moist mucous membranes, no thrush  Neck: Supple, No JVD  Lungs: Clear to auscultation bilaterally, good air entry, non-labored breathing  Cardio: RRR, S1/S2, No murmur  Abdomen: Soft, Nontender, Nondistended; Bowel sounds present  Extremities: No calf tenderness, No cyanosis, No pitting edema      LABS:                        7.4    9.18  )-----------( 171      ( 09 Jul 2024 06:27 )             22.7     07-10    151  |  119  |  57  ----------------------------<  97  3.3   |  19  |  1.96    Ca    9.8      10 Jul 2024 07:16  Phos  2.9     07-08  Mg     2.2     07-09          Urinalysis Basic - ( 10 Jul 2024 07:16 )    Color: x / Appearance: x / SG: x / pH: x  Gluc: 97 mg/dL / Ketone: x  / Bili: x / Urobili: x   Blood: x / Protein: x / Nitrite: x   Leuk Esterase: x / RBC: x / WBC x   Sq Epi: x / Non Sq Epi: x / Bacteria: x          RADIOLOGY & ADDITIONAL TESTS:    Care Discussed with Consultants/Other Providers:

## 2024-07-11 NOTE — PROGRESS NOTE ADULT - NUTRITIONAL ASSESSMENT
This patient has been assessed with a concern for Malnutrition and has been determined to have a diagnosis/diagnoses of Moderate protein-calorie malnutrition.    This patient is being managed with:   Diet Clear Liquid-  Entered: Jul 9 2024 12:53PM  
This patient has been assessed with a concern for Malnutrition and has been determined to have a diagnosis/diagnoses of Moderate protein-calorie malnutrition.    This patient is being managed with:   Diet Pureed-  Low Sodium  Supplement Feeding Modality:  Oral  Ensure Plus High Protein Cans or Servings Per Day:  1       Frequency:  Two Times a day  Entered: Jul 4 2024 12:40PM  
This patient has been assessed with a concern for Malnutrition and has been determined to have a diagnosis/diagnoses of Moderate protein-calorie malnutrition.    This patient is being managed with:   Diet Pureed-  Low Sodium  Supplement Feeding Modality:  Oral  Ensure Plus High Protein Cans or Servings Per Day:  1       Frequency:  Two Times a day  Entered: Jul 4 2024 12:40PM  
This patient has been assessed with a concern for Malnutrition and has been determined to have a diagnosis/diagnoses of Moderate protein-calorie malnutrition.    This patient is being managed with:   Diet Clear Liquid-  Entered: Jul 9 2024 12:53PM  
This patient has been assessed with a concern for Malnutrition and has been determined to have a diagnosis/diagnoses of Moderate protein-calorie malnutrition.    This patient is being managed with:   Diet Pureed-  Low Sodium  Supplement Feeding Modality:  Oral  Ensure Plus High Protein Cans or Servings Per Day:  1       Frequency:  Two Times a day  Entered: Jul 4 2024 12:40PM  
This patient has been assessed with a concern for Malnutrition and has been determined to have a diagnosis/diagnoses of Moderate protein-calorie malnutrition.    This patient is being managed with:   Diet Clear Liquid-  Entered: Jul 9 2024 12:53PM  
This patient has been assessed with a concern for Malnutrition and has been determined to have a diagnosis/diagnoses of Moderate protein-calorie malnutrition.    This patient is being managed with:   Diet Pureed-  Low Sodium  Supplement Feeding Modality:  Oral  Ensure Plus High Protein Cans or Servings Per Day:  1       Frequency:  Two Times a day  Entered: Jul 4 2024 12:40PM

## 2024-07-11 NOTE — PROGRESS NOTE ADULT - ASSESSMENT
89yo female w. PMH Dementia, PPM, Afib on Eliquis, mitral/tricuspid insufficiency, HTN, HLD, hypothyroid, and gout p/w L leg hematoma with acute renal failure.     PATIENT IS NOW ON COMFORT MEASURES     *Acute Renal Failure  - Cr. 6 on admission, now downtrending --2.6 on 7/9  - CTAP revealed sigmoid dilatation, consider colonoscopy to rule out partial obstruction, abnormal endometrium r/o-polyp vs myoma  - Continue to hold ACE/AB, Lasix, Avoid nephrotoxic meds  - stopped IVF, family does not want a peg tube  - Nephrology following - Pt is not a great candidate for RRT given poor MS at baseline, Suspect severe pre-renal ATN  - No further blood draws per comfort measures     *LT Leg Hematoma, with possible acute blood loss  *Elevated INR/PTT  - CT LT Leg showed hematoma  - CTH without bleed  - H/H stable, no further blood draws per comfort measures      *Abdominal Distention  -CTAP reviewed  -Appreciate colorectal consult     *Hypothyroid  - Continue Synthroid    *HLD  - Chronic condition  - Continue statin     *Dementia  - Continue Namenda    *GOC/Code Status-pt has advanced directives. Confirmed DNR/DNI--pending home hospice referral, Daughter Lisa Rae [phone]    Dispo: pending home hospice today     89yo female w. PMH Dementia, PPM, Afib on Eliquis, mitral/tricuspid insufficiency, HTN, HLD, hypothyroid, and gout p/w L leg hematoma with acute renal failure.     PATIENT IS NOW ON COMFORT MEASURES     *Acute Renal Failure  - Cr. 6 on admission, now downtrending --2.6 on 7/9  - CTAP revealed sigmoid dilatation, consider colonoscopy to rule out partial obstruction, abnormal endometrium r/o-polyp vs myoma  - Continue to hold ACE/AB, Lasix, Avoid nephrotoxic meds  - stopped IVF, family does not want a peg tube  - Nephrology following - Pt is not a great candidate for RRT given poor MS at baseline, Suspect severe pre-renal ATN  - No further blood draws per comfort measures     *LT Leg Hematoma, with possible acute blood loss  *Elevated INR/PTT  - CT LT Leg showed hematoma  - CTH without bleed  - H/H stable, no further blood draws per comfort measures      *Abdominal Distention  -CTAP reviewed  -Appreciate colorectal consult     *Hypothyroid  - cont synthroid    *HLD  - Chronic condition    *Dementia  - Continue Namenda    *GOC/Code Status-pt has advanced directives. Confirmed DNR/DNI--pending home hospice referral, Daughter Lisa Rae [phone]    Dispo: D/C home hospice today

## 2024-07-11 NOTE — DISCHARGE NOTE NURSING/CASE MANAGEMENT/SOCIAL WORK - PATIENT PORTAL LINK FT
You can access the FollowMyHealth Patient Portal offered by [facility] by registering at the following website: [email or web address]. By joining [employer]’s FollowMyHealth portal, you will also be able to view your health information using other applications (apps) compatible with our system.

## 2024-07-11 NOTE — PROGRESS NOTE ADULT - REASON FOR ADMISSION
Acute renal failure

## 2024-07-11 NOTE — PROGRESS NOTE ADULT - NS ATTEND OPT1 GEN_ALL_CORE
I attest my time as attending is greater than 50% of the total combined time spent on qualifying patient care activities by the PA/NP and attending.
I independently performed the documented:
I attest my time as attending is greater than 50% of the total combined time spent on qualifying patient care activities by the PA/NP and attending.
I independently performed the documented:
I attest my time as attending is greater than 50% of the total combined time spent on qualifying patient care activities by the PA/NP and attending.
I independently performed the documented:

## 2024-07-12 ENCOUNTER — APPOINTMENT (OUTPATIENT)
Dept: HOME HEALTH SERVICES | Facility: HOME HEALTH | Age: 89
End: 2024-07-12

## 2024-07-16 ENCOUNTER — RX RENEWAL (OUTPATIENT)
Age: 89
End: 2024-07-16

## 2024-07-18 NOTE — CDI QUERY NOTE - NSCDIOTHERTXTBX_GEN_ALL_CORE_HH
Presents with AMS, ARF with ATN. H/O Dementia.     Notes -  acute kidney injury, hypernatremia likely due ot dehydration. c/w IVF for now.  Cr improving. sodium high, K low. replaced K. monitor K and Mg. renal following. on IVF. hospice eval pending. palliative noted.    D/C note - Patient becoming increasingly lethargic. Palliative care consulted. Patient with poor PO intake. Cr improving with IVF. Per GOC discussions, patient decided against PEG placement and wants to pursue hospice. Hospice referral sent and pt was accepted to Home Hospice.    Please provide a diagnosis associated with AMS, Lethargy if clinically significant  - Acute Metabolic Encephalopathy  - Other(specify)

## 2024-07-26 ENCOUNTER — APPOINTMENT (OUTPATIENT)
Dept: HOME HEALTH SERVICES | Facility: HOME HEALTH | Age: 89
End: 2024-07-26
Payer: MEDICARE

## 2024-07-26 DIAGNOSIS — F03.90 UNSPECIFIED DEMENTIA W/OUT BEHAVIORAL DISTURBANCE: ICD-10-CM

## 2024-07-26 DIAGNOSIS — N18.30 CHRONIC KIDNEY DISEASE, STAGE 3 UNSPECIFIED: ICD-10-CM

## 2024-07-26 DIAGNOSIS — Z51.5 ENCOUNTER FOR PALLIATIVE CARE: ICD-10-CM

## 2024-07-26 DIAGNOSIS — I48.91 UNSPECIFIED ATRIAL FIBRILLATION: ICD-10-CM

## 2024-07-26 PROCEDURE — 99495 TRANSJ CARE MGMT MOD F2F 14D: CPT | Mod: GV

## 2024-07-30 VITALS
OXYGEN SATURATION: 92 % | TEMPERATURE: 97.6 F | HEART RATE: 80 BPM | DIASTOLIC BLOOD PRESSURE: 60 MMHG | SYSTOLIC BLOOD PRESSURE: 90 MMHG

## 2024-07-30 PROBLEM — Z51.5 HOSPICE CARE PATIENT: Status: ACTIVE | Noted: 2024-07-30

## 2024-07-30 NOTE — PHYSICAL EXAM
[Normal Sclera/Conjunctiva] : normal sclera/conjunctiva [PERRL] : pupils equal, round and reactive to light [Normal Outer Ear/Nose] : the ears and nose were normal in appearance [Normal Oropharynx] : the oropharynx was normal [No JVD] : no jugular venous distention [No Respiratory Distress] : no respiratory distress [Breast Exam Declined] : patient declined to have breast exam done [Normal Bowel Sounds] : normal bowel sounds [Non Tender] : non-tender [Soft] : abdomen soft [Patient Refused] : rectal exam was refused by the patient [No CVA Tenderness] : no ~M costovertebral angle tenderness [Kyphosis] :  kyphosis present [No Skin Lesions] : no skin lesions [No Motor Deficits] : the motor exam was normal [Normal Affect] : the affect was normal [de-identified] : obese  in bed  lethargic and moaning but does not seem to be in pain [de-identified] : poor effort   dull at bases  [de-identified] :  irregular  [de-identified] : obese and softly distended abdomen [de-identified] : antalgic  unsteady  decreased  strength and tone Non ambulatory  remove clutter   always call for assistance with  transfers [de-identified] :   weak

## 2024-07-30 NOTE — HISTORY OF PRESENT ILLNESS
[Family Member] : family member [FreeTextEntry1] :  dementia  [FreeTextEntry2] : 89F h/o dementia, Afib , HLD, hypothyroidism, HTN, CKD, spinal stenosis, has pacemaker now on home hospice , patient is being seen today  after discharge home from  hospital  . Discharge medications were reconciled with the current medication list. all  available  blood work  and radiology reviewed   along with discharge summary s/p NYDIA  and ileus poor appetite   poor   has to be fedas per son most   meds stopped patient now only on comfort pack does not  need much morphine  only tylenol for  grimacing possibly pain  incontinence  dually  pressure/bed sores skin breakdown  Ambulates completely bedbound  falls  none Behavior  depressed  agitated  Mood  depressed   memory  poor    sleep   poor now on seroquel  sensory deficits   pain Medication refills done and reconciliation  done  Goals of care discussed and completed

## 2024-07-30 NOTE — REASON FOR VISIT
[Admitted on: ___] : The patient was admitted on [unfilled] [Discharged on ___] : discharged on [unfilled] [Post-hospitalization from ___ Hospital] : Post-hospitalization from [unfilled] Hospital [Discharge Summary] : discharge summary [FreeTextEntry2] : Upon admission, patient with acute renal failure. Nephrology consulted. Lasix/ACE held. CTAP performed which showed sigmoid dilation. Patient treated with IVF. Patient with further abdominal distention, Colorectal surgery consulted. Bowel regimen started. Electrolytes repleted. Patient becoming increasingly lethargic. Palliative care consulted. Patient with poor PO intake. to pursue hospice. Hospice referral sent and [facility].

## 2024-07-30 NOTE — PHYSICAL EXAM
[Normal Sclera/Conjunctiva] : normal sclera/conjunctiva [PERRL] : pupils equal, round and reactive to light [Normal Outer Ear/Nose] : the ears and nose were normal in appearance [Normal Oropharynx] : the oropharynx was normal [No JVD] : no jugular venous distention [No Respiratory Distress] : no respiratory distress [Breast Exam Declined] : patient declined to have breast exam done [Normal Bowel Sounds] : normal bowel sounds [Non Tender] : non-tender [Soft] : abdomen soft [Patient Refused] : rectal exam was refused by the patient [No CVA Tenderness] : no ~M costovertebral angle tenderness [Kyphosis] :  kyphosis present [No Skin Lesions] : no skin lesions [No Motor Deficits] : the motor exam was normal [Normal Affect] : the affect was normal [de-identified] : obese  in bed  lethargic and moaning but does not seem to be in pain [de-identified] : poor effort   dull at bases  [de-identified] :  irregular  [de-identified] : obese and softly distended abdomen [de-identified] : antalgic  unsteady  decreased  strength and tone Non ambulatory  remove clutter   always call for assistance with  transfers [de-identified] :   weak

## 2024-08-14 ENCOUNTER — APPOINTMENT (OUTPATIENT)
Dept: HOME HEALTH SERVICES | Facility: HOME HEALTH | Age: 89
End: 2024-08-14

## 2024-08-14 VITALS
SYSTOLIC BLOOD PRESSURE: 110 MMHG | TEMPERATURE: 97 F | DIASTOLIC BLOOD PRESSURE: 60 MMHG | HEART RATE: 78 BPM | OXYGEN SATURATION: 92 % | RESPIRATION RATE: 16 BRPM

## 2024-08-15 ENCOUNTER — NON-APPOINTMENT (OUTPATIENT)
Age: 89
End: 2024-08-15

## 2024-08-29 ENCOUNTER — APPOINTMENT (OUTPATIENT)
Dept: ELECTROPHYSIOLOGY | Facility: CLINIC | Age: 89
End: 2024-08-29
Payer: MEDICARE

## 2024-08-29 ENCOUNTER — NON-APPOINTMENT (OUTPATIENT)
Age: 89
End: 2024-08-29

## 2024-08-29 PROCEDURE — 93294 REM INTERROG EVL PM/LDLS PM: CPT

## 2024-08-29 PROCEDURE — 93296 REM INTERROG EVL PM/IDS: CPT

## 2024-09-16 ENCOUNTER — APPOINTMENT (OUTPATIENT)
Dept: HOME HEALTH SERVICES | Facility: HOME HEALTH | Age: 89
End: 2024-09-16

## 2024-10-18 PROBLEM — L89.154 DECUBITUS ULCER OF SACRAL REGION, STAGE 4: Status: ACTIVE | Noted: 2024-01-01

## 2024-10-26 ENCOUNTER — NON-APPOINTMENT (OUTPATIENT)
Age: 89
End: 2024-10-26